# Patient Record
Sex: FEMALE | Race: WHITE | Employment: OTHER | URBAN - METROPOLITAN AREA
[De-identification: names, ages, dates, MRNs, and addresses within clinical notes are randomized per-mention and may not be internally consistent; named-entity substitution may affect disease eponyms.]

---

## 2017-01-13 ENCOUNTER — TELEPHONE (OUTPATIENT)
Dept: INTERNAL MEDICINE CLINIC | Facility: CLINIC | Age: 82
End: 2017-01-13

## 2017-01-13 NOTE — TELEPHONE ENCOUNTER
Cassie Swartz from [facility] health OT calling because due to medicare regulation she is  requesting a verbal order to see the pt next week to complete the evaluation. .. Kip Contreras please advise

## 2017-01-16 ENCOUNTER — TELEPHONE (OUTPATIENT)
Dept: INTERNAL MEDICINE CLINIC | Facility: CLINIC | Age: 82
End: 2017-01-16

## 2017-01-16 NOTE — TELEPHONE ENCOUNTER
Yumiko from [facility] stts she is sending a new order for speech.  Please advise

## 2017-01-18 NOTE — TELEPHONE ENCOUNTER
Left detailed message on confidential voicemail. If Yumiko calls back, ok to trans to [phone] today only or [phone].

## 2017-01-23 ENCOUNTER — TELEPHONE (OUTPATIENT)
Dept: INTERNAL MEDICINE CLINIC | Facility: CLINIC | Age: 82
End: 2017-01-23

## 2017-01-23 ENCOUNTER — PRIOR ORIGINAL RECORDS (OUTPATIENT)
Dept: OTHER | Age: 82
End: 2017-01-23

## 2017-01-23 NOTE — TELEPHONE ENCOUNTER
Monica LUTHER called requesting recent A1C result . Has to be within the last 120 days, if no recent A1C on file a new order will be needed.   Fax # [phone]

## 2017-01-24 NOTE — TELEPHONE ENCOUNTER
Pt has follow up scheduled Friday 01/27 with TIMOTHY.  LOV 10/12 states DM being managed by endocrinologist Dr. Radha Esposito. No A1C on file here. Mason Kapadia notified, she will contact Dr. Kimberley Sharma office. If no recent data can be addressed at office visit. TIMOTHY FYI.

## 2017-01-24 NOTE — TELEPHONE ENCOUNTER
[facility] [address] calling checking status of results of lab work of A1C done in last 3 months. Nurse states she does not have pts complete med list of plan of care for pt since pt is a diabetic.  Nurse states pts family seems to not have all the informa

## 2017-01-27 ENCOUNTER — OFFICE VISIT (OUTPATIENT)
Dept: INTERNAL MEDICINE CLINIC | Facility: CLINIC | Age: 82
End: 2017-01-27

## 2017-01-27 VITALS
TEMPERATURE: 98 F | SYSTOLIC BLOOD PRESSURE: 137 MMHG | HEIGHT: 64 IN | BODY MASS INDEX: 24.59 KG/M2 | WEIGHT: 144 LBS | DIASTOLIC BLOOD PRESSURE: 87 MMHG | HEART RATE: 97 BPM

## 2017-01-27 DIAGNOSIS — I65.29 CAROTID ARTERY PLAQUE, UNSPECIFIED LATERALITY: ICD-10-CM

## 2017-01-27 DIAGNOSIS — R26.89 NEED FOR ASSISTANCE DUE TO UNSTEADY GAIT: ICD-10-CM

## 2017-01-27 DIAGNOSIS — E11.9 TYPE 2 DIABETES MELLITUS WITHOUT COMPLICATION, WITHOUT LONG-TERM CURRENT USE OF INSULIN (HCC): ICD-10-CM

## 2017-01-27 DIAGNOSIS — M15.9 PRIMARY OSTEOARTHRITIS INVOLVING MULTIPLE JOINTS: ICD-10-CM

## 2017-01-27 DIAGNOSIS — Z91.81 AT HIGH RISK FOR FALLS: ICD-10-CM

## 2017-01-27 DIAGNOSIS — H40.009 PREGLAUCOMA, UNSPECIFIED LATERALITY: ICD-10-CM

## 2017-01-27 DIAGNOSIS — N60.19 FIBROCYSTIC BREAST CHANGES, UNSPECIFIED LATERALITY: ICD-10-CM

## 2017-01-27 DIAGNOSIS — Z00.00 ENCOUNTER FOR MEDICARE ANNUAL WELLNESS EXAM: Primary | ICD-10-CM

## 2017-01-27 DIAGNOSIS — I48.0 PAROXYSMAL ATRIAL FIBRILLATION (HCC): ICD-10-CM

## 2017-01-27 PROCEDURE — G0439 PPPS, SUBSEQ VISIT: HCPCS | Performed by: INTERNAL MEDICINE

## 2017-01-27 PROCEDURE — 99213 OFFICE O/P EST LOW 20 MIN: CPT | Performed by: INTERNAL MEDICINE

## 2017-01-27 RX ORDER — CARVEDILOL 6.25 MG/1
3.12 TABLET ORAL DAILY
COMMUNITY
Start: 2017-01-11 | End: 2018-07-18

## 2017-01-27 RX ORDER — CLOTRIMAZOLE 1 %
CREAM (GRAM) TOPICAL AS NEEDED
COMMUNITY
Start: 2017-01-11 | End: 2017-09-07

## 2017-01-27 RX ORDER — FLUOXETINE 10 MG/1
10 CAPSULE ORAL DAILY
COMMUNITY
End: 2017-05-23

## 2017-01-27 NOTE — PROGRESS NOTES
HPI:    Patient ID: Alan Jackson is a 80year old female.     HPI    Patient lazarus in by her daughter and son  She lives alone in a 4 bedrrom home with stairs  She fell and was found on the charity after a few hours   Was hospitalized  And sent home af 152 lb (68.947 kg)  04/29/13 : 153 lb (69.4 kg)  12/14/12 : 153 lb (69.4 kg)  11/30/12 : 155 lb (70.308 kg)    Body mass index is 24.71 kg/(m^2).   /87 mmHg  Pulse 97  Temp(Src) 98.2 °F (36.8 °C) (Oral)  Ht 5' 4\" (1.626 m)  Wt 144 lb (65.318 kg)  BMI of Systems   Constitutional: Positive for fatigue. Negative for fever, chills and activity change. HENT: Negative for ear discharge, nosebleeds, postnasal drip, rhinorrhea, sinus pressure and sore throat.     Eyes: Negative for pain, discharge and redness by mouth daily. Disp: 90 tablet Rfl: 1   oxcarbazepine (TRILEPTAL) 150 MG Oral Tab Take 1 tablet (150 mg total) by mouth daily. Disp: 90 tablet Rfl: 1   RaNITidine HCl 15 MG/ML Oral Syrup Take by mouth 2 (two) times daily.  Disp:  Rfl:    GlipiZIDE ER 2.5 M gallop. No murmur heard. Pulmonary/Chest: Effort normal and breath sounds normal. No respiratory distress. She has no wheezes. She has no rales. She exhibits no tenderness. Abdominal: Soft.  Bowel sounds are normal. She exhibits no distension and no m discussed. Modification of risk for CAD discussed. Patient voiced understanding and agrees with current plan and management. HPI:   Juanito Fraga is a 80year old female who presents for a Medicare Annual Wellness visit.     Patient Active Proble 0-No     Have you had any memory issues?: 0-No    Fall/Risk Scorin    Scoring Interpretation: 4+ At Risk     Depression Screening (PHQ-2/PHQ-9): Over the LAST 2 WEEKS   Little interest or pleasure in doing things (over the last two weeks)?: Not at all mg total) by mouth daily. Disp: 90 tablet Rfl: 1   oxcarbazepine (TRILEPTAL) 150 MG Oral Tab Take 1 tablet (150 mg total) by mouth daily. Disp: 90 tablet Rfl: 1   RaNITidine HCl 15 MG/ML Oral Syrup Take by mouth 2 (two) times daily.  Disp:  Rfl:    Mathew Nicholas are grossly intact    ASSESSMENT AND OTHER RELEVANT CHRONIC CONDITIONS:   Briseida Still is a 80year old female who presents for a Medicare Assessment.      PLAN SUMMARY:   MEDICARE ANNUAL EXAM  SEE ABOVE FOR DETAIL     The patient indicates [place] Immunization Activity if applicable    Pneumococcal   Orders placed or performed in visit on 10/12/16  -PNEUMOCOCCAL IMM (PNEUMOVAX)    Update Immunization Activity if applicable    Hepatitis B No orders found for this or any previous visit.  Update Immuniz reminders to display for this patient. Pap All Patients q2 year if indicated There are no preventive care reminders to display for this patient.    Mammogram Age > 39 q1 year Mammogram,1 Yr due on 11/13/2015   EKG All Patients One at initial exam COMPLED

## 2017-01-30 ENCOUNTER — TELEPHONE (OUTPATIENT)
Dept: INTERNAL MEDICINE CLINIC | Facility: CLINIC | Age: 82
End: 2017-01-30

## 2017-01-30 NOTE — TELEPHONE ENCOUNTER
[facility] Renetta Swift is calling to f/u with Md regarding the pt visit on Friday   HHN need to know exactly what took place at the visit   [facility] also states she has irregular heart beats   Please advise

## 2017-01-30 NOTE — TELEPHONE ENCOUNTER
Spoke to Randee. She has noticed pt has an irregular HR when she sees her, is aware that pt has a history of afib. Wanted to make sure that  in the office on Friday and wondered if there was anything specific that had changed.  Per OV note,  is aware pt

## 2017-01-31 NOTE — TELEPHONE ENCOUNTER
Left a detailed message with orders below to Randee from home health. Instructed to call back if needed.

## 2017-02-01 ENCOUNTER — TELEPHONE (OUTPATIENT)
Dept: INTERNAL MEDICINE CLINIC | Facility: CLINIC | Age: 82
End: 2017-02-01

## 2017-02-01 NOTE — TELEPHONE ENCOUNTER
Per daughter changed pt residential home because her room was too small. Pt is going to [facility]. Daughter is requesting the information needed be faxed to [address] [facility]. Information faxed.   Also daughter is would like to know the st

## 2017-02-01 NOTE — TELEPHONE ENCOUNTER
Daughter would like to speak to to RN or , to discuss physical therapy for the pt and also to get an update on the walker request for the pt.

## 2017-02-02 ENCOUNTER — TELEPHONE (OUTPATIENT)
Dept: INTERNAL MEDICINE CLINIC | Facility: CLINIC | Age: 82
End: 2017-02-02

## 2017-02-02 NOTE — TELEPHONE ENCOUNTER
Sherie Dumont from Occupational Therapy would like to know if she can extend OT for two times per week for two week.

## 2017-02-02 NOTE — TELEPHONE ENCOUNTER
Zachariah ordered  You have to send it to physical therapy taking care of her now  She will need to give me new forms  [facility]

## 2017-02-02 NOTE — TELEPHONE ENCOUNTER
Order faxed to [phone] and daughter notified. Daughter stts to please also send a copy to Jonel . Copy faxed.

## 2017-02-02 NOTE — TELEPHONE ENCOUNTER
Ladarius Almendarez is calling to get Height and weight for pt   Pharm is trying to deliver her walker

## 2017-02-03 NOTE — TELEPHONE ENCOUNTER
LM on verified confidential VM of Jack Vizcaino OT in regards to ok to extend OT two times per week for two weeks. Informed to call back if any further questions or concerns. May transfer to x  until 5 today or i [phone] anytime.

## 2017-02-06 ENCOUNTER — LAB REQUISITION (OUTPATIENT)
Dept: LAB | Facility: HOSPITAL | Age: 82
End: 2017-02-06
Payer: MEDICARE

## 2017-02-06 DIAGNOSIS — E11.9 TYPE 2 DIABETES MELLITUS WITHOUT COMPLICATIONS (HCC): ICD-10-CM

## 2017-02-06 PROCEDURE — 83036 HEMOGLOBIN GLYCOSYLATED A1C: CPT | Performed by: INTERNAL MEDICINE

## 2017-02-07 ENCOUNTER — TELEPHONE (OUTPATIENT)
Dept: FAMILY MEDICINE CLINIC | Facility: CLINIC | Age: 82
End: 2017-02-07

## 2017-02-07 LAB — HBA1C MFR BLD: 6.3 % (ref 4–6)

## 2017-02-07 NOTE — TELEPHONE ENCOUNTER
Randee SALAS states Hemaglobin A1C is 6.3. States when patient was in [place] Mylar was taken off the Glipizide, states family told her she wasn't eating well at that time and blood sugar was controlled.  States tried calling daughter who is in charge of Endocrine

## 2017-02-07 NOTE — TELEPHONE ENCOUNTER
Baltazar Jo is calling from [facility] state that pt had abnormal AC value is 6.3 want to know if any new order that concern   Also state that she will be discharging pt from [facility]  On 2/13 Monday state that pt will still have therapy but there is no Need for Skil

## 2017-02-08 ENCOUNTER — PRIOR ORIGINAL RECORDS (OUTPATIENT)
Dept: OTHER | Age: 82
End: 2017-02-08

## 2017-02-08 NOTE — TELEPHONE ENCOUNTER
S/w @ AM- she states pt did see [facility] over there, will give the msg to [facility]'s nurse to refill this med. Pt not seen in [facility].

## 2017-02-08 NOTE — TELEPHONE ENCOUNTER
LM on verified confidential VM in regards to doctor message below , stated to call back if any further questions or concerns. May transfer to  [phone] if call back.

## 2017-02-09 ENCOUNTER — TELEPHONE (OUTPATIENT)
Dept: FAMILY MEDICINE CLINIC | Facility: CLINIC | Age: 82
End: 2017-02-09

## 2017-02-09 DIAGNOSIS — Z11.1 PPD SCREENING TEST: Primary | ICD-10-CM

## 2017-02-09 NOTE — TELEPHONE ENCOUNTER
Peter Gonsales is calling again. Can we please fax pt's XR chest results from 12/14/2012 to [facility] at fax # [phone]?

## 2017-02-09 NOTE — TELEPHONE ENCOUNTER
Faxed chest x-ray to fax [phone]. Yessenia-daughter advised. Daughter indicated that paperwork was filled out by Dr Davi Dumont for [facility]. [facility]iley Felder will accept that paperwork. Do not see paperwork scanned into chart.  Please advise if paperwork o

## 2017-02-09 NOTE — TELEPHONE ENCOUNTER
Morgan Shultz of [facility] Snider to her New home [facility] is scheduled to Move in there today   FAX # [phone] [facility] [address] Phone # [phone]  Please advise

## 2017-02-09 NOTE — TELEPHONE ENCOUNTER
Pt's daughter Simone Duffy she needs results of chest xray and TB so mother can be admitted to nursing home.  Please advised

## 2017-02-09 NOTE — TELEPHONE ENCOUNTER
[facility] is unable to accept pt. Esau Lares from [facility] is requesting form that was sent to [facility] to be faxed to her.      Fax # [phone] , Latisha Gardner can be reached at [phone]

## 2017-02-09 NOTE — TELEPHONE ENCOUNTER
Daughter needs the original form that was filled out at her first visit. Her Mom will not be able to move in [place]. They will accept the original form if we can find it.   Daughter is very upset and wants a call back ASAP ( advised no one in th

## 2017-02-10 NOTE — TELEPHONE ENCOUNTER
Rafael form filled out  We just got iti today and  The form from another assisted living was not appropriate  The office did not have the correct form before

## 2017-02-10 NOTE — TELEPHONE ENCOUNTER
Advised daughter of Fabiano Reeves note below. Daughter verbalized understanding and appreciated the call.

## 2017-02-10 NOTE — TELEPHONE ENCOUNTER
Per Alexia Lamb of pt, would like to know if form is completed. Pls call back Genna Ramírez at [phone]. Pls advise.

## 2017-02-10 NOTE — TELEPHONE ENCOUNTER
Per Franca Ramos daughter of pt, need a new whole form for the [place] since  pt form for [employer] is missing by the office,  Franca Ramos the daughter of pt is so upset that this needs to be done asap due to office is at fault.   Any question can call Franca Ramos at

## 2017-02-10 NOTE — TELEPHONE ENCOUNTER
FORM COMPLETED AND FAXED TO [facility] [phone], FAXED CONFIRMATION RECEIVED. ORIGINAL FORM PLACED ON DR Ninfa More DESK AT [facility].

## 2017-02-10 NOTE — TELEPHONE ENCOUNTER
Grace/Rafael [place] f/u on the forms. Must have them faxed today before office is closed. Fax:[phone].

## 2017-02-13 ENCOUNTER — TELEPHONE (OUTPATIENT)
Dept: INTERNAL MEDICINE CLINIC | Facility: CLINIC | Age: 82
End: 2017-02-13

## 2017-02-13 NOTE — TELEPHONE ENCOUNTER
Merry from [facility] calling and states patients BP is elevated at 140/92. Pulse is 83. Patient has just taken her BP meds. Franci Lyle states patient is now at [facility] in [address] [place] [address] and they need an order in order to check patients BP for one whole week.

## 2017-02-14 NOTE — TELEPHONE ENCOUNTER
Darlyn Craig RN of Dr. Villareal [place] note. Merry indicated that needed that order faxed to [address] Fax [phone]. Letter generated and faxed.

## 2017-02-15 ENCOUNTER — TELEPHONE (OUTPATIENT)
Dept: INTERNAL MEDICINE CLINIC | Facility: CLINIC | Age: 82
End: 2017-02-15

## 2017-02-15 DIAGNOSIS — R41.3 SHORT-TERM MEMORY LOSS: Primary | ICD-10-CM

## 2017-02-15 NOTE — TELEPHONE ENCOUNTER
Call  DR Fidel oSn or pharmacy  Not in her med list  Refill for a month if you fine exelon in her med list   Makes sure no drug interaction

## 2017-02-15 NOTE — TELEPHONE ENCOUNTER
Per Daly/OT, need to extend Occupational Therapy as pt is living in [place] [address] living for 2 times a wk for 2 wks. Daughter of pt, she would like pt to start a memory patch due to pt is having memory issue and Daly/OT told her to call the office.

## 2017-02-15 NOTE — TELEPHONE ENCOUNTER
Spoke to pt's daughter, Herbert Howard. Informed her of recommendation from Dr. Melody Bates to refer to Neurology. Per daughter, the pt was on the Exelon (Rivastigmine) patch, prescribed by Dr. Latrell Elliott, however the patient was not changing it, so it was discontinued.  Adia Jimenez

## 2017-02-15 NOTE — TELEPHONE ENCOUNTER
Dr. Natasha Hair, please see message from PT at [facility] below and advise.

## 2017-02-15 NOTE — TELEPHONE ENCOUNTER
Estelita, physical therapist with [facility] , called in requesting an extension on pt's PT. Estelita is asking for twice a week for the next three weeks, please.

## 2017-02-15 NOTE — TELEPHONE ENCOUNTER
OK    Will have to eval  For memory issues  Next OV will do MMSE if not done yet to determine need  For  Treatment for Alzheimer  Offer referal to see neurology  meds have side effect   Need to  Give med if there is an indicatiion  referal to neurology pre

## 2017-02-16 NOTE — TELEPHONE ENCOUNTER
Message sent to Dr Devan Cunningham. [facility] pharmacy called as only pharmacy on file for this pt. Pharmacist looked as far back as 2015. No record of ever dispensing this medication for pt.

## 2017-02-16 NOTE — TELEPHONE ENCOUNTER
Dr Melody Bates would like to know if you recall prescribing Exelon ( Rivastigmine) for this pt? No in current EPIC chart past or present meds.   Please advise

## 2017-02-24 ENCOUNTER — HOSPITAL ENCOUNTER (INPATIENT)
Facility: HOSPITAL | Age: 82
LOS: 10 days | Discharge: NURSING, REHAB OR LONG-TERM CARE | DRG: 291 | End: 2017-03-07
Attending: EMERGENCY MEDICINE | Admitting: HOSPITALIST
Payer: MEDICARE

## 2017-02-24 ENCOUNTER — APPOINTMENT (OUTPATIENT)
Dept: GENERAL RADIOLOGY | Facility: HOSPITAL | Age: 82
DRG: 291 | End: 2017-02-24
Attending: EMERGENCY MEDICINE
Payer: MEDICARE

## 2017-02-24 DIAGNOSIS — K29.70 GASTRITIS: ICD-10-CM

## 2017-02-24 DIAGNOSIS — I50.9 HEART FAILURE, UNSPECIFIED HEART FAILURE CHRONICITY, UNSPECIFIED HEART FAILURE TYPE: Primary | ICD-10-CM

## 2017-02-24 DIAGNOSIS — K44.9 HIATAL HERNIA: ICD-10-CM

## 2017-02-24 DIAGNOSIS — K31.7 GASTRIC POLYP: ICD-10-CM

## 2017-02-24 LAB
ALBUMIN SERPL BCP-MCNC: 3.9 G/DL (ref 3.5–4.8)
ALP SERPL-CCNC: 75 U/L (ref 32–100)
ALT SERPL-CCNC: 20 U/L (ref 14–54)
ANION GAP SERPL CALC-SCNC: 11 MMOL/L (ref 0–18)
AST SERPL-CCNC: 22 U/L (ref 15–41)
BASOPHILS # BLD: 0.1 K/UL (ref 0–0.2)
BASOPHILS NFR BLD: 1 %
BILIRUB DIRECT SERPL-MCNC: 0.3 MG/DL (ref 0–0.2)
BILIRUB SERPL-MCNC: 1.1 MG/DL (ref 0.3–1.2)
BNP SERPL-MCNC: 1499 PG/ML (ref 0–100)
BUN SERPL-MCNC: 25 MG/DL (ref 8–20)
BUN/CREAT SERPL: 24 (ref 10–20)
CALCIUM SERPL-MCNC: 9 MG/DL (ref 8.5–10.5)
CHLORIDE SERPL-SCNC: 100 MMOL/L (ref 95–110)
CO2 SERPL-SCNC: 22 MMOL/L (ref 22–32)
CREAT SERPL-MCNC: 1.04 MG/DL (ref 0.5–1.5)
EOSINOPHIL # BLD: 0.1 K/UL (ref 0–0.7)
EOSINOPHIL NFR BLD: 1 %
ERYTHROCYTE [DISTWIDTH] IN BLOOD BY AUTOMATED COUNT: 15.4 % (ref 11–15)
GLUCOSE SERPL-MCNC: 263 MG/DL (ref 70–99)
HCT VFR BLD AUTO: 38.6 % (ref 35–48)
HGB BLD-MCNC: 12.7 G/DL (ref 12–16)
LYMPHOCYTES # BLD: 0.6 K/UL (ref 1–4)
LYMPHOCYTES NFR BLD: 6 %
MCH RBC QN AUTO: 30.8 PG (ref 27–32)
MCHC RBC AUTO-ENTMCNC: 32.9 G/DL (ref 32–37)
MCV RBC AUTO: 93.8 FL (ref 80–100)
MONOCYTES # BLD: 1 K/UL (ref 0–1)
MONOCYTES NFR BLD: 9 %
NEUTROPHILS # BLD AUTO: 9 K/UL (ref 1.8–7.7)
NEUTROPHILS NFR BLD: 84 %
OSMOLALITY UR CALC.SUM OF ELEC: 290 MOSM/KG (ref 275–295)
PLATELET # BLD AUTO: 200 K/UL (ref 140–400)
PMV BLD AUTO: 9.7 FL (ref 7.4–10.3)
POTASSIUM SERPL-SCNC: 4.1 MMOL/L (ref 3.3–5.1)
PROT SERPL-MCNC: 6.2 G/DL (ref 5.9–8.4)
RBC # BLD AUTO: 4.11 M/UL (ref 3.7–5.4)
SODIUM SERPL-SCNC: 133 MMOL/L (ref 136–144)
TROPONIN I SERPL-MCNC: 0.02 NG/ML (ref ?–0.03)
WBC # BLD AUTO: 10.7 K/UL (ref 4–11)

## 2017-02-24 PROCEDURE — 71010 XR CHEST AP PORTABLE  (CPT=71010): CPT

## 2017-02-24 PROCEDURE — 99223 1ST HOSP IP/OBS HIGH 75: CPT | Performed by: HOSPITALIST

## 2017-02-24 RX ORDER — NITROGLYCERIN 20 MG/100ML
50 INJECTION INTRAVENOUS CONTINUOUS
Status: DISCONTINUED | OUTPATIENT
Start: 2017-02-24 | End: 2017-02-24

## 2017-02-24 RX ORDER — FUROSEMIDE 10 MG/ML
40 INJECTION INTRAMUSCULAR; INTRAVENOUS ONCE
Status: COMPLETED | OUTPATIENT
Start: 2017-02-24 | End: 2017-02-24

## 2017-02-24 RX ORDER — FUROSEMIDE 10 MG/ML
20 INJECTION INTRAMUSCULAR; INTRAVENOUS ONCE
Status: DISCONTINUED | OUTPATIENT
Start: 2017-02-24 | End: 2017-02-24

## 2017-02-24 RX ORDER — NITROGLYCERIN 20 MG/100ML
50 INJECTION INTRAVENOUS CONTINUOUS
Status: DISCONTINUED | OUTPATIENT
Start: 2017-02-24 | End: 2017-03-07

## 2017-02-24 RX ORDER — NITROGLYCERIN 20 MG/100ML
INJECTION INTRAVENOUS
Status: COMPLETED
Start: 2017-02-24 | End: 2017-02-24

## 2017-02-25 ENCOUNTER — APPOINTMENT (OUTPATIENT)
Dept: ULTRASOUND IMAGING | Facility: HOSPITAL | Age: 82
DRG: 291 | End: 2017-02-25
Attending: INTERNAL MEDICINE
Payer: MEDICARE

## 2017-02-25 ENCOUNTER — APPOINTMENT (OUTPATIENT)
Dept: CV DIAGNOSTICS | Facility: HOSPITAL | Age: 82
DRG: 291 | End: 2017-02-25
Attending: HOSPITALIST
Payer: MEDICARE

## 2017-02-25 PROBLEM — I50.9 HEART FAILURE, UNSPECIFIED HEART FAILURE CHRONICITY, UNSPECIFIED HEART FAILURE TYPE: Status: ACTIVE | Noted: 2017-02-25

## 2017-02-25 LAB
ANION GAP SERPL CALC-SCNC: 12 MMOL/L (ref 0–18)
BASOPHILS # BLD: 0.1 K/UL (ref 0–0.2)
BASOPHILS NFR BLD: 1 %
BILIRUB UR QL: NEGATIVE
BUN SERPL-MCNC: 20 MG/DL (ref 8–20)
BUN/CREAT SERPL: 18.9 (ref 10–20)
CALCIUM SERPL-MCNC: 9 MG/DL (ref 8.5–10.5)
CHLORIDE SERPL-SCNC: 96 MMOL/L (ref 95–110)
CO2 SERPL-SCNC: 28 MMOL/L (ref 22–32)
COLOR UR: YELLOW
CREAT SERPL-MCNC: 1.06 MG/DL (ref 0.5–1.5)
EOSINOPHIL # BLD: 0.1 K/UL (ref 0–0.7)
EOSINOPHIL NFR BLD: 2 %
ERYTHROCYTE [DISTWIDTH] IN BLOOD BY AUTOMATED COUNT: 14.9 % (ref 11–15)
GLUCOSE BLDC GLUCOMTR-MCNC: 125 MG/DL (ref 70–99)
GLUCOSE BLDC GLUCOMTR-MCNC: 153 MG/DL (ref 70–99)
GLUCOSE BLDC GLUCOMTR-MCNC: 176 MG/DL (ref 70–99)
GLUCOSE BLDC GLUCOMTR-MCNC: 211 MG/DL (ref 70–99)
GLUCOSE SERPL-MCNC: 179 MG/DL (ref 70–99)
GLUCOSE UR-MCNC: NEGATIVE MG/DL
HCT VFR BLD AUTO: 36.8 % (ref 35–48)
HGB BLD-MCNC: 12.5 G/DL (ref 12–16)
KETONES UR-MCNC: NEGATIVE MG/DL
LYMPHOCYTES # BLD: 0.7 K/UL (ref 1–4)
LYMPHOCYTES NFR BLD: 7 %
MAGNESIUM SERPL-MCNC: 1.6 MG/DL (ref 1.8–2.5)
MCH RBC QN AUTO: 31.3 PG (ref 27–32)
MCHC RBC AUTO-ENTMCNC: 33.8 G/DL (ref 32–37)
MCV RBC AUTO: 92.4 FL (ref 80–100)
MONOCYTES # BLD: 1.2 K/UL (ref 0–1)
MONOCYTES NFR BLD: 13 %
MRSA DNA SPEC QL NAA+PROBE: NEGATIVE
NEUTROPHILS # BLD AUTO: 7.3 K/UL (ref 1.8–7.7)
NEUTROPHILS NFR BLD: 77 %
NITRITE UR QL STRIP.AUTO: NEGATIVE
OSMOLALITY UR CALC.SUM OF ELEC: 289 MOSM/KG (ref 275–295)
PH UR: 5 [PH] (ref 5–8)
PLATELET # BLD AUTO: 183 K/UL (ref 140–400)
PMV BLD AUTO: 9.6 FL (ref 7.4–10.3)
POTASSIUM SERPL-SCNC: 3.6 MMOL/L (ref 3.3–5.1)
PROT UR-MCNC: NEGATIVE MG/DL
RBC # BLD AUTO: 3.98 M/UL (ref 3.7–5.4)
RBC #/AREA URNS AUTO: 18 /HPF
SODIUM SERPL-SCNC: 136 MMOL/L (ref 136–144)
SP GR UR STRIP: 1 (ref 1–1.03)
UROBILINOGEN UR STRIP-ACNC: <2
VIT C UR-MCNC: NEGATIVE MG/DL
WBC # BLD AUTO: 9.5 K/UL (ref 4–11)
WBC #/AREA URNS AUTO: 186 /HPF

## 2017-02-25 PROCEDURE — 99223 1ST HOSP IP/OBS HIGH 75: CPT | Performed by: INTERNAL MEDICINE

## 2017-02-25 PROCEDURE — 99233 SBSQ HOSP IP/OBS HIGH 50: CPT | Performed by: HOSPITALIST

## 2017-02-25 PROCEDURE — 76770 US EXAM ABDO BACK WALL COMP: CPT

## 2017-02-25 PROCEDURE — 93306 TTE W/DOPPLER COMPLETE: CPT | Performed by: INTERNAL MEDICINE

## 2017-02-25 PROCEDURE — 93306 TTE W/DOPPLER COMPLETE: CPT

## 2017-02-25 RX ORDER — FLUOXETINE 10 MG/1
10 CAPSULE ORAL DAILY
Status: DISCONTINUED | OUTPATIENT
Start: 2017-02-25 | End: 2017-03-07

## 2017-02-25 RX ORDER — MAGNESIUM SULFATE HEPTAHYDRATE 40 MG/ML
2 INJECTION, SOLUTION INTRAVENOUS ONCE
Status: COMPLETED | OUTPATIENT
Start: 2017-02-25 | End: 2017-02-25

## 2017-02-25 RX ORDER — DEXTROSE MONOHYDRATE 25 G/50ML
50 INJECTION, SOLUTION INTRAVENOUS AS NEEDED
Status: DISCONTINUED | OUTPATIENT
Start: 2017-02-25 | End: 2017-03-07

## 2017-02-25 RX ORDER — 0.9 % SODIUM CHLORIDE 0.9 %
VIAL (ML) INJECTION
Status: DISPENSED
Start: 2017-02-25 | End: 2017-02-25

## 2017-02-25 RX ORDER — FUROSEMIDE 10 MG/ML
20 INJECTION INTRAMUSCULAR; INTRAVENOUS ONCE
Status: DISCONTINUED | OUTPATIENT
Start: 2017-02-25 | End: 2017-02-27

## 2017-02-25 RX ORDER — CARVEDILOL 12.5 MG/1
12.5 TABLET ORAL 2 TIMES DAILY WITH MEALS
Status: DISCONTINUED | OUTPATIENT
Start: 2017-02-25 | End: 2017-03-02

## 2017-02-25 RX ORDER — LOSARTAN POTASSIUM 50 MG/1
50 TABLET ORAL DAILY
Status: DISCONTINUED | OUTPATIENT
Start: 2017-02-25 | End: 2017-03-02

## 2017-02-25 RX ORDER — NITROGLYCERIN 0.4 MG/1
0.4 TABLET SUBLINGUAL EVERY 5 MIN PRN
Status: DISCONTINUED | OUTPATIENT
Start: 2017-02-25 | End: 2017-03-07

## 2017-02-25 RX ORDER — FUROSEMIDE 10 MG/ML
40 INJECTION INTRAMUSCULAR; INTRAVENOUS DAILY
Status: DISCONTINUED | OUTPATIENT
Start: 2017-02-25 | End: 2017-02-25

## 2017-02-25 RX ORDER — FUROSEMIDE 10 MG/ML
40 INJECTION INTRAMUSCULAR; INTRAVENOUS DAILY
Status: DISCONTINUED | OUTPATIENT
Start: 2017-02-26 | End: 2017-02-27

## 2017-02-25 RX ORDER — ASPIRIN 81 MG/1
81 TABLET ORAL DAILY
Status: DISCONTINUED | OUTPATIENT
Start: 2017-02-25 | End: 2017-03-07

## 2017-02-25 RX ORDER — EZETIMIBE AND SIMVASTATIN 10; 10 MG/1; MG/1
1 TABLET ORAL DAILY
Status: DISCONTINUED | OUTPATIENT
Start: 2017-02-25 | End: 2017-03-07

## 2017-02-25 RX ORDER — OXCARBAZEPINE 150 MG/1
150 TABLET, FILM COATED ORAL DAILY
Status: DISCONTINUED | OUTPATIENT
Start: 2017-02-25 | End: 2017-03-07

## 2017-02-25 RX ORDER — POTASSIUM CHLORIDE 20 MEQ/1
40 TABLET, EXTENDED RELEASE ORAL EVERY 4 HOURS
Status: COMPLETED | OUTPATIENT
Start: 2017-02-25 | End: 2017-02-25

## 2017-02-25 RX ORDER — FUROSEMIDE 10 MG/ML
40 INJECTION INTRAMUSCULAR; INTRAVENOUS
Status: DISCONTINUED | OUTPATIENT
Start: 2017-02-25 | End: 2017-02-25

## 2017-02-25 RX ORDER — ACETAMINOPHEN 325 MG/1
650 TABLET ORAL EVERY 6 HOURS PRN
Status: DISCONTINUED | OUTPATIENT
Start: 2017-02-25 | End: 2017-03-07

## 2017-02-25 RX ORDER — GLIPIZIDE 2.5 MG/1
2.5 TABLET, EXTENDED RELEASE ORAL
Status: DISCONTINUED | OUTPATIENT
Start: 2017-02-25 | End: 2017-03-06

## 2017-02-25 RX ORDER — ALPRAZOLAM 0.25 MG/1
0.25 TABLET ORAL EVERY 8 HOURS PRN
Status: DISCONTINUED | OUTPATIENT
Start: 2017-02-25 | End: 2017-03-07

## 2017-02-25 RX ORDER — ONDANSETRON 2 MG/ML
4 INJECTION INTRAMUSCULAR; INTRAVENOUS EVERY 6 HOURS PRN
Status: DISCONTINUED | OUTPATIENT
Start: 2017-02-25 | End: 2017-03-07

## 2017-02-25 RX ORDER — PANTOPRAZOLE SODIUM 40 MG/1
40 TABLET, DELAYED RELEASE ORAL DAILY
Status: DISCONTINUED | OUTPATIENT
Start: 2017-02-25 | End: 2017-03-07

## 2017-02-25 RX ORDER — DOCUSATE SODIUM 100 MG/1
100 CAPSULE, LIQUID FILLED ORAL 2 TIMES DAILY
Status: DISCONTINUED | OUTPATIENT
Start: 2017-02-25 | End: 2017-03-07

## 2017-02-25 NOTE — ED NOTES
Bello catheter continues to drain clear yellow urine via gravity without difficulty. Nitro drip infusing via pump at 100 mcg/min. Patient sleeping on cart with respirations even and unlabored at this time. Monitor continues to display A-fib.   Patient to

## 2017-02-25 NOTE — PROGRESS NOTES
[place] [facility]    Progress Note    Veronica Doherty Patient Status:  Inpatient    1935 MRN [id number]   Location [facility] 2W/SW Attending Amanda Wiggins MD   [address] Day # 1 PCP Violet Montoya MD       SUBJECTIVE:  No CP, reflect congestive changes overlie can't entirely exclude superimposed pneumonia. Findings are new when compared to December 2012 study. 3. Preliminary report was given by I AM AT Radiology.            Meds:     Current Facility-Administered Medications:  on (chronic kidney disease) stage 3, GFR 30-59 ml/min      Plan:     Acute diastolic heart failure  - Started IV lasix bid - change to daily now  - repeat echo pending  - cards consulted  - monitor I/O and daily weights    Uncontrolled diabetes  We will resum

## 2017-02-25 NOTE — PROGRESS NOTES
Diabetes Educator Note:  Met with patient and family to discuss monitoring blood sugars. Pt resides at [facility], receives nursing care where her blood sugars are regularly being checked by staff.   Information provided by her son, who also reports she is n

## 2017-02-25 NOTE — ED NOTES
Ntg paste removed per MD orders and Nitro Drip hung and infusing via pump at 50 mcg/min as per orders. Monitor continues to displays A-fib with rate of 119. Daughter at bedside.

## 2017-02-25 NOTE — PHYSICAL THERAPY NOTE
PHYSICAL THERAPY EVALUATION - INPATIENT     Room Number: 224/224-A  Evaluation Date: 2/25/2017  Type of Evaluation: Initial   Physician Order: PT Eval and Treat    Presenting Problem:  (CHF, BLE edema)  Reason for Therapy: Mobility Dysfunction and Disc consents to sit at EOB, stance in walker, tries to take steps in walker. RN reports pt walked to BR earlier in the day w/ RN assist.     Patient self-stated goal is go back to sleep.      OBJECTIVE  Precautions: None  Fall Risk: High fall risk    WEIGHT KARMA from a bed to a chair (including a wheelchair)?: A Lot   -   Need to walk in hospital room?: A Lot   -   Climbing 3-5 steps with a railing?: A Lot       AM-PAC Score:  Raw Score: 15   PT Approx Degree of Impairment Score: 57.7%   Standardized Score (AM-PAC Goal #3 Patient is able to ambulate 100 feet with assist device: RW at assistance level: CGA   Goal #4    Goal #5    Goal #6    Goal Comments: Goals established on 2/25/2017

## 2017-02-25 NOTE — CONSULTS
[facility]    PATIENT'S NAME: Dianne Holcomb   ATTENDING PHYSICIAN: Isaak Anthony MD   CONSULTING PHYSICIAN: Vic Goldmann, MD   PATIENT ACCOUNT#:   [id number]    LOCATION:  [address] [place] [address] Street #:   [id number]       DATE OF BI losartan 50 mg daily, Trileptal 150 mg daily, Protonix, NovoLog p.r.n., Xanax, Lasix 40 mg IV push b.i.d., Coreg 12.5 mg b.i.d., nitroglycerin p.r.n. SOCIAL HISTORY:  She is a nonsmoker and recently started residing at [place].   The daughter states s a urinalysis along with a baseline renal ultrasound for completion sake. Her current sodium is normal but we will follow. The patient is at risk for cardiorenal syndrome. Discussed the above with the patient, her daughter, and Dr. Marce Dubois.      Dictated B

## 2017-02-25 NOTE — H&P
[facility] Patient Status:  Emergency    1935 MRN [id number]   Location [address] Attending Kym Cantrell MD   [facility] Day # 1 PCP MD ANIKET Edmondson UNITS Oral Cap   Yes No   Sig: Take by mouth. Ezetimibe-Simvastatin (VYTORIN) 10-10 MG Oral Tab   Yes No   Sig: Take 1 tablet by mouth daily. FLUoxetine HCl 10 MG Oral Cap   Yes No   Sig: Take 10 mg by mouth daily.    GlipiZIDE ER 2.5 MG Oral Tablet 24 symmetrical chest wall expansion. Cardiovascular:  Normal rate, regular rhythm, no murmur, no edema. Gastrointestinal:  Soft, non-tender, non-distended, normal bowel sounds, no organomegaly. Lymphatics:  No lymphadenopathy neck, axilla, groin.   Musculos outcome      Farrah Gomez MD  2/25/2017  12:02 AM

## 2017-02-25 NOTE — TRANSITION NOTE
ADMITTED , DROWSY BUT RESPONDS APPROPRIATELY; ORIENTED TO PERSON AND PLACE BUT FORGETS SITUATION EACH TME SHE WAKES U.  RESP EASY AND NON-LABORED WHILE SLEEPING . HR  AFIB. ON AWAKENING, BECOMES ANXIOUS, RESP GRUNTING, HR 'S.    LG OUT FR

## 2017-02-25 NOTE — ED PROVIDER NOTES
Patient Seen in: [facility] AND [facility] Emergency Department    History   Patient presents with:  Swelling Edema (cardiovascular, metabolic)    Stated Complaint: leg swelling, fatigue, sob    HPI     44-year-old female presents for evaluation of bilateral low with breakfast.   Pantoprazole Sodium 40 MG Oral Tab EC,  Take 1 tablet (40 mg total) by mouth daily. oxcarbazepine (TRILEPTAL) 150 MG Oral Tab,  Take 1 tablet (150 mg total) by mouth daily.        Family History   Problem Relation Age of Onset   • Diabet Musculoskeletal: Normal range of motion. Neurological: She is alert and oriented to person, place, and time. No focal deficit   Skin: Skin is warm and dry. No rash noted. Psychiatric: She has a normal mood and affect.    Nursing note and vitals revi peripheral edema    Well-appearing patient, denies chest pain or shortness of breath. Clinically in heart failure. Labs consistent with heart failure as well. Initially discussed with Dr. Tulio Garcia, plan for Lasix 40 mg and telemetry floor admission.   When I

## 2017-02-26 ENCOUNTER — APPOINTMENT (OUTPATIENT)
Dept: GENERAL RADIOLOGY | Facility: HOSPITAL | Age: 82
DRG: 291 | End: 2017-02-26
Attending: INTERNAL MEDICINE
Payer: MEDICARE

## 2017-02-26 LAB
ALBUMIN SERPL BCP-MCNC: 3.4 G/DL (ref 3.5–4.8)
ANION GAP SERPL CALC-SCNC: 7 MMOL/L (ref 0–18)
BASOPHILS # BLD: 0.1 K/UL (ref 0–0.2)
BASOPHILS NFR BLD: 1 %
BUN SERPL-MCNC: 16 MG/DL (ref 8–20)
BUN/CREAT SERPL: 16.8 (ref 10–20)
CALCIUM SERPL-MCNC: 8.8 MG/DL (ref 8.5–10.5)
CHLORIDE SERPL-SCNC: 98 MMOL/L (ref 95–110)
CO2 SERPL-SCNC: 31 MMOL/L (ref 22–32)
CREAT SERPL-MCNC: 0.95 MG/DL (ref 0.5–1.5)
EOSINOPHIL # BLD: 0.2 K/UL (ref 0–0.7)
EOSINOPHIL NFR BLD: 2 %
ERYTHROCYTE [DISTWIDTH] IN BLOOD BY AUTOMATED COUNT: 15.1 % (ref 11–15)
GLUCOSE BLDC GLUCOMTR-MCNC: 142 MG/DL (ref 70–99)
GLUCOSE BLDC GLUCOMTR-MCNC: 151 MG/DL (ref 70–99)
GLUCOSE BLDC GLUCOMTR-MCNC: 166 MG/DL (ref 70–99)
GLUCOSE BLDC GLUCOMTR-MCNC: 251 MG/DL (ref 70–99)
GLUCOSE SERPL-MCNC: 136 MG/DL (ref 70–99)
HCT VFR BLD AUTO: 36.5 % (ref 35–48)
HGB BLD-MCNC: 12.3 G/DL (ref 12–16)
LYMPHOCYTES # BLD: 0.9 K/UL (ref 1–4)
LYMPHOCYTES NFR BLD: 9 %
MAGNESIUM SERPL-MCNC: 2 MG/DL (ref 1.8–2.5)
MCH RBC QN AUTO: 31.2 PG (ref 27–32)
MCHC RBC AUTO-ENTMCNC: 33.6 G/DL (ref 32–37)
MCV RBC AUTO: 92.9 FL (ref 80–100)
MONOCYTES # BLD: 1.2 K/UL (ref 0–1)
MONOCYTES NFR BLD: 13 %
NEUTROPHILS # BLD AUTO: 7.3 K/UL (ref 1.8–7.7)
NEUTROPHILS NFR BLD: 75 %
OSMOLALITY UR CALC.SUM OF ELEC: 285 MOSM/KG (ref 275–295)
PHOSPHATE SERPL-MCNC: 4.3 MG/DL (ref 2.4–4.7)
PLATELET # BLD AUTO: 176 K/UL (ref 140–400)
PMV BLD AUTO: 9.5 FL (ref 7.4–10.3)
POTASSIUM SERPL-SCNC: 4.3 MMOL/L (ref 3.3–5.1)
RBC # BLD AUTO: 3.93 M/UL (ref 3.7–5.4)
SODIUM SERPL-SCNC: 136 MMOL/L (ref 136–144)
WBC # BLD AUTO: 9.7 K/UL (ref 4–11)

## 2017-02-26 PROCEDURE — 99233 SBSQ HOSP IP/OBS HIGH 50: CPT | Performed by: HOSPITALIST

## 2017-02-26 PROCEDURE — 99233 SBSQ HOSP IP/OBS HIGH 50: CPT | Performed by: INTERNAL MEDICINE

## 2017-02-26 PROCEDURE — 71010 XR CHEST AP PORTABLE  (CPT=71010): CPT

## 2017-02-26 RX ORDER — SPIRONOLACTONE 25 MG/1
12.5 TABLET ORAL DAILY
Status: DISCONTINUED | OUTPATIENT
Start: 2017-02-26 | End: 2017-03-02

## 2017-02-26 RX ORDER — 0.9 % SODIUM CHLORIDE 0.9 %
VIAL (ML) INJECTION
Status: DISPENSED
Start: 2017-02-26 | End: 2017-02-26

## 2017-02-26 NOTE — DISCHARGE PLANNING
2/26-MD orders received in regards to discharge planning. The Patient was seen at bedside. The Patient resides alone in [place] in a single family home.   Prior to hospitalization, the Patient was using a walker and/or cane PRN, wasn't driving, but doing

## 2017-02-26 NOTE — PROGRESS NOTES
[place] [facility] - [facility]    Progress Note    Judith Lou Patient Status:  Inpatient    1935 MRN [id number]   Location [facility] 2W/SW Attending Kaley Torrez MD   [facility] Day # 2 PCP George Agee MD       SUBJECTIVE:    No ac and/or pulmonary edema. 2. Left lower lobe/retrocardiac consolidation and/or atelectasis may reflect congestive changes overlie can't entirely exclude superimposed pneumonia. Findings are new when compared to December 2012 study.  3. Preliminary report was ml/min      Plan:     Acute diastolic heart failure  - Started IV lasix bid - changedto daily now  - repeat echo showed decreased EF  - cards consulted  - monitor I/O and daily weights  - will need further work up with new echo findings - cath vs stress te

## 2017-02-26 NOTE — PROGRESS NOTES
Patient seen and examined. Chart reviewed in EPIC. Discussed with night RN.     Impression/Recommendations:  Acute on chronic heart failure with new, significant decline in EF on echo   - LVEF 30% with severe hypokinesis of inferoseptal myocardium (nml EF mg Oral Daily with breakfast   • Losartan Potassium  50 mg Oral Daily   • oxcarbazepine  150 mg Oral Daily   • Pantoprazole Sodium  40 mg Oral Daily   • insulin aspart  1-7 Units Subcutaneous TID CC   • furosemide  20 mg Intravenous Once   • carvedilol  12

## 2017-02-26 NOTE — PROGRESS NOTES
[facility]  Nephrology Daily Progress Note    Briseida Still  [id number]  80year old      HPI:   Briseida Still is a 80year old female. Overall feels better. SOB improved.     ROS:     Constitutional:  Negative for decreased act 02/26/2017   HGB 12.3 02/26/2017   HCT 36.5 02/26/2017    02/26/2017   CREATSERUM 0.95 02/26/2017   BUN 16 02/26/2017    02/26/2017   K 4.3 02/26/2017   CL 98 02/26/2017   CO2 31 02/26/2017    02/26/2017   CA 8.8 02/26/2017   ALB 3.4 02 sodium (COLACE) cap 100 mg, 100 mg, Oral, BID  •  Ezetimibe-Simvastatin (VYTORIN) 10-10 MG tablet 1 tablet, 1 tablet, Oral, Daily  •  FLUoxetine HCl (PROZAC) cap 10 mg, 10 mg, Oral, Daily  •  GlipiZIDE ER (GLUCOTROL) 24 hr tab 2.5 mg, 2.5 mg, Oral, Daily w

## 2017-02-27 ENCOUNTER — APPOINTMENT (OUTPATIENT)
Dept: CV DIAGNOSTICS | Facility: HOSPITAL | Age: 82
DRG: 291 | End: 2017-02-27
Attending: NURSE PRACTITIONER
Payer: MEDICARE

## 2017-02-27 ENCOUNTER — APPOINTMENT (OUTPATIENT)
Dept: NUCLEAR MEDICINE | Facility: HOSPITAL | Age: 82
DRG: 291 | End: 2017-02-27
Attending: NURSE PRACTITIONER
Payer: MEDICARE

## 2017-02-27 LAB
ALBUMIN SERPL BCP-MCNC: 3.4 G/DL (ref 3.5–4.8)
ANION GAP SERPL CALC-SCNC: 11 MMOL/L (ref 0–18)
BASOPHILS # BLD: 0.1 K/UL (ref 0–0.2)
BASOPHILS NFR BLD: 1 %
BUN SERPL-MCNC: 18 MG/DL (ref 8–20)
BUN/CREAT SERPL: 20.7 (ref 10–20)
CALCIUM SERPL-MCNC: 8.9 MG/DL (ref 8.5–10.5)
CHLORIDE SERPL-SCNC: 95 MMOL/L (ref 95–110)
CO2 SERPL-SCNC: 26 MMOL/L (ref 22–32)
CREAT SERPL-MCNC: 0.87 MG/DL (ref 0.5–1.5)
EOSINOPHIL # BLD: 0.2 K/UL (ref 0–0.7)
EOSINOPHIL NFR BLD: 2 %
ERYTHROCYTE [DISTWIDTH] IN BLOOD BY AUTOMATED COUNT: 14.7 % (ref 11–15)
GLUCOSE BLDC GLUCOMTR-MCNC: 145 MG/DL (ref 70–99)
GLUCOSE BLDC GLUCOMTR-MCNC: 156 MG/DL (ref 70–99)
GLUCOSE BLDC GLUCOMTR-MCNC: 196 MG/DL (ref 70–99)
GLUCOSE BLDC GLUCOMTR-MCNC: 260 MG/DL (ref 70–99)
GLUCOSE SERPL-MCNC: 165 MG/DL (ref 70–99)
HBA1C MFR BLD: 6.6 % (ref 4–6)
HCT VFR BLD AUTO: 38.3 % (ref 35–48)
HGB BLD-MCNC: 12.8 G/DL (ref 12–16)
LYMPHOCYTES # BLD: 0.7 K/UL (ref 1–4)
LYMPHOCYTES NFR BLD: 6 %
MAGNESIUM SERPL-MCNC: 1.7 MG/DL (ref 1.8–2.5)
MCH RBC QN AUTO: 31.2 PG (ref 27–32)
MCHC RBC AUTO-ENTMCNC: 33.4 G/DL (ref 32–37)
MCV RBC AUTO: 93.6 FL (ref 80–100)
MONOCYTES # BLD: 1.2 K/UL (ref 0–1)
MONOCYTES NFR BLD: 10 %
NEUTROPHILS # BLD AUTO: 9.6 K/UL (ref 1.8–7.7)
NEUTROPHILS NFR BLD: 82 %
OSMOLALITY UR CALC.SUM OF ELEC: 280 MOSM/KG (ref 275–295)
PHOSPHATE SERPL-MCNC: 4.3 MG/DL (ref 2.4–4.7)
PLATELET # BLD AUTO: 171 K/UL (ref 140–400)
PMV BLD AUTO: 9.4 FL (ref 7.4–10.3)
POTASSIUM SERPL-SCNC: 4 MMOL/L (ref 3.3–5.1)
RBC # BLD AUTO: 4.1 M/UL (ref 3.7–5.4)
SODIUM SERPL-SCNC: 132 MMOL/L (ref 136–144)
WBC # BLD AUTO: 11.8 K/UL (ref 4–11)

## 2017-02-27 PROCEDURE — 93018 CV STRESS TEST I&R ONLY: CPT | Performed by: NUCLEAR MEDICINE

## 2017-02-27 PROCEDURE — 93016 CV STRESS TEST SUPVJ ONLY: CPT | Performed by: INTERNAL MEDICINE

## 2017-02-27 PROCEDURE — 99232 SBSQ HOSP IP/OBS MODERATE 35: CPT | Performed by: INTERNAL MEDICINE

## 2017-02-27 PROCEDURE — 99233 SBSQ HOSP IP/OBS HIGH 50: CPT | Performed by: HOSPITALIST

## 2017-02-27 PROCEDURE — 93018 CV STRESS TEST I&R ONLY: CPT | Performed by: INTERNAL MEDICINE

## 2017-02-27 RX ORDER — FUROSEMIDE 20 MG/1
20 TABLET ORAL DAILY
Status: DISCONTINUED | OUTPATIENT
Start: 2017-02-27 | End: 2017-03-01

## 2017-02-27 RX ORDER — MAGNESIUM SULFATE HEPTAHYDRATE 40 MG/ML
2 INJECTION, SOLUTION INTRAVENOUS ONCE
Status: COMPLETED | OUTPATIENT
Start: 2017-02-27 | End: 2017-02-27

## 2017-02-27 RX ORDER — 0.9 % SODIUM CHLORIDE 0.9 %
VIAL (ML) INJECTION
Status: COMPLETED
Start: 2017-02-27 | End: 2017-02-27

## 2017-02-27 NOTE — PROGRESS NOTES
[place] [facility]    Progress Note    Blanca Lobo Patient Status:  Inpatient    1935 MRN [id number]   Location [facility] 2W/SW Attending Joshua Stacy MD   [facility] Day # 3 PCP Abdon Bailey MD         Assessment and Plan: breakfast   • Losartan Potassium  50 mg Oral Daily   • oxcarbazepine  150 mg Oral Daily   • Pantoprazole Sodium  40 mg Oral Daily   • insulin aspart  1-7 Units Subcutaneous TID CC   • carvedilol  12.5 mg Oral BID with meals   • furosemide  40 mg Intravenou

## 2017-02-27 NOTE — PHYSICAL THERAPY NOTE
Attempted to see pt this am. Pt is multiple test. Will follow up later as schedule allows otherwise will follow up tomorrow.

## 2017-02-27 NOTE — PROGRESS NOTES
[facility]    Progress Note    Isaac Rasmussen Patient Status:  Inpatient    1935 MRN [id number]   Location [facility] 2W/SW Attending Kye Sarkar MD   [facility] Day # 3 PCP Malaika Sidhu MD       SUBJECTIVE:    No ac consistent with congestive failure and/or pulmonary edema. 2. Left lower lobe/retrocardiac consolidation and/or atelectasis may reflect congestive changes overlie can't entirely exclude superimposed pneumonia.  Findings are new when compared to December 201 ml/min      Plan:     Acute diastolic heart failure  - Started IV lasix bid - changed to daily IV and now changed to oral lasix  - repeat echo showed decreased EF  - cards consulted  - monitor I/O and daily weights  - will need further work up with new ech

## 2017-02-27 NOTE — PROGRESS NOTES
[facility]  Nephrology Daily Progress Note    Mimi Juan  [id number]  80year old      HPI:   Mimi Juan is a 80year old female. SOB better.  No CP.      ROS:     Constitutional:  Negative for decreased activity, fever, i Results  Component Value Date   WBC 11.8 02/27/2017   HGB 12.8 02/27/2017   HCT 38.3 02/27/2017    02/27/2017   CREATSERUM 0.87 02/27/2017   BUN 18 02/27/2017    02/27/2017   K 4.0 02/27/2017   CL 95 02/27/2017   CO2 26 02/27/2017    02 (ALDACTONE) tab 12.5 mg, 12.5 mg, Oral, Daily  •  insulin detemir (LEVEMIR) 100 UNIT/ML flextouch 5 Units, 5 Units, Subcutaneous, Nightly  •  ondansetron HCl (ZOFRAN) injection 4 mg, 4 mg, Intravenous, Q6H PRN  •  acetaminophen (TYLENOL) tab 650 mg, 650 mg today. Results pending. Discussed with family.              2/27/2017  Angel Edwards MD

## 2017-02-27 NOTE — PLAN OF CARE
CARDIOVASCULAR - ADULT    • Absence of cardiac arrhythmias or at baseline Not Progressing        Integumentary status not within defined limits    • Pt's integumentary status will be adequate for discharge Not Progressing        Patient/Family Goals    • P

## 2017-02-28 LAB
ALBUMIN SERPL BCP-MCNC: 3.3 G/DL (ref 3.5–4.8)
ANION GAP SERPL CALC-SCNC: 8 MMOL/L (ref 0–18)
BASOPHILS # BLD: 0 K/UL (ref 0–0.2)
BASOPHILS NFR BLD: 0 %
BUN SERPL-MCNC: 23 MG/DL (ref 8–20)
BUN/CREAT SERPL: 29.9 (ref 10–20)
CALCIUM SERPL-MCNC: 8.7 MG/DL (ref 8.5–10.5)
CHLORIDE SERPL-SCNC: 95 MMOL/L (ref 95–110)
CO2 SERPL-SCNC: 30 MMOL/L (ref 22–32)
CREAT SERPL-MCNC: 0.77 MG/DL (ref 0.5–1.5)
EOSINOPHIL # BLD: 0.2 K/UL (ref 0–0.7)
EOSINOPHIL NFR BLD: 2 %
ERYTHROCYTE [DISTWIDTH] IN BLOOD BY AUTOMATED COUNT: 14.6 % (ref 11–15)
GLUCOSE BLDC GLUCOMTR-MCNC: 123 MG/DL (ref 70–99)
GLUCOSE BLDC GLUCOMTR-MCNC: 151 MG/DL (ref 70–99)
GLUCOSE BLDC GLUCOMTR-MCNC: 164 MG/DL (ref 70–99)
GLUCOSE BLDC GLUCOMTR-MCNC: 169 MG/DL (ref 70–99)
GLUCOSE SERPL-MCNC: 146 MG/DL (ref 70–99)
HCT VFR BLD AUTO: 38.7 % (ref 35–48)
HGB BLD-MCNC: 13.2 G/DL (ref 12–16)
LYMPHOCYTES # BLD: 0.8 K/UL (ref 1–4)
LYMPHOCYTES NFR BLD: 8 %
MAGNESIUM SERPL-MCNC: 2.1 MG/DL (ref 1.8–2.5)
MCH RBC QN AUTO: 31.7 PG (ref 27–32)
MCHC RBC AUTO-ENTMCNC: 34 G/DL (ref 32–37)
MCV RBC AUTO: 93.3 FL (ref 80–100)
MONOCYTES # BLD: 1.3 K/UL (ref 0–1)
MONOCYTES NFR BLD: 13 %
NEUTROPHILS # BLD AUTO: 7.8 K/UL (ref 1.8–7.7)
NEUTROPHILS NFR BLD: 77 %
OSMOLALITY UR CALC.SUM OF ELEC: 282 MOSM/KG (ref 275–295)
PHOSPHATE SERPL-MCNC: 4.5 MG/DL (ref 2.4–4.7)
PLATELET # BLD AUTO: 173 K/UL (ref 140–400)
PMV BLD AUTO: 9.7 FL (ref 7.4–10.3)
POTASSIUM SERPL-SCNC: 4 MMOL/L (ref 3.3–5.1)
RBC # BLD AUTO: 4.15 M/UL (ref 3.7–5.4)
SODIUM SERPL-SCNC: 133 MMOL/L (ref 136–144)
WBC # BLD AUTO: 10.1 K/UL (ref 4–11)

## 2017-02-28 PROCEDURE — 99233 SBSQ HOSP IP/OBS HIGH 50: CPT | Performed by: HOSPITALIST

## 2017-02-28 PROCEDURE — 99232 SBSQ HOSP IP/OBS MODERATE 35: CPT | Performed by: INTERNAL MEDICINE

## 2017-02-28 NOTE — PLAN OF CARE
CARDIOVASCULAR - ADULT    • Maintains optimal cardiac output and hemodynamic stability Progressing    • Absence of cardiac arrhythmias or at baseline Progressing    A-fib on tele. VSS.      Diabetes/Glucose Control    • Glucose maintained within prescribed

## 2017-02-28 NOTE — PROGRESS NOTES
[facility]  Nephrology Daily Progress Note    Michelle Job  [id number]  80year old      HPI:   Michelle Job is a 80year old female. SOB better.  No CP.      ROS:     Constitutional:  Negative for decreased activity, fever, i Results  Component Value Date   WBC 10.1 02/28/2017   HGB 13.2 02/28/2017   HCT 38.7 02/28/2017    02/28/2017   CREATSERUM 0.77 02/28/2017   BUN 23 02/28/2017    02/28/2017   K 4.0 02/28/2017   CL 95 02/28/2017   CO2 30 02/28/2017    02 Daily  •  dextrose injection 50 mL, 50 mL, Intravenous, PRN  •  insulin aspart (NOVOLOG) 100 UNIT/ML flexpen 1-7 Units, 1-7 Units, Subcutaneous, TID CC  •  alprazolam (XANAX) tab 0.25 mg, 0.25 mg, Oral, Q8H PRN  •  carvedilol (COREG) tab 12.5 mg, 12.5 mg,

## 2017-02-28 NOTE — PROGRESS NOTES
[facility]    Progress Note    Chemo Rodriguez Patient Status:  Inpatient    1935 MRN [id number]   Location [facility] 3W/SW Attending Jerica Wright MD   [facility] Day # 4 PCP Martha Hale MD         Assessment and Plan: • Pantoprazole Sodium  40 mg Oral Daily   • insulin aspart  1-7 Units Subcutaneous TID CC   • carvedilol  12.5 mg Oral BID with meals             Results:     Lab Results  Component Value Date   WBC 10.1 02/28/2017   HGB 13.2 02/28/2017   HCT 38.7 02/28/

## 2017-02-28 NOTE — PHYSICAL THERAPY NOTE
PHYSICAL THERAPY TREATMENT NOTE - INPATIENT    Room Number: 122/937-T       Presenting Problem:  (CHF, BLE edema)    Problem List  Principal Problem:    Heart failure, unspecified heart failure chronicity, unspecified heart failure type (HCC)  Active Prob Static Sitting: Fair +  Dynamic Sitting: Fair           Static Standing: Fair -  Dynamic Standing: Fair -    ACTIVITY TOLERANCE  Room air O2 sats remained above 90% with activity. RN (Rico) aware.   SpO2 following gait, bathro

## 2017-02-28 NOTE — DIETARY NOTE
ADULT NUTRITION INITIAL ASSESSMENT    Pt is at moderate nutrition risk. Pt does not meet malnutrition criteria.       RECOMMENDATIONS TO MD:  Recommendations to MD: none at this time     NUTRITION DIAGNOSIS/PROBLEM:  Inadequate oral intake related to lack 22.1 kg/(m^2). BMI CLASSIFICATION: 19-24.9 kg/m2 - WNL  IBW: 120 lbs        107% IBW  Usual Body Wt: unsure.  Due to new CHF  WEIGHT HISTORY:  Patient Weight(s) for the past 336 hrs:   Weight   02/28/17 0558 58.423 kg (128 lb 12.8 oz)   02/27/17 0600 63.09

## 2017-02-28 NOTE — PROGRESS NOTES
[facility]    Progress Note    Voncile Slider Patient Status:  Inpatient    1935 MRN [id number]   Location [facility] 2W/SW Attending Dajuan Hyde MD   [facility] Day # 4 PCP Joshua Williamson MD       SUBJECTIVE:    No ac detemir (LEVEMIR) 100 UNIT/ML flextouch 5 Units 5 Units Subcutaneous Nightly   ondansetron HCl (ZOFRAN) injection 4 mg 4 mg Intravenous Q6H PRN   acetaminophen (TYLENOL) tab 650 mg 650 mg Oral Q6H PRN   aspirin EC tab 81 mg 81 mg Oral Daily   docusate sodi secondary to fluid overload, continue Lasix for now despite history of hyponatremia with diuresis.   - monitor bmp    Hyperlipidemia  Continue statin    Dementia without signs of agitation  Continue supportive care will use Haldol as needed for agitation if

## 2017-03-01 LAB
ANION GAP SERPL CALC-SCNC: 9 MMOL/L (ref 0–18)
BASOPHILS # BLD: 0.1 K/UL (ref 0–0.2)
BASOPHILS NFR BLD: 1 %
BUN SERPL-MCNC: 17 MG/DL (ref 8–20)
BUN/CREAT SERPL: 25 (ref 10–20)
CALCIUM SERPL-MCNC: 8.9 MG/DL (ref 8.5–10.5)
CHLORIDE SERPL-SCNC: 94 MMOL/L (ref 95–110)
CO2 SERPL-SCNC: 27 MMOL/L (ref 22–32)
CREAT SERPL-MCNC: 0.68 MG/DL (ref 0.5–1.5)
EOSINOPHIL # BLD: 0.3 K/UL (ref 0–0.7)
EOSINOPHIL NFR BLD: 3 %
ERYTHROCYTE [DISTWIDTH] IN BLOOD BY AUTOMATED COUNT: 15 % (ref 11–15)
GLUCOSE BLDC GLUCOMTR-MCNC: 118 MG/DL (ref 70–99)
GLUCOSE BLDC GLUCOMTR-MCNC: 130 MG/DL (ref 70–99)
GLUCOSE BLDC GLUCOMTR-MCNC: 157 MG/DL (ref 70–99)
GLUCOSE BLDC GLUCOMTR-MCNC: 206 MG/DL (ref 70–99)
GLUCOSE SERPL-MCNC: 129 MG/DL (ref 70–99)
HCT VFR BLD AUTO: 39.2 % (ref 35–48)
HGB BLD-MCNC: 13 G/DL (ref 12–16)
LYMPHOCYTES # BLD: 0.9 K/UL (ref 1–4)
LYMPHOCYTES NFR BLD: 10 %
MCH RBC QN AUTO: 31.1 PG (ref 27–32)
MCHC RBC AUTO-ENTMCNC: 33.3 G/DL (ref 32–37)
MCV RBC AUTO: 93.3 FL (ref 80–100)
MONOCYTES # BLD: 1.3 K/UL (ref 0–1)
MONOCYTES NFR BLD: 14 %
NEUTROPHILS # BLD AUTO: 6.4 K/UL (ref 1.8–7.7)
NEUTROPHILS NFR BLD: 72 %
OSMOLALITY UR CALC.SUM OF ELEC: 273 MOSM/KG (ref 275–295)
PLATELET # BLD AUTO: 178 K/UL (ref 140–400)
PMV BLD AUTO: 9.4 FL (ref 7.4–10.3)
POTASSIUM SERPL-SCNC: 4 MMOL/L (ref 3.3–5.1)
RBC # BLD AUTO: 4.19 M/UL (ref 3.7–5.4)
SODIUM SERPL-SCNC: 130 MMOL/L (ref 136–144)
WBC # BLD AUTO: 8.9 K/UL (ref 4–11)

## 2017-03-01 PROCEDURE — 99232 SBSQ HOSP IP/OBS MODERATE 35: CPT | Performed by: HOSPITALIST

## 2017-03-01 RX ORDER — CALCIUM CARBONATE 200(500)MG
1000 TABLET,CHEWABLE ORAL 3 TIMES DAILY PRN
Status: DISCONTINUED | OUTPATIENT
Start: 2017-03-01 | End: 2017-03-07

## 2017-03-01 RX ORDER — FUROSEMIDE 20 MG/1
20 TABLET ORAL
Status: DISCONTINUED | OUTPATIENT
Start: 2017-03-03 | End: 2017-03-07

## 2017-03-01 NOTE — CONSULTS
PHYSICAL MEDICINE AND REHABILITATION CONSULTATION     CC: Impaired mobility and ADL dysfunction secondary to acute diastolic heart failure      HPI: This is a 80year old woman admitted to [facility] 2/24 for bilateral lower extremity swelling.  Apparently had sto Intravenous Q6H PRN   acetaminophen (TYLENOL) tab 650 mg 650 mg Oral Q6H PRN   [DISCONTINUED] furosemide (LASIX) injection 40 mg 40 mg Intravenous Daily   aspirin EC tab 81 mg 81 mg Oral Daily   [DISCONTINUED] carvedilol (COREG) tab 9.375 mg 9.375 mg Oral [COMPLETED] furosemide (LASIX) injection 40 mg 40 mg Intravenous Once   [COMPLETED] nitroGLYCERIN (NITRO-BID 2 % TD OINT) 2 % ointment 1 inch 1 inch Topical Once   [COMPLETED] Nitroglycerin in D5W 200-5 MCG/ML-% infusion      nitroGLYCERIN infusion 50mg well nourished, NAD  Eyes: conjunctiva/lids without erythema/icterus, pupils are equal and round  E/N/T: No scars noted on bilateral ears, Lips normal, hard of hearing  Neck: supple, symmetrical, no thyromegaly appreciated  Lymph: no cervical and no axilla interdisciplinary team approach, with 24H medical oversight and access to subspeciality management. Pt has long term discharge plan which is home to her [place] apartment.    PT to work on transfers, balance, mobility  OT to work on improving independence with s

## 2017-03-01 NOTE — PLAN OF CARE
CARDIOVASCULAR - ADULT    • Maintains optimal cardiac output and hemodynamic stability Progressing    • Absence of cardiac arrhythmias or at baseline Progressing    1500 mls fluid restriction.      Diabetes/Glucose Control    • Glucose maintained within pre

## 2017-03-01 NOTE — PROGRESS NOTES
[place] [facility]    Progress Note    Cheryle Bryce Patient Status:  Inpatient    1935 MRN [id number]   Location [facility] 3W/SW Attending Yohannes Zimmerman MD   Hosp Day # 5 PCP Chela James MD         Assessment and Mani • Pantoprazole Sodium  40 mg Oral Daily   • insulin aspart  1-7 Units Subcutaneous TID CC   • carvedilol  12.5 mg Oral BID with meals             Results:     Lab Results  Component Value Date   WBC 8.9 03/01/2017   HGB 13.0 03/01/2017   HCT 39.2 03/01/2

## 2017-03-01 NOTE — PROGRESS NOTES
[facility]    Progress Note    Warren Palm Patient Status:  Inpatient    1935 MRN [id number]   Location [facility] 2W/SW Attending Oleg Vargas MD   [facility] Day # 5 PCP Eddie Ellis MD       SUBJECTIVE:    No ac [facility]) injection 4 mg 4 mg Intravenous Q6H PRN   acetaminophen (TYLENOL) tab 650 mg 650 mg Oral Q6H PRN   aspirin EC tab 81 mg 81 mg Oral Daily   docusate sodium (COLACE) cap 100 mg 100 mg Oral BID   Ezetimibe-Simvastatin (VYTORIN) 10-10 MG tablet 1 tabl diuresis.   - monitor bmp    Hyperlipidemia  Continue statin    Dementia without signs of agitation  Continue supportive care will use Haldol as needed for agitation if occurs    CKD stage 3- possible cardiorenal syndrome  - nephro consulted  - checked mike

## 2017-03-01 NOTE — DISCHARGE PLANNING
3/1/17 CM Discharge planning   Pt resides at [place] [id number]. Family requesting rehab at [facility], requested PMR fro rehab recommendation. CM will continue to follow.   Tammy Medina X [phone]

## 2017-03-02 ENCOUNTER — APPOINTMENT (OUTPATIENT)
Dept: GENERAL RADIOLOGY | Facility: HOSPITAL | Age: 82
DRG: 291 | End: 2017-03-02
Attending: HOSPITALIST
Payer: MEDICARE

## 2017-03-02 ENCOUNTER — APPOINTMENT (OUTPATIENT)
Dept: ULTRASOUND IMAGING | Facility: HOSPITAL | Age: 82
DRG: 291 | End: 2017-03-02
Attending: HOSPITALIST
Payer: MEDICARE

## 2017-03-02 LAB
ANION GAP SERPL CALC-SCNC: 10 MMOL/L (ref 0–18)
BASOPHILS # BLD: 0.1 K/UL (ref 0–0.2)
BASOPHILS NFR BLD: 1 %
BILIRUB UR QL: NEGATIVE
BUN SERPL-MCNC: 24 MG/DL (ref 8–20)
BUN/CREAT SERPL: 32 (ref 10–20)
CALCIUM SERPL-MCNC: 9 MG/DL (ref 8.5–10.5)
CHLORIDE SERPL-SCNC: 98 MMOL/L (ref 95–110)
CO2 SERPL-SCNC: 26 MMOL/L (ref 22–32)
COLOR UR: YELLOW
CREAT SERPL-MCNC: 0.75 MG/DL (ref 0.5–1.5)
EOSINOPHIL # BLD: 0.2 K/UL (ref 0–0.7)
EOSINOPHIL NFR BLD: 2 %
ERYTHROCYTE [DISTWIDTH] IN BLOOD BY AUTOMATED COUNT: 14.2 % (ref 11–15)
GLUCOSE BLDC GLUCOMTR-MCNC: 110 MG/DL (ref 70–99)
GLUCOSE BLDC GLUCOMTR-MCNC: 142 MG/DL (ref 70–99)
GLUCOSE BLDC GLUCOMTR-MCNC: 167 MG/DL (ref 70–99)
GLUCOSE BLDC GLUCOMTR-MCNC: 181 MG/DL (ref 70–99)
GLUCOSE SERPL-MCNC: 140 MG/DL (ref 70–99)
GLUCOSE UR-MCNC: NEGATIVE MG/DL
HCT VFR BLD AUTO: 39.6 % (ref 35–48)
HGB BLD-MCNC: 13.3 G/DL (ref 12–16)
KETONES UR-MCNC: NEGATIVE MG/DL
LACTATE SERPL-SCNC: 1.6 MMOL/L (ref 0.5–2.2)
LYMPHOCYTES # BLD: 0.9 K/UL (ref 1–4)
LYMPHOCYTES NFR BLD: 10 %
MCH RBC QN AUTO: 31.1 PG (ref 27–32)
MCHC RBC AUTO-ENTMCNC: 33.7 G/DL (ref 32–37)
MCV RBC AUTO: 92.3 FL (ref 80–100)
MONOCYTES # BLD: 1.1 K/UL (ref 0–1)
MONOCYTES NFR BLD: 13 %
NEUTROPHILS # BLD AUTO: 6.8 K/UL (ref 1.8–7.7)
NEUTROPHILS NFR BLD: 75 %
NITRITE UR QL STRIP.AUTO: NEGATIVE
OSMOLALITY UR CALC.SUM OF ELEC: 284 MOSM/KG (ref 275–295)
PH UR: 5 [PH] (ref 5–8)
PLATELET # BLD AUTO: 187 K/UL (ref 140–400)
PMV BLD AUTO: 9.6 FL (ref 7.4–10.3)
POTASSIUM SERPL-SCNC: 4.1 MMOL/L (ref 3.3–5.1)
PROCALCITONIN SERPL-MCNC: <0.05 NG/ML (ref ?–0.11)
PROT UR-MCNC: NEGATIVE MG/DL
RBC # BLD AUTO: 4.28 M/UL (ref 3.7–5.4)
RBC #/AREA URNS AUTO: 2 /HPF
SODIUM SERPL-SCNC: 134 MMOL/L (ref 136–144)
SP GR UR STRIP: 1.02 (ref 1–1.03)
TROPONIN I SERPL-MCNC: 0.01 NG/ML (ref ?–0.03)
TROPONIN I SERPL-MCNC: 0.03 NG/ML (ref ?–0.03)
UROBILINOGEN UR STRIP-ACNC: <2
VIT C UR-MCNC: NEGATIVE MG/DL
WBC # BLD AUTO: 9.1 K/UL (ref 4–11)
WBC #/AREA URNS AUTO: 124 /HPF

## 2017-03-02 PROCEDURE — 71010 XR CHEST AP PORTABLE  (CPT=71010): CPT

## 2017-03-02 PROCEDURE — 99291 CRITICAL CARE FIRST HOUR: CPT | Performed by: HOSPITALIST

## 2017-03-02 PROCEDURE — 76705 ECHO EXAM OF ABDOMEN: CPT

## 2017-03-02 RX ORDER — LOSARTAN POTASSIUM 25 MG/1
25 TABLET ORAL DAILY
Status: DISCONTINUED | OUTPATIENT
Start: 2017-03-03 | End: 2017-03-07

## 2017-03-02 RX ORDER — SODIUM CHLORIDE 9 MG/ML
INJECTION, SOLUTION INTRAVENOUS
Status: COMPLETED
Start: 2017-03-02 | End: 2017-03-02

## 2017-03-02 RX ORDER — 0.9 % SODIUM CHLORIDE 0.9 %
VIAL (ML) INJECTION
Status: COMPLETED
Start: 2017-03-02 | End: 2017-03-02

## 2017-03-02 RX ORDER — CARVEDILOL 6.25 MG/1
6.25 TABLET ORAL 2 TIMES DAILY WITH MEALS
Status: DISCONTINUED | OUTPATIENT
Start: 2017-03-02 | End: 2017-03-07

## 2017-03-02 NOTE — PHYSICAL THERAPY NOTE
Attempted to work with pt this AM, however rapid response team in room. Per MD (Dr. Randall Cha) at nurses station pt is hypotensive. Spoke with RN Kacey Katz) who stated that we should resume with PT tomorrow but to check with nursing prior to session.

## 2017-03-02 NOTE — SPIRITUAL CARE NOTE
Chp followed up after RRT an hour earlier. Visited with pt's son as pt was asleep. Family appreciates periodic pastoral visits.

## 2017-03-02 NOTE — PROGRESS NOTES
[facility]   Progress Note    Jersey Turner Patient Status:  Inpatient    1935 MRN [id number]   Location [facility] 3W/SW Attending Low Jeff MD   Hosp Day # 6 PCP Irish Maradiaga MD (chronic kidney disease) stage 3, GFR 30-59 ml/min  -BUN slightly up but stable    3) Chronic atrial fibrillation  -no anticoagulation due to fall risk, on baby aspirin  -on Coreg 12.5mg B ID for rate control  -Afib on telemetry with acceptable rates    4)

## 2017-03-02 NOTE — PROGRESS NOTES
[place] [facility] - [facility]    Progress Note    Homa Daily Patient Status:  Inpatient    1935 MRN [id number]   Location [facility] 2W/SW Attending Hardeep Pérez MD   [address] Day # 6 PCP Paul Darden MD       SUBJECTIVE:    No ac Meds:     Current Facility-Administered Medications:  CefTRIAXone Sodium (ROCEPHIN) 1 g in sodium chloride 0.9 % 100 mL IVPB-minibag 1 g Intravenous Q24H   [START ON 3/3/2017] furosemide (LASIX) tab 20 mg 20 mg Oral Once per day on Mon Wed Fri   Calciu sensitive to rocephin  -will give bolus NS 500cc x 1  -monitor BP closely on tele  -check lactic acid  -check PCT  -check repeat u/a, uc  -check CXR  -check EKG  -hold BP meds for SBP <90  -check blood cultures x 2  -check trop 0 and 2 hour  -start on Roce

## 2017-03-02 NOTE — DISCHARGE PLANNING
3/2/17 CM Discharge planning   Per PMR consult recommending acute rehab. Met with dtr at bedside, pt and family in agreement with rehab at [facility]. Referral and medical records faxed to [facility]. Awaiting further orders to arrange rehab transfer.   KR

## 2017-03-03 LAB
ANION GAP SERPL CALC-SCNC: 9 MMOL/L (ref 0–18)
BASOPHILS # BLD: 0.1 K/UL (ref 0–0.2)
BASOPHILS NFR BLD: 1 %
BUN SERPL-MCNC: 21 MG/DL (ref 8–20)
BUN/CREAT SERPL: 28.8 (ref 10–20)
CALCIUM SERPL-MCNC: 9.1 MG/DL (ref 8.5–10.5)
CHLORIDE SERPL-SCNC: 99 MMOL/L (ref 95–110)
CO2 SERPL-SCNC: 26 MMOL/L (ref 22–32)
CREAT SERPL-MCNC: 0.73 MG/DL (ref 0.5–1.5)
EOSINOPHIL # BLD: 0.2 K/UL (ref 0–0.7)
EOSINOPHIL NFR BLD: 2 %
ERYTHROCYTE [DISTWIDTH] IN BLOOD BY AUTOMATED COUNT: 14.2 % (ref 11–15)
GLUCOSE BLDC GLUCOMTR-MCNC: 112 MG/DL (ref 70–99)
GLUCOSE BLDC GLUCOMTR-MCNC: 113 MG/DL (ref 70–99)
GLUCOSE BLDC GLUCOMTR-MCNC: 122 MG/DL (ref 70–99)
GLUCOSE BLDC GLUCOMTR-MCNC: 167 MG/DL (ref 70–99)
GLUCOSE SERPL-MCNC: 120 MG/DL (ref 70–99)
HCT VFR BLD AUTO: 37.2 % (ref 35–48)
HGB BLD-MCNC: 12.5 G/DL (ref 12–16)
LYMPHOCYTES # BLD: 0.9 K/UL (ref 1–4)
LYMPHOCYTES NFR BLD: 11 %
MCH RBC QN AUTO: 31.2 PG (ref 27–32)
MCHC RBC AUTO-ENTMCNC: 33.7 G/DL (ref 32–37)
MCV RBC AUTO: 92.7 FL (ref 80–100)
MONOCYTES # BLD: 0.9 K/UL (ref 0–1)
MONOCYTES NFR BLD: 11 %
NEUTROPHILS # BLD AUTO: 6.1 K/UL (ref 1.8–7.7)
NEUTROPHILS NFR BLD: 75 %
OSMOLALITY UR CALC.SUM OF ELEC: 282 MOSM/KG (ref 275–295)
PLATELET # BLD AUTO: 192 K/UL (ref 140–400)
PMV BLD AUTO: 9.2 FL (ref 7.4–10.3)
POTASSIUM SERPL-SCNC: 4.4 MMOL/L (ref 3.3–5.1)
RBC # BLD AUTO: 4.01 M/UL (ref 3.7–5.4)
SODIUM SERPL-SCNC: 134 MMOL/L (ref 136–144)
VIT B12 SERPL-MCNC: 493 PG/ML (ref 181–914)
WBC # BLD AUTO: 8.1 K/UL (ref 4–11)

## 2017-03-03 PROCEDURE — 99233 SBSQ HOSP IP/OBS HIGH 50: CPT | Performed by: HOSPITALIST

## 2017-03-03 RX ORDER — SODIUM CHLORIDE 9 MG/ML
INJECTION, SOLUTION INTRAVENOUS
Status: COMPLETED
Start: 2017-03-03 | End: 2017-03-03

## 2017-03-03 RX ORDER — 0.9 % SODIUM CHLORIDE 0.9 %
VIAL (ML) INJECTION
Status: COMPLETED
Start: 2017-03-03 | End: 2017-03-03

## 2017-03-03 NOTE — DISCHARGE PLANNING
3/3/17 CM Discharge planning   Updated medical records faxed to [facility]. Spoke with Fidelina Lennon at [facility], requested bed for this weekend, pending medical course. CM will continue to follow.   Elvia Stroud  X [phone]

## 2017-03-03 NOTE — PROGRESS NOTES
[facility]    Progress Note    Waylon Sunshine Patient Status:  Inpatient    1935 MRN [id number]   Location [facility] 2W/SW Attending Nieves Gomez MD   [facility] Day # 7 PCP Dane Hollis MD       SUBJECTIVE:    No ac mL IVPB-minibag 1 g Intravenous Q24H   carvedilol (COREG) tab 6.25 mg 6.25 mg Oral BID with meals   Losartan Potassium (COZAAR) tab 25 mg 25 mg Oral Daily   furosemide (LASIX) tab 20 mg 20 mg Oral Once per day on Mon Wed Fri   Calcium Carbonate Antacid (TU <90  -blood cultures x 2 pending  -checked trop 0 and 2 hour  -started on Rocephin     Nausea, intractable  -u/s GB shows sludge  -check HIDA  -GI asked to see    Acute systolic heart failure EF 30%  - Started on IV lasix bid - changed to daily IV and now

## 2017-03-03 NOTE — PROGRESS NOTES
[facility]    Progress Note    Cheryle Bryce Patient Status:  Inpatient    1935 MRN [id number]   Location [facility] 3W/SW Attending Yohannes Zimmerman MD   Hosp Day # 7 PCP Chela James MD         Assessment and Mani sodium  100 mg Oral BID   • Ezetimibe-Simvastatin  1 tablet Oral Daily   • FLUoxetine HCl  10 mg Oral Daily   • GlipiZIDE ER  2.5 mg Oral Daily with breakfast   • oxcarbazepine  150 mg Oral Daily   • Pantoprazole Sodium  40 mg Oral Daily   • insulin aspart

## 2017-03-03 NOTE — PHYSICAL THERAPY NOTE
PHYSICAL THERAPY TREATMENT NOTE - INPATIENT    Room Number: 808/616-D       Presenting Problem:  (CHF, BLE edema)    Problem List  Principal Problem:    Heart failure, unspecified heart failure chronicity, unspecified heart failure type (HCC)  Active Prob a chair (including a wheelchair)?: A Little   -   Need to walk in hospital room?: A Little   -   Climbing 3-5 steps with a railing?: A Lot    AM-PAC Score:  Raw Score: 17   PT Approx Degree of Impairment Score: 50.57%   Standardized Score (AM-PAC Scale): 4

## 2017-03-03 NOTE — PLAN OF CARE
RRT    *See RRT Documentation Record*    Reason the RRT was called: Hypotension  Assessment of patient leading up to RRT: Pt C/O nausea. Zofran given without relief.   BP 66/52  Interventions/Testing: Bolus 500 cc x1, STAT BC x2, straight cath for UA & CX,

## 2017-03-04 ENCOUNTER — APPOINTMENT (OUTPATIENT)
Dept: NUCLEAR MEDICINE | Facility: HOSPITAL | Age: 82
End: 2017-03-04
Attending: HOSPITALIST
Payer: MEDICARE

## 2017-03-04 LAB
ANION GAP SERPL CALC-SCNC: 7 MMOL/L (ref 0–18)
BASOPHILS # BLD: 0.1 K/UL (ref 0–0.2)
BASOPHILS NFR BLD: 1 %
BUN SERPL-MCNC: 15 MG/DL (ref 8–20)
BUN/CREAT SERPL: 19.5 (ref 10–20)
CALCIUM SERPL-MCNC: 8.9 MG/DL (ref 8.5–10.5)
CHLORIDE SERPL-SCNC: 99 MMOL/L (ref 95–110)
CO2 SERPL-SCNC: 27 MMOL/L (ref 22–32)
CREAT SERPL-MCNC: 0.77 MG/DL (ref 0.5–1.5)
EOSINOPHIL # BLD: 0.2 K/UL (ref 0–0.7)
EOSINOPHIL NFR BLD: 2 %
ERYTHROCYTE [DISTWIDTH] IN BLOOD BY AUTOMATED COUNT: 14.3 % (ref 11–15)
GLUCOSE BLDC GLUCOMTR-MCNC: 124 MG/DL (ref 70–99)
GLUCOSE BLDC GLUCOMTR-MCNC: 126 MG/DL (ref 70–99)
GLUCOSE BLDC GLUCOMTR-MCNC: 132 MG/DL (ref 70–99)
GLUCOSE SERPL-MCNC: 101 MG/DL (ref 70–99)
HCT VFR BLD AUTO: 38.8 % (ref 35–48)
HGB BLD-MCNC: 12.8 G/DL (ref 12–16)
LYMPHOCYTES # BLD: 0.9 K/UL (ref 1–4)
LYMPHOCYTES NFR BLD: 9 %
MCH RBC QN AUTO: 30.8 PG (ref 27–32)
MCHC RBC AUTO-ENTMCNC: 33 G/DL (ref 32–37)
MCV RBC AUTO: 93.4 FL (ref 80–100)
MONOCYTES # BLD: 1 K/UL (ref 0–1)
MONOCYTES NFR BLD: 11 %
NEUTROPHILS # BLD AUTO: 7.3 K/UL (ref 1.8–7.7)
NEUTROPHILS NFR BLD: 77 %
OSMOLALITY UR CALC.SUM OF ELEC: 277 MOSM/KG (ref 275–295)
PLATELET # BLD AUTO: 179 K/UL (ref 140–400)
PMV BLD AUTO: 9.5 FL (ref 7.4–10.3)
POTASSIUM SERPL-SCNC: 4.1 MMOL/L (ref 3.3–5.1)
RBC # BLD AUTO: 4.16 M/UL (ref 3.7–5.4)
SODIUM SERPL-SCNC: 133 MMOL/L (ref 136–144)
WBC # BLD AUTO: 9.4 K/UL (ref 4–11)

## 2017-03-04 PROCEDURE — 99233 SBSQ HOSP IP/OBS HIGH 50: CPT | Performed by: HOSPITALIST

## 2017-03-04 PROCEDURE — 78227 HEPATOBIL SYST IMAGE W/DRUG: CPT

## 2017-03-04 RX ORDER — 0.9 % SODIUM CHLORIDE 0.9 %
VIAL (ML) INJECTION
Status: COMPLETED
Start: 2017-03-04 | End: 2017-03-04

## 2017-03-04 NOTE — PLAN OF CARE
CARDIOVASCULAR - ADULT    • Maintains optimal cardiac output and hemodynamic stability Not Progressing          RESPIRATORY - ADULT    • Achieves optimal ventilation and oxygenation Progressing        SAFETY ADULT - FALL    • Free from fall injury Progress

## 2017-03-04 NOTE — PROGRESS NOTES
[facility]    Progress Note    Judy Garcia Patient Status:  Inpatient    1935 MRN [id number]   Location [facility] 2W/SW Attending Jessica Carter MD   [facility] Day # 8 PCP Fallon Blakely MD       SUBJECTIVE:    Jasbir Miller IVPB-minibag 1 g Intravenous Q24H   carvedilol (COREG) tab 6.25 mg 6.25 mg Oral BID with meals   Losartan Potassium (COZAAR) tab 25 mg 25 mg Oral Daily   furosemide (LASIX) tab 20 mg 20 mg Oral Once per day on Mon Wed Fri   Calcium Carbonate Antacid (TUMS) EKG  -hold BP meds for SBP <90  -blood cultures x 2 negative.   -checked trop 0 and 2 hour  - On rocephin     Nausea, intractable  -u/s GB shows sludge  -check HIDA pending.   - Dr. Prasanth Castillo to see today     Acute systolic heart failure EF 30%  - Started on IV

## 2017-03-04 NOTE — PROGRESS NOTES
[facility]    Progress Note    Briseida Still Patient Status:  Inpatient    1935 MRN [id number]   Location [facility] 3W/SW Attending Tia Wharton MD   Hosp Day # 8 PCP Leigh Ann Chaudhari MD         Assessment and Mani Potassium  25 mg Oral Daily   • furosemide  20 mg Oral Once per day on Mon Wed Fri   • insulin detemir  5 Units Subcutaneous Nightly   • aspirin  81 mg Oral Daily   • docusate sodium  100 mg Oral BID   • Ezetimibe-Simvastatin  1 tablet Oral Daily   • FLUox

## 2017-03-04 NOTE — CONSULTS
REFERRING PHYSICIAN: Dr. Lozada ref. provider found    HPI:         Thank you very much for requesting me to see the patient. Hx per chart and son, Ricki Brown, at bedside.      As you know, Paul Sinclair is a 80year old female who presents today --hx per son -- Alcohol Use: Yes           1.2 oz/week       2 Glasses of wine per week          Current Facility-Administered Medications:  CefTRIAXone Sodium (ROCEPHIN) 1 g in sodium chloride 0.9 % 100 mL IVPB-minibag 1 g Intravenous Q24H   carvedilol (COREG) tab 6.25 no rashes, no suspicious lesions  HEENT: anicteric; no JVD. NECK: supple, no adenopathy, no bruits  LUNGS: clear to auscultation  CARDIO: RRR, nl s1 and s2. No murmers appreciated. GI: Soft, NT, ND, normal BS. NO HSM, masses, hernias or bruits.   EXTREMIT

## 2017-03-04 NOTE — DISCHARGE PLANNING
IDALMIS following up on d/c planning for the patient. The plan is for transfer to [facility] when stable. No d/c orders at this time.      Edwige Parada [facility]  [phone]

## 2017-03-04 NOTE — PLAN OF CARE
Integumentary status not within defined limits    • Pt's integumentary status will be adequate for discharge Adequate for Discharge        Patient Centered Care    • Patient preferences are identified and integrated in the patient's plan of care Adequate f

## 2017-03-05 ENCOUNTER — APPOINTMENT (OUTPATIENT)
Dept: CT IMAGING | Facility: HOSPITAL | Age: 82
DRG: 291 | End: 2017-03-05
Attending: INTERNAL MEDICINE
Payer: MEDICARE

## 2017-03-05 LAB
ALBUMIN SERPL BCP-MCNC: 3.3 G/DL (ref 3.5–4.8)
ALBUMIN/GLOB SERPL: 1.3 {RATIO} (ref 1–2)
ALP SERPL-CCNC: 63 U/L (ref 32–100)
ALT SERPL-CCNC: 23 U/L (ref 14–54)
ANION GAP SERPL CALC-SCNC: 9 MMOL/L (ref 0–18)
AST SERPL-CCNC: 24 U/L (ref 15–41)
BILIRUB SERPL-MCNC: 0.9 MG/DL (ref 0.3–1.2)
BUN SERPL-MCNC: 14 MG/DL (ref 8–20)
BUN/CREAT SERPL: 19.7 (ref 10–20)
CALCIUM SERPL-MCNC: 9 MG/DL (ref 8.5–10.5)
CHLORIDE SERPL-SCNC: 99 MMOL/L (ref 95–110)
CO2 SERPL-SCNC: 26 MMOL/L (ref 22–32)
CREAT SERPL-MCNC: 0.71 MG/DL (ref 0.5–1.5)
GLOBULIN PLAS-MCNC: 2.5 G/DL (ref 2.5–3.7)
GLUCOSE BLDC GLUCOMTR-MCNC: 103 MG/DL (ref 70–99)
GLUCOSE BLDC GLUCOMTR-MCNC: 105 MG/DL (ref 70–99)
GLUCOSE BLDC GLUCOMTR-MCNC: 113 MG/DL (ref 70–99)
GLUCOSE BLDC GLUCOMTR-MCNC: 116 MG/DL (ref 70–99)
GLUCOSE SERPL-MCNC: 111 MG/DL (ref 70–99)
OSMOLALITY UR CALC.SUM OF ELEC: 279 MOSM/KG (ref 275–295)
POTASSIUM SERPL-SCNC: 4 MMOL/L (ref 3.3–5.1)
PROT SERPL-MCNC: 5.8 G/DL (ref 5.9–8.4)
SODIUM SERPL-SCNC: 134 MMOL/L (ref 136–144)

## 2017-03-05 PROCEDURE — 99233 SBSQ HOSP IP/OBS HIGH 50: CPT | Performed by: HOSPITALIST

## 2017-03-05 PROCEDURE — 74177 CT ABD & PELVIS W/CONTRAST: CPT

## 2017-03-05 RX ORDER — 0.9 % SODIUM CHLORIDE 0.9 %
VIAL (ML) INJECTION
Status: COMPLETED
Start: 2017-03-05 | End: 2017-03-05

## 2017-03-05 NOTE — PROGRESS NOTES
[place] [facility] - [facility]    Progress Note    Cheryle Bryce Patient Status:  Inpatient    1935 MRN [id number]   Location [facility] 2W/SW Attending Chris Bobo MD   [facility] Day # 9 PCP Chela James MD       SUBJECTIVE:    Mayco Henson Facility-Administered Medications:  CefTRIAXone Sodium (ROCEPHIN) 1 g in sodium chloride 0.9 % 100 mL IVPB-minibag 1 g Intravenous Q24H   carvedilol (COREG) tab 6.25 mg 6.25 mg Oral BID with meals   Losartan Potassium (COZAAR) tab 25 mg 25 mg Oral Daily - ok  -checked PCT - neg  -check repeat u/a, uc Citrobacter farmeri sens to Rocephin.   -checked EKG  -hold BP meds for SBP <90  -blood cultures x 2 negative.   -checked trop 0 and 2 hour  - On rocephin     Nausea, intractable  -u/s GB shows sludge  -check

## 2017-03-05 NOTE — PLAN OF CARE
Diabetes/Glucose Control    • Glucose maintained within prescribed range Adequate for Discharge        RESPIRATORY - ADULT    • Achieves optimal ventilation and oxygenation Adequate for Discharge          Integumentary status not within defined limits    •

## 2017-03-05 NOTE — PROGRESS NOTES
GI  PROGRESS NOTE    SUBJECTIVE: resting.  No new complaints;       OBJECTIVE:  Temp:  [98.1 °F (36.7 °C)-98.3 °F (36.8 °C)] 98.1 °F (36.7 °C)  Pulse:  [78-83] 83  Resp:  [14-17] 14  BP: (102-114)/(58-97) 112/97 mmHg  Exam  Gen: No acute distress,   Pulm: Gastroenterology.   ___________________________________________________________

## 2017-03-05 NOTE — DISCHARGE PLANNING
SW informed that the patient is not ready for d/c this weekend.      Debora Castillo [facility]  [id number]

## 2017-03-06 ENCOUNTER — SURGERY (OUTPATIENT)
Age: 82
End: 2017-03-06

## 2017-03-06 ENCOUNTER — ANESTHESIA (OUTPATIENT)
Dept: ENDOSCOPY | Facility: HOSPITAL | Age: 82
DRG: 291 | End: 2017-03-06
Payer: MEDICARE

## 2017-03-06 ENCOUNTER — PRIOR ORIGINAL RECORDS (OUTPATIENT)
Dept: OTHER | Age: 82
End: 2017-03-06

## 2017-03-06 ENCOUNTER — ANESTHESIA EVENT (OUTPATIENT)
Dept: ENDOSCOPY | Facility: HOSPITAL | Age: 82
DRG: 291 | End: 2017-03-06
Payer: MEDICARE

## 2017-03-06 ENCOUNTER — APPOINTMENT (OUTPATIENT)
Dept: GENERAL RADIOLOGY | Facility: HOSPITAL | Age: 82
DRG: 291 | End: 2017-03-06
Attending: INTERNAL MEDICINE
Payer: MEDICARE

## 2017-03-06 LAB
ALBUMIN SERPL BCP-MCNC: 3.2 G/DL (ref 3.5–4.8)
ALBUMIN/GLOB SERPL: 1.3 {RATIO} (ref 1–2)
ALP SERPL-CCNC: 61 U/L (ref 32–100)
ALT SERPL-CCNC: 20 U/L (ref 14–54)
ANION GAP SERPL CALC-SCNC: 8 MMOL/L (ref 0–18)
AST SERPL-CCNC: 21 U/L (ref 15–41)
BASOPHILS # BLD: 0.1 K/UL (ref 0–0.2)
BASOPHILS NFR BLD: 1 %
BILIRUB SERPL-MCNC: 0.7 MG/DL (ref 0.3–1.2)
BUN SERPL-MCNC: 11 MG/DL (ref 8–20)
BUN/CREAT SERPL: 17.7 (ref 10–20)
CALCIUM SERPL-MCNC: 8.9 MG/DL (ref 8.5–10.5)
CHLORIDE SERPL-SCNC: 98 MMOL/L (ref 95–110)
CO2 SERPL-SCNC: 26 MMOL/L (ref 22–32)
CREAT SERPL-MCNC: 0.62 MG/DL (ref 0.5–1.5)
EOSINOPHIL # BLD: 0.2 K/UL (ref 0–0.7)
EOSINOPHIL NFR BLD: 2 %
ERYTHROCYTE [DISTWIDTH] IN BLOOD BY AUTOMATED COUNT: 13.7 % (ref 11–15)
GLOBULIN PLAS-MCNC: 2.4 G/DL (ref 2.5–3.7)
GLUCOSE BLDC GLUCOMTR-MCNC: 107 MG/DL (ref 70–99)
GLUCOSE BLDC GLUCOMTR-MCNC: 136 MG/DL (ref 70–99)
GLUCOSE BLDC GLUCOMTR-MCNC: 169 MG/DL (ref 70–99)
GLUCOSE BLDC GLUCOMTR-MCNC: 95 MG/DL (ref 70–99)
GLUCOSE SERPL-MCNC: 106 MG/DL (ref 70–99)
HCT VFR BLD AUTO: 37.8 % (ref 35–48)
HGB BLD-MCNC: 12.8 G/DL (ref 12–16)
LYMPHOCYTES # BLD: 0.8 K/UL (ref 1–4)
LYMPHOCYTES NFR BLD: 10 %
MAGNESIUM SERPL-MCNC: 1.7 MG/DL (ref 1.8–2.5)
MCH RBC QN AUTO: 30.9 PG (ref 27–32)
MCHC RBC AUTO-ENTMCNC: 33.7 G/DL (ref 32–37)
MCV RBC AUTO: 91.7 FL (ref 80–100)
MONOCYTES # BLD: 0.9 K/UL (ref 0–1)
MONOCYTES NFR BLD: 12 %
NEUTROPHILS # BLD AUTO: 5.9 K/UL (ref 1.8–7.7)
NEUTROPHILS NFR BLD: 76 %
OSMOLALITY UR CALC.SUM OF ELEC: 274 MOSM/KG (ref 275–295)
PLATELET # BLD AUTO: 189 K/UL (ref 140–400)
PMV BLD AUTO: 9.5 FL (ref 7.4–10.3)
POTASSIUM SERPL-SCNC: 4 MMOL/L (ref 3.3–5.1)
PROT SERPL-MCNC: 5.6 G/DL (ref 5.9–8.4)
RBC # BLD AUTO: 4.13 M/UL (ref 3.7–5.4)
SODIUM SERPL-SCNC: 132 MMOL/L (ref 136–144)
WBC # BLD AUTO: 7.8 K/UL (ref 4–11)

## 2017-03-06 PROCEDURE — 0DB68ZX EXCISION OF STOMACH, VIA NATURAL OR ARTIFICIAL OPENING ENDOSCOPIC, DIAGNOSTIC: ICD-10-PCS | Performed by: INTERNAL MEDICINE

## 2017-03-06 PROCEDURE — 99232 SBSQ HOSP IP/OBS MODERATE 35: CPT | Performed by: HOSPITALIST

## 2017-03-06 PROCEDURE — 74230 X-RAY XM SWLNG FUNCJ C+: CPT

## 2017-03-06 RX ORDER — LIDOCAINE HYDROCHLORIDE 10 MG/ML
INJECTION, SOLUTION EPIDURAL; INFILTRATION; INTRACAUDAL; PERINEURAL AS NEEDED
Status: DISCONTINUED | OUTPATIENT
Start: 2017-03-06 | End: 2017-03-06 | Stop reason: SURG

## 2017-03-06 RX ORDER — FUROSEMIDE 20 MG/1
10 TABLET ORAL DAILY
Refills: 0 | Status: SHIPPED | COMMUNITY
Start: 2017-03-06 | End: 2017-06-06

## 2017-03-06 RX ORDER — ACETAMINOPHEN 325 MG/1
650 TABLET ORAL EVERY 6 HOURS PRN
Refills: 0 | Status: SHIPPED | COMMUNITY
Start: 2017-03-06 | End: 2019-03-19

## 2017-03-06 RX ORDER — DEXTROSE MONOHYDRATE 25 G/50ML
50 INJECTION, SOLUTION INTRAVENOUS AS NEEDED
Status: DISCONTINUED | OUTPATIENT
Start: 2017-03-06 | End: 2017-03-06

## 2017-03-06 RX ORDER — NALOXONE HYDROCHLORIDE 0.4 MG/ML
80 INJECTION, SOLUTION INTRAMUSCULAR; INTRAVENOUS; SUBCUTANEOUS AS NEEDED
Status: DISCONTINUED | OUTPATIENT
Start: 2017-03-06 | End: 2017-03-06 | Stop reason: HOSPADM

## 2017-03-06 RX ORDER — SODIUM CHLORIDE 9 MG/ML
INJECTION, SOLUTION INTRAVENOUS
Status: COMPLETED
Start: 2017-03-06 | End: 2017-03-07

## 2017-03-06 RX ORDER — CEPHALEXIN 500 MG/1
500 CAPSULE ORAL 2 TIMES DAILY
Qty: 8 CAPSULE | Refills: 0 | Status: SHIPPED | COMMUNITY
Start: 2017-03-06 | End: 2017-06-11 | Stop reason: ALTCHOICE

## 2017-03-06 RX ORDER — ALPRAZOLAM 0.25 MG/1
0.25 TABLET ORAL EVERY 8 HOURS PRN
Qty: 30 TABLET | Refills: 0 | Status: SHIPPED | OUTPATIENT
Start: 2017-03-06 | End: 2017-06-22 | Stop reason: ALTCHOICE

## 2017-03-06 RX ORDER — SODIUM CHLORIDE, SODIUM LACTATE, POTASSIUM CHLORIDE, CALCIUM CHLORIDE 600; 310; 30; 20 MG/100ML; MG/100ML; MG/100ML; MG/100ML
INJECTION, SOLUTION INTRAVENOUS CONTINUOUS PRN
Status: DISCONTINUED | OUTPATIENT
Start: 2017-03-06 | End: 2017-03-06 | Stop reason: SURG

## 2017-03-06 RX ORDER — MAGNESIUM OXIDE 400 MG (241.3 MG MAGNESIUM) TABLET
400 TABLET ONCE
Status: COMPLETED | OUTPATIENT
Start: 2017-03-06 | End: 2017-03-06

## 2017-03-06 RX ORDER — LOSARTAN POTASSIUM 25 MG/1
25 TABLET ORAL DAILY
Refills: 0 | Status: SHIPPED | COMMUNITY
Start: 2017-03-06 | End: 2017-08-04

## 2017-03-06 RX ADMIN — LIDOCAINE HYDROCHLORIDE 25 MG: 10 INJECTION, SOLUTION EPIDURAL; INFILTRATION; INTRACAUDAL; PERINEURAL at 11:06:00

## 2017-03-06 RX ADMIN — SODIUM CHLORIDE, SODIUM LACTATE, POTASSIUM CHLORIDE, CALCIUM CHLORIDE: 600; 310; 30; 20 INJECTION, SOLUTION INTRAVENOUS at 11:14:00

## 2017-03-06 RX ADMIN — SODIUM CHLORIDE, SODIUM LACTATE, POTASSIUM CHLORIDE, CALCIUM CHLORIDE: 600; 310; 30; 20 INJECTION, SOLUTION INTRAVENOUS at 11:03:00

## 2017-03-06 NOTE — SLP NOTE
ADULT VIDEOFLUOROSCOPIC SWALLOWING STUDY    Admission Date: 2/24/2017  Evaluation Date: 03/06/2017  Radiologist: Surya Higgins    PLAN   Diet Recommendations - Solids: Regular  Diet Recommendations - Liquid:  Thin    Further Follow-up:  Follow Up Needed: No functional and timely across all puree, nectar, and thin liquid consistencies. Pt with increased mastication time on hard solid with reduced mastication.  However, pt independently clearing mild lingual residue after the swallow with multiple bolus formatio

## 2017-03-06 NOTE — PROGRESS NOTES
[place] [facility] - [facility]    Progress Note    Virgil Fair Patient Status:  Inpatient    1935 MRN [id number]   Location [facility] 2W/SW Attending Arzella Saint, MD   [address] Day # 10 PCP Roger Kitchen MD       SUBJECTIVE:    Feel Uncomplicated diverticulosis of the sigmoid colon. Distended gallbladder. Mild fatty infiltration of the liver. No obstructive uropathy or kidney stone. Air in the urinary bladder more likely iatrogenic.          Nm Gb Hepatobiliary Scan With Pharmacy  (cpt 0.4 mg Sublingual Q5 Min PRN   nitroGLYCERIN infusion 50mg in D5W 250ml 50 mcg/min Intravenous Continuous         Assessment  Patient Active Problem List:     Atrial fibrillation (HCC)     Preglaucoma     [facility] II diabetes mellitus ([facility]     Heart failure ( checked renal us - image reviewed  - monitor I/O    A-fib  Chronic  -no AC due to falls  -rate controlled    Prophylaxis  Subcutaneous heparin    CODE STATUS  Full    Primary care physician  Malaika Sidhu MD    To rehab tomorrow      >30 min spent with p

## 2017-03-06 NOTE — OPERATIVE REPORT
[facility] HOSP - [facility]    Esophagogastroduodenoscopy Report    Juanito Fraga Patient Status:  Inpatient    1935 MRN [id number]   Location [facility] ENDOSCOPY LAB SUITES Attending Jaci Angeles MD      DATE OF OPERATION:  that patient frequently chokes and coughs when swallowing.     Miriam Callahan MD  3/6/2017  11:15 AM

## 2017-03-06 NOTE — PROGRESS NOTES
[facility]    Progress Note    Joshua Peng Patient Status:  Inpatient    1935 MRN [id number]   Location [facility] 3W/SW Attending Carolann Adams MD   Hosp Day # 10 PCP Ivana Diop MD         Assessment and Pl 150 mg Oral Daily   • Pantoprazole Sodium  40 mg Oral Daily             Results:     Lab Results  Component Value Date   WBC 7.8 03/06/2017   HGB 12.8 03/06/2017   HCT 37.8 03/06/2017    03/06/2017   CREATSERUM 0.62 03/06/2017   BUN 11 03/06/2017

## 2017-03-06 NOTE — ANESTHESIA POSTPROCEDURE EVALUATION
Patient: Warren Palm    Procedure Summary     Date Anesthesia Start Anesthesia Stop Room / Location    03/06/17 1103 1121 [facility] ENDOSCOPY 01 / [facility] ENDOSCOPY       Procedure Diagnosis Surgeon Responsible Provider    ESOPHAGOGASTRODUODENOSCOPY (EGD) (N

## 2017-03-06 NOTE — DISCHARGE PLANNING
3/6/17 CM Discharge planning   Updated medical records faxed to [facility] pt [facility] Awaiting final orders to arrange rehab transfer.   Carlene Cervantes X [phone]

## 2017-03-06 NOTE — H&P
[address]  Patient Status:  Inpatient    1935 MRN [id number]   Location [facility] ENDOSCOPY LAB SUITES Attending Marissa Ryder MD   Hosp Day # 10 PCP MD ANIKET Whaley EC Take 1 tablet (40 mg total) by mouth daily. oxcarbazepine (TRILEPTAL) 150 MG Oral Tab Take 1 tablet (150 mg total) by mouth daily. carvedilol 6.25 MG Oral Tab 6.25 mg 2 (two) times daily. clotrimazole 1 % External Cream as needed.      docusate s

## 2017-03-06 NOTE — ANESTHESIA PREPROCEDURE EVALUATION
Anesthesia PreOp Note    HPI:     Sofía Weinberg is a 80year old female who presents for preoperative consultation requested by: Wali Gonzalez MD    Date of Surgery: 2/24/2017 - 3/6/2017    Procedure(s):  ESOPHAGOGASTRODUODENOSCOPY (EGD)  Indicatio Ezetimibe-Simvastatin (VYTORIN) 10-10 MG Oral Tab Take 1 tablet by mouth daily. Disp:  Rfl:  2/24/2017 at Unknown time   Losartan Potassium 50 MG Oral Tab Take 50 mg by mouth daily.    Disp:  Rfl: 3 2/24/2017 at Unknown time   GlipiZIDE ER 2.5 MG Oral Table furosemide (LASIX) tab 20 mg 20 mg Oral Once per day on Mon Wed Fri Lela Leyva MD 20 mg at 03/03/17 1018    Calcium Carbonate Antacid (TUMS) chewable tab 1,000 mg 1,000 mg Oral TID PRN Choco Peña MD     insulin detemir (LEVEMIR) 100 UNIT/ML • Macular degeneration Sister    • Diabetes Sister    • Glaucoma Neg        Social History   Marital Status:    Spouse Name: N/A    Years of Education: N/A  Number of Children: N/A     Occupational History  None on file     Social History Main Topics (+) neuromuscular disease, TIA,     GI/Hepatic/Renal - negative ROS     Endo/Other    (+) diabetes mellitus type 2,   Abdominal              Anesthesia Plan:   ASA:  3  Plan:   MAC  Informed Consent Plan and Risks Discussed With:  Patient  Discussed plan w

## 2017-03-07 VITALS
HEIGHT: 64 IN | RESPIRATION RATE: 18 BRPM | HEART RATE: 70 BPM | BODY MASS INDEX: 23.1 KG/M2 | OXYGEN SATURATION: 94 % | SYSTOLIC BLOOD PRESSURE: 118 MMHG | DIASTOLIC BLOOD PRESSURE: 71 MMHG | TEMPERATURE: 98 F | WEIGHT: 135.31 LBS

## 2017-03-07 LAB
CREAT SERPL-MCNC: 0.62 MG/DL (ref 0.5–1.5)
GLUCOSE BLDC GLUCOMTR-MCNC: 105 MG/DL (ref 70–99)
GLUCOSE BLDC GLUCOMTR-MCNC: 127 MG/DL (ref 70–99)
MAGNESIUM SERPL-MCNC: 1.6 MG/DL (ref 1.8–2.5)
POTASSIUM SERPL-SCNC: 3.8 MMOL/L (ref 3.3–5.1)

## 2017-03-07 PROCEDURE — 99239 HOSP IP/OBS DSCHRG MGMT >30: CPT | Performed by: HOSPITALIST

## 2017-03-07 RX ORDER — POTASSIUM CHLORIDE 20 MEQ/1
40 TABLET, EXTENDED RELEASE ORAL ONCE
Status: COMPLETED | OUTPATIENT
Start: 2017-03-07 | End: 2017-03-07

## 2017-03-07 RX ORDER — RIVASTIGMINE 4.6 MG/24H
1 PATCH, EXTENDED RELEASE TRANSDERMAL DAILY
Refills: 0 | Status: SHIPPED | COMMUNITY
Start: 2017-03-07 | End: 2017-09-07

## 2017-03-07 RX ORDER — MAGNESIUM OXIDE 400 MG (241.3 MG MAGNESIUM) TABLET
400 TABLET ONCE
Status: COMPLETED | OUTPATIENT
Start: 2017-03-07 | End: 2017-03-07

## 2017-03-07 NOTE — DISCHARGE SUMMARY
[facility]    Discharge Summary    Waylon Sunshine Patient Status:  Inpatient    1935 MRN [id number]   Location [facility] 3W/SW Attending Wilfrido Storey MD   Hosp Day # 6 PCP Dane Hollis MD     Date of Admissi negative.    -checked trop 0 and 2 hour  - On rocephin - change to keflex    Nausea, intractable  Abnormal HIDA scan  -u/s GB shows sludge  -check HIDA with 0% EF  - Dr. Be Rodriguez rec CT and Surgical eval -  Patient does not want surgery - d/w her son Sunday Arabian who Refills:  0         CHANGE how you take these medications       Instructions Prescription details    carvedilol 6.25 MG Tabs   Last time this was given:  6.25 mg on 3/7/2017  8:51 AM   Commonly known as:  COREG   What changed:  Another medication with t by mouth daily. Quantity:  90 tablet   Refills:  1       Pantoprazole Sodium 40 MG Tbec   Last time this was given:  40 mg on 3/7/2017  8:51 AM   Commonly known as:  PROTONIX        Take 1 tablet (40 mg total) by mouth daily.     Quantity:  90 tablet   R

## 2017-03-07 NOTE — DISCHARGE PLANNING
3/7/17 CM Discharge planning /MDO transfer to rehab   Spoke with Dariusz Mayorga at [facility], bed available today at 1:30 pm, room 1111B RN report [phone], transport arranged via [facility].   Met with pt and son at bedside, both aware and in agreement wit

## 2017-03-07 NOTE — PROGRESS NOTES
Report called to [facility] for transfer. Family aware of discharge.  Plan for discharge at 1330 via [address] [facility]

## 2017-03-07 NOTE — PROGRESS NOTES
[place] [facility] - [facility]    Progress Note    Cheryle Bryce Patient Status:  Inpatient    1935 MRN [id number]   Location [facility] 3W/SW Attending Yohannes Zimmerman MD   Hosp Day # 11 PCP Chela James MD         Assessment and Pl FLUoxetine HCl  10 mg Oral Daily   • oxcarbazepine  150 mg Oral Daily   • Pantoprazole Sodium  40 mg Oral Daily             Results:     Lab Results  Component Value Date   WBC 7.8 03/06/2017   HGB 12.8 03/06/2017   HCT 37.8 03/06/2017    03/06/2017

## 2017-04-03 ENCOUNTER — PRIOR ORIGINAL RECORDS (OUTPATIENT)
Dept: OTHER | Age: 82
End: 2017-04-03

## 2017-04-03 ENCOUNTER — TELEPHONE (OUTPATIENT)
Dept: INTERNAL MEDICINE CLINIC | Facility: CLINIC | Age: 82
End: 2017-04-03

## 2017-04-03 ENCOUNTER — LAB ENCOUNTER (OUTPATIENT)
Dept: LAB | Facility: HOSPITAL | Age: 82
End: 2017-04-03
Attending: INTERNAL MEDICINE
Payer: MEDICARE

## 2017-04-03 DIAGNOSIS — I50.9 HF (HEART FAILURE) (HCC): Primary | ICD-10-CM

## 2017-04-03 DIAGNOSIS — R60.9 EDEMA: ICD-10-CM

## 2017-04-03 PROCEDURE — 36415 COLL VENOUS BLD VENIPUNCTURE: CPT

## 2017-04-03 PROCEDURE — 80048 BASIC METABOLIC PNL TOTAL CA: CPT

## 2017-04-04 ENCOUNTER — PRIOR ORIGINAL RECORDS (OUTPATIENT)
Dept: OTHER | Age: 82
End: 2017-04-04

## 2017-04-04 LAB
BUN: 12 MG/DL
CALCIUM: 8.8 MG/DL
CHLORIDE: 90 MEQ/L
CREATININE, SERUM: 0.77 MG/DL
GLUCOSE: 95 MG/DL
POTASSIUM, SERUM: 3.9 MEQ/L
SODIUM: 126 MEQ/L

## 2017-04-05 ENCOUNTER — TELEPHONE (OUTPATIENT)
Dept: INTERNAL MEDICINE CLINIC | Facility: CLINIC | Age: 82
End: 2017-04-05

## 2017-04-05 ENCOUNTER — PRIOR ORIGINAL RECORDS (OUTPATIENT)
Dept: OTHER | Age: 82
End: 2017-04-05

## 2017-04-05 DIAGNOSIS — I50.9 CONGESTIVE HEART FAILURE, UNSPECIFIED CONGESTIVE HEART FAILURE CHRONICITY, UNSPECIFIED CONGESTIVE HEART FAILURE TYPE: Primary | ICD-10-CM

## 2017-04-05 NOTE — TELEPHONE ENCOUNTER
Pt started [facility] yesterday for skilled RN, PT and OT. Pt needs a Tele health machine which include weighing scale, BP and pulse auxiliary due to CHF. Need to know also when was pt A1C with the result?

## 2017-04-06 ENCOUNTER — TELEPHONE (OUTPATIENT)
Dept: FAMILY MEDICINE CLINIC | Facility: CLINIC | Age: 82
End: 2017-04-06

## 2017-04-06 NOTE — TELEPHONE ENCOUNTER
Derrek Skill is calling from [facility] state that pt is developing some upper respiratory infection running productive cough and congestion  Otankita Skill want to know if Dr AGUIRRE can prescribe a antibiotic

## 2017-04-06 NOTE — TELEPHONE ENCOUNTER
Actions Requested: [facility] RN requesting ABX  Situation/Background   Problem:  URI    Onset: 2-3 days   Associated Symptoms: *mod with to yellow sputum    History of Same:  NA   Precipitated By:  Admitted to [facility] 4/3/17     Aggravating/Alleviating Factors:  na

## 2017-04-07 ENCOUNTER — OFFICE VISIT (OUTPATIENT)
Dept: INTERNAL MEDICINE CLINIC | Facility: CLINIC | Age: 82
End: 2017-04-07

## 2017-04-07 ENCOUNTER — HOSPITAL ENCOUNTER (OUTPATIENT)
Dept: GENERAL RADIOLOGY | Age: 82
Discharge: HOME | End: 2017-04-07
Attending: INTERNAL MEDICINE | Admitting: INTERNAL MEDICINE
Payer: MEDICARE

## 2017-04-07 VITALS
OXYGEN SATURATION: 94 % | RESPIRATION RATE: 123 BRPM | BODY MASS INDEX: 23.9 KG/M2 | DIASTOLIC BLOOD PRESSURE: 74 MMHG | HEIGHT: 64 IN | HEART RATE: 89 BPM | WEIGHT: 140 LBS | SYSTOLIC BLOOD PRESSURE: 107 MMHG | TEMPERATURE: 98 F

## 2017-04-07 DIAGNOSIS — R05.9 COUGH: ICD-10-CM

## 2017-04-07 DIAGNOSIS — R05.9 COUGH: Primary | ICD-10-CM

## 2017-04-07 PROCEDURE — 71020 XR CHEST PA + LAT CHEST (CPT=71020): CPT

## 2017-04-07 PROCEDURE — G0463 HOSPITAL OUTPT CLINIC VISIT: HCPCS | Performed by: INTERNAL MEDICINE

## 2017-04-07 PROCEDURE — 99214 OFFICE O/P EST MOD 30 MIN: CPT | Performed by: INTERNAL MEDICINE

## 2017-04-07 RX ORDER — BETHANECHOL CHLORIDE 10 MG/1
1 TABLET ORAL 3 TIMES DAILY
Refills: 0 | COMMUNITY
Start: 2017-03-31 | End: 2017-05-23

## 2017-04-07 RX ORDER — LORATADINE 10 MG/1
1 TABLET ORAL DAILY
Refills: 0 | COMMUNITY
Start: 2017-03-31 | End: 2017-05-02

## 2017-04-07 RX ORDER — DOXYCYCLINE 100 MG/1
100 TABLET ORAL 2 TIMES DAILY
Qty: 20 TABLET | Refills: 0 | Status: SHIPPED | OUTPATIENT
Start: 2017-04-07 | End: 2017-06-11 | Stop reason: ALTCHOICE

## 2017-04-07 RX ORDER — INHALER, ASSIST DEVICES
SPACER (EA) MISCELLANEOUS
Qty: 1 DEVICE | Refills: 0 | Status: SHIPPED | OUTPATIENT
Start: 2017-04-07

## 2017-04-07 RX ORDER — ALBUTEROL SULFATE 90 UG/1
2 AEROSOL, METERED RESPIRATORY (INHALATION) EVERY 6 HOURS PRN
Qty: 1 INHALER | Refills: 0 | Status: SHIPPED | OUTPATIENT
Start: 2017-04-07 | End: 2017-06-22 | Stop reason: ALTCHOICE

## 2017-04-07 NOTE — TELEPHONE ENCOUNTER
Component      Latest Ref Rng 2/27/2017   HEMOGLOBIN A1c      4.0-6.0 % 6.6 (H)     OK to order  requests

## 2017-04-07 NOTE — PROGRESS NOTES
HPI:    Patient ID: Lelo Lares is a 80year old female. Cough  This is a new problem. The current episode started in the past 7 days (3 dyas). The problem has been unchanged. The problem occurs constantly. The cough is productive of sputum.  Per Take 2.5 mg by mouth daily with breakfast. Disp:  Rfl:    oxcarbazepine (TRILEPTAL) 150 MG Oral Tab Take 1 tablet (150 mg total) by mouth daily. Disp: 90 tablet Rfl: 1   Bethanechol Chloride 10 MG Oral Tab Take 1 tablet by mouth 3 (three) times daily.  Disp She is alert. Skin: She is not diaphoretic.               ASSESSMENT/PLAN:   (R05) Cough  (primary encounter diagnosis)  Plan: XR CHEST PA + LAT CHEST (CPT=71020)        Pt has coarse breath sounds and coarse rales both lung fields on exam. She has no sig

## 2017-04-07 NOTE — TELEPHONE ENCOUNTER
Elier Vargas from [facility] notified of Dr. Irma Viera recommendation. He stated he will call the family and inform them.

## 2017-04-10 ENCOUNTER — TELEPHONE (OUTPATIENT)
Dept: INTERNAL MEDICINE CLINIC | Facility: CLINIC | Age: 82
End: 2017-04-10

## 2017-04-10 NOTE — TELEPHONE ENCOUNTER
Per Aleks/RN, would like an RN to contact [facility] Tel# [phone]. to give Order for blood sugars.

## 2017-04-11 NOTE — TELEPHONE ENCOUNTER
Dr. Lina Snyder, please see [facility] message below and advise regarding orders for BS at [facility].

## 2017-04-11 NOTE — TELEPHONE ENCOUNTER
Yifan Coppola from [place] [address] stated the patient is living at [facility] in [place] and they need an order to either check her blood sugar or not check her blood sugar. The Patient is on Glipizide 2.5 mg daily.      We need to call the nursing station

## 2017-04-15 NOTE — TELEPHONE ENCOUNTER
glucose were within normal in the hospital  Check glucose premeal once a week  Call MD ir glucose is 180 or more

## 2017-04-19 ENCOUNTER — TELEPHONE (OUTPATIENT)
Dept: INTERNAL MEDICINE CLINIC | Facility: CLINIC | Age: 82
End: 2017-04-19

## 2017-04-19 NOTE — TELEPHONE ENCOUNTER
Faina Kasper from [facility], called in requesting a verbal order to extend pt's OT services with home health for once a week, for the next three weeks, going into effect starting next week. Please advise.

## 2017-04-20 NOTE — TELEPHONE ENCOUNTER
Noted, [facility] called LM with verbal order on confidential VM and asked to call back if additional info needed.

## 2017-04-25 ENCOUNTER — TELEPHONE (OUTPATIENT)
Dept: INTERNAL MEDICINE CLINIC | Facility: CLINIC | Age: 82
End: 2017-04-25

## 2017-04-25 NOTE — TELEPHONE ENCOUNTER
Per Sarah/RN need the written Order that to monitor the sugar level of pt wkly, pls fax it to [phone]. Sarah/RN can not get the device until they can receive the order for pt.

## 2017-04-25 NOTE — TELEPHONE ENCOUNTER
Roseanna Whalen RN from [facility] will be faxing you a form to fill out to [phone] . See her request below. She stated she can not accept a verbal order. Pls fax back to them . Thanks

## 2017-04-27 ENCOUNTER — TELEPHONE (OUTPATIENT)
Dept: INTERNAL MEDICINE CLINIC | Facility: CLINIC | Age: 82
End: 2017-04-27

## 2017-05-02 LAB
BUN: 11 MG/DL
CREATININE, SERUM: 0.62 MG/DL
GLUCOSE: 106 MG/DL
POTASSIUM, SERUM: 4 MEQ/L
SGOT (AST): 21 IU/L
SGPT (ALT): 20 IU/L
SODIUM: 132 MEQ/L

## 2017-05-03 RX ORDER — LORATADINE 10 MG/1
TABLET ORAL
Qty: 30 TABLET | Refills: 5 | Status: SHIPPED | OUTPATIENT
Start: 2017-05-03 | End: 2017-10-24

## 2017-05-03 RX ORDER — MELATONIN
Qty: 30 TABLET | Refills: 5 | Status: SHIPPED | OUTPATIENT
Start: 2017-05-03 | End: 2017-10-24

## 2017-05-03 RX ORDER — PANTOPRAZOLE SODIUM 40 MG/1
TABLET, DELAYED RELEASE ORAL
Qty: 30 TABLET | Refills: 5 | Status: SHIPPED | OUTPATIENT
Start: 2017-05-03 | End: 2017-10-03

## 2017-05-03 RX ORDER — GLIPIZIDE 2.5 MG/1
TABLET, EXTENDED RELEASE ORAL
Qty: 30 TABLET | Refills: 5 | Status: SHIPPED | OUTPATIENT
Start: 2017-05-03 | End: 2017-09-07

## 2017-05-04 RX ORDER — GLIPIZIDE 2.5 MG/1
2.5 TABLET, EXTENDED RELEASE ORAL
Qty: 60 TABLET | Refills: 0 | OUTPATIENT
Start: 2017-05-04

## 2017-05-04 RX ORDER — LORATADINE 10 MG/1
10 TABLET ORAL DAILY
Qty: 30 TABLET | Refills: 0 | OUTPATIENT
Start: 2017-05-04

## 2017-05-04 RX ORDER — PANTOPRAZOLE SODIUM 40 MG/1
40 TABLET, DELAYED RELEASE ORAL DAILY
Qty: 90 TABLET | Refills: 1 | OUTPATIENT
Start: 2017-05-04

## 2017-05-04 NOTE — TELEPHONE ENCOUNTER
Pending Prescriptions Disp Refills    GlipiZIDE ER 2.5 MG Oral Tablet 24 Hr 60 tablet 0     Sig: Take 1 tablet (2.5 mg total) by mouth daily with breakfast.      loratadine 10 MG Oral Tab 30 tablet 0     Sig: Take 1 tablet (10 mg total) by mouth daily.

## 2017-05-12 ENCOUNTER — TELEPHONE (OUTPATIENT)
Dept: INTERNAL MEDICINE CLINIC | Facility: CLINIC | Age: 82
End: 2017-05-12

## 2017-05-13 NOTE — TELEPHONE ENCOUNTER
Please note. Thank you. [facility] nurse (Estelita) returned call and MD message for approval to discharge from services was relayed to her.     Estelita verbalized understanding but wanted to inform the doctor that she missed the visit with the pt yesterday and will

## 2017-05-24 RX ORDER — OXCARBAZEPINE 150 MG/1
TABLET, FILM COATED ORAL
Qty: 90 TABLET | Refills: 0 | Status: SHIPPED | OUTPATIENT
Start: 2017-05-24 | End: 2017-08-08

## 2017-05-24 RX ORDER — FLUOXETINE 10 MG/1
CAPSULE ORAL
Qty: 90 CAPSULE | Refills: 0 | Status: SHIPPED | OUTPATIENT
Start: 2017-05-24 | End: 2017-08-08

## 2017-05-24 RX ORDER — BETHANECHOL CHLORIDE 10 MG/1
TABLET ORAL
Qty: 90 TABLET | Refills: 1 | Status: SHIPPED | OUTPATIENT
Start: 2017-05-24 | End: 2017-06-27

## 2017-06-01 ENCOUNTER — PRIOR ORIGINAL RECORDS (OUTPATIENT)
Dept: OTHER | Age: 82
End: 2017-06-01

## 2017-06-06 NOTE — TELEPHONE ENCOUNTER
Hypertensive Medications:  .Refill protocol failed because the patient did not meet the protocol criteria.  Please advise in regards to refill request - not mentioned in LOV with MMP      Protocol Criteria:  · Appointment scheduled in the past 6 months or i

## 2017-06-08 RX ORDER — FUROSEMIDE 20 MG/1
TABLET ORAL
Qty: 15 TABLET | Refills: 5 | Status: SHIPPED | OUTPATIENT
Start: 2017-06-08 | End: 2017-10-31

## 2017-06-11 ENCOUNTER — HOSPITAL ENCOUNTER (OUTPATIENT)
Age: 82
Discharge: HOME | End: 2017-06-11
Attending: EMERGENCY MEDICINE
Payer: MEDICARE

## 2017-06-11 VITALS
TEMPERATURE: 97 F | SYSTOLIC BLOOD PRESSURE: 143 MMHG | WEIGHT: 143 LBS | DIASTOLIC BLOOD PRESSURE: 82 MMHG | BODY MASS INDEX: 25 KG/M2 | HEART RATE: 84 BPM | RESPIRATION RATE: 12 BRPM | OXYGEN SATURATION: 96 %

## 2017-06-11 DIAGNOSIS — B02.8 HERPES ZOSTER WITH COMPLICATION: Primary | ICD-10-CM

## 2017-06-11 PROCEDURE — 99204 OFFICE O/P NEW MOD 45 MIN: CPT

## 2017-06-11 PROCEDURE — 99213 OFFICE O/P EST LOW 20 MIN: CPT

## 2017-06-11 RX ORDER — SENNOSIDES 8.6 MG
17.2 TABLET ORAL DAILY PRN
COMMUNITY

## 2017-06-11 RX ORDER — PREDNISONE 50 MG/1
50 TABLET ORAL DAILY
Qty: 5 TABLET | Refills: 0 | Status: SHIPPED | OUTPATIENT
Start: 2017-06-11 | End: 2017-06-16

## 2017-06-11 RX ORDER — LIDOCAINE 50 MG/G
PATCH TOPICAL
Qty: 30 PATCH | Refills: 0 | Status: SHIPPED | OUTPATIENT
Start: 2017-06-11 | End: 2018-07-18 | Stop reason: ALTCHOICE

## 2017-06-11 RX ORDER — VALACYCLOVIR HYDROCHLORIDE 1 G/1
1 TABLET, FILM COATED ORAL 3 TIMES DAILY
Qty: 21 TABLET | Refills: 0 | Status: SHIPPED | OUTPATIENT
Start: 2017-06-11 | End: 2017-06-18

## 2017-06-11 NOTE — ED PROVIDER NOTES
Patient Seen in: 5 [facility]    History   Patient presents with:  Rash Skin Problem (integumentary)    Stated Complaint: Poss Shingles    HPI    80year old female complains of painful red rash to left flank starting today b Albuterol Sulfate HFA (PROAIR HFA) 108 (90 Base) MCG/ACT Inhalation Aero Soln,  Inhale 2 puffs into the lungs every 6 (six) hours as needed for Wheezing or Shortness of Breath.    Spacer/Aero-Holding Chambers (AEROCHAMBER MINI CHAMBER) Does not apply Devic O2 Device 06/11/17 1126 None (Room air)       Current:/82 mmHg  Pulse 84  Temp(Src) 97 °F (36.1 °C) (Oral)  Resp 12  Wt 64.864 kg  SpO2 96%        Physical Exam   Constitutional: She is oriented to person, place, and time. She is cooperative.   Non-to Impression:  After history, physical and any above immediate care workup, the patient was found to have Herpes zoster with complication  (primary encounter diagnosis)    Plan: lidoderm patch for pain, prednisone and valacyclovir. F.u pmd Supportive care.  A

## 2017-06-11 NOTE — ED INITIAL ASSESSMENT (HPI)
PATIENT ARRIVED AMBULATORY WITH WALKER TO ROOM WITH SON. SON STATES \"SHE'S AT [facility] AND THEY SAID I SHOULD BRING HER IN FOR POSSIBLE SHINGLES\" +RASH TO THE LEFT FLANK. +PAIN WITH RASH. NO FEVERS. PT ALERT AND ORIENTED X3. HARD OF HEARING.

## 2017-06-14 ENCOUNTER — TELEPHONE (OUTPATIENT)
Dept: INTERNAL MEDICINE CLINIC | Facility: CLINIC | Age: 82
End: 2017-06-14

## 2017-06-14 NOTE — TELEPHONE ENCOUNTER
Please advise. Thank you. Pt's daughter Bina Kramer) was contacted and informed of the medication being sent to the pharmacy on file. Cady Talamantes asked if they should continue with the Lidocaine patches and should they be giving her Tylenol for the pain?  Arik Fylnn

## 2017-06-14 NOTE — TELEPHONE ENCOUNTER
i sent neurontin to pharmacy  ERX sent  100 mg po tid  Neuralgia  Regular pain meds usually do not work for nerve pain

## 2017-06-14 NOTE — TELEPHONE ENCOUNTER
Changed from orders call to acute. Pt's daughter [facility] is calling stating that the pt has shingles. They gave her tylenol for the pain. Was this ok to do?

## 2017-06-14 NOTE — TELEPHONE ENCOUNTER
Willian from [employer] and also needs an order for a pain medication, because the pt has shingles.

## 2017-06-18 ENCOUNTER — HOSPITAL ENCOUNTER (EMERGENCY)
Facility: HOSPITAL | Age: 82
Discharge: HOME | End: 2017-06-19
Attending: EMERGENCY MEDICINE
Payer: MEDICARE

## 2017-06-18 ENCOUNTER — APPOINTMENT (OUTPATIENT)
Dept: CT IMAGING | Facility: HOSPITAL | Age: 82
End: 2017-06-18
Attending: EMERGENCY MEDICINE
Payer: MEDICARE

## 2017-06-18 DIAGNOSIS — S01.01XA SCALP LACERATION, INITIAL ENCOUNTER: ICD-10-CM

## 2017-06-18 DIAGNOSIS — N30.00 ACUTE CYSTITIS WITHOUT HEMATURIA: ICD-10-CM

## 2017-06-18 DIAGNOSIS — S09.90XA HEAD INJURY, INITIAL ENCOUNTER: Primary | ICD-10-CM

## 2017-06-18 PROCEDURE — 72125 CT NECK SPINE W/O DYE: CPT | Performed by: EMERGENCY MEDICINE

## 2017-06-18 PROCEDURE — 99284 EMERGENCY DEPT VISIT MOD MDM: CPT

## 2017-06-18 PROCEDURE — 70450 CT HEAD/BRAIN W/O DYE: CPT | Performed by: EMERGENCY MEDICINE

## 2017-06-18 PROCEDURE — 12001 RPR S/N/AX/GEN/TRNK 2.5CM/<: CPT

## 2017-06-18 PROCEDURE — 87086 URINE CULTURE/COLONY COUNT: CPT | Performed by: EMERGENCY MEDICINE

## 2017-06-18 PROCEDURE — 87186 SC STD MICRODIL/AGAR DIL: CPT | Performed by: EMERGENCY MEDICINE

## 2017-06-18 PROCEDURE — 81001 URINALYSIS AUTO W/SCOPE: CPT | Performed by: EMERGENCY MEDICINE

## 2017-06-18 RX ORDER — CEPHALEXIN 500 MG/1
500 CAPSULE ORAL 4 TIMES DAILY
Qty: 28 CAPSULE | Refills: 0 | Status: SHIPPED | OUTPATIENT
Start: 2017-06-18 | End: 2017-06-25

## 2017-06-18 RX ORDER — LIDOCAINE HYDROCHLORIDE AND EPINEPHRINE 20; 5 MG/ML; UG/ML
INJECTION, SOLUTION EPIDURAL; INFILTRATION; INTRACAUDAL; PERINEURAL
Status: COMPLETED
Start: 2017-06-18 | End: 2017-06-19

## 2017-06-19 VITALS
TEMPERATURE: 98 F | SYSTOLIC BLOOD PRESSURE: 127 MMHG | RESPIRATION RATE: 16 BRPM | OXYGEN SATURATION: 94 % | HEIGHT: 64 IN | WEIGHT: 143 LBS | BODY MASS INDEX: 24.41 KG/M2 | HEART RATE: 87 BPM | DIASTOLIC BLOOD PRESSURE: 76 MMHG

## 2017-06-19 RX ORDER — CEPHALEXIN 500 MG/1
500 CAPSULE ORAL ONCE
Status: COMPLETED | OUTPATIENT
Start: 2017-06-19 | End: 2017-06-19

## 2017-06-19 NOTE — ED PROVIDER NOTES
Patient Seen in: [facility] AND [facility] Emergency Department    History   Patient presents with:  Fall (musculoskeletal, neurologic)    Stated Complaint: Fall    HPI    42-year-old female on aspirin for history of atrial fibrillation who lives alone at [facility] [place] DAILY   PANTOPRAZOLE SODIUM 40 MG Oral Tab EC,  TAKE 1 TABLET BY MOUTH DAILY   MAGNESIUM OXIDE 400 (240 Mg) MG Oral Tab,  TAKE 1 TABLET BY MOUTH DAILY   GLIPIZIDE ER 2.5 MG Oral Tablet 24 Hr,  TAKE 1 TABLET BY MOUTH DAILY   LORATADINE 10 MG Oral Tab,  TAKE agreed except as otherwise stated in HPI.     Physical Exam       ED Triage Vitals   BP 06/18/17 2220 132/65 mmHg   Pulse 06/18/17 2220 88   Resp 06/18/17 2220 16   Temp 06/18/17 2220 98.1 °F (36.7 °C)   Temp src 06/18/17 2220 Oral   SpO2 06/18/17 2220 95 % calvarial fracture  Moderate cervical atrophy, minimal periventricular regions of white matter ischemic low density  Right posterior scalp contusion/laceration  No abnormal fluid in the visualized portions paranasal sinuses and mastoid air cells    Cervica Refills: 0

## 2017-06-19 NOTE — ED INITIAL ASSESSMENT (HPI)
Pt to ED via EMS after she went to pick something up and lost her balance. Patient hit her head on the table- small superficial LAC to side of head; no active bleeding. No LOC. No dizziness, vision changes, n/v or weakness.

## 2017-06-22 ENCOUNTER — OFFICE VISIT (OUTPATIENT)
Dept: INTERNAL MEDICINE CLINIC | Facility: CLINIC | Age: 82
End: 2017-06-22

## 2017-06-22 VITALS — TEMPERATURE: 99 F | BODY MASS INDEX: 24.24 KG/M2 | HEIGHT: 64 IN | WEIGHT: 142 LBS

## 2017-06-22 DIAGNOSIS — B02.9 HERPES ZOSTER WITHOUT COMPLICATION: ICD-10-CM

## 2017-06-22 DIAGNOSIS — R53.81 PHYSICAL DECONDITIONING: ICD-10-CM

## 2017-06-22 DIAGNOSIS — S01.01XA SCALP LACERATION, INITIAL ENCOUNTER: Primary | ICD-10-CM

## 2017-06-22 PROCEDURE — 99214 OFFICE O/P EST MOD 30 MIN: CPT | Performed by: INTERNAL MEDICINE

## 2017-06-22 PROCEDURE — G0463 HOSPITAL OUTPT CLINIC VISIT: HCPCS | Performed by: INTERNAL MEDICINE

## 2017-06-22 NOTE — PROGRESS NOTES
HPI:    Patient ID: Briseida Still is a 80year old female. Shingles  This is a new (was diagnosed with shingles in ER 2 weeks ago) problem. The current episode started 1 to 4 weeks ago. The problem has been gradually improving since onset.  The aff 0   BETHANECHOL CHLORIDE 10 MG Oral Tab TAKE 1 TABLET BY MOUTH THREE TIMES DAILY Disp: 90 tablet Rfl: 1   FLUOXETINE HCL 10 MG Oral Cap TAKE 1 CAPSULE BY MOUTH DAILY Disp: 90 capsule Rfl: 0   PANTOPRAZOLE SODIUM 40 MG Oral Tab EC TAKE 1 TABLET BY MOUTH HAMIDA Left eye exhibits no discharge. No scleral icterus. Neck: Normal range of motion. Neck supple. No JVD present. No thyromegaly present. Cardiovascular: Normal rate and normal heart sounds. No murmur heard.   Pulmonary/Chest: Effort normal and breath s

## 2017-06-26 ENCOUNTER — OFFICE VISIT (OUTPATIENT)
Dept: INTERNAL MEDICINE CLINIC | Facility: CLINIC | Age: 82
End: 2017-06-26

## 2017-06-26 ENCOUNTER — TELEPHONE (OUTPATIENT)
Dept: INTERNAL MEDICINE CLINIC | Facility: CLINIC | Age: 82
End: 2017-06-26

## 2017-06-26 VITALS
HEART RATE: 93 BPM | SYSTOLIC BLOOD PRESSURE: 95 MMHG | WEIGHT: 145 LBS | HEIGHT: 64 IN | TEMPERATURE: 98 F | DIASTOLIC BLOOD PRESSURE: 60 MMHG | RESPIRATION RATE: 12 BRPM | BODY MASS INDEX: 24.75 KG/M2

## 2017-06-26 DIAGNOSIS — Z48.02 ENCOUNTER FOR STAPLE REMOVAL: Primary | ICD-10-CM

## 2017-06-26 DIAGNOSIS — B02.9 HERPES ZOSTER WITHOUT COMPLICATION: ICD-10-CM

## 2017-06-26 PROCEDURE — 99213 OFFICE O/P EST LOW 20 MIN: CPT | Performed by: INTERNAL MEDICINE

## 2017-06-26 PROCEDURE — G0463 HOSPITAL OUTPT CLINIC VISIT: HCPCS | Performed by: INTERNAL MEDICINE

## 2017-06-26 NOTE — TELEPHONE ENCOUNTER
[facility] physical therapist calling states pt had evaluation on 06/24, he is recommending twice a week for 4 weeks, and suggesting OT and speech therapy as well. Please call with verbal orders.

## 2017-06-26 NOTE — PROGRESS NOTES
HPI:    Patient ID: Michelle Lipscomb is a 80year old female. Pt here for removal of 3 staples for scalp laceration she sustained from a fall in [facility] about a week ago.  Daughter also mentioned about pt complaining of some possible pain or weak Tablet 24 Hr TAKE 1 TABLET BY MOUTH DAILY Disp: 30 tablet Rfl: 5   LORATADINE 10 MG Oral Tab TAKE 1 TABLET BY MOUTH DAILY Disp: 30 tablet Rfl: 5   Spacer/Aero-Holding Chambers (AEROCHAMBER MINI CHAMBER) Does not apply Device Use with inhaler Disp: 1 Device torso. Continue  With gabapentin as prescribd before. No focal weakness noted on upper and lower extremities so I think her some irritation on raising her left upper ext is due to her shingles instead.          No orders of the defined types were placed in

## 2017-06-27 NOTE — TELEPHONE ENCOUNTER
Left voice mail for [facility] and gave verbal order approving PT twice a week for 4 weeks as well as OT and speech therapy

## 2017-06-28 RX ORDER — BETHANECHOL CHLORIDE 10 MG/1
TABLET ORAL
Qty: 90 TABLET | Refills: 0 | Status: SHIPPED | OUTPATIENT
Start: 2017-06-28 | End: 2017-08-08

## 2017-07-05 RX ORDER — EZETIMIBE AND SIMVASTATIN 10; 10 MG/1; MG/1
1 TABLET ORAL DAILY
Qty: 30 TABLET | Refills: 11 | Status: SHIPPED | OUTPATIENT
Start: 2017-07-05 | End: 2018-06-12

## 2017-07-10 ENCOUNTER — TELEPHONE (OUTPATIENT)
Dept: INTERNAL MEDICINE CLINIC | Facility: CLINIC | Age: 82
End: 2017-07-10

## 2017-07-10 NOTE — TELEPHONE ENCOUNTER
Home Health Order # [id number] faxed and confirmed sent. Original placed for scanning to chart.  Order was signed by Tim Salazar.

## 2017-07-11 ENCOUNTER — TELEPHONE (OUTPATIENT)
Dept: INTERNAL MEDICINE CLINIC | Facility: CLINIC | Age: 82
End: 2017-07-11

## 2017-07-11 NOTE — TELEPHONE ENCOUNTER
Replaced by [facility] order # [id number] faxed, confirmed sent and original placed for scanning to chart.

## 2017-07-14 ENCOUNTER — TELEPHONE (OUTPATIENT)
Dept: INTERNAL MEDICINE CLINIC | Facility: CLINIC | Age: 82
End: 2017-07-14

## 2017-07-14 NOTE — TELEPHONE ENCOUNTER
Left detailed message on [facility] (OT with [facility]) confidential VM informing EL approves extension of OT as requested below.

## 2017-07-14 NOTE — TELEPHONE ENCOUNTER
Tanvir Arenas, occupational therapist from [facility] is calling to request orders to extend occupational therapy two times a week for three weeks. Please advise.

## 2017-07-18 ENCOUNTER — TELEPHONE (OUTPATIENT)
Dept: INTERNAL MEDICINE CLINIC | Facility: CLINIC | Age: 82
End: 2017-07-18

## 2017-07-18 NOTE — TELEPHONE ENCOUNTER
[facility] certification and plan of care # [id number] received. Placed in Dr Lillie Dawson for signature.

## 2017-07-20 RX ORDER — GABAPENTIN 100 MG/1
CAPSULE ORAL
Qty: 90 CAPSULE | Refills: 5 | Status: SHIPPED | OUTPATIENT
Start: 2017-07-20 | End: 2017-09-07

## 2017-07-25 ENCOUNTER — TELEPHONE (OUTPATIENT)
Dept: INTERNAL MEDICINE CLINIC | Facility: CLINIC | Age: 82
End: 2017-07-25

## 2017-07-26 ENCOUNTER — TELEPHONE (OUTPATIENT)
Dept: INTERNAL MEDICINE CLINIC | Facility: CLINIC | Age: 82
End: 2017-07-26

## 2017-08-01 ENCOUNTER — TELEPHONE (OUTPATIENT)
Dept: INTERNAL MEDICINE CLINIC | Facility: CLINIC | Age: 82
End: 2017-08-01

## 2017-08-01 NOTE — TELEPHONE ENCOUNTER
ANN Kingston pls see [facility] OT message below. lmtcb ext [phone] I tried calling Alicia to get more information. Is pt in pain?did pt hit her head?

## 2017-08-01 NOTE — TELEPHONE ENCOUNTER
FYI - Pt. had an unwitnessed fall last night on 7/31. Per RN at [facility], pt. Has no bruising.

## 2017-08-01 NOTE — TELEPHONE ENCOUNTER
[facility] Occupational Therapy calling to see if Dr. Clarisa Sharma can extend Occupational Therapy for Pt after recent fall. Thomas Swann states would like 1x this week and 2 more times for next 2weeks on Thursday if possible.      Thomas Swann would like a ve

## 2017-08-03 ENCOUNTER — TELEPHONE (OUTPATIENT)
Dept: INTERNAL MEDICINE CLINIC | Facility: CLINIC | Age: 82
End: 2017-08-03

## 2017-08-03 NOTE — TELEPHONE ENCOUNTER
Order # [id number] received from [facility] for signature. Placed in Dr Nikki Luna.

## 2017-08-04 ENCOUNTER — TELEPHONE (OUTPATIENT)
Dept: INTERNAL MEDICINE CLINIC | Facility: CLINIC | Age: 82
End: 2017-08-04

## 2017-08-04 RX ORDER — LOSARTAN POTASSIUM 25 MG/1
25 TABLET ORAL DAILY
Qty: 30 TABLET | Refills: 0 | Status: SHIPPED | OUTPATIENT
Start: 2017-08-04 | End: 2017-08-08

## 2017-08-04 NOTE — TELEPHONE ENCOUNTER
Hypertensive Medications: 30 day supply issued per protocol.  Due for office visit    Protocol Criteria:  · Appointment scheduled in the past 6 months or in the next 3 months  · BMP or CMP in the past 12 months  · Creatinine result < 2  Recent Outpatient Vi

## 2017-08-04 NOTE — TELEPHONE ENCOUNTER
Physician verbal order # [id number] & cert plan of care # [id number] received from [facility]. Placed in Dr Aminta Favre for signatures.

## 2017-08-08 NOTE — TELEPHONE ENCOUNTER
Please send to the     [facility], [address], [address] [phone], [phone]

## 2017-08-09 RX ORDER — LOSARTAN POTASSIUM 25 MG/1
TABLET ORAL
Qty: 30 TABLET | Refills: 22 | Status: SHIPPED | OUTPATIENT
Start: 2017-08-09 | End: 2018-09-04

## 2017-08-09 RX ORDER — FLUOXETINE 10 MG/1
CAPSULE ORAL
Qty: 90 CAPSULE | Refills: 0 | Status: SHIPPED | OUTPATIENT
Start: 2017-08-09 | End: 2017-11-14

## 2017-08-09 RX ORDER — OXCARBAZEPINE 150 MG/1
TABLET, FILM COATED ORAL
Qty: 90 TABLET | Refills: 0 | Status: SHIPPED | OUTPATIENT
Start: 2017-08-09 | End: 2017-11-14

## 2017-08-09 RX ORDER — BETHANECHOL CHLORIDE 10 MG/1
TABLET ORAL
Qty: 90 TABLET | Refills: 0 | Status: SHIPPED | OUTPATIENT
Start: 2017-08-09 | End: 2017-09-12

## 2017-08-14 ENCOUNTER — OFFICE VISIT (OUTPATIENT)
Dept: INTERNAL MEDICINE CLINIC | Facility: CLINIC | Age: 82
End: 2017-08-14

## 2017-08-14 ENCOUNTER — LAB ENCOUNTER (OUTPATIENT)
Dept: LAB | Age: 82
End: 2017-08-14
Attending: INTERNAL MEDICINE
Payer: MEDICARE

## 2017-08-14 VITALS
SYSTOLIC BLOOD PRESSURE: 106 MMHG | HEIGHT: 64 IN | WEIGHT: 143 LBS | TEMPERATURE: 98 F | DIASTOLIC BLOOD PRESSURE: 62 MMHG | BODY MASS INDEX: 24.41 KG/M2 | HEART RATE: 101 BPM

## 2017-08-14 DIAGNOSIS — E11.9 CONTROLLED TYPE 2 DIABETES MELLITUS WITHOUT COMPLICATION, WITHOUT LONG-TERM CURRENT USE OF INSULIN (HCC): ICD-10-CM

## 2017-08-14 DIAGNOSIS — R26.81 UNSTEADY GAIT: Primary | ICD-10-CM

## 2017-08-14 DIAGNOSIS — R53.1 GENERALIZED WEAKNESS: ICD-10-CM

## 2017-08-14 LAB
ALBUMIN SERPL BCP-MCNC: 4 G/DL (ref 3.5–4.8)
ALBUMIN/GLOB SERPL: 1.4 {RATIO} (ref 1–2)
ALP SERPL-CCNC: 71 U/L (ref 32–100)
ALT SERPL-CCNC: 12 U/L (ref 14–54)
ANION GAP SERPL CALC-SCNC: 9 MMOL/L (ref 0–18)
AST SERPL-CCNC: 16 U/L (ref 15–41)
BASOPHILS # BLD: 0.1 K/UL (ref 0–0.2)
BASOPHILS NFR BLD: 1 %
BILIRUB SERPL-MCNC: 0.7 MG/DL (ref 0.3–1.2)
BILIRUB UR QL: NEGATIVE
BUN SERPL-MCNC: 13 MG/DL (ref 8–20)
BUN/CREAT SERPL: 15.5 (ref 10–20)
CALCIUM SERPL-MCNC: 9.2 MG/DL (ref 8.5–10.5)
CHLORIDE SERPL-SCNC: 95 MMOL/L (ref 95–110)
CO2 SERPL-SCNC: 26 MMOL/L (ref 22–32)
COLOR UR: YELLOW
CREAT SERPL-MCNC: 0.84 MG/DL (ref 0.5–1.5)
EOSINOPHIL # BLD: 0.2 K/UL (ref 0–0.7)
EOSINOPHIL NFR BLD: 2 %
ERYTHROCYTE [DISTWIDTH] IN BLOOD BY AUTOMATED COUNT: 13.4 % (ref 11–15)
GLOBULIN PLAS-MCNC: 2.9 G/DL (ref 2.5–3.7)
GLUCOSE SERPL-MCNC: 105 MG/DL (ref 70–99)
GLUCOSE UR-MCNC: NEGATIVE MG/DL
HCT VFR BLD AUTO: 41.2 % (ref 35–48)
HGB BLD-MCNC: 13.9 G/DL (ref 12–16)
HGB UR QL STRIP.AUTO: NEGATIVE
KETONES UR-MCNC: NEGATIVE MG/DL
LYMPHOCYTES # BLD: 0.9 K/UL (ref 1–4)
LYMPHOCYTES NFR BLD: 9 %
MAGNESIUM SERPL-MCNC: 1.8 MG/DL (ref 1.8–2.5)
MCH RBC QN AUTO: 31.3 PG (ref 27–32)
MCHC RBC AUTO-ENTMCNC: 33.6 G/DL (ref 32–37)
MCV RBC AUTO: 93 FL (ref 80–100)
MONOCYTES # BLD: 1.3 K/UL (ref 0–1)
MONOCYTES NFR BLD: 14 %
NEUTROPHILS # BLD AUTO: 6.9 K/UL (ref 1.8–7.7)
NEUTROPHILS NFR BLD: 74 %
NITRITE UR QL STRIP.AUTO: NEGATIVE
OSMOLALITY UR CALC.SUM OF ELEC: 270 MOSM/KG (ref 275–295)
PH UR: 5 [PH] (ref 5–8)
PLATELET # BLD AUTO: 216 K/UL (ref 140–400)
PMV BLD AUTO: 9.7 FL (ref 7.4–10.3)
POTASSIUM SERPL-SCNC: 4 MMOL/L (ref 3.3–5.1)
PROT SERPL-MCNC: 6.9 G/DL (ref 5.9–8.4)
PROT UR-MCNC: NEGATIVE MG/DL
RBC # BLD AUTO: 4.43 M/UL (ref 3.7–5.4)
RBC #/AREA URNS AUTO: 2 /HPF
SODIUM SERPL-SCNC: 130 MMOL/L (ref 136–144)
SP GR UR STRIP: 1.01 (ref 1–1.03)
TSH SERPL-ACNC: 1.45 UIU/ML (ref 0.45–5.33)
UROBILINOGEN UR STRIP-ACNC: <2
VIT C UR-MCNC: NEGATIVE MG/DL
WBC # BLD AUTO: 9.4 K/UL (ref 4–11)
WBC #/AREA URNS AUTO: 27 /HPF

## 2017-08-14 PROCEDURE — 83735 ASSAY OF MAGNESIUM: CPT

## 2017-08-14 PROCEDURE — 83036 HEMOGLOBIN GLYCOSYLATED A1C: CPT

## 2017-08-14 PROCEDURE — 36415 COLL VENOUS BLD VENIPUNCTURE: CPT

## 2017-08-14 PROCEDURE — 80053 COMPREHEN METABOLIC PANEL: CPT

## 2017-08-14 PROCEDURE — 87086 URINE CULTURE/COLONY COUNT: CPT

## 2017-08-14 PROCEDURE — 81001 URINALYSIS AUTO W/SCOPE: CPT

## 2017-08-14 PROCEDURE — G0463 HOSPITAL OUTPT CLINIC VISIT: HCPCS | Performed by: INTERNAL MEDICINE

## 2017-08-14 PROCEDURE — 84443 ASSAY THYROID STIM HORMONE: CPT

## 2017-08-14 PROCEDURE — 99214 OFFICE O/P EST MOD 30 MIN: CPT | Performed by: INTERNAL MEDICINE

## 2017-08-14 PROCEDURE — 85025 COMPLETE CBC W/AUTO DIFF WBC: CPT

## 2017-08-14 RX ORDER — BLOOD-GLUCOSE METER
KIT MISCELLANEOUS
Refills: 99 | COMMUNITY
Start: 2017-06-23

## 2017-08-14 NOTE — PROGRESS NOTES
HPI:    Patient ID: Sofía Weinberg is a 80year old female. Patient presents today with her daughter, complaining of marked fatigue/tiredness as well as getting weaker.   Pt lives ins [facility] living place and had been getting physical therapy nina TABLET BY MOUTH DAILY Disp: 30 tablet Rfl: 22   EZETIMIBE-SIMVASTATIN 10-10 MG Oral Tab TAKE 1 TABLET BY MOUTH DAILY Disp: 30 tablet Rfl: 11   Senna 8.6 MG Oral Tab Take 8.6 mg by mouth 2 (two) times daily.  Disp:  Rfl:    FUROSEMIDE 20 MG Oral Tab TAKE 1/2 Comment:tendonitis heel   PHYSICAL EXAM:   Physical Exam   Constitutional: She appears well-developed and well-nourished. She is cooperative. Non-toxic appearance. She does not have a sickly appearance. She does not appear ill. No distress.    HENT:   [place] neurologist Dr Jerica Russell.      (R53.1) Generalized weakness  Plan: CBC WITH DIFFERENTIAL WITH PLATELET, COMP         METABOLIC PANEL (14), URINALYSIS WITH CULTURE         REFLEX, MAGNESIUM, ASSAY, THYROID STIM HORMONE,        NEURO - INTERNAL, NEURO - INTERNA

## 2017-08-15 ENCOUNTER — TELEPHONE (OUTPATIENT)
Dept: INTERNAL MEDICINE CLINIC | Facility: CLINIC | Age: 82
End: 2017-08-15

## 2017-08-15 LAB — HBA1C MFR BLD: 6.1 % (ref 4–6)

## 2017-08-15 NOTE — TELEPHONE ENCOUNTER
[facility] Physical Therapy calling to report pt fell in her room at [facility] living [facility] this morning  Would like callback

## 2017-08-16 ENCOUNTER — TELEPHONE (OUTPATIENT)
Dept: OTHER | Age: 82
End: 2017-08-16

## 2017-08-16 ENCOUNTER — TELEPHONE (OUTPATIENT)
Dept: INTERNAL MEDICINE CLINIC | Facility: CLINIC | Age: 82
End: 2017-08-16

## 2017-08-16 NOTE — TELEPHONE ENCOUNTER
Daughter would like a call back with pts blood and urine test results. Per daughter they were done on Monday 8-14-17.

## 2017-08-16 NOTE — TELEPHONE ENCOUNTER
We can hold her glipizide since she is taking very small dose anyway but would continue at least checking glucose once a week as they are doing now.

## 2017-08-16 NOTE — TELEPHONE ENCOUNTER
Daughter advised of recommendations/results below and verbalized understanding. No new or changed sxs since lov with Dr. Breann Danielle.  Daughter advised to call with new or changed/worsening sxs. Daughter agrees with the plan and verbalized understanding.

## 2017-08-16 NOTE — TELEPHONE ENCOUNTER
Her labs shows her cbc, thyroid test, Magnesium level were all good. Her chemistries were all good also except slightly sodium level though this is getting better already compared to previous blood test back in April.    Urine culture showed only contamina

## 2017-08-16 NOTE — TELEPHONE ENCOUNTER
Dr. Ally Wynn, please see message below. Pts daughter Ameya Gillespie [employer] [facility]), is requesting lab results done on 8/14/17 also requesting to speak directly to Dr. Ally Wynn regarding labs, good call back number is [phone].  [facility] [employer] is inquiring

## 2017-08-17 NOTE — TELEPHONE ENCOUNTER
Dr. Leo Easley (I know you are aware of patient's multiple falls)    Spoke with Ciarra Kim [facility] physical therapist re: patient fall at [facility] 8/16/17. Ciarra Kim states there was no injury to patient and family is aware of fall.  She is concerne

## 2017-08-17 NOTE — TELEPHONE ENCOUNTER
Spoke with Karlene Piedra at [facility] and relayed Dr. Payton Domínguez message below. She will fax order to Dr. Dee Dee Mclean RE: d/c Glipizide and continue weekly glucose checks as they are doing now for him to sign.  Also spoke with daughter Mark Mcgarry [employer] [facility]) and relayed bruce

## 2017-08-21 ENCOUNTER — TELEPHONE (OUTPATIENT)
Dept: INTERNAL MEDICINE CLINIC | Facility: CLINIC | Age: 82
End: 2017-08-21

## 2017-08-22 ENCOUNTER — TELEPHONE (OUTPATIENT)
Dept: INTERNAL MEDICINE CLINIC | Facility: CLINIC | Age: 82
End: 2017-08-22

## 2017-08-22 NOTE — TELEPHONE ENCOUNTER
Current Outpatient Prescriptions:  BETHANECHOL CHLORIDE 10 MG Oral Tab TAKE 1 TABLET BY MOUTH THREE TIMES DAILY Disp: 90 tablet Rfl: 0

## 2017-08-23 ENCOUNTER — TELEPHONE (OUTPATIENT)
Dept: INTERNAL MEDICINE CLINIC | Facility: CLINIC | Age: 82
End: 2017-08-23

## 2017-08-28 NOTE — TELEPHONE ENCOUNTER
Dr. Alejandro Hernandez, do you have any H. H orders for this patient? Last I have are from July and sent. Please advise.

## 2017-08-29 NOTE — TELEPHONE ENCOUNTER
Script from 8/9 was printed instead of electronically sent. Phoned in prescription to [facility].

## 2017-09-01 ENCOUNTER — TELEPHONE (OUTPATIENT)
Dept: INTERNAL MEDICINE CLINIC | Facility: CLINIC | Age: 82
End: 2017-09-01

## 2017-09-01 DIAGNOSIS — R53.1 GENERALIZED WEAKNESS: Primary | ICD-10-CM

## 2017-09-06 NOTE — TELEPHONE ENCOUNTER
LMTCB. Need to tell patient order for wheel chair is ready to p/u at reception area B.  Order placed in file szymanski at reception B.

## 2017-09-07 ENCOUNTER — TELEPHONE (OUTPATIENT)
Dept: NEUROLOGY | Facility: CLINIC | Age: 82
End: 2017-09-07

## 2017-09-07 ENCOUNTER — OFFICE VISIT (OUTPATIENT)
Dept: NEUROLOGY | Facility: CLINIC | Age: 82
End: 2017-09-07

## 2017-09-07 VITALS
HEIGHT: 64 IN | RESPIRATION RATE: 16 BRPM | DIASTOLIC BLOOD PRESSURE: 64 MMHG | HEART RATE: 56 BPM | SYSTOLIC BLOOD PRESSURE: 110 MMHG | WEIGHT: 143 LBS | BODY MASS INDEX: 24.41 KG/M2

## 2017-09-07 DIAGNOSIS — R48.2 APRAXIA: Primary | ICD-10-CM

## 2017-09-07 DIAGNOSIS — G12.29 PSEUDOBULBAR PALSY (HCC): ICD-10-CM

## 2017-09-07 PROCEDURE — 99215 OFFICE O/P EST HI 40 MIN: CPT | Performed by: OTHER

## 2017-09-07 NOTE — PROGRESS NOTES
Juanito Fraga : 1935     HPI:   Patient presents with:  Gait: [facility] daughter.  Patient's daughter states that patient was doing okay but in the past 2 months she has been having a hard time wal Rfl: 0   LOSARTAN POTASSIUM 25 MG Oral Tab TAKE 1 TABLET BY MOUTH DAILY Disp: 30 tablet Rfl: 22   EZETIMIBE-SIMVASTATIN 10-10 MG Oral Tab TAKE 1 TABLET BY MOUTH DAILY Disp: 30 tablet Rfl: 11   Senna 8.6 MG Oral Tab Take 8.6 mg by mouth 2 (two) times daily. reviewed. No pertinent surgical history. Family History   Problem Relation Age of Onset   • Diabetes Mother    • Macular degeneration Sister    • Diabetes Sister    • Glaucoma Neg       Social History:  Social History    Marital status:   the date. She was able to recall 2 of 3 objects after a short time frame. Cranial Nerves: II-Visual acuity grossly normal, with full visual fields. Pupil react to light. Fundoscopic exam normal.  III,IV,VI- EOM full, with normal pursuit.  V-Facial sens In the meantime, she is continuing physical and speech therapy at [facility], which hopefully will help her walking. I discussed the differential diagnosis with the patient and her daughter. Thank you very much.     Return in about 3 months (around 12/7/20

## 2017-09-08 ENCOUNTER — TELEPHONE (OUTPATIENT)
Dept: INTERNAL MEDICINE CLINIC | Facility: CLINIC | Age: 82
End: 2017-09-08

## 2017-09-08 NOTE — TELEPHONE ENCOUNTER
Called [facility] [phone] for authorization of approval of MRI brain wo. L/m requesting authorization for MRI. Pt. Is scheduled for MRI on 09/11/17. Await return call.

## 2017-09-08 NOTE — TELEPHONE ENCOUNTER
Order # [id number] received from [facility] for 's signature. Placed in Dr Lillie Dawson.

## 2017-09-08 NOTE — TELEPHONE ENCOUNTER
De Campbell @ [facility] [phone] returned call. States this insurance is secondary and Medicare is primary. MRI does not require authorization.

## 2017-09-11 ENCOUNTER — TELEPHONE (OUTPATIENT)
Dept: INTERNAL MEDICINE CLINIC | Facility: CLINIC | Age: 82
End: 2017-09-11

## 2017-09-11 ENCOUNTER — HOSPITAL ENCOUNTER (OUTPATIENT)
Dept: MRI IMAGING | Age: 82
Discharge: HOME | End: 2017-09-11
Attending: Other
Payer: MEDICARE

## 2017-09-11 DIAGNOSIS — R48.2 APRAXIA: ICD-10-CM

## 2017-09-11 PROCEDURE — 70551 MRI BRAIN STEM W/O DYE: CPT | Performed by: OTHER

## 2017-09-11 NOTE — TELEPHONE ENCOUNTER
Order # [id number] faxed to [facility] Confirmed sent and placed for scanning to [facility].

## 2017-09-13 RX ORDER — BETHANECHOL CHLORIDE 10 MG/1
TABLET ORAL
Qty: 90 TABLET | Refills: 1 | Status: SHIPPED | OUTPATIENT
Start: 2017-09-13 | End: 2017-10-31

## 2017-09-14 ENCOUNTER — TELEPHONE (OUTPATIENT)
Dept: INTERNAL MEDICINE CLINIC | Facility: CLINIC | Age: 82
End: 2017-09-14

## 2017-09-14 NOTE — TELEPHONE ENCOUNTER
[facility] certification and plan of care order # [id number] received. Placed in Dr Nikki Luna for signature.

## 2017-09-15 NOTE — TELEPHONE ENCOUNTER
Forms signed by MD and faxed back to home health certification and plan of care. Forms placed to be scanned into epic. Confirmation received.

## 2017-09-19 ENCOUNTER — TELEPHONE (OUTPATIENT)
Dept: NEUROLOGY | Facility: CLINIC | Age: 82
End: 2017-09-19

## 2017-09-21 ENCOUNTER — TELEPHONE (OUTPATIENT)
Dept: OTHER | Age: 82
End: 2017-09-21

## 2017-09-21 RX ORDER — CLOPIDOGREL BISULFATE 75 MG/1
75 TABLET ORAL DAILY
Qty: 30 TABLET | Refills: 3 | Status: SHIPPED | OUTPATIENT
Start: 2017-09-21 | End: 2017-12-12

## 2017-09-21 NOTE — TELEPHONE ENCOUNTER
Dr. Vinicio Alvarenga, [facility], Physical Therapy is requesting an extension of physical therapy services for 1x a week for 4 weeks. Please advise.

## 2017-09-22 NOTE — TELEPHONE ENCOUNTER
Estelita [facility]) notified that it is ok to extend the physical therapy for 4 weeks. She indicated verbal order was fine and she would fax over documentation. Closing encounter.

## 2017-09-29 RX ORDER — CARVEDILOL 3.12 MG/1
TABLET ORAL
Qty: 30 TABLET | Refills: 0 | Status: SHIPPED | OUTPATIENT
Start: 2017-09-29 | End: 2017-12-05

## 2017-10-02 ENCOUNTER — PRIOR ORIGINAL RECORDS (OUTPATIENT)
Dept: OTHER | Age: 82
End: 2017-10-02

## 2017-10-02 ENCOUNTER — LAB ENCOUNTER (OUTPATIENT)
Dept: LAB | Facility: HOSPITAL | Age: 82
End: 2017-10-02
Attending: INTERNAL MEDICINE
Payer: MEDICARE

## 2017-10-02 DIAGNOSIS — I10 HTN (HYPERTENSION): Primary | ICD-10-CM

## 2017-10-02 PROCEDURE — 80061 LIPID PANEL: CPT

## 2017-10-02 PROCEDURE — 84460 ALANINE AMINO (ALT) (SGPT): CPT

## 2017-10-02 PROCEDURE — 80048 BASIC METABOLIC PNL TOTAL CA: CPT

## 2017-10-02 PROCEDURE — 36415 COLL VENOUS BLD VENIPUNCTURE: CPT

## 2017-10-02 PROCEDURE — 84450 TRANSFERASE (AST) (SGOT): CPT

## 2017-10-04 RX ORDER — PANTOPRAZOLE SODIUM 40 MG/1
TABLET, DELAYED RELEASE ORAL
Qty: 30 TABLET | Refills: 2 | Status: SHIPPED | OUTPATIENT
Start: 2017-10-04 | End: 2018-01-02

## 2017-10-11 ENCOUNTER — TELEPHONE (OUTPATIENT)
Dept: FAMILY MEDICINE CLINIC | Facility: CLINIC | Age: 82
End: 2017-10-11

## 2017-10-11 NOTE — TELEPHONE ENCOUNTER
Daughter notified of last flu vaccine date. Pt will receive flu shot at [facility] and will have them fax info regarding vaccine to [facility] office.

## 2017-10-16 ENCOUNTER — TELEPHONE (OUTPATIENT)
Dept: INTERNAL MEDICINE CLINIC | Facility: CLINIC | Age: 82
End: 2017-10-16

## 2017-10-16 NOTE — TELEPHONE ENCOUNTER
[facility] residential [facility] asking for discharge from PT for home care has met maximum rehab potential

## 2017-10-18 ENCOUNTER — TELEPHONE (OUTPATIENT)
Dept: INTERNAL MEDICINE CLINIC | Facility: CLINIC | Age: 82
End: 2017-10-18

## 2017-10-22 ENCOUNTER — PRIOR ORIGINAL RECORDS (OUTPATIENT)
Dept: OTHER | Age: 82
End: 2017-10-22

## 2017-10-24 ENCOUNTER — TELEPHONE (OUTPATIENT)
Dept: INTERNAL MEDICINE CLINIC | Facility: CLINIC | Age: 82
End: 2017-10-24

## 2017-10-24 RX ORDER — LORATADINE 10 MG/1
TABLET ORAL
Qty: 30 TABLET | Refills: 3 | Status: SHIPPED | OUTPATIENT
Start: 2017-10-24 | End: 2018-02-27

## 2017-10-24 RX ORDER — MELATONIN
Qty: 30 TABLET | Refills: 1 | Status: SHIPPED | OUTPATIENT
Start: 2017-10-24 | End: 2017-12-26

## 2017-10-31 RX ORDER — BETHANECHOL CHLORIDE 10 MG/1
TABLET ORAL
Qty: 90 TABLET | Refills: 0 | Status: SHIPPED | OUTPATIENT
Start: 2017-10-31 | End: 2017-12-26

## 2017-10-31 RX ORDER — FUROSEMIDE 20 MG/1
TABLET ORAL
Qty: 15 TABLET | Refills: 1 | Status: SHIPPED | OUTPATIENT
Start: 2017-10-31 | End: 2017-12-26

## 2017-11-14 NOTE — TELEPHONE ENCOUNTER
Please advise on refill request.      Recent Outpatient Visits            2 months ago Darryl Farmer MD    Office Visit    3 months ago Unsteady gait    150 Kim Perry MD    Office Visit    4 months ago Encounter for staple removal    [address] Kim Nguyen MD    Office Visit    4 months ago Scalp laceration, initial encounter    150 Kim Perry MD    Office Visit    7 months ago Evelyn Quintana MD    Office Visit

## 2017-11-15 RX ORDER — FLUOXETINE 10 MG/1
CAPSULE ORAL
Qty: 90 CAPSULE | Refills: 0 | Status: SHIPPED | OUTPATIENT
Start: 2017-11-15 | End: 2017-12-26

## 2017-11-15 RX ORDER — OXCARBAZEPINE 150 MG/1
TABLET, FILM COATED ORAL
Qty: 90 TABLET | Refills: 1 | Status: SHIPPED | OUTPATIENT
Start: 2017-11-15 | End: 2018-05-08

## 2017-12-06 RX ORDER — CARVEDILOL 3.12 MG/1
TABLET ORAL
Qty: 90 TABLET | Refills: 0 | Status: SHIPPED | OUTPATIENT
Start: 2017-12-06 | End: 2018-05-08

## 2017-12-13 RX ORDER — CLOPIDOGREL BISULFATE 75 MG/1
75 TABLET ORAL DAILY
Qty: 30 TABLET | Refills: 5 | Status: SHIPPED | OUTPATIENT
Start: 2017-12-13 | End: 2018-06-19

## 2017-12-26 RX ORDER — BETHANECHOL CHLORIDE 10 MG/1
TABLET ORAL
Qty: 90 TABLET | Refills: 0 | Status: SHIPPED | OUTPATIENT
Start: 2017-12-26 | End: 2018-02-05

## 2017-12-26 RX ORDER — FUROSEMIDE 20 MG/1
TABLET ORAL
Qty: 15 TABLET | Refills: 0 | Status: SHIPPED | OUTPATIENT
Start: 2017-12-26 | End: 2018-03-26

## 2017-12-26 RX ORDER — FLUOXETINE 10 MG/1
CAPSULE ORAL
Qty: 90 CAPSULE | Refills: 0 | Status: SHIPPED | OUTPATIENT
Start: 2017-12-26 | End: 2018-04-17

## 2017-12-26 RX ORDER — MELATONIN
Qty: 30 TABLET | Refills: 0 | Status: SHIPPED | OUTPATIENT
Start: 2017-12-26 | End: 2018-02-15

## 2018-01-03 RX ORDER — ASPIRIN 81 MG
TABLET, DELAYED RELEASE (ENTERIC COATED) ORAL
Qty: 30 TABLET | Refills: 11 | Status: SHIPPED | OUTPATIENT
Start: 2018-01-03 | End: 2018-02-05

## 2018-01-03 RX ORDER — PANTOPRAZOLE SODIUM 40 MG/1
TABLET, DELAYED RELEASE ORAL
Qty: 30 TABLET | Refills: 11 | Status: SHIPPED | OUTPATIENT
Start: 2018-01-03 | End: 2018-12-24

## 2018-01-20 ENCOUNTER — APPOINTMENT (OUTPATIENT)
Dept: GENERAL RADIOLOGY | Age: 83
End: 2018-01-20
Attending: EMERGENCY MEDICINE
Payer: MEDICARE

## 2018-01-20 ENCOUNTER — HOSPITAL ENCOUNTER (OUTPATIENT)
Age: 83
Discharge: HOME | End: 2018-01-20
Attending: EMERGENCY MEDICINE
Payer: MEDICARE

## 2018-01-20 VITALS
RESPIRATION RATE: 18 BRPM | WEIGHT: 138 LBS | OXYGEN SATURATION: 95 % | HEIGHT: 64 IN | TEMPERATURE: 98 F | BODY MASS INDEX: 23.56 KG/M2 | SYSTOLIC BLOOD PRESSURE: 117 MMHG | HEART RATE: 73 BPM | DIASTOLIC BLOOD PRESSURE: 66 MMHG

## 2018-01-20 DIAGNOSIS — J40 BRONCHITIS: Primary | ICD-10-CM

## 2018-01-20 PROCEDURE — 99213 OFFICE O/P EST LOW 20 MIN: CPT

## 2018-01-20 PROCEDURE — 99214 OFFICE O/P EST MOD 30 MIN: CPT

## 2018-01-20 PROCEDURE — 71046 X-RAY EXAM CHEST 2 VIEWS: CPT | Performed by: EMERGENCY MEDICINE

## 2018-01-20 RX ORDER — CEFDINIR 300 MG/1
300 CAPSULE ORAL 2 TIMES DAILY
Qty: 20 CAPSULE | Refills: 0 | Status: SHIPPED | OUTPATIENT
Start: 2018-01-20 | End: 2018-01-20

## 2018-01-20 RX ORDER — CEFDINIR 300 MG/1
300 CAPSULE ORAL 2 TIMES DAILY
Qty: 20 CAPSULE | Refills: 0 | Status: SHIPPED | OUTPATIENT
Start: 2018-01-20 | End: 2018-01-30

## 2018-01-20 NOTE — ED PROVIDER NOTES
Patient Seen in: [facility]    History   Patient presents with:  Cough/URI    Stated Complaint: Cough    HPI    Patient complains of a cough that she has had for the past week.   Patient's history was obtained from patient 73  Resp: 18  Temp: 98.2 °F (36.8 °C)  Temp src: n/a  SpO2: 95 %  O2 Device: None (Room air)    Current:/66   Pulse 73   Temp 98.2 °F (36.8 °C)   Resp 18   Ht 162.6 cm (5' 4\")   Wt 62.6 kg   SpO2 95%   BMI 23.69 kg/m²         Physical Exam    Patien by (CST): Grayson Harp MD on 1/20/2018 at 14:30          CONCLUSION:  Stable chest with reidentification of a minimal midthoracic compression fracture deformity, and without radiographically evident acute intrathoracic process.            MDM     In disc

## 2018-01-20 NOTE — ED INITIAL ASSESSMENT (HPI)
PATIENT ARRIVED PER WHEELCHAIR TO ROOM WITH DAUGHTER C/O A COUGH STARTED 1 WEEK AGO. NO FEVERS. NO N/V/D. NO NASAL CONGESTION. EASY NON LABORED RESPIRATIONS. NO CHEST PAIN.

## 2018-01-24 ENCOUNTER — PRIOR ORIGINAL RECORDS (OUTPATIENT)
Dept: OTHER | Age: 83
End: 2018-01-24

## 2018-01-25 ENCOUNTER — PRIOR ORIGINAL RECORDS (OUTPATIENT)
Dept: OTHER | Age: 83
End: 2018-01-25

## 2018-02-13 ENCOUNTER — TELEPHONE (OUTPATIENT)
Dept: INTERNAL MEDICINE CLINIC | Facility: CLINIC | Age: 83
End: 2018-02-13

## 2018-02-13 NOTE — TELEPHONE ENCOUNTER
Current Outpatient Prescriptions:   •  MAGNESIUM OXIDE 400 (240 Mg) MG Oral Tab, TAKE 1 TABLET BY MOUTH EVERY DAY, Disp: 30 tablet, Rfl: 0  •  BETHANECHOL CHLORIDE 10 MG Oral Tab, TAKE 1 TABLET BY MOUTH THREE TIMES DAILY, Disp: 90 tablet, Rfl: 0

## 2018-02-16 RX ORDER — MELATONIN
1
Qty: 90 TABLET | Refills: 0 | Status: SHIPPED | OUTPATIENT
Start: 2018-02-16 | End: 2018-05-08

## 2018-02-16 RX ORDER — BETHANECHOL CHLORIDE 10 MG/1
TABLET ORAL
Qty: 90 TABLET | Refills: 0 | Status: SHIPPED | OUTPATIENT
Start: 2018-02-16 | End: 2018-03-26

## 2018-02-16 RX ORDER — ASPIRIN 81 MG
TABLET, DELAYED RELEASE (ENTERIC COATED) ORAL
Qty: 90 TABLET | Refills: 0 | Status: SHIPPED | OUTPATIENT
Start: 2018-02-16 | End: 2018-06-12

## 2018-02-28 ENCOUNTER — TELEPHONE (OUTPATIENT)
Dept: INTERNAL MEDICINE CLINIC | Facility: CLINIC | Age: 83
End: 2018-02-28

## 2018-02-28 RX ORDER — LORATADINE 10 MG/1
TABLET ORAL
Qty: 30 TABLET | Refills: 11 | Status: SHIPPED | OUTPATIENT
Start: 2018-02-28 | End: 2018-07-18 | Stop reason: ALTCHOICE

## 2018-02-28 NOTE — TELEPHONE ENCOUNTER
Received a medical survey/assesment from [facility] assisted living that needs to be completed prior to admission. Placed in Dr. David Jacinto to review. Dr REYNALDO flores has an appt on 03/06/18 for a check up, if you need to see her we can change the time.

## 2018-02-28 NOTE — TELEPHONE ENCOUNTER
We had not seen pt for almost 7 mos so will request pt to come in for visit so we can fill out these forms

## 2018-03-01 ENCOUNTER — TELEPHONE (OUTPATIENT)
Dept: INTERNAL MEDICINE CLINIC | Facility: CLINIC | Age: 83
End: 2018-03-01

## 2018-03-01 RX ORDER — MAG HYDROX/ALUMINUM HYD/SIMETH 400-400-40
1 SUSPENSION, ORAL (FINAL DOSE FORM) ORAL DAILY
Qty: 90 CAPSULE | Refills: 1 | Status: CANCELLED | OUTPATIENT
Start: 2018-03-01 | End: 2018-03-31

## 2018-03-01 NOTE — TELEPHONE ENCOUNTER
LMTCB transfer to triage--message to daughter Scottie Navas to ask about medication, frequency of dose.

## 2018-03-01 NOTE — TELEPHONE ENCOUNTER
Pharmacy requesting new rx for vitamin D3 5000 U per pharmacy fax it was originally prescribed by dr Javan Gomez. Arnol Rodriguez

## 2018-03-02 NOTE — TELEPHONE ENCOUNTER
Called patient's daughter  - verified patient's name and  - daughter states pt was prescribed vitamin D by older Leobardo Terry. provider. Pt has no vitamin D level on chart. Pt has appt with EL on Tuesday, daughter states she will mention to doctor then.

## 2018-03-13 NOTE — TELEPHONE ENCOUNTER
Pt failed protocol, pended and sent to provider per protocol, thank you. Sent to Dr. Freida Luong as he appears to be pcp, thank you.

## 2018-03-21 ENCOUNTER — APPOINTMENT (OUTPATIENT)
Dept: CT IMAGING | Facility: HOSPITAL | Age: 83
End: 2018-03-21
Attending: EMERGENCY MEDICINE
Payer: MEDICARE

## 2018-03-21 ENCOUNTER — TELEPHONE (OUTPATIENT)
Dept: INTERNAL MEDICINE CLINIC | Facility: CLINIC | Age: 83
End: 2018-03-21

## 2018-03-21 ENCOUNTER — HOSPITAL ENCOUNTER (EMERGENCY)
Facility: HOSPITAL | Age: 83
Discharge: HOME | End: 2018-03-21
Attending: EMERGENCY MEDICINE
Payer: MEDICARE

## 2018-03-21 ENCOUNTER — APPOINTMENT (OUTPATIENT)
Dept: GENERAL RADIOLOGY | Facility: HOSPITAL | Age: 83
End: 2018-03-21
Attending: EMERGENCY MEDICINE
Payer: MEDICARE

## 2018-03-21 ENCOUNTER — PRIOR ORIGINAL RECORDS (OUTPATIENT)
Dept: OTHER | Age: 83
End: 2018-03-21

## 2018-03-21 VITALS
OXYGEN SATURATION: 95 % | SYSTOLIC BLOOD PRESSURE: 140 MMHG | HEART RATE: 83 BPM | DIASTOLIC BLOOD PRESSURE: 96 MMHG | RESPIRATION RATE: 18 BRPM | TEMPERATURE: 99 F

## 2018-03-21 DIAGNOSIS — R11.2 NAUSEA AND VOMITING, INTRACTABILITY OF VOMITING NOT SPECIFIED, UNSPECIFIED VOMITING TYPE: Primary | ICD-10-CM

## 2018-03-21 LAB
ALBUMIN SERPL BCP-MCNC: 3.8 G/DL (ref 3.5–4.8)
ALP SERPL-CCNC: 61 U/L (ref 32–100)
ALT SERPL-CCNC: 11 U/L (ref 14–54)
ANION GAP SERPL CALC-SCNC: 11 MMOL/L (ref 0–18)
AST SERPL-CCNC: 19 U/L (ref 15–41)
BACTERIA UR QL AUTO: NEGATIVE /HPF
BASOPHILS # BLD: 0.1 K/UL (ref 0–0.2)
BASOPHILS NFR BLD: 1 %
BILIRUB DIRECT SERPL-MCNC: 0.2 MG/DL (ref 0–0.2)
BILIRUB SERPL-MCNC: 1.1 MG/DL (ref 0.3–1.2)
BILIRUB UR QL: NEGATIVE
BUN SERPL-MCNC: 18 MG/DL (ref 8–20)
BUN/CREAT SERPL: 20.5 (ref 10–20)
CALCIUM SERPL-MCNC: 8.9 MG/DL (ref 8.5–10.5)
CHLORIDE SERPL-SCNC: 96 MMOL/L (ref 95–110)
CLARITY UR: CLEAR
CO2 SERPL-SCNC: 23 MMOL/L (ref 22–32)
COLOR UR: YELLOW
CREAT SERPL-MCNC: 0.88 MG/DL (ref 0.5–1.5)
EOSINOPHIL # BLD: 0 K/UL (ref 0–0.7)
EOSINOPHIL NFR BLD: 0 %
ERYTHROCYTE [DISTWIDTH] IN BLOOD BY AUTOMATED COUNT: 12.8 % (ref 11–15)
GLUCOSE SERPL-MCNC: 176 MG/DL (ref 70–99)
GLUCOSE UR-MCNC: NEGATIVE MG/DL
HCT VFR BLD AUTO: 41.5 % (ref 35–48)
HGB BLD-MCNC: 14 G/DL (ref 12–16)
HGB UR QL STRIP.AUTO: NEGATIVE
KETONES UR-MCNC: NEGATIVE MG/DL
LIPASE SERPL-CCNC: <10 U/L (ref 22–51)
LYMPHOCYTES # BLD: 0.8 K/UL (ref 1–4)
LYMPHOCYTES NFR BLD: 8 %
MCH RBC QN AUTO: 31.1 PG (ref 27–32)
MCHC RBC AUTO-ENTMCNC: 33.8 G/DL (ref 32–37)
MCV RBC AUTO: 92 FL (ref 80–100)
MONOCYTES # BLD: 1.1 K/UL (ref 0–1)
MONOCYTES NFR BLD: 11 %
NEUTROPHILS # BLD AUTO: 8.2 K/UL (ref 1.8–7.7)
NEUTROPHILS NFR BLD: 80 %
NITRITE UR QL STRIP.AUTO: NEGATIVE
OSMOLALITY UR CALC.SUM OF ELEC: 276 MOSM/KG (ref 275–295)
PH UR: 6 [PH] (ref 5–8)
PLATELET # BLD AUTO: 203 K/UL (ref 140–400)
PMV BLD AUTO: 8.7 FL (ref 7.4–10.3)
POTASSIUM SERPL-SCNC: 4.5 MMOL/L (ref 3.3–5.1)
PROT SERPL-MCNC: 6.4 G/DL (ref 5.9–8.4)
PROT UR-MCNC: NEGATIVE MG/DL
RBC # BLD AUTO: 4.51 M/UL (ref 3.7–5.4)
RBC #/AREA URNS AUTO: 3 /HPF
SODIUM SERPL-SCNC: 130 MMOL/L (ref 136–144)
SP GR UR STRIP: 1.02 (ref 1–1.03)
UROBILINOGEN UR STRIP-ACNC: <2
VIT C UR-MCNC: NEGATIVE MG/DL
WBC # BLD AUTO: 10.2 K/UL (ref 4–11)
WBC #/AREA URNS AUTO: 12 /HPF

## 2018-03-21 PROCEDURE — 80076 HEPATIC FUNCTION PANEL: CPT | Performed by: EMERGENCY MEDICINE

## 2018-03-21 PROCEDURE — 83690 ASSAY OF LIPASE: CPT | Performed by: EMERGENCY MEDICINE

## 2018-03-21 PROCEDURE — 74177 CT ABD & PELVIS W/CONTRAST: CPT | Performed by: EMERGENCY MEDICINE

## 2018-03-21 PROCEDURE — 80048 BASIC METABOLIC PNL TOTAL CA: CPT

## 2018-03-21 PROCEDURE — 96361 HYDRATE IV INFUSION ADD-ON: CPT

## 2018-03-21 PROCEDURE — 85025 COMPLETE CBC W/AUTO DIFF WBC: CPT | Performed by: EMERGENCY MEDICINE

## 2018-03-21 PROCEDURE — 71101 X-RAY EXAM UNILAT RIBS/CHEST: CPT | Performed by: EMERGENCY MEDICINE

## 2018-03-21 PROCEDURE — 99285 EMERGENCY DEPT VISIT HI MDM: CPT

## 2018-03-21 PROCEDURE — 80048 BASIC METABOLIC PNL TOTAL CA: CPT | Performed by: EMERGENCY MEDICINE

## 2018-03-21 PROCEDURE — 87086 URINE CULTURE/COLONY COUNT: CPT | Performed by: EMERGENCY MEDICINE

## 2018-03-21 PROCEDURE — 85025 COMPLETE CBC W/AUTO DIFF WBC: CPT

## 2018-03-21 PROCEDURE — 96360 HYDRATION IV INFUSION INIT: CPT

## 2018-03-21 PROCEDURE — 81001 URINALYSIS AUTO W/SCOPE: CPT | Performed by: EMERGENCY MEDICINE

## 2018-03-21 RX ORDER — SODIUM CHLORIDE 9 MG/ML
INJECTION, SOLUTION INTRAVENOUS ONCE
Status: COMPLETED | OUTPATIENT
Start: 2018-03-21 | End: 2018-03-21

## 2018-03-21 NOTE — TELEPHONE ENCOUNTER
Pt's daughter called in stating that pt is experiencing the following symptoms:  Pt's daughter is taking pt to the [address] [place] [address] ER today.      Low fever of 100.5  Vomiting  Pain in arm and rib cage    Pt had bronchitis 8 weeks ago

## 2018-03-21 NOTE — ED INITIAL ASSESSMENT (HPI)
Pt from [facility]. Per daughter [facility] told her she was having a low grade fever and vomiting. Not sure if she aspirated. Pt denies pain at present.  Has been having pain to left abd area for a week

## 2018-03-21 NOTE — ED PROVIDER NOTES
Patient Seen in: [facility] AND [facility] Emergency Department    History   Patient presents with:  Nausea/Vomiting/Diarrhea (gastrointestinal)    Stated Complaint: vomiting and low grade fever    HPI    Patient presents emergency department with a low-grade fe 135/77  Pulse: 65  Resp: 20  Temp: 97.8 °F (36.6 °C)  Temp src: Temporal  SpO2: 96 %  O2 Device: None (Room air)    Current:/96   Pulse 83   Temp 98.5 °F (36.9 °C) (Oral)   Resp 18   SpO2 95%         Physical Exam   Constitutional: She is oriented to limits   REDRAW BMP - Normal   CBC WITH DIFFERENTIAL WITH PLATELET    Narrative: The following orders were created for panel order CBC WITH DIFFERENTIAL WITH PLATELET.   Procedure                               Abnormality         Status for follow up      Case was discussed with Dr. Pawan Aviles. Will discharge back to [facility].       Disposition and Plan     Clinical Impression:  Nausea and vomiting, intractability of vomiting not specified, unspecified vomiting type  (primary encounter diagnos

## 2018-03-29 RX ORDER — BETHANECHOL CHLORIDE 10 MG/1
10 TABLET ORAL 3 TIMES DAILY
Qty: 30 TABLET | Refills: 0 | Status: SHIPPED | OUTPATIENT
Start: 2018-03-29 | End: 2018-04-10

## 2018-03-29 RX ORDER — FUROSEMIDE 20 MG/1
TABLET ORAL
Qty: 15 TABLET | Refills: 0 | Status: SHIPPED | OUTPATIENT
Start: 2018-03-29 | End: 2018-04-17

## 2018-03-31 ENCOUNTER — TELEPHONE (OUTPATIENT)
Dept: INTERNAL MEDICINE CLINIC | Facility: CLINIC | Age: 83
End: 2018-03-31

## 2018-04-13 RX ORDER — BETHANECHOL CHLORIDE 10 MG/1
TABLET ORAL
Qty: 30 TABLET | Refills: 2 | Status: SHIPPED | OUTPATIENT
Start: 2018-04-13 | End: 2018-04-17

## 2018-04-16 LAB
ALT (SGPT): 11 U/L
AST (SGOT): 20 U/L
BUN: 18 MG/DL
CALCIUM: 8.9 MG/DL
CHLORIDE: 96 MEQ/L
CHOLESTEROL, TOTAL: 153 MG/DL
CREATININE, SERUM: 0.88 MG/DL
GLUCOSE: 176 MG/DL
HDL CHOLESTEROL: 65 MG/DL
HEMATOCRIT: 41.5 %
HEMOGLOBIN: 14 G/DL
LDL CHOLESTEROL: 37 MG/DL
NON-HDL CHOLESTEROL: 88 MG/DL
PLATELETS: 203 K/UL
RED BLOOD COUNT: 4.51 X 10-6/U
SODIUM: 130 MEQ/L
TRIGLYCERIDES: 255 MG/DL
WHITE BLOOD COUNT: 10.2 X 10-3/U

## 2018-04-17 RX ORDER — BETHANECHOL CHLORIDE 10 MG/1
10 TABLET ORAL 3 TIMES DAILY
Qty: 90 TABLET | Refills: 2 | Status: SHIPPED | OUTPATIENT
Start: 2018-04-17 | End: 2018-06-12

## 2018-04-17 NOTE — TELEPHONE ENCOUNTER
To DR SMITH=medication pended for approval , needs 90 tabs/month , was previously ordered for 30 tabs only, patient is taking it 3x/day., pharmacy did not release the 4/13/18 script yet, waiting for the 90 tabs script.       Darius Fraire calling and reques

## 2018-04-18 RX ORDER — FUROSEMIDE 20 MG/1
TABLET ORAL
Qty: 15 TABLET | Refills: 2 | Status: SHIPPED | OUTPATIENT
Start: 2018-04-18 | End: 2019-08-13

## 2018-04-18 RX ORDER — FLUOXETINE 10 MG/1
CAPSULE ORAL
Qty: 90 CAPSULE | Refills: 0 | Status: SHIPPED | OUTPATIENT
Start: 2018-04-18 | End: 2018-07-31

## 2018-04-19 ENCOUNTER — PRIOR ORIGINAL RECORDS (OUTPATIENT)
Dept: OTHER | Age: 83
End: 2018-04-19

## 2018-05-02 NOTE — TELEPHONE ENCOUNTER
Failed per nursing protocol - please advise on refill request   Refill Protocol Appointment Criteria  · Appointment scheduled in the past 6 months or in the next 3 months  Recent Outpatient Visits            7 months ago Deirdre Pillai

## 2018-05-04 RX ORDER — OXCARBAZEPINE 150 MG/1
TABLET, FILM COATED ORAL
Qty: 90 TABLET | OUTPATIENT
Start: 2018-05-04

## 2018-05-04 NOTE — TELEPHONE ENCOUNTER
This med is not in the pt's med list and I had not prescribe this med before.  If given by neurologist, needs to be refilled by neurologist.

## 2018-05-11 RX ORDER — OXCARBAZEPINE 150 MG/1
TABLET, FILM COATED ORAL
Qty: 90 TABLET | Refills: 0 | Status: SHIPPED | OUTPATIENT
Start: 2018-05-11 | End: 2018-08-07

## 2018-05-11 RX ORDER — MELATONIN
Qty: 90 TABLET | Refills: 0 | Status: SHIPPED | OUTPATIENT
Start: 2018-05-11 | End: 2018-08-07

## 2018-05-11 RX ORDER — CARVEDILOL 3.12 MG/1
TABLET ORAL
Qty: 90 TABLET | Refills: 0 | Status: SHIPPED | OUTPATIENT
Start: 2018-05-11 | End: 2018-09-04

## 2018-05-30 RX ORDER — EZETIMIBE AND SIMVASTATIN 10; 10 MG/1; MG/1
TABLET ORAL
Qty: 30 TABLET | OUTPATIENT
Start: 2018-05-30

## 2018-05-31 NOTE — TELEPHONE ENCOUNTER
Pt's daughter called and wanted the name of the script that was requested by the pharmacy.   Informed her it was ezetimibe-simvastatin

## 2018-06-05 ENCOUNTER — TELEPHONE (OUTPATIENT)
Dept: NEUROLOGY | Facility: CLINIC | Age: 83
End: 2018-06-05

## 2018-06-15 RX ORDER — ASPIRIN 81 MG/1
TABLET ORAL
Qty: 90 TABLET | Refills: 0 | Status: SHIPPED | OUTPATIENT
Start: 2018-06-15 | End: 2018-07-02

## 2018-06-15 RX ORDER — BETHANECHOL CHLORIDE 10 MG/1
TABLET ORAL
Qty: 90 TABLET | Refills: 0 | Status: SHIPPED | OUTPATIENT
Start: 2018-06-15 | End: 2018-07-18 | Stop reason: ALTCHOICE

## 2018-06-15 RX ORDER — EZETIMIBE AND SIMVASTATIN 10; 10 MG/1; MG/1
TABLET ORAL
Qty: 30 TABLET | Refills: 0 | Status: SHIPPED | OUTPATIENT
Start: 2018-06-15 | End: 2018-07-02

## 2018-06-21 RX ORDER — CLOPIDOGREL BISULFATE 75 MG/1
75 TABLET ORAL DAILY
Qty: 30 TABLET | Refills: 0 | Status: SHIPPED | OUTPATIENT
Start: 2018-06-21 | End: 2018-07-24

## 2018-07-02 RX ORDER — EZETIMIBE AND SIMVASTATIN 10; 10 MG/1; MG/1
TABLET ORAL
Qty: 90 TABLET | Refills: 1 | Status: SHIPPED | OUTPATIENT
Start: 2018-07-02 | End: 2019-03-14

## 2018-07-02 RX ORDER — ASPIRIN 81 MG/1
TABLET ORAL
Qty: 90 TABLET | Refills: 1 | Status: SHIPPED | OUTPATIENT
Start: 2018-07-02 | End: 2019-01-03

## 2018-07-18 ENCOUNTER — OFFICE VISIT (OUTPATIENT)
Dept: INTERNAL MEDICINE CLINIC | Facility: CLINIC | Age: 83
End: 2018-07-18
Payer: MEDICARE

## 2018-07-18 VITALS
HEART RATE: 66 BPM | DIASTOLIC BLOOD PRESSURE: 72 MMHG | WEIGHT: 148 LBS | SYSTOLIC BLOOD PRESSURE: 113 MMHG | HEIGHT: 64 IN | BODY MASS INDEX: 25.27 KG/M2 | TEMPERATURE: 98 F

## 2018-07-18 DIAGNOSIS — I48.20 CHRONIC A-FIB (HCC): ICD-10-CM

## 2018-07-18 DIAGNOSIS — E11.9 CONTROLLED TYPE 2 DIABETES MELLITUS WITHOUT COMPLICATION, WITHOUT LONG-TERM CURRENT USE OF INSULIN (HCC): Primary | ICD-10-CM

## 2018-07-18 DIAGNOSIS — E78.00 HYPERCHOLESTEREMIA: ICD-10-CM

## 2018-07-18 DIAGNOSIS — E55.9 VITAMIN D DEFICIENCY: ICD-10-CM

## 2018-07-18 DIAGNOSIS — Z23 NEED FOR VACCINATION: ICD-10-CM

## 2018-07-18 DIAGNOSIS — R48.2 GAIT APRAXIA OF ELDERLY: ICD-10-CM

## 2018-07-18 DIAGNOSIS — R39.15 URINARY URGENCY: ICD-10-CM

## 2018-07-18 DIAGNOSIS — I10 ESSENTIAL HYPERTENSION: ICD-10-CM

## 2018-07-18 PROCEDURE — 99214 OFFICE O/P EST MOD 30 MIN: CPT | Performed by: INTERNAL MEDICINE

## 2018-07-18 PROCEDURE — 90670 PCV13 VACCINE IM: CPT | Performed by: INTERNAL MEDICINE

## 2018-07-18 PROCEDURE — G0009 ADMIN PNEUMOCOCCAL VACCINE: HCPCS | Performed by: INTERNAL MEDICINE

## 2018-07-18 PROCEDURE — G0463 HOSPITAL OUTPT CLINIC VISIT: HCPCS | Performed by: INTERNAL MEDICINE

## 2018-07-18 NOTE — PROGRESS NOTES
HPI:    Patient ID: Lex Ojeda is a 80year old female. Diabetes   She presents for her follow-up diabetic visit. She has type 2 diabetes mellitus. Her disease course has been stable. There are no hypoglycemic associated symptoms.  There are no improvement. Hypertensive end-organ damage includes CVA. There is no history of chronic renal disease or a hypertension causing med. Hyperlipidemia   This is a chronic problem. The current episode started more than 1 year ago. The problem is controlled. 20 MG Oral Tab TAKE 1/2 TABLET BY MOUTH DAILY Disp: 15 tablet Rfl: 2   FLUOXETINE HCL 10 MG Oral Cap TAKE 1 CAPSULE BY MOUTH DAILY Disp: 90 capsule Rfl: 0   PANTOPRAZOLE SODIUM 40 MG Oral Tab EC TAKE 1 TABLET BY MOUTH DAILY Disp: 30 tablet Rfl: Keira Cutler diaphoretic.               ASSESSMENT/PLAN:   (E11.9) Controlled type 2 diabetes mellitus without complication, without long-term current use of insulin (HCC)  (primary encounter diagnosis)  Plan: HEMOGLOBIN A1C        Just being treated with diet alone and

## 2018-07-24 ENCOUNTER — OFFICE VISIT (OUTPATIENT)
Dept: NEUROLOGY | Facility: CLINIC | Age: 83
End: 2018-07-24
Payer: MEDICARE

## 2018-07-24 VITALS
HEIGHT: 64 IN | BODY MASS INDEX: 25.1 KG/M2 | WEIGHT: 147 LBS | SYSTOLIC BLOOD PRESSURE: 100 MMHG | DIASTOLIC BLOOD PRESSURE: 58 MMHG | HEART RATE: 67 BPM

## 2018-07-24 DIAGNOSIS — G12.29 PSEUDOBULBAR PALSY (HCC): ICD-10-CM

## 2018-07-24 DIAGNOSIS — R48.2 APRAXIA: Primary | ICD-10-CM

## 2018-07-24 PROCEDURE — 99214 OFFICE O/P EST MOD 30 MIN: CPT | Performed by: OTHER

## 2018-07-24 RX ORDER — FUROSEMIDE 20 MG/1
TABLET ORAL
Qty: 15 TABLET | Refills: 0 | Status: SHIPPED | OUTPATIENT
Start: 2018-07-24 | End: 2018-09-04

## 2018-07-24 RX ORDER — BETHANECHOL CHLORIDE 10 MG/1
TABLET ORAL
Qty: 90 TABLET | Refills: 0 | Status: SHIPPED | OUTPATIENT
Start: 2018-07-24 | End: 2018-09-04

## 2018-07-24 NOTE — PATIENT INSTRUCTIONS
As of October 6th 2014, the Drug Enforcement Agency [facility]) is reclassifying all hydrocodone combination medications from Schedule III to Schedule II. This includes medications such as Norco, Vicodin, Lortab, Zohydro, and Vicoprofen.     What this means for y

## 2018-07-24 NOTE — PROGRESS NOTES
Neurology Follow up Visit     Referred By: Dr. Lozada ref. provider found    Chief Complaint: Patient presents with:  Neurologic Problem: LOV: 09/07/17. Patient presents for a f/u a renewal on her medications and a c/o cough and vomiting , and sneezing.  The was appearing slower.      Past Medical History:   Diagnosis Date   • Acute, but ill-defined, cerebrovascular disease    • Anxiety state    • Atrial fibrillation [facility])    • Cataract, bilateral 2005   • Chronic a-fib ([facility]    • Congestive heart disease ([facility] GLUCOCARD VITAL TEST In Vitro Strip, Use as directed, Disp: , Rfl: 99  •  LOSARTAN POTASSIUM 25 MG Oral Tab, TAKE 1 TABLET BY MOUTH DAILY, Disp: 30 tablet, Rfl: 22  •  Senna 8.6 MG Oral Tab, Take 8.6 mg by mouth 2 (two) times daily. , Disp: , Rfl:   •  acet bilaterally  No atrophy or fasciculations  Strength- upper extremities 5/5 proximally and distally                  - lower  extremities 4/5 proximally and distally    Sensory Exam:  Light touch sensation- intact in all 4 extremities    Deep Tendon Reflexe Instructed patient to call my office or seek medical attention immediately if symptoms worsen. Patient verbalized understanding of information given. All questions were answered. All side effects of drugs were discussed.      Time spent for examination, co

## 2018-07-25 ENCOUNTER — TELEPHONE (OUTPATIENT)
Dept: INTERNAL MEDICINE CLINIC | Facility: CLINIC | Age: 83
End: 2018-07-25

## 2018-07-25 RX ORDER — CLOPIDOGREL BISULFATE 75 MG/1
TABLET ORAL
Qty: 90 TABLET | Refills: 1 | Status: SHIPPED | OUTPATIENT
Start: 2018-07-25 | End: 2019-01-03

## 2018-07-25 RX ORDER — AMPICILLIN 500 MG/1
500 CAPSULE ORAL 4 TIMES DAILY
Qty: 28 CAPSULE | Refills: 0 | Status: SHIPPED | OUTPATIENT
Start: 2018-07-25 | End: 2019-03-19 | Stop reason: ALTCHOICE

## 2018-07-25 NOTE — TELEPHONE ENCOUNTER
Called into the patient's daughter who stated the order has to be given to [address] [place] [address] so they can give her the medication. We need to call Rafael in [place] at  [phone]. I called Rafael and order given.   We need to sent the me

## 2018-07-25 NOTE — TELEPHONE ENCOUNTER
Pls call daughter; urine test did show pt has uti. Start pt on ampicillin 500mg po QID for one week.

## 2018-07-31 RX ORDER — FLUOXETINE 10 MG/1
CAPSULE ORAL
Qty: 90 CAPSULE | Refills: 0 | Status: SHIPPED | OUTPATIENT
Start: 2018-07-31 | End: 2018-09-04

## 2018-07-31 NOTE — TELEPHONE ENCOUNTER
Psychiatric Non-Scheduled (Anti-Anxiety) Passed7/31 12:53 PM   Appointment in last 6 or next 3 months     Medication refilled for 90 days per protocol.

## 2018-08-07 RX ORDER — OXCARBAZEPINE 150 MG/1
TABLET, FILM COATED ORAL
Qty: 90 TABLET | Refills: 1 | Status: SHIPPED | OUTPATIENT
Start: 2018-08-07 | End: 2019-03-14

## 2018-08-07 RX ORDER — MELATONIN
Qty: 90 TABLET | Refills: 1 | Status: SHIPPED | OUTPATIENT
Start: 2018-08-07 | End: 2019-03-14

## 2018-08-21 ENCOUNTER — TELEPHONE (OUTPATIENT)
Dept: OTHER | Age: 83
End: 2018-08-21

## 2018-08-21 DIAGNOSIS — R48.2 APRAXIA: ICD-10-CM

## 2018-08-21 DIAGNOSIS — G12.29 PSEUDOBULBAR PALSY (HCC): Primary | ICD-10-CM

## 2018-08-21 RX ORDER — MAG HYDROX/ALUMINUM HYD/SIMETH 400-400-40
SUSPENSION, ORAL (FINAL DOSE FORM) ORAL
Qty: 90 CAPSULE | Refills: 0 | Status: CANCELLED | OUTPATIENT
Start: 2018-08-21

## 2018-08-21 NOTE — TELEPHONE ENCOUNTER
Darius Fraire requesting Vitamin D3 5000 units once capsule daily    08/21/2018: completely out of medication, need refill in order to fill pt's bubble pack    Please advise. No Vitamin D lab noted. Patient has not done labs ordered 7/18/18;   I call

## 2018-08-21 NOTE — TELEPHONE ENCOUNTER
Armando Living the daughter who is on HIPPA is asking for the patient to receive Home Health for her Physical therapy and Speech therapy. Please advise.

## 2018-08-22 NOTE — TELEPHONE ENCOUNTER
Letha Alvarez informed with understanding who is the patient's daughter. [facility] pharmacy was also called and informed.

## 2018-08-29 ENCOUNTER — TELEPHONE (OUTPATIENT)
Dept: INTERNAL MEDICINE CLINIC | Facility: CLINIC | Age: 83
End: 2018-08-29

## 2018-08-29 NOTE — TELEPHONE ENCOUNTER
Palomo Calderon from [facility] health stts pt refused speech eval today.

## 2018-09-04 RX ORDER — LOSARTAN POTASSIUM 25 MG/1
TABLET ORAL
Qty: 90 TABLET | Refills: 0 | Status: SHIPPED | OUTPATIENT
Start: 2018-09-04 | End: 2019-01-03

## 2018-09-04 RX ORDER — CARVEDILOL 3.12 MG/1
TABLET ORAL
Qty: 90 TABLET | Refills: 0 | Status: SHIPPED | OUTPATIENT
Start: 2018-09-04 | End: 2019-01-03

## 2018-09-04 RX ORDER — FUROSEMIDE 20 MG/1
TABLET ORAL
Qty: 15 TABLET | Refills: 1 | Status: SHIPPED | OUTPATIENT
Start: 2018-09-04 | End: 2018-11-06

## 2018-09-04 RX ORDER — BETHANECHOL CHLORIDE 10 MG/1
TABLET ORAL
Qty: 90 TABLET | Refills: 1 | Status: SHIPPED | OUTPATIENT
Start: 2018-09-04 | End: 2018-10-09

## 2018-09-04 RX ORDER — FLUOXETINE 10 MG/1
CAPSULE ORAL
Qty: 90 CAPSULE | Refills: 0 | Status: SHIPPED | OUTPATIENT
Start: 2018-09-04 | End: 2019-01-03

## 2018-09-05 ENCOUNTER — TELEPHONE (OUTPATIENT)
Dept: OTHER | Age: 83
End: 2018-09-05

## 2018-09-05 DIAGNOSIS — N39.0 URINARY TRACT INFECTION WITHOUT HEMATURIA, SITE UNSPECIFIED: Primary | ICD-10-CM

## 2018-09-05 RX ORDER — SULFAMETHOXAZOLE AND TRIMETHOPRIM 800; 160 MG/1; MG/1
1 TABLET ORAL 2 TIMES DAILY
Qty: 14 TABLET | Refills: 0 | Status: SHIPPED | OUTPATIENT
Start: 2018-09-05 | End: 2018-09-12

## 2018-09-05 NOTE — TELEPHONE ENCOUNTER
Hypertensive Medications  Protocol Criteria:  · Appointment scheduled in the past 6 months or in the next 3 months  · BMP or CMP in the past 12 months  · Creatinine result < 2  Recent Outpatient Visits            1 month ago [facility] Dora Smith MD    Office Visit    1 year ago Unsteady gait    [address] MD    Office Visit    1 year ago Encounter for staple removal    [address] Diaz, Jaylen Villalta

## 2018-09-05 NOTE — TELEPHONE ENCOUNTER
pt daughter [facility] is calling today requesting a second round of abx  for pt UTI. She stated that you treated her on 7/25/2018 with Ampicillin 500 mg but she feels pt still has a UTI.  She stated that last night pt had a personality change and s

## 2018-09-05 NOTE — TELEPHONE ENCOUNTER
Will send the message to on call Dr Pawan Aviles. Dr Ulloa=(on behalf of Dr Fred Jefferson) ,please see message below and advise.

## 2018-09-06 NOTE — TELEPHONE ENCOUNTER
I advised daughter of orders and recommendations below. She is concerned with rx for sulfa as she (daughter) experienced tongue swelling with sulfa.   Also, she will not be able to get the medication until tomorrow, pt lives in assisted living at [facility]

## 2018-09-06 NOTE — TELEPHONE ENCOUNTER
Late entry, I called Dr. Courtney Rasmussen shortly after 8 PM and he stated he will review. No response. Dr. Tamra Peterson will address. Sent to Dr. Tamra Peterson. Also daughter called for update on orders, see other encounter from today.

## 2018-09-06 NOTE — TELEPHONE ENCOUNTER
Talked to [employer] and message given. U/A and C&S ordered and faxed to [phone] per request.   Sofía So from [facility] will call the [address] and obtain the medication order.     Called the Daughter, Elsy Eason, and infor

## 2018-09-06 NOTE — TELEPHONE ENCOUNTER
Will rx with Bactrim DS. This is a different antibioitic, but very good for UTI's in older people. I have pended the order. If not resolving quickly, then get UA with culture. Prior UA reviewed.

## 2018-09-10 ENCOUNTER — TELEPHONE (OUTPATIENT)
Dept: INTERNAL MEDICINE CLINIC | Facility: CLINIC | Age: 83
End: 2018-09-10

## 2018-09-10 ENCOUNTER — TELEPHONE (OUTPATIENT)
Dept: OTHER | Age: 83
End: 2018-09-10

## 2018-09-10 DIAGNOSIS — N39.0 URINARY TRACT INFECTION WITHOUT HEMATURIA, SITE UNSPECIFIED: Primary | ICD-10-CM

## 2018-09-10 NOTE — TELEPHONE ENCOUNTER
Spoke with [facility] and relayed EL message below--she verbalizes understanding and agreement and will send out phlebotomist to draw fasting labs, as she does not usually see patient until 10 a.m.

## 2018-09-10 NOTE — TELEPHONE ENCOUNTER
Ned Yanes called to report that there is an interaction between the Bactrim DS and Losartan, severity 2 but didn't know specific interaction. Pt started taking medication yesterday. Pt is at [facility] facility in [place].

## 2018-09-10 NOTE — TELEPHONE ENCOUNTER
Nimesh Betancur, calling to confirm labs that have been ordered by Dr Millie High. [employer] was contacted by patient's daughter to notify of lipid panel and CBC that have been ordered.  Reviewed with [facility] all the current lab orders that need to be done, there is no

## 2018-09-10 NOTE — TELEPHONE ENCOUNTER
Spoke with Live Wu and relayed EL message below--she verbalizes understanding and agreement. Live Wu states UA and C & S was completed on Friday and resulted yesterday.  She states she saw the results yesterday--\"that's why the patient was started on Bact

## 2018-09-12 ENCOUNTER — TELEPHONE (OUTPATIENT)
Dept: OTHER | Age: 83
End: 2018-09-12

## 2018-09-12 NOTE — TELEPHONE ENCOUNTER
Stop the bactrim and observe pt, if continues to vomit, send her to ER  Would also recommend doing ua and urine culture.  I reviewed chart, she was treated with ampicillin 2 weeks ago for uti but apparently didn't clear up symptoms completely so was then or

## 2018-09-12 NOTE — TELEPHONE ENCOUNTER
Spoke with Sarah NEFF and relayed EL message below--she verbalizes understanding and agreement. Faxed EL message to her per her request at [phone].      She states she will fax order form to EL at [facility] office for UA and Urine C & S order--states sh

## 2018-09-12 NOTE — TELEPHONE ENCOUNTER
Dr. David Estrella I received a call from [facility] from [facility] in [place]. She stated that pt was started on Bactrim DS and she has been complaining of vomiting and nausea since yesterday.  She had one episode yesterday of vomiting and another one

## 2018-09-13 ENCOUNTER — TELEPHONE (OUTPATIENT)
Dept: OTHER | Age: 83
End: 2018-09-13

## 2018-09-13 NOTE — TELEPHONE ENCOUNTER
[facility] [address] calling to inform Dr Destiny Hebert she was unable to do blood draw today as by the time she got to [place] the pt already ate.  RN states she will go back next week to draw labs.     Per [facility] RN ok to leave voice mail

## 2018-09-17 ENCOUNTER — LAB REQUISITION (OUTPATIENT)
Dept: LAB | Facility: HOSPITAL | Age: 83
End: 2018-09-17
Payer: MEDICARE

## 2018-09-17 ENCOUNTER — PRIOR ORIGINAL RECORDS (OUTPATIENT)
Dept: OTHER | Age: 83
End: 2018-09-17

## 2018-09-17 DIAGNOSIS — E11.22 TYPE 2 DIABETES MELLITUS WITH DIABETIC CHRONIC KIDNEY DISEASE (HCC): ICD-10-CM

## 2018-09-17 DIAGNOSIS — E55.9 VITAMIN D DEFICIENCY: ICD-10-CM

## 2018-09-17 DIAGNOSIS — N18.30 CHRONIC KIDNEY DISEASE, STAGE III (MODERATE) (HCC): ICD-10-CM

## 2018-09-17 LAB
25(OH)D3 SERPL-MCNC: 56.4 NG/ML
ALBUMIN SERPL BCP-MCNC: 3.7 G/DL (ref 3.5–4.8)
ALBUMIN/GLOB SERPL: 1.5 {RATIO} (ref 1–2)
ALP SERPL-CCNC: 61 U/L (ref 32–100)
ALT SERPL-CCNC: 13 U/L (ref 14–54)
ANION GAP SERPL CALC-SCNC: 10 MMOL/L (ref 0–18)
AST SERPL-CCNC: 19 U/L (ref 15–41)
BASOPHILS # BLD: 0.1 K/UL (ref 0–0.2)
BASOPHILS NFR BLD: 1 %
BILIRUB SERPL-MCNC: 0.6 MG/DL (ref 0.3–1.2)
BUN SERPL-MCNC: 14 MG/DL (ref 8–20)
BUN/CREAT SERPL: 18.2 (ref 10–20)
CALCIUM SERPL-MCNC: 8.8 MG/DL (ref 8.5–10.5)
CHLORIDE SERPL-SCNC: 98 MMOL/L (ref 95–110)
CHOLEST SERPL-MCNC: 139 MG/DL (ref 110–200)
CO2 SERPL-SCNC: 24 MMOL/L (ref 22–32)
CREAT SERPL-MCNC: 0.77 MG/DL (ref 0.5–1.5)
EOSINOPHIL # BLD: 0.2 K/UL (ref 0–0.7)
EOSINOPHIL NFR BLD: 2 %
ERYTHROCYTE [DISTWIDTH] IN BLOOD BY AUTOMATED COUNT: 12.9 % (ref 11–15)
GLOBULIN PLAS-MCNC: 2.4 G/DL (ref 2.5–3.7)
GLUCOSE SERPL-MCNC: 119 MG/DL (ref 70–99)
HBA1C MFR BLD: 7.3 % (ref 4–6)
HCT VFR BLD AUTO: 40.6 % (ref 35–48)
HDLC SERPL-MCNC: 59 MG/DL
HGB BLD-MCNC: 13.5 G/DL (ref 12–16)
LDLC SERPL CALC-MCNC: 59 MG/DL (ref 0–99)
LYMPHOCYTES # BLD: 0.8 K/UL (ref 1–4)
LYMPHOCYTES NFR BLD: 10 %
MCH RBC QN AUTO: 30.7 PG (ref 27–32)
MCHC RBC AUTO-ENTMCNC: 33.2 G/DL (ref 32–37)
MCV RBC AUTO: 92.3 FL (ref 80–100)
MONOCYTES # BLD: 0.9 K/UL (ref 0–1)
MONOCYTES NFR BLD: 11 %
NEUTROPHILS # BLD AUTO: 6.3 K/UL (ref 1.8–7.7)
NEUTROPHILS NFR BLD: 76 %
NONHDLC SERPL-MCNC: 80 MG/DL
OSMOLALITY UR CALC.SUM OF ELEC: 276 MOSM/KG (ref 275–295)
PLATELET # BLD AUTO: 214 K/UL (ref 140–400)
PMV BLD AUTO: 8.7 FL (ref 7.4–10.3)
POTASSIUM SERPL-SCNC: 4.3 MMOL/L (ref 3.3–5.1)
PROT SERPL-MCNC: 6.1 G/DL (ref 5.9–8.4)
RBC # BLD AUTO: 4.4 M/UL (ref 3.7–5.4)
SODIUM SERPL-SCNC: 132 MMOL/L (ref 136–144)
TRIGL SERPL-MCNC: 105 MG/DL (ref 1–149)
WBC # BLD AUTO: 8.2 K/UL (ref 4–11)

## 2018-09-17 PROCEDURE — 80061 LIPID PANEL: CPT | Performed by: INTERNAL MEDICINE

## 2018-09-17 PROCEDURE — 82306 VITAMIN D 25 HYDROXY: CPT | Performed by: INTERNAL MEDICINE

## 2018-09-17 PROCEDURE — 85025 COMPLETE CBC W/AUTO DIFF WBC: CPT | Performed by: INTERNAL MEDICINE

## 2018-09-17 PROCEDURE — 80053 COMPREHEN METABOLIC PANEL: CPT | Performed by: INTERNAL MEDICINE

## 2018-09-17 PROCEDURE — 83036 HEMOGLOBIN GLYCOSYLATED A1C: CPT | Performed by: INTERNAL MEDICINE

## 2018-09-20 ENCOUNTER — TELEPHONE (OUTPATIENT)
Dept: INTERNAL MEDICINE CLINIC | Facility: CLINIC | Age: 83
End: 2018-09-20

## 2018-09-25 ENCOUNTER — TELEPHONE (OUTPATIENT)
Dept: INTERNAL MEDICINE CLINIC | Facility: CLINIC | Age: 83
End: 2018-09-25

## 2018-09-25 NOTE — TELEPHONE ENCOUNTER
You Delgado from [facility] requesting verbal order to continue PT at home for 3 weeks

## 2018-10-02 ENCOUNTER — TELEPHONE (OUTPATIENT)
Dept: INTERNAL MEDICINE CLINIC | Facility: CLINIC | Age: 83
End: 2018-10-02

## 2018-10-09 RX ORDER — BETHANECHOL CHLORIDE 10 MG/1
TABLET ORAL
Qty: 90 TABLET | Refills: 1 | Status: SHIPPED | OUTPATIENT
Start: 2018-10-09 | End: 2018-12-11

## 2018-10-10 ENCOUNTER — TELEPHONE (OUTPATIENT)
Dept: OTHER | Age: 83
End: 2018-10-10

## 2018-10-10 NOTE — TELEPHONE ENCOUNTER
Spoke with [address] Robison RN--requesting order to discharge patient from home health next week.     Please respond to pool: LANDY MCDONALDO CANDICEN/FRANK

## 2018-10-11 ENCOUNTER — TELEPHONE (OUTPATIENT)
Dept: OTHER | Age: 83
End: 2018-10-11

## 2018-10-11 NOTE — TELEPHONE ENCOUNTER
Informed daughter test results. Verbalized understanding. Informed pt results. Notes recorded by Marisela Paige MD on 9/18/2018 at 5:02 PM CDT  Notify daughter  Pt's labs show a1c at 7.3 so dm fairly controlled.   Her cholesterol levels are in

## 2018-10-16 ENCOUNTER — TELEPHONE (OUTPATIENT)
Dept: INTERNAL MEDICINE CLINIC | Facility: CLINIC | Age: 83
End: 2018-10-16

## 2018-10-16 NOTE — TELEPHONE ENCOUNTER
Order # [id number] received from [facility] for 's signature.

## 2018-11-06 RX ORDER — FUROSEMIDE 20 MG/1
TABLET ORAL
Qty: 15 TABLET | Refills: 1 | Status: SHIPPED | OUTPATIENT
Start: 2018-11-06 | End: 2019-01-08

## 2018-11-14 LAB
ALBUMIN: 3.7 G/DL
ALKALINE PHOSPHATATE(ALK PHOS): 61 IU/L
BILIRUBIN TOTAL: 0.6 MG/DL
BUN: 14 MG/DL
CALCIUM: 8.8 MG/DL
CHLORIDE: 98 MEQ/L
CREATININE, SERUM: 0.77 MG/DL
GLOBULIN: 2.4 G/DL
GLUCOSE: 119 MG/DL
HEMATOCRIT: 40.6 %
HEMOGLOBIN: 13.5 G/DL
PLATELETS: 214 K/UL
POTASSIUM, SERUM: 4.3 MEQ/L
PROTEIN, TOTAL: 6.1 G/DL
RED BLOOD COUNT: 4.4 X 10-6/U
SGOT (AST): 19 IU/L
SGPT (ALT): 13 IU/L
SODIUM: 132 MEQ/L
WHITE BLOOD COUNT: 8.2 X 10-3/U

## 2018-11-15 ENCOUNTER — PRIOR ORIGINAL RECORDS (OUTPATIENT)
Dept: OTHER | Age: 83
End: 2018-11-15

## 2018-11-15 ENCOUNTER — MYAURORA ACCOUNT LINK (OUTPATIENT)
Dept: OTHER | Age: 83
End: 2018-11-15

## 2018-11-28 RX ORDER — LOSARTAN POTASSIUM 25 MG/1
TABLET ORAL
Qty: 90 TABLET | Refills: 0 | OUTPATIENT
Start: 2018-11-28

## 2018-11-28 RX ORDER — CARVEDILOL 3.12 MG/1
TABLET ORAL
Qty: 90 TABLET | Refills: 0 | OUTPATIENT
Start: 2018-11-28

## 2018-11-28 NOTE — TELEPHONE ENCOUNTER
Sara Fortune from [facility] called in about the refilled that was received for the Pt.   She stated the Px was called in this morning and she need to verify the parameters of the  Medication Carvedilol 3.125    Please advised    [phone] Pharmacy #

## 2018-12-10 ENCOUNTER — TELEPHONE (OUTPATIENT)
Dept: INTERNAL MEDICINE CLINIC | Facility: CLINIC | Age: 83
End: 2018-12-10

## 2018-12-10 NOTE — TELEPHONE ENCOUNTER
Received a fax from [facility]; ua and culture done  Due to urinary frequency; culture showed E coli  50K to 100K with sensitivities. Start cefuroxime 250mg po bid for one week.

## 2018-12-11 ENCOUNTER — TELEPHONE (OUTPATIENT)
Dept: INTERNAL MEDICINE CLINIC | Facility: CLINIC | Age: 83
End: 2018-12-11

## 2018-12-11 RX ORDER — BETHANECHOL CHLORIDE 10 MG/1
TABLET ORAL
Qty: 90 TABLET | Refills: 0 | Status: SHIPPED | OUTPATIENT
Start: 2018-12-11 | End: 2019-02-12

## 2018-12-11 NOTE — TELEPHONE ENCOUNTER
Order for Cefueroxime signed by Dr. Piter Diaz faxed back, confirmed sent. Original placed for scanning to joshua.

## 2018-12-18 RX ORDER — EZETIMIBE AND SIMVASTATIN 10; 10 MG/1; MG/1
TABLET ORAL
Qty: 90 TABLET | Refills: 0 | Status: CANCELLED | OUTPATIENT
Start: 2018-12-18

## 2018-12-24 RX ORDER — PANTOPRAZOLE SODIUM 40 MG/1
TABLET, DELAYED RELEASE ORAL
Qty: 30 TABLET | Refills: 11 | Status: SHIPPED | OUTPATIENT
Start: 2018-12-24 | End: 2020-01-08

## 2018-12-24 NOTE — TELEPHONE ENCOUNTER
Refill Protocol Appointment Criteria  · Appointment scheduled in the past 12 months or in the next 3 months  Recent Outpatient Visits            5 months ago [place], [address], [address], [place]

## 2018-12-27 ENCOUNTER — APPOINTMENT (OUTPATIENT)
Dept: CT IMAGING | Age: 83
End: 2018-12-27
Attending: EMERGENCY MEDICINE
Payer: MEDICARE

## 2018-12-27 ENCOUNTER — APPOINTMENT (OUTPATIENT)
Dept: GENERAL RADIOLOGY | Age: 83
End: 2018-12-27
Attending: EMERGENCY MEDICINE
Payer: MEDICARE

## 2018-12-27 ENCOUNTER — NURSE TRIAGE (OUTPATIENT)
Dept: INTERNAL MEDICINE CLINIC | Facility: CLINIC | Age: 83
End: 2018-12-27

## 2018-12-27 ENCOUNTER — HOSPITAL ENCOUNTER (OUTPATIENT)
Age: 83
Discharge: HOME | End: 2018-12-27
Attending: EMERGENCY MEDICINE
Payer: MEDICARE

## 2018-12-27 VITALS
HEART RATE: 101 BPM | OXYGEN SATURATION: 94 % | DIASTOLIC BLOOD PRESSURE: 94 MMHG | SYSTOLIC BLOOD PRESSURE: 145 MMHG | RESPIRATION RATE: 18 BRPM | TEMPERATURE: 98 F

## 2018-12-27 DIAGNOSIS — R52 PAIN: ICD-10-CM

## 2018-12-27 DIAGNOSIS — S20.229A CONTUSION OF BACK, UNSPECIFIED LATERALITY, INITIAL ENCOUNTER: Primary | ICD-10-CM

## 2018-12-27 PROCEDURE — 70450 CT HEAD/BRAIN W/O DYE: CPT | Performed by: EMERGENCY MEDICINE

## 2018-12-27 PROCEDURE — 99214 OFFICE O/P EST MOD 30 MIN: CPT

## 2018-12-27 PROCEDURE — 72110 X-RAY EXAM L-2 SPINE 4/>VWS: CPT | Performed by: EMERGENCY MEDICINE

## 2018-12-27 PROCEDURE — 72100 X-RAY EXAM L-S SPINE 2/3 VWS: CPT | Performed by: EMERGENCY MEDICINE

## 2018-12-27 PROCEDURE — 72125 CT NECK SPINE W/O DYE: CPT | Performed by: EMERGENCY MEDICINE

## 2018-12-27 PROCEDURE — 72072 X-RAY EXAM THORAC SPINE 3VWS: CPT | Performed by: EMERGENCY MEDICINE

## 2018-12-27 NOTE — ED PROVIDER NOTES
Patient Seen in: [facility]    History   Patient presents with:  Fall (musculoskeletal, neurologic)    Stated Complaint: Fall/Back Pain    HPI    This patient's history was obtained from patient and family member.   Adriel systems reviewed and negative except as noted above.     Physical Exam     ED Triage Vitals [12/27/18 1505]   BP (!) 145/94   Pulse 101   Resp 18   Temp 98.3 °F (36.8 °C)   Temp src Oral   SpO2 94 %   O2 Device None (Room air)       Current:BP (!) 145/94 DISC SPACES: Normal.  No significant disc space narrowing. OTHER: Negative. CONCLUSION:  1. No well-defined acute appearing compression deformity is noted. Moderate compression deformity of T9 unchanged from previous chest x-ray of 1/20/18.  Wil Turner is commensurate atrophy. CALVARIUM: There is no apparent depressed fracture, mass, or other significant visible lesion. SINUSES: Limited views demonstrate no significant mucosal thickening or fluid. ORBITS: Limited views are grossly unremarkable.   OTHER subluxation of the cervical spine. 2. Multilevel degenerative changes of the cervical spine, most pronounced at C4-C5 and C5-C6 with moderate to severe left neural foraminal stenosis at both levels. 3. Mild cervical levoscoliosis.  4. Mild bilateral carotid symptomatic treatment for contusion.   Did not think laboratory testing was indicated            Disposition and Plan     Clinical Impression:  Contusion of back, unspecified laterality, initial encounter  (primary encounter diagnosis)    Disposition:  Disc

## 2018-12-27 NOTE — TELEPHONE ENCOUNTER
Action Requested: Summary for Provider     []  Critical Lab, Recommendations Needed  [] Need Additional Advice  [x]   FYI    []   Need Orders  [] Need Medications Sent to Pharmacy  []  Other     SUMMARY: Patient fell on barry lucho at her assisted living

## 2018-12-27 NOTE — ED INITIAL ASSESSMENT (HPI)
Pt fell on 12/25/18 while using the walker  Pt hit head on door jam while she fell and injured low back  Pt using Tylenol for pain   +bruising to back   Unable to lay flat today

## 2019-01-01 ENCOUNTER — EXTERNAL RECORD (OUTPATIENT)
Dept: HEALTH INFORMATION MANAGEMENT | Facility: OTHER | Age: 84
End: 2019-01-01

## 2019-01-03 RX ORDER — CLOPIDOGREL BISULFATE 75 MG/1
TABLET ORAL
Qty: 90 TABLET | Refills: 1 | Status: SHIPPED | OUTPATIENT
Start: 2019-01-03 | End: 2019-07-16

## 2019-01-04 RX ORDER — FLUOXETINE 10 MG/1
CAPSULE ORAL
Qty: 90 CAPSULE | Refills: 0 | Status: SHIPPED | OUTPATIENT
Start: 2019-01-04 | End: 2019-03-14

## 2019-01-04 RX ORDER — LOSARTAN POTASSIUM 25 MG/1
TABLET ORAL
Qty: 30 TABLET | Refills: 0 | Status: SHIPPED | OUTPATIENT
Start: 2019-01-04 | End: 2019-02-07

## 2019-01-04 RX ORDER — CARVEDILOL 3.12 MG/1
TABLET ORAL
Qty: 30 TABLET | Refills: 0 | Status: SHIPPED | OUTPATIENT
Start: 2019-01-04 | End: 2019-02-07

## 2019-01-04 RX ORDER — ASPIRIN 81 MG/1
TABLET ORAL
Qty: 90 TABLET | Refills: 1 | Status: ON HOLD | OUTPATIENT
Start: 2019-01-04 | End: 2019-04-02

## 2019-01-08 RX ORDER — FUROSEMIDE 20 MG/1
TABLET ORAL
Qty: 45 TABLET | Refills: 0 | Status: SHIPPED | OUTPATIENT
Start: 2019-01-08 | End: 2019-03-19

## 2019-01-09 NOTE — TELEPHONE ENCOUNTER
Refill passed per [facility] protocol.   Hypertensive Medications  Protocol Criteria:  · Appointment scheduled in the past 6 months or in the next 3 months  · BMP or CMP in the past 12 months  · Creatinine result < 2  Recent Outpatient Visits

## 2019-01-29 ENCOUNTER — TELEPHONE (OUTPATIENT)
Dept: INTERNAL MEDICINE CLINIC | Facility: CLINIC | Age: 84
End: 2019-01-29

## 2019-01-29 RX ORDER — MELATONIN
Qty: 90 TABLET | Refills: 1 | Status: CANCELLED | OUTPATIENT
Start: 2019-01-29

## 2019-01-29 RX ORDER — OXCARBAZEPINE 150 MG/1
TABLET, FILM COATED ORAL
Qty: 90 TABLET | Refills: 1 | Status: CANCELLED | OUTPATIENT
Start: 2019-01-29

## 2019-01-29 NOTE — TELEPHONE ENCOUNTER
[de-identified] Telephone Orders for UA with reflex culture signed by Dr. Aditi Vazquez, faxed back and confirmed sent. Original placed for scanning to chart.

## 2019-01-30 RX ORDER — EZETIMIBE AND SIMVASTATIN 10; 10 MG/1; MG/1
TABLET ORAL
Qty: 90 TABLET | Refills: 1 | OUTPATIENT
Start: 2019-01-30

## 2019-01-30 RX ORDER — LOSARTAN POTASSIUM 25 MG/1
25 TABLET ORAL
Qty: 30 TABLET | Refills: 0 | OUTPATIENT
Start: 2019-01-30

## 2019-01-30 RX ORDER — CARVEDILOL 3.12 MG/1
TABLET ORAL
Qty: 30 TABLET | Refills: 0 | OUTPATIENT
Start: 2019-01-30

## 2019-01-30 NOTE — TELEPHONE ENCOUNTER
Current Outpatient Medications:   •  CARVEDILOL 3.125 MG Oral Tab, TAKE 1 TABLET BY MOUTH daily, hold FOR sbp less THAN 100/60, Disp: 30 tablet, Rfl: 0  •  LOSARTAN POTASSIUM 25 MG Oral Tab, TAKE 1 TABLET BY MOUTH DAILY, Disp: 30 tablet, Rfl: 0  •  EZETI

## 2019-01-30 NOTE — TELEPHONE ENCOUNTER
Failed protocol.      Hypertensive Medications  Protocol Criteria:  · Appointment scheduled in the past 6 months or in the next 3 months  · BMP or CMP in the past 12 months  · Creatinine result < 2  Recent Outpatient Visits            6 months ago [facility]

## 2019-02-07 RX ORDER — CARVEDILOL 3.12 MG/1
TABLET ORAL
Qty: 30 TABLET | Refills: 0 | Status: SHIPPED | OUTPATIENT
Start: 2019-02-07 | End: 2019-03-26

## 2019-02-07 RX ORDER — LOSARTAN POTASSIUM 25 MG/1
25 TABLET ORAL
Qty: 30 TABLET | Refills: 0 | Status: SHIPPED | OUTPATIENT
Start: 2019-02-07 | End: 2019-02-19

## 2019-02-12 ENCOUNTER — TELEPHONE (OUTPATIENT)
Dept: INTERNAL MEDICINE CLINIC | Facility: CLINIC | Age: 84
End: 2019-02-12

## 2019-02-12 RX ORDER — BETHANECHOL CHLORIDE 10 MG/1
TABLET ORAL
Qty: 90 TABLET | Refills: 0 | Status: SHIPPED | OUTPATIENT
Start: 2019-02-12 | End: 2019-02-26

## 2019-02-12 NOTE — TELEPHONE ENCOUNTER
Orders for Cipro signed by Dr. Han Cano, faxed back and confirmed sent. Original placed for scanning.

## 2019-02-19 RX ORDER — LOSARTAN POTASSIUM 25 MG/1
TABLET ORAL
Qty: 30 TABLET | Refills: 0 | Status: SHIPPED | OUTPATIENT
Start: 2019-02-19 | End: 2019-03-26

## 2019-02-26 RX ORDER — BETHANECHOL CHLORIDE 10 MG/1
TABLET ORAL
Qty: 90 TABLET | Refills: 0 | Status: SHIPPED | OUTPATIENT
Start: 2019-02-26 | End: 2019-03-26

## 2019-02-28 VITALS
WEIGHT: 135 LBS | SYSTOLIC BLOOD PRESSURE: 124 MMHG | DIASTOLIC BLOOD PRESSURE: 70 MMHG | HEIGHT: 64 IN | HEART RATE: 64 BPM | BODY MASS INDEX: 23.05 KG/M2

## 2019-02-28 VITALS
SYSTOLIC BLOOD PRESSURE: 132 MMHG | WEIGHT: 144 LBS | BODY MASS INDEX: 24.59 KG/M2 | DIASTOLIC BLOOD PRESSURE: 66 MMHG | HEART RATE: 58 BPM | HEIGHT: 64 IN

## 2019-02-28 VITALS
SYSTOLIC BLOOD PRESSURE: 120 MMHG | DIASTOLIC BLOOD PRESSURE: 60 MMHG | WEIGHT: 144 LBS | HEIGHT: 64 IN | OXYGEN SATURATION: 94 % | BODY MASS INDEX: 24.59 KG/M2 | HEART RATE: 55 BPM

## 2019-03-01 VITALS
OXYGEN SATURATION: 94 % | HEIGHT: 64 IN | DIASTOLIC BLOOD PRESSURE: 60 MMHG | HEART RATE: 95 BPM | SYSTOLIC BLOOD PRESSURE: 98 MMHG | WEIGHT: 141 LBS | BODY MASS INDEX: 24.07 KG/M2

## 2019-03-01 VITALS
BODY MASS INDEX: 23.9 KG/M2 | HEART RATE: 81 BPM | OXYGEN SATURATION: 94 % | WEIGHT: 140 LBS | HEIGHT: 64 IN | DIASTOLIC BLOOD PRESSURE: 66 MMHG | SYSTOLIC BLOOD PRESSURE: 108 MMHG

## 2019-03-05 RX ORDER — LORATADINE 10 MG/1
TABLET ORAL
Qty: 30 TABLET | Refills: 11 | Status: SHIPPED | OUTPATIENT
Start: 2019-03-05 | End: 2020-03-31

## 2019-03-14 RX ORDER — FLUOXETINE 10 MG/1
CAPSULE ORAL
Qty: 30 CAPSULE | Refills: 0 | Status: SHIPPED | OUTPATIENT
Start: 2019-03-14 | End: 2019-06-11

## 2019-03-14 RX ORDER — OXCARBAZEPINE 150 MG/1
150 TABLET, FILM COATED ORAL
Qty: 90 TABLET | Refills: 1 | Status: SHIPPED | OUTPATIENT
Start: 2019-03-14 | End: 2019-09-04

## 2019-03-14 RX ORDER — MELATONIN
1
Qty: 90 TABLET | Refills: 1 | Status: SHIPPED | OUTPATIENT
Start: 2019-03-14 | End: 2020-01-07

## 2019-03-14 RX ORDER — EZETIMIBE AND SIMVASTATIN 10; 10 MG/1; MG/1
TABLET ORAL
Qty: 30 TABLET | Refills: 0 | Status: SHIPPED | OUTPATIENT
Start: 2019-03-14 | End: 2019-03-26

## 2019-03-14 NOTE — TELEPHONE ENCOUNTER
Fluoxetine hcl 10mg capsule qty90  Take one capsule by mouth everyda    •  OXCARBAZEPINE 150 MG Oral Tab, TAKE 1 TABLET BY MOUTH DAILY, Disp: 90 tablet, Rfl: 1  •  MAGNESIUM OXIDE 400 (240 Mg) MG Oral Tab, TAKE 1 TABLET BY MOUTH DAILY, Disp: 90 tablet, Rfl

## 2019-03-14 NOTE — TELEPHONE ENCOUNTER
Please call patient and schedule OV. Thanks.     Yesenia Macias MD   Em Im Shwetha Lpn/Cma 4 minutes ago (5:09 PM)      PLEASE INFORM PATIENT, OFFICE VISIT REQUIRED PRIOR TO ANY ADDITIONAL REFILLS   Last  OV more than a year ago

## 2019-03-15 RX ORDER — OXCARBAZEPINE 150 MG/1
TABLET, FILM COATED ORAL
Qty: 90 TABLET | Refills: 1 | OUTPATIENT
Start: 2019-03-15

## 2019-03-15 RX ORDER — EZETIMIBE AND SIMVASTATIN 10; 10 MG/1; MG/1
TABLET ORAL
Qty: 30 TABLET | Refills: 0 | OUTPATIENT
Start: 2019-03-15

## 2019-03-15 NOTE — TELEPHONE ENCOUNTER
Spoke with Pt authorized Demar Merlos daughter and she made appt for 3/19    Daughter also said thank you to PCP

## 2019-03-19 ENCOUNTER — OFFICE VISIT (OUTPATIENT)
Dept: INTERNAL MEDICINE CLINIC | Facility: CLINIC | Age: 84
End: 2019-03-19
Payer: MEDICARE

## 2019-03-19 VITALS
SYSTOLIC BLOOD PRESSURE: 124 MMHG | RESPIRATION RATE: 16 BRPM | DIASTOLIC BLOOD PRESSURE: 76 MMHG | TEMPERATURE: 98 F | HEART RATE: 78 BPM | WEIGHT: 146 LBS | HEIGHT: 64 IN | BODY MASS INDEX: 24.92 KG/M2

## 2019-03-19 DIAGNOSIS — I48.20 CHRONIC A-FIB (HCC): ICD-10-CM

## 2019-03-19 DIAGNOSIS — I50.22 CHRONIC SYSTOLIC HEART FAILURE (HCC): ICD-10-CM

## 2019-03-19 DIAGNOSIS — I10 ESSENTIAL HYPERTENSION: ICD-10-CM

## 2019-03-19 DIAGNOSIS — E78.00 HYPERCHOLESTEREMIA: ICD-10-CM

## 2019-03-19 DIAGNOSIS — E83.42 HYPOMAGNESEMIA: ICD-10-CM

## 2019-03-19 DIAGNOSIS — R39.15 URINARY URGENCY: ICD-10-CM

## 2019-03-19 DIAGNOSIS — E11.9 CONTROLLED TYPE 2 DIABETES MELLITUS WITHOUT COMPLICATION, WITHOUT LONG-TERM CURRENT USE OF INSULIN (HCC): Primary | ICD-10-CM

## 2019-03-19 DIAGNOSIS — R48.2 GAIT APRAXIA OF ELDERLY: ICD-10-CM

## 2019-03-19 DIAGNOSIS — R53.81 PHYSICAL DECONDITIONING: ICD-10-CM

## 2019-03-19 DIAGNOSIS — N18.30 CKD (CHRONIC KIDNEY DISEASE) STAGE 3, GFR 30-59 ML/MIN (HCC): ICD-10-CM

## 2019-03-19 DIAGNOSIS — E55.9 VITAMIN D DEFICIENCY: ICD-10-CM

## 2019-03-19 PROCEDURE — 99214 OFFICE O/P EST MOD 30 MIN: CPT | Performed by: INTERNAL MEDICINE

## 2019-03-19 PROCEDURE — G0463 HOSPITAL OUTPT CLINIC VISIT: HCPCS | Performed by: INTERNAL MEDICINE

## 2019-03-19 RX ORDER — MECLIZINE HYDROCHLORIDE 25 MG/1
12.5 TABLET ORAL DAILY PRN
COMMUNITY

## 2019-03-19 RX ORDER — ALBUTEROL SULFATE 90 UG/1
2 AEROSOL, METERED RESPIRATORY (INHALATION) EVERY 6 HOURS PRN
COMMUNITY

## 2019-03-19 RX ORDER — ACETAMINOPHEN 500 MG
500 TABLET ORAL EVERY 6 HOURS PRN
COMMUNITY

## 2019-03-19 NOTE — PROGRESS NOTES
HPI:    Patient ID: Jersey Turner is a 80year old female. Diabetes   She presents for her follow-up diabetic visit. She has type 2 diabetes mellitus. Her disease course has been stable. There are no hypoglycemic associated symptoms.  There are no disease. Identifiable causes of hypertension include chronic renal disease. There is no history of a hypertension causing med. Hyperlipidemia   This is a chronic problem. The current episode started more than 1 year ago. The problem is controlled.  Recent once daily. Disp: 90 tablet Rfl: 1   Magnesium Oxide 400 (240 Mg) MG Oral Tab Take 1 tablet (400 mg total) by mouth once daily.  Disp: 90 tablet Rfl: 1   LORATADINE 10 MG Oral Tab TAKE 1 TABLET BY MOUTH EVERY DAY Disp: 30 tablet Rfl: 11   BETHANECHOL CHLORI Abdominal: Soft. Bowel sounds are normal. She exhibits no distension and no mass. There is no tenderness. There is no rebound and no guarding. Musculoskeletal: She exhibits no edema.    Bilateral barefoot skin diabetic exam is normal, visualized feet an benefit from getting PT again. Order given.     (R53.81) Physical deconditioning  Plan: PHYSICAL THERAPY - INTERNAL        See above         Orders Placed This Encounter      Comp Metabolic Panel (14) [E]      Hemoglobin A1C [E]      Lipid Panel [E]      Vi

## 2019-03-20 ENCOUNTER — TELEPHONE (OUTPATIENT)
Dept: INTERNAL MEDICINE CLINIC | Facility: CLINIC | Age: 84
End: 2019-03-20

## 2019-03-20 NOTE — TELEPHONE ENCOUNTER
Per Cherise Willis, needs to fax the blood work including UA Order and Physical Therapy Order fax to [address] @ [phone] Attn: Jayna Miller!!

## 2019-03-20 NOTE — TELEPHONE ENCOUNTER
Pt daughter would like labs and urine order to be faxed to number listed below. Dr. Gurmeet [place] daughter stts there was suppose to be a PT. Please advise last PT order was 7/2018.

## 2019-03-26 RX ORDER — LOSARTAN POTASSIUM 25 MG/1
TABLET ORAL
Qty: 30 TABLET | Refills: 0 | Status: SHIPPED | OUTPATIENT
Start: 2019-03-26 | End: 2019-05-28

## 2019-03-26 RX ORDER — BETHANECHOL CHLORIDE 10 MG/1
TABLET ORAL
Qty: 90 TABLET | Refills: 0 | Status: SHIPPED | OUTPATIENT
Start: 2019-03-26 | End: 2019-06-11

## 2019-03-26 RX ORDER — CARVEDILOL 3.12 MG/1
TABLET ORAL
Qty: 30 TABLET | Refills: 0 | Status: SHIPPED | OUTPATIENT
Start: 2019-03-26 | End: 2019-06-11

## 2019-03-26 RX ORDER — EZETIMIBE AND SIMVASTATIN 10; 10 MG/1; MG/1
TABLET ORAL
Qty: 30 TABLET | Refills: 0 | Status: SHIPPED | OUTPATIENT
Start: 2019-03-26 | End: 2019-05-07

## 2019-03-27 ENCOUNTER — TELEPHONE (OUTPATIENT)
Dept: OTHER | Age: 84
End: 2019-03-27

## 2019-03-27 NOTE — TELEPHONE ENCOUNTER
Urine culture result reviewed and positive for E coli >100K colonies  ESBL positive. pls call [place] and start pt on Bactrim DS one tab po bid for one week. Her other blood tests shows her cholesterol levels are in good range.   a1c was 7.3 so diab

## 2019-03-27 NOTE — TELEPHONE ENCOUNTER
Nurse Sarah- called from [facility] - stated Pt's UA/Cul- has Ecoli/ESBL- advised to fax report

## 2019-03-28 ENCOUNTER — APPOINTMENT (OUTPATIENT)
Dept: CT IMAGING | Facility: HOSPITAL | Age: 84
DRG: 884 | End: 2019-03-28
Attending: EMERGENCY MEDICINE
Payer: MEDICARE

## 2019-03-28 ENCOUNTER — TELEPHONE (OUTPATIENT)
Dept: INTERNAL MEDICINE CLINIC | Facility: CLINIC | Age: 84
End: 2019-03-28

## 2019-03-28 ENCOUNTER — HOSPITAL ENCOUNTER (INPATIENT)
Facility: HOSPITAL | Age: 84
LOS: 5 days | Discharge: NURSING, REHAB OR LONG-TERM CARE | DRG: 884 | End: 2019-04-02
Attending: EMERGENCY MEDICINE | Admitting: HOSPITALIST
Payer: MEDICARE

## 2019-03-28 ENCOUNTER — APPOINTMENT (OUTPATIENT)
Dept: GENERAL RADIOLOGY | Facility: HOSPITAL | Age: 84
DRG: 884 | End: 2019-03-28
Attending: HOSPITALIST
Payer: MEDICARE

## 2019-03-28 ENCOUNTER — APPOINTMENT (OUTPATIENT)
Dept: GENERAL RADIOLOGY | Facility: HOSPITAL | Age: 84
DRG: 884 | End: 2019-03-28
Attending: EMERGENCY MEDICINE
Payer: MEDICARE

## 2019-03-28 DIAGNOSIS — S22.42XD CLOSED FRACTURE OF MULTIPLE RIBS OF LEFT SIDE WITH ROUTINE HEALING, SUBSEQUENT ENCOUNTER: ICD-10-CM

## 2019-03-28 DIAGNOSIS — N30.01 ACUTE CYSTITIS WITH HEMATURIA: Primary | ICD-10-CM

## 2019-03-28 PROCEDURE — 71045 X-RAY EXAM CHEST 1 VIEW: CPT | Performed by: HOSPITALIST

## 2019-03-28 PROCEDURE — 73700 CT LOWER EXTREMITY W/O DYE: CPT | Performed by: EMERGENCY MEDICINE

## 2019-03-28 RX ORDER — SODIUM CHLORIDE 0.9 % (FLUSH) 0.9 %
3 SYRINGE (ML) INJECTION AS NEEDED
Status: DISCONTINUED | OUTPATIENT
Start: 2019-03-28 | End: 2019-04-02

## 2019-03-28 RX ORDER — HEPARIN SODIUM 5000 [USP'U]/ML
5000 INJECTION, SOLUTION INTRAVENOUS; SUBCUTANEOUS EVERY 12 HOURS SCHEDULED
Status: DISCONTINUED | OUTPATIENT
Start: 2019-03-29 | End: 2019-04-02

## 2019-03-28 RX ORDER — HYDROMORPHONE HYDROCHLORIDE 1 MG/ML
0.2 INJECTION, SOLUTION INTRAMUSCULAR; INTRAVENOUS; SUBCUTANEOUS EVERY 4 HOURS PRN
Status: DISCONTINUED | OUTPATIENT
Start: 2019-03-28 | End: 2019-04-02

## 2019-03-28 RX ORDER — SULFAMETHOXAZOLE AND TRIMETHOPRIM 800; 160 MG/1; MG/1
1 TABLET ORAL 2 TIMES DAILY
Qty: 14 TABLET | Refills: 0 | Status: ON HOLD | OUTPATIENT
Start: 2019-03-28 | End: 2019-04-02

## 2019-03-28 RX ORDER — HYDROMORPHONE HYDROCHLORIDE 1 MG/ML
0.4 INJECTION, SOLUTION INTRAMUSCULAR; INTRAVENOUS; SUBCUTANEOUS EVERY 4 HOURS PRN
Status: DISCONTINUED | OUTPATIENT
Start: 2019-03-28 | End: 2019-04-02

## 2019-03-28 RX ORDER — ONDANSETRON 2 MG/ML
4 INJECTION INTRAMUSCULAR; INTRAVENOUS EVERY 4 HOURS PRN
Status: DISCONTINUED | OUTPATIENT
Start: 2019-03-28 | End: 2019-03-28

## 2019-03-28 RX ORDER — ONDANSETRON 2 MG/ML
4 INJECTION INTRAMUSCULAR; INTRAVENOUS EVERY 6 HOURS PRN
Status: DISCONTINUED | OUTPATIENT
Start: 2019-03-28 | End: 2019-04-02

## 2019-03-28 RX ORDER — SODIUM CHLORIDE 9 MG/ML
INJECTION, SOLUTION INTRAVENOUS CONTINUOUS
Status: DISCONTINUED | OUTPATIENT
Start: 2019-03-28 | End: 2019-04-02

## 2019-03-28 RX ORDER — DEXTROSE MONOHYDRATE 25 G/50ML
50 INJECTION, SOLUTION INTRAVENOUS AS NEEDED
Status: DISCONTINUED | OUTPATIENT
Start: 2019-03-28 | End: 2019-04-02

## 2019-03-28 NOTE — TELEPHONE ENCOUNTER
Received call from Letha Alvarez, reports patient is currently having an X-Ray done at [facility], due to a left side fall she sustained yesterday, x-ray being done by home health now, requesting if left side hip can also be added due to symptom note

## 2019-03-28 NOTE — TELEPHONE ENCOUNTER
Selena spoke with Nurse AMANDA and informed of Dr. Nick Taylor note below. Amanda would like script sent to [facility], completed per Dr. Nick Taylor order. Amanda voiced understanding.

## 2019-03-29 ENCOUNTER — TELEPHONE (OUTPATIENT)
Dept: INTERNAL MEDICINE CLINIC | Facility: CLINIC | Age: 84
End: 2019-03-29

## 2019-03-29 ENCOUNTER — APPOINTMENT (OUTPATIENT)
Dept: CT IMAGING | Facility: HOSPITAL | Age: 84
DRG: 884 | End: 2019-03-29
Attending: HOSPITALIST
Payer: MEDICARE

## 2019-03-29 PROCEDURE — 70450 CT HEAD/BRAIN W/O DYE: CPT | Performed by: HOSPITALIST

## 2019-03-29 PROCEDURE — 99223 1ST HOSP IP/OBS HIGH 75: CPT | Performed by: HOSPITALIST

## 2019-03-29 RX ORDER — TRAMADOL HYDROCHLORIDE 50 MG/1
50 TABLET ORAL EVERY 6 HOURS PRN
Status: DISCONTINUED | OUTPATIENT
Start: 2019-03-29 | End: 2019-04-02

## 2019-03-29 RX ORDER — LIDOCAINE 50 MG/G
1 PATCH TOPICAL DAILY PRN
Status: DISCONTINUED | OUTPATIENT
Start: 2019-03-29 | End: 2019-04-02

## 2019-03-29 RX ORDER — ACETAMINOPHEN 325 MG/1
650 TABLET ORAL EVERY 6 HOURS PRN
Status: DISCONTINUED | OUTPATIENT
Start: 2019-03-29 | End: 2019-04-02

## 2019-03-29 RX ORDER — ACETAMINOPHEN 325 MG/1
650 TABLET ORAL EVERY 6 HOURS PRN
Status: DISCONTINUED | OUTPATIENT
Start: 2019-03-29 | End: 2019-03-29

## 2019-03-29 RX ORDER — CARVEDILOL 3.12 MG/1
3.12 TABLET ORAL DAILY
Status: DISCONTINUED | OUTPATIENT
Start: 2019-03-29 | End: 2019-04-02

## 2019-03-29 RX ORDER — LOSARTAN POTASSIUM 25 MG/1
25 TABLET ORAL
Status: DISCONTINUED | OUTPATIENT
Start: 2019-03-29 | End: 2019-04-02

## 2019-03-29 RX ORDER — ALBUTEROL SULFATE 90 UG/1
2 AEROSOL, METERED RESPIRATORY (INHALATION) EVERY 6 HOURS PRN
Status: DISCONTINUED | OUTPATIENT
Start: 2019-03-29 | End: 2019-04-02

## 2019-03-29 RX ORDER — MECLIZINE HYDROCHLORIDE 25 MG/1
12.5 TABLET ORAL 3 TIMES DAILY PRN
Status: DISCONTINUED | OUTPATIENT
Start: 2019-03-29 | End: 2019-04-02

## 2019-03-29 RX ORDER — FLUOXETINE 10 MG/1
10 CAPSULE ORAL
Status: DISCONTINUED | OUTPATIENT
Start: 2019-03-29 | End: 2019-04-02

## 2019-03-29 RX ORDER — CLOPIDOGREL BISULFATE 75 MG/1
75 TABLET ORAL DAILY
Status: DISCONTINUED | OUTPATIENT
Start: 2019-03-29 | End: 2019-04-02

## 2019-03-29 RX ORDER — EZETIMIBE AND SIMVASTATIN 10; 10 MG/1; MG/1
1 TABLET ORAL NIGHTLY
Status: DISCONTINUED | OUTPATIENT
Start: 2019-03-29 | End: 2019-04-02

## 2019-03-29 RX ORDER — OXCARBAZEPINE 300 MG/1
150 TABLET, FILM COATED ORAL
Status: DISCONTINUED | OUTPATIENT
Start: 2019-03-29 | End: 2019-04-02

## 2019-03-29 RX ORDER — CETIRIZINE HYDROCHLORIDE 5 MG/1
5 TABLET ORAL DAILY
Status: DISCONTINUED | OUTPATIENT
Start: 2019-03-29 | End: 2019-04-02

## 2019-03-29 RX ORDER — PANTOPRAZOLE SODIUM 40 MG/1
40 TABLET, DELAYED RELEASE ORAL
Status: DISCONTINUED | OUTPATIENT
Start: 2019-03-29 | End: 2019-04-02

## 2019-03-29 RX ORDER — BETHANECHOL CHLORIDE 5 MG
10 TABLET ORAL 3 TIMES DAILY
Status: DISCONTINUED | OUTPATIENT
Start: 2019-03-29 | End: 2019-04-02

## 2019-03-29 RX ORDER — MAGNESIUM OXIDE 400 MG (241.3 MG MAGNESIUM) TABLET
400 TABLET
Status: DISCONTINUED | OUTPATIENT
Start: 2019-03-29 | End: 2019-04-02

## 2019-03-29 RX ORDER — SENNOSIDES 8.6 MG
17.2 TABLET ORAL DAILY PRN
Status: DISCONTINUED | OUTPATIENT
Start: 2019-03-29 | End: 2019-04-02

## 2019-03-29 RX ORDER — CLOPIDOGREL BISULFATE 75 MG/1
75 TABLET ORAL
Status: DISCONTINUED | OUTPATIENT
Start: 2019-03-29 | End: 2019-03-29

## 2019-03-29 RX ORDER — FUROSEMIDE 20 MG/1
10 TABLET ORAL DAILY
Status: DISCONTINUED | OUTPATIENT
Start: 2019-03-29 | End: 2019-04-02

## 2019-03-29 RX ORDER — ASPIRIN 81 MG/1
81 TABLET ORAL
Status: DISCONTINUED | OUTPATIENT
Start: 2019-03-29 | End: 2019-03-29

## 2019-03-29 NOTE — PROGRESS NOTES
[facility] Pharmacy Note:  Renal Adjustment for meropenem [facility])    Radha Faye is a 80year old female who has been prescribed meropenem (MERREM) 500 mg every 8 hrs. CrCl is estimated creatinine clearance is 42 mL/min (based on SCr of 0.86 mg/dL).  so

## 2019-03-29 NOTE — TELEPHONE ENCOUNTER
Paging    Message # [phone]         2019 07:46p   [[facility]]  To:  From:  VIOLETA Arias MD:  Phone#:  ----------------------------------------------------------------------  [place] [facility] [place]/ MARELY Perez, :   19

## 2019-03-29 NOTE — PLAN OF CARE
Problem: Patient Centered Care  Goal: Patient preferences are identified and integrated in the patient's plan of care  Interventions:  - What would you like us to know as we care for you?  \"can not hear without hearing aids\"  - Provide timely, complete, a vasoactive medications to optimize hemodynamic stability  - Monitor arterial and/or venous puncture sites for bleeding and/or hematoma  - Assess quality of pulses, skin color and temperature  - Assess for signs of decreased coronary artery perfusion - ex. protection for wounds/incisions during mobilization  - Obtain PT/OT consults as needed  - Advance activity as appropriate  - Communicate ordered activity level and limitations with patient/family  Outcome: Progressing      Problem: PAIN - ADULT  Goal: Verb barriers to discharge w/pt and caregiver  - Include patient/family/discharge partner in discharge planning  - Arrange for needed discharge resources and transportation as appropriate  - Identify discharge learning needs (meds, wound care, etc)  - Arrange f

## 2019-03-29 NOTE — OCCUPATIONAL THERAPY NOTE
OCCUPATIONAL THERAPY EVALUATION - INPATIENT     Room Number: 555/555-A  Evaluation Date: 3/29/2019  Type of Evaluation: Initial  Presenting Problem: fall    Physician Order: IP Consult to Occupational Therapy  Reason for Therapy: ADL/IADL Dysfunction and D TBD    PLAN  OT Treatment Plan: Balance activities; ADL training;Functional transfer training; Compensatory technique education;Patient/Family education       OCCUPATIONAL THERAPY MEDICAL/SOCIAL HISTORY     Problem List  Principal Problem:    Acute cystitis TOLERANCE                         O2 SATURATIONS                COGNITION  Following Commands:  follows one step commands without difficulty      SENSATION  inact    Communication: wfl    Behavioral/Emotional/Social: wfl    RANGE OF MOTION   Upper extremit questions and concerns addressed; Alarm set; Family present    OT Goals  Patients self stated goal is: to go home     Patient will complete functional transfer with CGA  Comment:     Patient will complete toileting with CGA  Comment:     Patient will tolerat

## 2019-03-29 NOTE — ED PROVIDER NOTES
Patient Seen in: [facility] AND [facility] Emergency Department    History   Patient presents with:  Fall (musculoskeletal, neurologic)      HPI    Patient presents to the ED from her care facility after falling yesterday.   Patient fell getting out of the shower MOUTH DAILY AT BEDTIME Disp: 30 tablet Rfl: 0    LOSARTAN POTASSIUM 25 MG Oral Tab TAKE 1 TABLET BY MOUTH DAILY Disp: 30 tablet Rfl: 0    CARVEDILOL 3.125 MG Oral Tab TAKE 1 TABLET BY MOUTH DAILY hold FOR sbp less THAN 100/60 Disp: 30 tablet Rfl: 0    Mecl degeneration Sister    • Diabetes Sister    • Glaucoma Neg        Smoking Status: Social History    Socioeconomic History      Marital status:        Spouse name: Not on file      Number of children: Not on file      Years of education: Not on file There is no tenderness. Musculoskeletal: She exhibits tenderness. She exhibits no edema or deformity. Tenderness to the left lateral hip and ecchymosis and hematoma present. Neurological: She is alert and oriented to person, place, and time.    Skin: in ED:     ED Medications Administered:   Medications   Normal Saline Flush 0.9 % injection 3 mL (not administered)   dextrose 50 % injection 50 mL (not administered)   Glucose-Vitamin C (DEX-4) 4-6 GM-MG chewable tab 4 tablet (not administered)   glucose subsequent encounter on their problem list. to contribute to the complexity of this ED evaluation. ED Course: Presents to the ED after falling yesterday. Left rib fractures 4 through 6 on x-rays at her nursing care facility.   X-ray of the left hip show

## 2019-03-29 NOTE — PLAN OF CARE
Problem: Patient Centered Care  Goal: Patient preferences are identified and integrated in the patient's plan of care  Interventions:  - What would you like us to know as we care for you?  \"can not hear without hearing aids\"  - Provide timely, complete, a and temperature  - Assess for signs of decreased coronary artery perfusion - ex.  Angina  - Evaluate fluid balance, assess for edema, trend weights  Outcome: Progressing      Problem: METABOLIC/FLUID AND ELECTROLYTES - ADULT  Goal: Glucose maintained within patient/family  Outcome: Progressing      Problem: PAIN - ADULT  Goal: Verbalizes/displays adequate comfort level or patient's stated pain goal  INTERVENTIONS:  - Encourage pt to monitor pain and request assistance  - Assess pain using appropriate pain sca planning  - Arrange for needed discharge resources and transportation as appropriate  - Identify discharge learning needs (meds, wound care, etc)  - Arrange for interpreters to assist at discharge as needed  - Consider post-discharge preferences of patient

## 2019-03-29 NOTE — CM/SW NOTE
SW met w/ pt and pt's dtr to discuss d/c planning. Pt lives at [facility]. Pt reported using a walker. Dtr stated that she assist w/ transportation & errands.  Dtr stated that MD just sent referral to [facility], but then pt fell & was admitted into the hos

## 2019-03-29 NOTE — RESPIRATORY THERAPY NOTE
HIMANSHU ASSESSMENT:    Pt does not have a previous diagnosis of HIMANSHU. Pt does not routinely use a CPAP device at home. This pt is not suspected to be at high risk for HIMANSHU and sleep lab packet was not provided to patient for outpatient follow-up.     CPAP INITIAT

## 2019-03-29 NOTE — ED NOTES
Orders for admission, patient is aware of plan and ready to go upstairs.  Any questions, please call ED RN Eddie Harman  at extension [phone]

## 2019-03-29 NOTE — ED INITIAL ASSESSMENT (HPI)
Patient arrive from EMS with c/o of left rib/ left hip pain from fall yesterday. Pt received xray and showed fractures- here for evaluation.

## 2019-03-29 NOTE — PROGRESS NOTES
Lido-Capsaicin-Men-Methyl Sal 0.5-0.035-5-20 % PTCH is Non-Formulary Medication &  Auto-Substituted to Lidocaine 5% Patch apply for 12hr then remove for 12hrs daily prn Per P&T PROTOCOL

## 2019-03-29 NOTE — PHYSICAL THERAPY NOTE
PHYSICAL THERAPY EVALUATION - INPATIENT     Room Number: 555/555-A  Evaluation Date: 3/29/2019  Type of Evaluation: Initial   Physician Order: PT Eval and Treat    Presenting Problem: Acute cystitis wtih heamturia, fall at home  Reason for Therapy: [facility] use of RW bed>chair approx 5 feet. Shuffled gait with narrow base of support. Antalgic pattern, cues for management of RW with turns. Patient assisted to chair at end of session with needs in reach.      The patient's Approx Degree of Impairment: 68.66% ha HOME SITUATION  Type of Home: Assisted living facility([facility])   Home Layout: One level                Lives With: Staff 24 hours  Drives: No  Patient Owned Equipment: Rolling walker([employer] LLE- also has orthotics for shoes)  Patient Regularly Uses: bed to a chair (including a wheelchair)?: A Lot   -   Need to walk in hospital room?: A Lot   -   Climbing 3-5 steps with a railing?: A Lot     AM-PAC Score:  Raw Score: 12   Approx Degree of Impairment: 68.66%   Standardized Score (AM-PAC Scale): 35.33

## 2019-03-29 NOTE — H&P
[facility] ER    PATIENT'S NAME: Ramona Lilly   ATTENDING PHYSICIAN: Gato Cervantes MD   PATIENT ACCOUNT#:   [de-identified]    LOCATION:  [address] [place] [address] RECORD #:   [id number]       YOB: 1935  ADMISSION DATE: sensitivities that were reported to me from the nursing home indicated an organism which was resistant to ceftriaxone, possible ESBL, sensitive to meropenem. Her glucose was 217. Sodium was 131.   CBC was essentially normal with a hemoglobin of 12.9, plat years ago. Drinks a small amount of alcohol per week. No use of drugs. Currently residing in a skilled nursing facility. REVIEW OF SYSTEMS:  Unobtainable from the patient, who is very hard of hearing.       PHYSICAL EXAMINATION:    VITAL SIGNS:  [employer] mentioned. Chest x-ray was previously mentioned. EKG is pending. ASSESSMENT AND PLAN:    1. Fall in the shower. According to the emergency room physician's record, it appears to have been a mechanical fall.   It is unclear as to whether or not precautions. 12.   The patient's current clinical status and proposed treatment plan could not be discussed with her, and her daughter had left for the night. Await results of further testing.   Discussion can be had with the patient's daughter and daytim

## 2019-03-29 NOTE — PROGRESS NOTES
[facility] Pharmacy Note:  Renal Dose Adjustment for Cetirizine (ZYRTEC)    Rayshawn Sadler has been prescribed Cetirizine (Zyrtec) 10 mg orally daily. Estimated Creatinine Clearance: 42 mL/min (based on SCr of 0.86 mg/dL).     Her calculated creatinine jen

## 2019-03-29 NOTE — PROGRESS NOTES
See H and P from Dr Alvaro Estrada. Discussed with daughter CT findings. Discussed with Dr Dina Hays. Will change baby asa to 325 given possible subacute infarct. Daughter states pt has no h/o seizure; is on trileptal for peripheral neuropathy.    Cont to mo

## 2019-03-30 PROCEDURE — 99233 SBSQ HOSP IP/OBS HIGH 50: CPT | Performed by: HOSPITALIST

## 2019-03-30 NOTE — PLAN OF CARE
Problem: Patient Centered Care  Goal: Patient preferences are identified and integrated in the patient's plan of care  Interventions:  - What would you like us to know as we care for you?  \"can not hear without hearing aids\"  - Provide timely, complete, a medications to optimize hemodynamic stability  - Monitor arterial and/or venous puncture sites for bleeding and/or hematoma  - Assess quality of pulses, skin color and temperature  - Assess for signs of decreased coronary artery perfusion - ex.  Angina  - E wounds/incisions during mobilization  - Obtain PT/OT consults as needed  - Advance activity as appropriate  - Communicate ordered activity level and limitations with patient/family  Outcome: Progressing  Pt ambulates with 2 assist and walker - tolerated we resources  INTERVENTIONS:  - Identify barriers to discharge w/pt and caregiver  - Include patient/family/discharge partner in discharge planning  - Arrange for needed discharge resources and transportation as appropriate  - Identify discharge learning need

## 2019-03-30 NOTE — PROGRESS NOTES
[facility]    Progress Note    Alan Jackson Patient Status:  Inpatient    1935 MRN [id number]   Location [facility] 5SW/SE Attending Cristela Larios, [address] [facility] [place] Day # 2 PCP Claudene Newborn, MD       Subjective:   Gonzales Carranza Daily   acetaminophen (TYLENOL) tab 650 mg 650 mg Oral Q6H PRN   traMADol HCl (ULTRAM) tab 50 mg 50 mg Oral Q6H PRN   Clopidogrel Bisulfate (PLAVIX) tab 75 mg 75 mg Oral Daily   Buffered Aspirin (BUFFERIN) 325 MG tab 325 mg 325 mg Oral Daily   Vancomycin H subacute/recent infarct. This finding is new since 12/27/18 CT exam. 2.  There are moderate microvascular white matter ischemic changes, likely related to long-standing hypertension and/or diabetes. 3.  Atherosclerosis in the anterior circulation.   Dictat 189.0 03/29/2019    .0 03/28/2019       Recent Labs   Lab  03/28/19   2221  03/29/19   0920  03/30/19   0629   GLU  217*  139*  132*   BUN  15  11  13   CREATSERUM  0.86  0.77  0.77   GFRAA  72  82  82   GFRNAA  62  71  71   CA  8.4*  8.7  8.2*   NA plavix and asa     6. Several rib fractures on the left. Continue Lidoderm patch. If pain persists significantly, consider nerve block. - added tramadol with improvement     8. Hyperlipidemia. Continue Vytorin.     9.       Chronic atrial fi

## 2019-03-30 NOTE — PLAN OF CARE
Problem: Patient Centered Care  Goal: Patient preferences are identified and integrated in the patient's plan of care  Interventions:  - What would you like us to know as we care for you?  \"can not hear without hearing aids\"  - Provide timely, complete, a stability  - Monitor arterial and/or venous puncture sites for bleeding and/or hematoma  - Assess quality of pulses, skin color and temperature  - Assess for signs of decreased coronary artery perfusion - ex.  Angina  - Evaluate fluid balance, assess for ed needed  - Ensure adequate protection for wounds/incisions during mobilization  - Obtain PT/OT consults as needed  - Advance activity as appropriate  - Communicate ordered activity level and limitations with patient/family  Outcome: Progressing  Up with 1-2 assist with strengthening/mobility  - Encourage toileting schedule  Outcome: Progressing  Fall precautions in place. Chair alarm/bed alarm on. Bed in low and locked position. Non-skid socks applied.      Problem: DISCHARGE PLANNING  Goal: Discharge to home

## 2019-03-31 PROCEDURE — 99233 SBSQ HOSP IP/OBS HIGH 50: CPT | Performed by: HOSPITALIST

## 2019-03-31 NOTE — PHYSICAL THERAPY NOTE
PHYSICAL THERAPY TREATMENT NOTE - INPATIENT     Room Number: 555/555-A       Presenting Problem: Acute cystitis wtih heamturia, fall at home    Problem List  Principal Problem:    Acute cystitis with hematuria  Active Problems:    Closed fracture of multip Restriction: None                PAIN ASSESSMENT   Rating: (did not rate)  Location: L flank  Management Techniques: Activity promotion; Body mechanics; Relaxation;Repositioning    BALANCE #2  Current Status Min A with the RW   Goal #3 Patient is able to ambulate 100 feet with assist device: walker - rolling at assistance level:CG   Goal #3   Current Status 3' with the RW min A   Goal #4 Patient to demonstrate independence with home activity

## 2019-03-31 NOTE — PROGRESS NOTES
[facility]  Hospitalist Progress Note     Warren Palm Patient Status:  Inpatient    1935  80year old CSN [id number]   Location 555/555-A Attending Prosper Delgado MD   Hosp Day # 3 PCP Julito Barba MD     ASSESSMENT/P [facility]    Labs:  Recent Labs   Lab  03/29/19   0920  03/30/19   0629  03/31/19   0613   RBC  3.93  4.07  3.87   HGB  12.3  12.6  11.9*   HCT  36.7  38.9  36.9   MCV  93.4  95.6  95.3   MCH  31.3  31.0  30.7   MCHC  33.5  32.4  32.2   RDW  12.0

## 2019-03-31 NOTE — CM/SW NOTE
Received MDO for d/c planning- dtr romie Parish. PT recommending subacute rehab. Patient has been to [facility] in the past. L/m for [place] liaestrada, SAMMI/Yessenia sent referral via [facility]. L/m for daughter Armando Living regarding Plan B option.      Please see Ab

## 2019-03-31 NOTE — PLAN OF CARE
Problem: Patient Centered Care  Goal: Patient preferences are identified and integrated in the patient's plan of care  Interventions:  - What would you like us to know as we care for you?  \"can not hear without hearing aids\"  - Provide timely, complete, a bleeding, hypotension and signs of decreased cardiac output  - Evaluate effectiveness of vasoactive medications to optimize hemodynamic stability  - Monitor arterial and/or venous puncture sites for bleeding and/or hematoma  - Assess quality of pulses, ski transferring and ambulating w/ or w/o assistive devices  - Assist with transfers and ambulation using safe patient handling equipment as needed  - Ensure adequate protection for wounds/incisions during mobilization  - Obtain PT/OT consults as needed  - Adv physical limitations  - Instruct pt to call for assistance with activity based on assessment  - Modify environment to reduce risk of injury  - Provide assistive devices as appropriate  - Consider OT/PT consult to assist with strengthening/mobility  - Encou

## 2019-03-31 NOTE — PLAN OF CARE
Problem: Patient Centered Care  Goal: Patient preferences are identified and integrated in the patient's plan of care  Interventions:  - What would you like us to know as we care for you?  \"can not hear without hearing aids\"  - Provide timely, complete, a pulses, skin color and temperature  - Assess for signs of decreased coronary artery perfusion - ex.  Angina  - Evaluate fluid balance, assess for edema, trend weights  Outcome: Progressing      Problem: METABOLIC/FLUID AND ELECTROLYTES - ADULT  Goal: Glucos level and limitations with patient/family  Outcome: Progressing  Patient using walker and rolling chair for bathroom and transfers    Problem: PAIN - ADULT  Goal: Verbalizes/displays adequate comfort level or patient's stated pain goal  INTERVENTIONS:  - E PLANNING  Goal: Discharge to home or other facility with appropriate resources  INTERVENTIONS:  - Identify barriers to discharge w/pt and caregiver  - Include patient/family/discharge partner in discharge planning  - Arrange for needed discharge resources

## 2019-04-01 PROCEDURE — 99233 SBSQ HOSP IP/OBS HIGH 50: CPT | Performed by: HOSPITALIST

## 2019-04-01 NOTE — HOME CARE LIAISON
Met with patient and daughter Truong Frederick at the bedside, 3/30/19. Patient is agreeable to [facility], pending orders. Brochure and liaison's card provided with contact information. All questions addressed and answered.  Aware she may be goi

## 2019-04-01 NOTE — CM/SW NOTE
Met with patient and daughter at bedside to explain the BPCI/Medicare program. Patient and daughter agreed with phone follow up for 3 months from [address] [place] [address] after discharge from [address] [place] [address]. Patient was enrolled under .   BPCI/Medicare letter and brochu

## 2019-04-01 NOTE — PLAN OF CARE
Problem: Diabetes/Glucose Control  Goal: Glucose maintained within prescribed range  INTERVENTIONS:  - Monitor Blood Glucose as ordered  - Assess for signs and symptoms of hyperglycemia and hypoglycemia  - Administer ordered medications to maintain glucose intake and initiate nutrition consult as needed  - Instruct patient on self management of diabetes  Outcome: Progressing    Goal: Electrolytes maintained within normal limits  INTERVENTIONS:  - Monitor labs and rhythm and assess patient for signs and sympt response  - Implement non-pharmacological measures as appropriate and evaluate response  - Consider cultural and social influences on pain and pain management  - Manage/alleviate anxiety  - Utilize distraction and/or relaxation techniques  - Monitor for op

## 2019-04-01 NOTE — PLAN OF CARE
Problem: Patient Centered Care  Goal: Patient preferences are identified and integrated in the patient's plan of care  Interventions:  - What would you like us to know as we care for you?  \"can not hear without hearing aids\"  - Provide timely, complete, a puncture sites for bleeding and/or hematoma  - Assess quality of pulses, skin color and temperature  - Assess for signs of decreased coronary artery perfusion - ex.  Angina  - Evaluate fluid balance, assess for edema, trend weights  Outcome: Progressing Advance activity as appropriate  - Communicate ordered activity level and limitations with patient/family  Outcome: Progressing  Patient using rolling chair to go to bathroom with assistance.      Problem: PAIN - ADULT  Goal: Verbalizes/displays adequate co patient reach.      Problem: DISCHARGE PLANNING  Goal: Discharge to home or other facility with appropriate resources  INTERVENTIONS:  - Identify barriers to discharge w/pt and caregiver  - Include patient/family/discharge partner in discharge planning  - A

## 2019-04-01 NOTE — PROGRESS NOTES
[facility]  Hospitalist Progress Note     Homa Daily Patient Status:  Inpatient    1935  80year old CSN [id number]   Location 555/555-A Attending Damien Jefferson, [address] [facility] Day # 4 PCP Valentina Khalil MD     ASSESSMENT/P RBC  3.93  4.07  3.87   HGB  12.3  12.6  11.9*   HCT  36.7  38.9  36.9   MCV  93.4  95.6  95.3   MCH  31.3  31.0  30.7   MCHC  33.5  32.4  32.2   RDW  12.0  12.1  12.1   NEPRELIM  9.88*  6.51  6.97   WBC  11.8*  8.8  9.2   PLT  189.0  194.0  207.0     Re

## 2019-04-01 NOTE — CM/SW NOTE
SW met w/ pt and pt's dtr to follow up on recommendations of SNF. Dtr requesting MJ at this time and does not want to discuss any other options. Dtr states that pt is able to follow commands, pt just chooses to not do certain things.  Dtr stated she feels t

## 2019-04-01 NOTE — OCCUPATIONAL THERAPY NOTE
OCCUPATIONAL THERAPY TREATMENT NOTE - INPATIENT        Room Number: 555/555-A           Presenting Problem: fall    Problem List  Principal Problem:    Acute cystitis with hematuria  Active Problems:    Closed fracture of multiple ribs of left side with ro None                PAIN ASSESSMENT  Rating: Unable to rate  Location: (L flank pain/fx ribs)  Management Techniques: Relaxation;Repositioning     ACTIVITY TOLERANCE    O2 SATURATIONS    ACTIVITIES OF DAILY LIVING ASSESSMENT  AM-PAC ‘6-Clicks’ Inpatient Da OTR/L  [facility] AND [facility]

## 2019-04-01 NOTE — PLAN OF CARE
Patient with history of ESBL in February 2019. Urine culture this admission (3/28/19) isolated E. Coli, not multi-drug resistant. Contact Isolation discontinued at this time. Will monitor closely.

## 2019-04-01 NOTE — CONSULTS
Consult requested by Dr. Regina Bowles in this 80year old woman with acute cystitis with hematuria. She is max assit for bed mobilty, min assist for sit to stand transfers. Able to take a few steps min assist level.  Prior level of function was  Mod I for ambulatio

## 2019-04-02 VITALS
HEART RATE: 93 BPM | WEIGHT: 144.31 LBS | HEIGHT: 64 IN | SYSTOLIC BLOOD PRESSURE: 149 MMHG | TEMPERATURE: 97 F | RESPIRATION RATE: 18 BRPM | BODY MASS INDEX: 24.64 KG/M2 | OXYGEN SATURATION: 93 % | DIASTOLIC BLOOD PRESSURE: 64 MMHG

## 2019-04-02 PROCEDURE — 99239 HOSP IP/OBS DSCHRG MGMT >30: CPT | Performed by: HOSPITALIST

## 2019-04-02 RX ORDER — CEPHALEXIN 500 MG/1
500 CAPSULE ORAL 3 TIMES DAILY
Qty: 9 CAPSULE | Refills: 0 | Status: SHIPPED | OUTPATIENT
Start: 2019-04-02 | End: 2019-04-05

## 2019-04-02 NOTE — PLAN OF CARE
Problem: Patient Centered Care  Goal: Patient preferences are identified and integrated in the patient's plan of care  Interventions:  - What would you like us to know as we care for you?  \"can not hear without hearing aids\"  - Provide timely, complete, a hematoma  - Assess quality of pulses, skin color and temperature  - Assess for signs of decreased coronary artery perfusion - ex.  Angina  - Evaluate fluid balance, assess for edema, trend weights  Outcome: Adequate for Discharge      Problem: METABOLIC/FLU activity as appropriate  - Communicate ordered activity level and limitations with patient/family  Outcome: Adequate for Discharge      Problem: PAIN - ADULT  Goal: Verbalizes/displays adequate comfort level or patient's stated pain goal  INTERVENTIONS:  - patient/family/discharge partner in discharge planning  - Arrange for needed discharge resources and transportation as appropriate  - Identify discharge learning needs (meds, wound care, etc)  - Arrange for interpreters to assist at discharge as needed  -

## 2019-04-02 NOTE — DISCHARGE SUMMARY
[facility] HOSPITALIST  DISCHARGE SUMMARY     William Gomez Patient Status:  Inpatient    1935 MRN [id number]   Location [facility] 5SW/SE Attending Erika Ferraro MD   Hosp Day # 5 PCP Emilio Jensen MD     DATE OF ADMISSION: 3/28 left hip were noted per [facility]. The patient denied any shortness of breath or chest discomfort. She was admitted for further management of her pain and continued treatment of her UTI.   The sensitivities that were reported to me from the nursing symptoms, other concerning symptoms. PHYSICAL EXAM:  Temp:  [96.6 °F (35.9 °C)-98.4 °F (36.9 °C)] 96.6 °F (35.9 °C)  Pulse:  [] 93  Resp:  [18] 18  BP: (132-152)/(64-81) 149/64  Gen: A+Ox2. No distress.    HEENT: NCAT, neck supple, no carotid brui less THAN 100/60   Quantity:  30 tablet  Refills:  0     Clopidogrel Bisulfate 75 MG Tabs  Commonly known as:  PLAVIX      TAKE 1 TABLET BY MOUTH DAILY   Quantity:  90 tablet  Refills:  1     Ezetimibe-Simvastatin 10-10 MG Tabs  Commonly known as:  VYTORIN Get Your Medications      Please  your prescriptions at the location directed by your doctor or nurse    Bring a paper prescription for each of these medications  · cephALEXin 500 MG Caps         CONSULTANTS  Consultants     Provider Role Specialty

## 2019-04-02 NOTE — PLAN OF CARE
Problem: Patient Centered Care  Goal: Patient preferences are identified and integrated in the patient's plan of care  Interventions:  - What would you like us to know as we care for you?  \"can not hear without hearing aids\"  - Provide timely, complete, a and temperature  - Assess for signs of decreased coronary artery perfusion - ex.  Angina  - Evaluate fluid balance, assess for edema, trend weights  Outcome: Progressing      Problem: METABOLIC/FLUID AND ELECTROLYTES - ADULT  Goal: Glucose maintained within patient/family  Outcome: Progressing      Problem: PAIN - ADULT  Goal: Verbalizes/displays adequate comfort level or patient's stated pain goal  INTERVENTIONS:  - Encourage pt to monitor pain and request assistance  - Assess pain using appropriate pain sca appropriate  - Identify discharge learning needs (meds, wound care, etc)  - Arrange for interpreters to assist at discharge as needed  - Consider post-discharge preferences of patient/family/discharge partner  - Complete POLST form as appropriate  - Assess

## 2019-04-02 NOTE — PHYSICAL THERAPY NOTE
PHYSICAL THERAPY TREATMENT NOTE - INPATIENT     Room Number: 555/555-A       Presenting Problem: Acute cystitis wtih heamturia, fall at home    Problem List  Principal Problem:    Acute cystitis with hematuria  Active Problems:    Closed fracture of multip training;Strengthening    SUBJECTIVE  cooperative    OBJECTIVE  Precautions: Bed/chair alarm    WEIGHT BEARING RESTRICTION  Weight Bearing Restriction: None                PAIN ASSESSMENT   Rating: Unable to rate  Location: lt side ribs  [employer] 4/19/19  Patient Goal Patient's self-stated goal is: Return to PLOF   Goal #1 Patient is able to demonstrate supine - sit EOB @ level: supervision     Goal #1   Current Status Mod A    Goal #2 Patient is able to demonstrate transfers EOB to/from Chair/Whee

## 2019-04-18 RX ORDER — FLUOXETINE 10 MG/1
TABLET, FILM COATED ORAL
COMMUNITY

## 2019-04-18 RX ORDER — EZETIMIBE AND SIMVASTATIN 10; 10 MG/1; MG/1
1 TABLET ORAL DAILY
COMMUNITY
Start: 2016-07-11

## 2019-04-18 RX ORDER — GLIPIZIDE 2.5 MG/1
TABLET, EXTENDED RELEASE ORAL DAILY
COMMUNITY
End: 2019-11-25

## 2019-04-18 RX ORDER — CARVEDILOL 3.12 MG/1
TABLET ORAL
COMMUNITY
Start: 2017-04-04

## 2019-04-18 RX ORDER — FUROSEMIDE 20 MG/1
TABLET ORAL
COMMUNITY
Start: 2017-04-04

## 2019-04-18 RX ORDER — CLOPIDOGREL BISULFATE 75 MG/1
TABLET ORAL DAILY
COMMUNITY
Start: 2017-10-02

## 2019-04-18 RX ORDER — MECLIZINE HYDROCHLORIDE 25 MG/1
TABLET ORAL
COMMUNITY

## 2019-04-18 RX ORDER — OXCARBAZEPINE 150 MG/1
TABLET, FILM COATED ORAL DAILY
COMMUNITY
Start: 2016-10-17

## 2019-04-18 RX ORDER — LORATADINE 10 MG/1
TABLET ORAL
COMMUNITY
Start: 2017-04-04

## 2019-04-18 RX ORDER — BETHANECHOL CHLORIDE 10 MG/1
10 TABLET ORAL 2 TIMES DAILY
COMMUNITY
Start: 2017-04-04

## 2019-04-18 RX ORDER — AMOXICILLIN 250 MG
CAPSULE ORAL
COMMUNITY
End: 2019-09-06 | Stop reason: ALTCHOICE

## 2019-04-18 RX ORDER — PANTOPRAZOLE SODIUM 40 MG/1
TABLET, DELAYED RELEASE ORAL
COMMUNITY
Start: 2012-09-11 | End: 2019-04-18 | Stop reason: CLARIF

## 2019-04-18 RX ORDER — MAGNESIUM OXIDE 400 MG/1
TABLET ORAL
COMMUNITY

## 2019-04-22 ENCOUNTER — OFFICE VISIT (OUTPATIENT)
Dept: INTERNAL MEDICINE CLINIC | Facility: CLINIC | Age: 84
End: 2019-04-22
Payer: MEDICARE

## 2019-04-22 ENCOUNTER — TELEPHONE (OUTPATIENT)
Dept: INTERNAL MEDICINE CLINIC | Facility: CLINIC | Age: 84
End: 2019-04-22

## 2019-04-22 VITALS
RESPIRATION RATE: 20 BRPM | DIASTOLIC BLOOD PRESSURE: 72 MMHG | HEIGHT: 64 IN | TEMPERATURE: 99 F | BODY MASS INDEX: 23.39 KG/M2 | HEART RATE: 78 BPM | WEIGHT: 137 LBS | SYSTOLIC BLOOD PRESSURE: 105 MMHG | OXYGEN SATURATION: 94 %

## 2019-04-22 DIAGNOSIS — I50.22 CHRONIC SYSTOLIC HEART FAILURE (HCC): ICD-10-CM

## 2019-04-22 DIAGNOSIS — I48.20 CHRONIC A-FIB (HCC): ICD-10-CM

## 2019-04-22 DIAGNOSIS — Z86.73 HISTORY OF RECENT STROKE: ICD-10-CM

## 2019-04-22 DIAGNOSIS — E11.9 CONTROLLED TYPE 2 DIABETES MELLITUS WITHOUT COMPLICATION, WITHOUT LONG-TERM CURRENT USE OF INSULIN (HCC): ICD-10-CM

## 2019-04-22 DIAGNOSIS — E78.00 HYPERCHOLESTEREMIA: ICD-10-CM

## 2019-04-22 DIAGNOSIS — I10 ESSENTIAL HYPERTENSION: ICD-10-CM

## 2019-04-22 DIAGNOSIS — S22.42XD CLOSED FRACTURE OF MULTIPLE RIBS OF LEFT SIDE WITH ROUTINE HEALING, SUBSEQUENT ENCOUNTER: Primary | ICD-10-CM

## 2019-04-22 PROCEDURE — G0463 HOSPITAL OUTPT CLINIC VISIT: HCPCS | Performed by: INTERNAL MEDICINE

## 2019-04-22 PROCEDURE — 99214 OFFICE O/P EST MOD 30 MIN: CPT | Performed by: INTERNAL MEDICINE

## 2019-04-22 NOTE — TELEPHONE ENCOUNTER
Luz Mccarty from [facility] home health [facility] pt is on Plavix and Celebrex which shows possible interaction. Also, asking if you will sign home care orders?

## 2019-04-22 NOTE — TELEPHONE ENCOUNTER
[facility] cele[facility] already been stopped since last week.

## 2019-04-24 ENCOUNTER — TELEPHONE (OUTPATIENT)
Dept: INTERNAL MEDICINE CLINIC | Facility: CLINIC | Age: 84
End: 2019-04-24

## 2019-04-24 DIAGNOSIS — Z87.440 HISTORY OF UTI: Primary | ICD-10-CM

## 2019-04-24 NOTE — TELEPHONE ENCOUNTER
I talked to [facility] and informed of order. They need the order faxed to [phone]. I pended the order but a diagnostic code is needed. Thank You.

## 2019-04-24 NOTE — TELEPHONE ENCOUNTER
[facility] req a repeat urinalysis to be performed by [address] [facility] and \[facility] [place] [facility]\"" can call the order in at [phone].

## 2019-04-26 ENCOUNTER — HOSPITAL ENCOUNTER (EMERGENCY)
Facility: HOSPITAL | Age: 84
Discharge: HOME | End: 2019-04-26
Attending: EMERGENCY MEDICINE
Payer: MEDICARE

## 2019-04-26 ENCOUNTER — APPOINTMENT (OUTPATIENT)
Dept: CT IMAGING | Facility: HOSPITAL | Age: 84
End: 2019-04-26
Attending: EMERGENCY MEDICINE
Payer: MEDICARE

## 2019-04-26 VITALS
RESPIRATION RATE: 23 BRPM | DIASTOLIC BLOOD PRESSURE: 81 MMHG | TEMPERATURE: 98 F | HEART RATE: 52 BPM | SYSTOLIC BLOOD PRESSURE: 142 MMHG | OXYGEN SATURATION: 94 %

## 2019-04-26 DIAGNOSIS — S09.90XA INJURY OF HEAD, INITIAL ENCOUNTER: ICD-10-CM
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
## 2019-04-26 PROCEDURE — 99284 EMERGENCY DEPT VISIT MOD MDM: CPT

## 2019-04-26 PROCEDURE — 70450 CT HEAD/BRAIN W/O DYE: CPT | Performed by: EMERGENCY MEDICINE

## 2019-04-26 PROCEDURE — 85025 COMPLETE CBC W/AUTO DIFF WBC: CPT | Performed by: EMERGENCY MEDICINE

## 2019-04-26 PROCEDURE — 80048 BASIC METABOLIC PNL TOTAL CA: CPT | Performed by: EMERGENCY MEDICINE

## 2019-04-26 PROCEDURE — 36415 COLL VENOUS BLD VENIPUNCTURE: CPT

## 2019-04-26 NOTE — ED PROVIDER NOTES
Patient Seen in: [facility] Emergency Department    History   Patient presents with:  Fall (musculoskeletal, neurologic)    Stated Complaint:     HPI    40-year-old female presents for complaint of a fall.   Patient states that she went to sit down Genitourinary: Negative. Musculoskeletal: Negative. Skin: Negative. Neurological: Negative. All other systems reviewed and are negative. Positive for stated complaint:   Other systems are as noted in HPI.   Constitutional and vital signs Cevallos's sign.    Musculoskeletal:        Right shoulder: Normal.        Left shoulder: Normal.        Right elbow: Normal.       Left elbow: Normal.        Right wrist: Normal.        Left wrist: Normal.        Right hip: Normal.        Left hip: Normal. MDM    Fall was mechanical. Patient's head CT is negative. Cervical spine cleared using Nexus criteria. Remainder of exam is unremarkable. Patient is able to ambulate using a walker. This is her baseline. She is discharged home.   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 Excluding this region, the gray-white matter junction is preserved and bilaterally symmetric in appearance.   Scattered periventricular and subcortical and radiology findings were discussed with the patient. Patient is advised to follow up with PCP for reevaluation. Return precautions were given. Patient voices understanding and agreement with the treatment plan. All questions were addressed and answered.

## 2019-04-26 NOTE — ED INITIAL ASSESSMENT (HPI)
C/o mechanical fall PTA. Pt denies hitting head, denies LOC,  en route. Pt recalls \"missing the railing\" when attempting to hold herself up. Denies pain.  Denies dizziness

## 2019-04-27 PROBLEM — R39.15 URINARY URGENCY: Status: RESOLVED | Noted: 2018-07-18 | Resolved: 2019-04-27

## 2019-04-27 PROBLEM — Z86.73 HISTORY OF RECENT STROKE: Status: ACTIVE | Noted: 2019-04-27

## 2019-04-27 PROBLEM — Z23 NEED FOR VACCINATION: Status: RESOLVED | Noted: 2018-07-18 | Resolved: 2019-04-27

## 2019-04-27 NOTE — PROGRESS NOTES
HPI:    Patient ID: Gina Mauricio is a 80year old female. Patient presents today for posthospital and rehab ffup. She was admitted to [facility] from her [place] assisted living after she had fallen. She sustained multiple rib fractures.  She was also Medications:  Buffered Aspirin 325 MG Oral Tab Take 1 tablet (325 mg total) by mouth daily.  Disp:  Rfl: 0   BETHANECHOL CHLORIDE 10 MG Oral Tab TAKE 1 TABLET BY MOUTH THREE TIMES DAILY Disp: 90 tablet Rfl: 0   EZETIMIBE-SIMVASTATIN 10-10 MG Oral Tab TAKE 1 Allergies:  Levaquin [Levofloxa*    OTHER (SEE COMMENTS)    Comment:tendonitis heel   PHYSICAL EXAM:   Physical Exam   Constitutional: She appears well-developed and well-nourished. No distress.    Pt in wheelchair   HENT:   Right Ear: External ear norm cpm.    (E78.00) Hypercholesteremia  Plan: continue with low chol diet and chol med.     (H85.35) Chronic systolic heart failure ([facility] [address]  Plan: pt appears to be compensated currently; she is being ff by cardilogist.        No orders of the defined types were

## 2019-04-30 ENCOUNTER — TELEPHONE (OUTPATIENT)
Dept: INTERNAL MEDICINE CLINIC | Facility: CLINIC | Age: 84
End: 2019-04-30

## 2019-04-30 RX ORDER — NITROFURANTOIN 25; 75 MG/1; MG/1
100 CAPSULE ORAL 2 TIMES DAILY
Qty: 14 CAPSULE | Refills: 0 | Status: SHIPPED | OUTPATIENT
Start: 2019-04-30 | End: 2019-06-11

## 2019-04-30 NOTE — TELEPHONE ENCOUNTER
Pls call [facility] where pt resides [phone]; I had received urinalysis and urine culture which showed pt still has uti.   Start pt on macrobid 100mg po bid for one week

## 2019-04-30 NOTE — TELEPHONE ENCOUNTER
Spoke with Richard Napier at [facility] and relayed EL message below--she verbalizes understanding and agreement--requests Macrobid to be sent to [facility] and they will deliver it to patient.     Spoke with Phani Beach at [facility] and relayed [employer]

## 2019-05-06 ENCOUNTER — TELEPHONE (OUTPATIENT)
Dept: INTERNAL MEDICINE CLINIC | Facility: CLINIC | Age: 84
End: 2019-05-06

## 2019-05-07 RX ORDER — EZETIMIBE AND SIMVASTATIN 10; 10 MG/1; MG/1
TABLET ORAL
Qty: 90 TABLET | Refills: 1 | Status: SHIPPED | OUTPATIENT
Start: 2019-05-07 | End: 2019-12-10

## 2019-05-07 NOTE — TELEPHONE ENCOUNTER
[facility]. Order # [id number] and order # [id number] signed by Dr. Fred Jefferson, faxed and confirmed sent. Original placed for scanning to chart.

## 2019-05-10 ENCOUNTER — TELEPHONE (OUTPATIENT)
Dept: INTERNAL MEDICINE CLINIC | Facility: CLINIC | Age: 84
End: 2019-05-10

## 2019-05-10 NOTE — TELEPHONE ENCOUNTER
Per Riddhi/RN, Need verbal order for blood sugar to be taken weekly at the [facility] facility.

## 2019-05-13 ENCOUNTER — TELEPHONE (OUTPATIENT)
Dept: INTERNAL MEDICINE CLINIC | Facility: CLINIC | Age: 84
End: 2019-05-13

## 2019-05-13 NOTE — TELEPHONE ENCOUNTER
[facility] Rosaline Trimble. Order # [id number] and [id number] placed on Dr. Maribeth Perez desk to complete.

## 2019-05-14 NOTE — TELEPHONE ENCOUNTER
Patient orders completed by Dr. Linda Moreno placed for mailing to [facility] in pre-addressed and stamped envelope

## 2019-05-15 RX ORDER — MAG HYDROX/ALUMINUM HYD/SIMETH 400-400-40
SUSPENSION, ORAL (FINAL DOSE FORM) ORAL
Qty: 90 CAPSULE | Refills: 1 | Status: SHIPPED | OUTPATIENT
Start: 2019-05-15 | End: 2019-11-12

## 2019-05-15 RX ORDER — ASPIRIN 325 MG
TABLET, DELAYED RELEASE (ENTERIC COATED) ORAL
Qty: 90 TABLET | Refills: 1 | Status: ON HOLD | OUTPATIENT
Start: 2019-05-15 | End: 2019-08-29

## 2019-05-15 NOTE — TELEPHONE ENCOUNTER
Refill passed per [facility] Aakash protocol.   Refill Protocol Appointment Criteria  · Appointment scheduled in the past 6 months or in the next 3 months  Recent Outpatient Visits            3 weeks ago Closed fracture of multiple ribs of left side with rout

## 2019-05-15 NOTE — TELEPHONE ENCOUNTER
Review pended refill request as it does not fall under a protocol.   Requested Prescriptions     Pending Prescriptions Disp Refills   • VITAMIN D3 5000 units Oral Cap [Pharmacy Med Name: VITAMIN D3 5000 UNIT CAPSULE] 30 capsule      Sig: TAKE 1 CAPSULE BY M

## 2019-05-22 RX ORDER — BETHANECHOL CHLORIDE 10 MG/1
TABLET ORAL
Qty: 90 TABLET | Refills: 0 | OUTPATIENT
Start: 2019-05-22

## 2019-05-22 RX ORDER — CARVEDILOL 6.25 MG/1
3.12 TABLET ORAL DAILY
Qty: 90 TABLET | Refills: 0 | OUTPATIENT
Start: 2019-05-22

## 2019-05-22 RX ORDER — FUROSEMIDE 20 MG/1
TABLET ORAL
Qty: 45 TABLET | Refills: 0 | OUTPATIENT
Start: 2019-05-22

## 2019-05-22 RX ORDER — FUROSEMIDE 20 MG/1
TABLET ORAL
Qty: 15 TABLET | Refills: 0 | OUTPATIENT
Start: 2019-05-22

## 2019-05-28 ENCOUNTER — TELEPHONE (OUTPATIENT)
Dept: INTERNAL MEDICINE CLINIC | Facility: CLINIC | Age: 84
End: 2019-05-28

## 2019-05-28 NOTE — TELEPHONE ENCOUNTER
Order signed by Dr. Faisal Cantrell to monitor patients blood pressure weekly faxed and confirmed sent. Original placed for scanning to chart.

## 2019-05-29 ENCOUNTER — TELEPHONE (OUTPATIENT)
Dept: INTERNAL MEDICINE CLINIC | Facility: CLINIC | Age: 84
End: 2019-05-29

## 2019-05-29 DIAGNOSIS — N39.0 RECURRENT UTI: Primary | ICD-10-CM

## 2019-05-29 RX ORDER — AMPICILLIN 500 MG/1
500 CAPSULE ORAL 4 TIMES DAILY
Qty: 28 CAPSULE | Refills: 0 | Status: SHIPPED | OUTPATIENT
Start: 2019-05-29 | End: 2019-06-11

## 2019-05-29 RX ORDER — LOSARTAN POTASSIUM 25 MG/1
TABLET ORAL
Qty: 90 TABLET | Refills: 1 | Status: SHIPPED | OUTPATIENT
Start: 2019-05-29 | End: 2019-11-07

## 2019-05-29 NOTE — TELEPHONE ENCOUNTER
Received ua and urine culture from [facility]; the test was done as requested by family last week when patient was getting confused and unstable gait which she gets when she had uti before.    Urine culture positive for E coli and ua was also

## 2019-06-11 ENCOUNTER — OFFICE VISIT (OUTPATIENT)
Dept: SURGERY | Facility: CLINIC | Age: 84
End: 2019-06-11
Payer: MEDICARE

## 2019-06-11 VITALS
DIASTOLIC BLOOD PRESSURE: 73 MMHG | HEART RATE: 58 BPM | SYSTOLIC BLOOD PRESSURE: 117 MMHG | WEIGHT: 137 LBS | BODY MASS INDEX: 23.39 KG/M2 | HEIGHT: 64 IN

## 2019-06-11 DIAGNOSIS — N39.0 RECURRENT UTI: Primary | ICD-10-CM

## 2019-06-11 PROCEDURE — 99204 OFFICE O/P NEW MOD 45 MIN: CPT | Performed by: NURSE PRACTITIONER

## 2019-06-11 PROCEDURE — G0463 HOSPITAL OUTPT CLINIC VISIT: HCPCS | Performed by: NURSE PRACTITIONER

## 2019-06-11 RX ORDER — FLUOXETINE 10 MG/1
CAPSULE ORAL
Qty: 90 CAPSULE | Refills: 1 | Status: SHIPPED | OUTPATIENT
Start: 2019-06-11 | End: 2019-12-31

## 2019-06-11 RX ORDER — CEPHALEXIN 500 MG/1
500 CAPSULE ORAL 2 TIMES DAILY
Qty: 14 CAPSULE | Refills: 0 | Status: SHIPPED | OUTPATIENT
Start: 2019-06-11 | End: 2019-06-18

## 2019-06-11 RX ORDER — CEPHALEXIN 250 MG/1
250 CAPSULE ORAL NIGHTLY
Qty: 90 CAPSULE | Refills: 1 | Status: ON HOLD | OUTPATIENT
Start: 2019-06-11 | End: 2019-08-30

## 2019-06-12 ENCOUNTER — TELEPHONE (OUTPATIENT)
Dept: INTERNAL MEDICINE CLINIC | Facility: CLINIC | Age: 84
End: 2019-06-12

## 2019-06-12 RX ORDER — BETHANECHOL CHLORIDE 10 MG/1
TABLET ORAL
Qty: 90 TABLET | Refills: 0 | Status: SHIPPED | OUTPATIENT
Start: 2019-06-12 | End: 2019-06-25

## 2019-06-12 RX ORDER — CARVEDILOL 3.12 MG/1
TABLET ORAL
Qty: 30 TABLET | Refills: 0 | Status: SHIPPED | OUTPATIENT
Start: 2019-06-12 | End: 2019-07-30

## 2019-06-12 NOTE — IP AVS SNAPSHOT
Patient Demographics     Address  [address] [facility] [address]  [place] [address] Phone  [phone] (Home)  [phone] (Mobile) *Preferred*      Emergency Contact(s)     Name Relation Home Work [place] Daughter [phone] When you should NOTIFY YOUR PHYSICIAN:   · If you have increasing discomfort at the procedure site.    ·  If you experience signs of a infection including a  temperature >101,  chills, redness and tenderness at the site, or any greenish/yellow drainage from Inhale 2 puffs into the lungs every 6 (six) hours as needed for Wheezing.           Bethanechol Chloride 10 MG Tabs  Commonly known as:  URECHOLINE  Next dose due:  TONIGHT      TAKE 1 TABLET BY MOUTH THREE TIMES DAILY   Malorie Lemus MD         [facility] Commonly known as:  COZAAR  Next dose due: Tomorrow morning      TAKE 1 TABLET BY MOUTH DAILY   Irish Maradiaga MD         Magnesium Oxide 400 (240 Mg) MG Tabs  Next dose due: Tomorrow morning      Take 1 tablet (400 mg total) by mouth once daily.    Saida [id number] acetaminophen (TYLENOL EXTRA STRENGTH) tab 500 mg 09/01/19 0911 Given      [id number] acetaminophen (TYLENOL) tab 650 mg 09/01/19 0434 Given      [id number] atorvastatin (LIPITOR) tab 5 mg (And Linked Group #1) 08/31/19 2017 Given      [id number] c PT 13.9 11.8 - 14.5 seconds — [facility]   INR 1.09 0.90 - 1.20 — [facility]   Comment:           Only the INR (not the Protime value) should be utilized for   the monitoring of oral anticoagulant therapy.      Recommended therapeutic ranges for anticoa MRSA Screen by PCR Once [[id number]] Collected:  08/29/19 1339    Order Status:  Sent Lab Status:  No result     Specimen:  Other from [facility]          H&P - H&P Note      H&P signed by Abelino Collins MD at 8/29/2019  5:57 PM   Version 1 of 1    Author:  KATHLEEN small dose of carvedilol 3.125 mg b.i.d. ALLERGIES:  Levaquin. FAMILY HISTORY:  Positive for diabetes. SOCIAL HISTORY:  No tobacco, alcohol, or drug use. Lives in a nursing facility.   Requires some assistance in her basic activities of daily taran Dictated By Susana Calderon MD  d: 08/29/2019 13:32:00  t: 08/29/2019 14:11:28  Job [id number]  FB/  [FB.1]  Electronically signed by Luzmaria Griffith MD on 8/29/2019  5:57 PM   Attribution Key    FB. 1 - Luzmaria Griffith MD on 8/29/2019  2:31 PM SOCIAL HISTORY:  She is , has 6 children. Daughter and son-in-law are here with her in the ER. Does not smoke. No alcohol. Mild caffeine. FAMILY HISTORY:  Noncontributory.     REVIEW OF SYSTEMS:  Review of systems cannot be obtained due to he Chest X-Ray Results (HF patients only)    Xr Chest Pa + Lat Chest (cpt=71046)    Result Date: 8/30/2019  PROCEDURE: XR CHEST PA + LAT CHEST (CPT=71020)  COMPARISON: [facility], XR CHEST AP PORTABLE (CPT=71045), 8/29/2019, 16:35.   INDICATION sling during Pt session. Pt with s/p pacemaker placement, HR varied during activity from 55-94bpm, RN is aware, oxy sat is [facility].  Pt is very [place] and needed lots of encouragement to participate and to do more, pt's son in the room during partial treatment and Precautions: Limb alert - left(Simultaneous filing.  User may not have seen previous data.)    WEIGHT BEARING RESTRICTION  Weight Bearing Restriction: L upper extremity     L Upper Extremity: Non-Weight Bearing          PAIN ASSESSMENT   Rating: (Did not ra Patient Goal Patient's self-stated goal is: to go home   Goal #1 Patient is able to demonstrate supine - sit EOB @ level: supervision      Goal #1   Current Status  NT, up in the chair   Goal #2 Patient is able to demonstrate transfers Sit to/from Stand at [address] and was ambulatory with use of a RW-had staff help with ADL's and bed mobility but able to walk to the bathroom and down to the dining oviedo independently with her RW.   Patient will benefit from continued IP PT services to address PT Discharge Recommendations: Sub-acute rehabilitation[MD.2]    PLAN[MD.1]  PT Treatment Plan: Bed mobility; Patient education; Energy conservation; Family education;Gait training;Strengthening;Transfer training;Balance training  Rehab Potential : Rondall Gearing Hipolito Najjar Precautions: Limb alert - left(Simultaneous filing.  User may not have seen previous data.)  Fall Risk: High fall risk[MD.2]    WEIGHT BEARING RESTRICTION[MD.1]  Weight Bearing Restriction: L upper extremity     L Upper Extremity: Non-Weight Bearing[MD.2] Exercise/Education Provided:  Functional activity tolerated  Gait training  Transfer training[MD.1]    Patient End of Session: Up in chair;Needs met;Call light within reach;RN aware of session/findings; All patient questions and concerns addressed; Family pr Pt was seen for OT treatment, performed in coordination w/ PT, nurse Oneil Welch authorized Pt participation, Pt was up in chair and asleep. Pt was drowsy, she is [place] and had difficulty conversing and following directions. BP was 123/73, 92-96% on room air. Pt w -   Putting on and taking off regular lower body clothing?: A Lot  -   Bathing (including washing, rinsing, drying)?: A Lot  -   Toileting, which includes using toilet, bedpan or urinal? : A Lot  -   Putting on and taking off regular upper body clothing?: Occupational Therapy Note signed by Elana Howell OT at 8/30/2019  4:32 PM  Version 1 of 1    Author:  Elana Howell OT Service:  — Author Type:  Occupational Therapist    Filed:  8/30/2019  4:32 PM Date of Service:  8/30/2019  4:10 PM Status:  Paolo Casarez The patient's Approx Degree of Impairment: 59.67% has been calculated based on documentation in the [facility] '6 clicks' Inpatient Daily Activity Short Form. Research supports that patients with this level of impairment may benefit from BRICE.      DISCHARGE REC bathing dressing. Staff would assist pt to bed a night. Pt ambulated w/ rw to dining room for all meals unassisted. Pt mod I for tolieting and clothing management    SUBJECTIVE  \"Do I have to?  I just want to sleep\"    OCCUPATIONAL THERAPY EXAMINATION Patient End of Session: Up in chair;Needs met;Call light within reach; All patient questions and concerns addressed; Family present;RN aware of session/findings    OT Goals  Patients self stated goal is: daughter's goal is for pt to go to rehab     Patient w 28

## 2019-06-12 NOTE — TELEPHONE ENCOUNTER
Please advise on rx. Passed protocol, but need last PAP date and PAP result and there aren't any current PAP results. LOV 04/22/19. Last RF 03/26/19.

## 2019-06-12 NOTE — TELEPHONE ENCOUNTER
[facility] req an order to discharge Pt from [address] [place] [address] due to no more skilled needs and states planning to discharge Pt on Thursday, 6/13.

## 2019-06-12 NOTE — TELEPHONE ENCOUNTER
Refill passed per [facility] protocol.   Refill Protocol Appointment Criteria  · Appointment scheduled in the past 6 months or in the next 3 months  Recent Outpatient Visits            Today Recurrent UTI    [facility], [address] [place] [address]

## 2019-06-21 ENCOUNTER — TELEPHONE (OUTPATIENT)
Dept: INTERNAL MEDICINE CLINIC | Facility: CLINIC | Age: 84
End: 2019-06-21

## 2019-06-26 RX ORDER — BETHANECHOL CHLORIDE 10 MG/1
TABLET ORAL
Qty: 270 TABLET | Refills: 1 | Status: SHIPPED | OUTPATIENT
Start: 2019-06-26 | End: 2020-01-08

## 2019-06-26 NOTE — TELEPHONE ENCOUNTER
Review pended refill request as it does not fall under a protocol.     Last Rx: 6/12/19  LOV: 2 weeks ago

## 2019-06-28 NOTE — TELEPHONE ENCOUNTER
[facility] order # [id number] signed by Dr. Karmen Maddox, faxed back, confirmed sent. Original placed for scanning to [facility].

## 2019-07-09 ENCOUNTER — TELEPHONE (OUTPATIENT)
Dept: SURGERY | Facility: CLINIC | Age: 84
End: 2019-07-09

## 2019-07-16 NOTE — TELEPHONE ENCOUNTER
Review pended refill request as it does not fall under a protocol.     Last Rx: 01/03/19  LOV: 04/22/19

## 2019-07-17 RX ORDER — CLOPIDOGREL BISULFATE 75 MG/1
TABLET ORAL
Qty: 90 TABLET | Refills: 1 | Status: SHIPPED | OUTPATIENT
Start: 2019-07-17 | End: 2020-02-11

## 2019-07-30 RX ORDER — CARVEDILOL 3.12 MG/1
TABLET ORAL
Qty: 90 TABLET | Refills: 0 | Status: SHIPPED | OUTPATIENT
Start: 2019-07-30 | End: 2019-11-19

## 2019-07-30 NOTE — TELEPHONE ENCOUNTER
Hypertensive Medications  Refill passed per [facility] protocol.     Protocol Criteria:  · Appointment scheduled in the past 6 months or in the next 3 months  · BMP or CMP in the past 12 months  · Creatinine result < 2  Recent Outpatient Visits

## 2019-07-31 RX ORDER — FUROSEMIDE 20 MG/1
TABLET ORAL
Qty: 30 TABLET | OUTPATIENT
Start: 2019-07-31

## 2019-08-01 RX ORDER — CEPHALEXIN 500 MG/1
CAPSULE ORAL
Qty: 14 CAPSULE | Refills: 0 | OUTPATIENT
Start: 2019-08-01

## 2019-08-01 NOTE — TELEPHONE ENCOUNTER
Pt LOV with Roberto TEIXEIRA 6/11/19 pt pharmacy requesting refill on cephalexin, refill denies. Pt needs to call our office if she is having any complaints.

## 2019-08-08 NOTE — TELEPHONE ENCOUNTER
Dr Moises Henry, please advise on 3/21/18 labs from ER visit, including UA/culture. Also advise on physical therapy order (new referral to [facility]). Called daughter Mark Mcgarry (on HIPAA) about follow-up with the PCP.  Patient lost 3 teeth and is getting d
LMTCB transfer to triage, to Peter Gonsales, daughter.
Pt's daughter is calling b/c her mother was in the ER on 3/21 and got some labs done. She was advised by ER DR to await Dr. Mariano Brought call for results. pls advise.    She has not heard anything from the hospital.
Urine cultur negative so no uti  Ok for phYsical therapy with me
\[facility] [place] [facility]\"" ( pt's dght)  was informed of MD recommendation per hipaa, she stated understanding.
no

## 2019-08-14 RX ORDER — FUROSEMIDE 20 MG/1
TABLET ORAL
Qty: 45 TABLET | Refills: 1 | Status: SHIPPED | OUTPATIENT
Start: 2019-08-14 | End: 2019-12-10

## 2019-08-23 ENCOUNTER — APPOINTMENT (OUTPATIENT)
Dept: CT IMAGING | Facility: HOSPITAL | Age: 84
End: 2019-08-23
Attending: EMERGENCY MEDICINE
Payer: MEDICARE

## 2019-08-23 ENCOUNTER — HOSPITAL ENCOUNTER (EMERGENCY)
Facility: HOSPITAL | Age: 84
Discharge: HOME | End: 2019-08-23
Attending: EMERGENCY MEDICINE
Payer: MEDICARE

## 2019-08-23 ENCOUNTER — APPOINTMENT (OUTPATIENT)
Dept: GENERAL RADIOLOGY | Facility: HOSPITAL | Age: 84
End: 2019-08-23
Attending: EMERGENCY MEDICINE
Payer: MEDICARE

## 2019-08-23 VITALS
HEART RATE: 87 BPM | HEIGHT: 64 IN | SYSTOLIC BLOOD PRESSURE: 149 MMHG | BODY MASS INDEX: 24.24 KG/M2 | DIASTOLIC BLOOD PRESSURE: 89 MMHG | TEMPERATURE: 98 F | OXYGEN SATURATION: 99 % | RESPIRATION RATE: 16 BRPM | WEIGHT: 142 LBS

## 2019-08-23 DIAGNOSIS — S00.03XA CONTUSION OF SCALP, INITIAL ENCOUNTER: Primary | ICD-10-CM

## 2019-08-23 DIAGNOSIS — S20.212A CONTUSION OF RIB ON LEFT SIDE, INITIAL ENCOUNTER: ICD-10-CM

## 2019-08-23 PROCEDURE — 99284 EMERGENCY DEPT VISIT MOD MDM: CPT

## 2019-08-23 PROCEDURE — 70450 CT HEAD/BRAIN W/O DYE: CPT | Performed by: EMERGENCY MEDICINE

## 2019-08-23 PROCEDURE — 71111 X-RAY EXAM RIBS/CHEST4/> VWS: CPT | Performed by: EMERGENCY MEDICINE

## 2019-08-23 NOTE — ED INITIAL ASSESSMENT (HPI)
Care assumed from EMS. Pts daughter states that the pt lost her balance & fell last night around 2200 and now c/o bl rib pain. No deformity, swelling, or bruising noted.  Pt denies hitting her head, LOC. + blood thinners

## 2019-08-23 NOTE — ED PROVIDER NOTES
Patient Seen in: [facility] AND [facility] Emergency Department    History   Patient presents with:  Fall (musculoskeletal, neurologic)    Stated Complaint:     HPI    Patient complains of mechanical fall that occurred  Last night when her walker got caught in s TAKE 1 CAPSULE BY MOUTH DAILY   Meclizine HCl 25 MG Oral Tab,  Take 12.5 mg by mouth daily as needed. Albuterol Sulfate  (90 Base) MCG/ACT Inhalation Aero Soln,  Inhale 2 puffs into the lungs every 6 (six) hours as needed for Wheezing.    acetamino reviewed and negative except as noted above. PSFH elements reviewed from today and agreed except as otherwise stated in HPI.     Physical Exam     ED Triage Vitals [08/23/19 0432]   BP (!) 170/117   Pulse 101   Resp 18   Temp 97.8 °F (36.6 °C)   Temp src circulations. 5. Lesser incidental findings as above.       Dictated by (CST): Jaki Hernandes MD on 8/23/2019 at 6:09     Approved by (CST): Jaki Hernandes MD on 8/23/2019 at 6:12          Xr Ribs With Chest, Bilateral   (xup=47252)    Result Date: 8/23/ Critical Care:      MDM                 Disposition and Plan     Clinical Impression:  Contusion of scalp, initial encounter  (primary encounter diagnosis)  Contusion of rib on left side, initial encounter    Disposition:  Discharge  8/23/2019

## 2019-08-23 NOTE — ED NOTES
Pt and pts daughter provided with discharge instructions. Verbalized understanding for plan of care at home and follow up. All questions/concerns addressed prior to discharge. Pt wheeled out of ER with pts daughter.

## 2019-08-28 ENCOUNTER — NURSE TRIAGE (OUTPATIENT)
Dept: INTERNAL MEDICINE CLINIC | Facility: CLINIC | Age: 84
End: 2019-08-28

## 2019-08-28 NOTE — TELEPHONE ENCOUNTER
Patient was seen on 8/23/19 for a fall;  Daughter suspected this may have been due to a UTI but she was not tested in ER . She was tested at [facility] and results came back positive for UTI. Patient is currently on Keflex 250 mg daily to prevent UTI.

## 2019-08-29 ENCOUNTER — APPOINTMENT (OUTPATIENT)
Dept: GENERAL RADIOLOGY | Facility: HOSPITAL | Age: 84
DRG: 244 | End: 2019-08-29
Attending: INTERNAL MEDICINE
Payer: MEDICARE

## 2019-08-29 ENCOUNTER — APPOINTMENT (OUTPATIENT)
Dept: GENERAL RADIOLOGY | Facility: HOSPITAL | Age: 84
DRG: 244 | End: 2019-08-29
Attending: EMERGENCY MEDICINE
Payer: MEDICARE

## 2019-08-29 ENCOUNTER — HOSPITAL ENCOUNTER (INPATIENT)
Facility: HOSPITAL | Age: 84
LOS: 3 days | Discharge: NURSING, REHAB OR LONG-TERM CARE | DRG: 244 | End: 2019-09-01
Attending: EMERGENCY MEDICINE | Admitting: HOSPITALIST
Payer: MEDICARE

## 2019-08-29 ENCOUNTER — APPOINTMENT (OUTPATIENT)
Dept: INTERVENTIONAL RADIOLOGY/VASCULAR | Facility: HOSPITAL | Age: 84
DRG: 244 | End: 2019-08-29
Attending: INTERNAL MEDICINE
Payer: MEDICARE

## 2019-08-29 DIAGNOSIS — I48.91 ATRIAL FIBRILLATION WITH SLOW VENTRICULAR RESPONSE (HCC): Primary | ICD-10-CM

## 2019-08-29 LAB
ANION GAP SERPL CALC-SCNC: 10 MMOL/L (ref 0–18)
BACTERIA UR QL AUTO: NEGATIVE /HPF
BASOPHILS # BLD AUTO: 0.07 X10(3) UL (ref 0–0.2)
BASOPHILS NFR BLD AUTO: 0.6 %
BILIRUB UR QL: NEGATIVE
BUN BLD-MCNC: 14 MG/DL (ref 7–18)
BUN/CREAT SERPL: 13.5 (ref 10–20)
CALCIUM BLD-MCNC: 9.3 MG/DL (ref 8.5–10.1)
CHLORIDE SERPL-SCNC: 95 MMOL/L (ref 98–112)
CLARITY UR: CLEAR
CO2 SERPL-SCNC: 24 MMOL/L (ref 21–32)
COLOR UR: YELLOW
CREAT BLD-MCNC: 1.04 MG/DL (ref 0.55–1.02)
DEPRECATED RDW RBC AUTO: 42.1 FL (ref 35.1–46.3)
EOSINOPHIL # BLD AUTO: 0.27 X10(3) UL (ref 0–0.7)
EOSINOPHIL NFR BLD AUTO: 2.2 %
ERYTHROCYTE [DISTWIDTH] IN BLOOD BY AUTOMATED COUNT: 12.7 % (ref 11–15)
EST. AVERAGE GLUCOSE BLD GHB EST-MCNC: 160 MG/DL (ref 68–126)
GLUCOSE BLD-MCNC: 199 MG/DL (ref 70–99)
GLUCOSE BLDC GLUCOMTR-MCNC: 140 MG/DL (ref 70–99)
GLUCOSE BLDC GLUCOMTR-MCNC: 170 MG/DL (ref 70–99)
GLUCOSE UR-MCNC: NEGATIVE MG/DL
HBA1C MFR BLD HPLC: 7.2 % (ref ?–5.7)
HCT VFR BLD AUTO: 37 % (ref 35–48)
HGB BLD-MCNC: 12.6 G/DL (ref 12–16)
IMM GRANULOCYTES # BLD AUTO: 0.07 X10(3) UL (ref 0–1)
IMM GRANULOCYTES NFR BLD: 0.6 %
KETONES UR-MCNC: NEGATIVE MG/DL
LYMPHOCYTES # BLD AUTO: 0.77 X10(3) UL (ref 1–4)
LYMPHOCYTES NFR BLD AUTO: 6.4 %
MCH RBC QN AUTO: 31.3 PG (ref 26–34)
MCHC RBC AUTO-ENTMCNC: 34.1 G/DL (ref 31–37)
MCV RBC AUTO: 91.8 FL (ref 80–100)
MONOCYTES # BLD AUTO: 1.06 X10(3) UL (ref 0.1–1)
MONOCYTES NFR BLD AUTO: 8.8 %
MRSA DNA SPEC QL NAA+PROBE: NEGATIVE
NEUTROPHILS # BLD AUTO: 9.87 X10 (3) UL (ref 1.5–7.7)
NEUTROPHILS # BLD AUTO: 9.87 X10(3) UL (ref 1.5–7.7)
NEUTROPHILS NFR BLD AUTO: 81.4 %
NITRITE UR QL STRIP.AUTO: NEGATIVE
OSMOLALITY SERPL CALC.SUM OF ELEC: 274 MOSM/KG (ref 275–295)
PH UR: 6 [PH] (ref 5–8)
PLATELET # BLD AUTO: 282 10(3)UL (ref 150–450)
POTASSIUM SERPL-SCNC: 4.9 MMOL/L (ref 3.5–5.1)
PROT UR-MCNC: NEGATIVE MG/DL
RBC # BLD AUTO: 4.03 X10(6)UL (ref 3.8–5.3)
RBC #/AREA URNS AUTO: 4 /HPF
SODIUM SERPL-SCNC: 129 MMOL/L (ref 136–145)
SP GR UR STRIP: 1.01 (ref 1–1.03)
TROPONIN I SERPL-MCNC: <0.045 NG/ML (ref ?–0.04)
UROBILINOGEN UR STRIP-ACNC: <2
VIT C UR-MCNC: NEGATIVE MG/DL
WBC # BLD AUTO: 12.1 X10(3) UL (ref 4–11)
WBC #/AREA URNS AUTO: 21 /HPF

## 2019-08-29 PROCEDURE — 02HK3JZ INSERTION OF PACEMAKER LEAD INTO RIGHT VENTRICLE, PERCUTANEOUS APPROACH: ICD-10-PCS | Performed by: INTERNAL MEDICINE

## 2019-08-29 PROCEDURE — 71045 X-RAY EXAM CHEST 1 VIEW: CPT | Performed by: EMERGENCY MEDICINE

## 2019-08-29 PROCEDURE — 99152 MOD SED SAME PHYS/QHP 5/>YRS: CPT | Performed by: INTERNAL MEDICINE

## 2019-08-29 PROCEDURE — 71045 X-RAY EXAM CHEST 1 VIEW: CPT | Performed by: INTERNAL MEDICINE

## 2019-08-29 PROCEDURE — 99223 1ST HOSP IP/OBS HIGH 75: CPT | Performed by: HOSPITALIST

## 2019-08-29 PROCEDURE — 0JH604Z INSERTION OF PACEMAKER, SINGLE CHAMBER INTO CHEST SUBCUTANEOUS TISSUE AND FASCIA, OPEN APPROACH: ICD-10-PCS | Performed by: INTERNAL MEDICINE

## 2019-08-29 PROCEDURE — 33207 INSERT HEART PM VENTRICULAR: CPT | Performed by: INTERNAL MEDICINE

## 2019-08-29 PROCEDURE — 99223 1ST HOSP IP/OBS HIGH 75: CPT | Performed by: INTERNAL MEDICINE

## 2019-08-29 RX ORDER — PANTOPRAZOLE SODIUM 40 MG/1
40 TABLET, DELAYED RELEASE ORAL
Status: DISCONTINUED | OUTPATIENT
Start: 2019-08-30 | End: 2019-09-01

## 2019-08-29 RX ORDER — ACETAMINOPHEN 325 MG/1
650 TABLET ORAL EVERY 6 HOURS PRN
Status: DISCONTINUED | OUTPATIENT
Start: 2019-08-29 | End: 2019-09-01

## 2019-08-29 RX ORDER — LOSARTAN POTASSIUM 25 MG/1
25 TABLET ORAL
Status: DISCONTINUED | OUTPATIENT
Start: 2019-08-30 | End: 2019-09-01

## 2019-08-29 RX ORDER — CEFAZOLIN SODIUM/WATER 2 G/20 ML
SYRINGE (ML) INTRAVENOUS
Status: COMPLETED
Start: 2019-08-29 | End: 2019-08-29

## 2019-08-29 RX ORDER — SODIUM CHLORIDE 0.9 % (FLUSH) 0.9 %
3 SYRINGE (ML) INJECTION AS NEEDED
Status: DISCONTINUED | OUTPATIENT
Start: 2019-08-29 | End: 2019-09-01

## 2019-08-29 RX ORDER — CEFAZOLIN SODIUM/WATER 2 G/20 ML
2 SYRINGE (ML) INTRAVENOUS EVERY 8 HOURS
Status: COMPLETED | OUTPATIENT
Start: 2019-08-29 | End: 2019-08-30

## 2019-08-29 RX ORDER — LIDOCAINE HYDROCHLORIDE AND EPINEPHRINE 10; 10 MG/ML; UG/ML
INJECTION, SOLUTION INFILTRATION; PERINEURAL
Status: COMPLETED
Start: 2019-08-29 | End: 2019-08-29

## 2019-08-29 RX ORDER — ONDANSETRON 2 MG/ML
4 INJECTION INTRAMUSCULAR; INTRAVENOUS EVERY 6 HOURS PRN
Status: DISCONTINUED | OUTPATIENT
Start: 2019-08-29 | End: 2019-09-01

## 2019-08-29 RX ORDER — DEXTROSE MONOHYDRATE 25 G/50ML
50 INJECTION, SOLUTION INTRAVENOUS AS NEEDED
Status: DISCONTINUED | OUTPATIENT
Start: 2019-08-29 | End: 2019-09-01

## 2019-08-29 RX ORDER — SODIUM CHLORIDE 9 MG/ML
INJECTION, SOLUTION INTRAVENOUS
Status: COMPLETED
Start: 2019-08-29 | End: 2019-08-29

## 2019-08-29 RX ORDER — DIPHENHYDRAMINE HYDROCHLORIDE 50 MG/ML
INJECTION INTRAMUSCULAR; INTRAVENOUS
Status: COMPLETED
Start: 2019-08-29 | End: 2019-08-29

## 2019-08-29 RX ORDER — LIDOCAINE HYDROCHLORIDE 20 MG/ML
INJECTION, SOLUTION EPIDURAL; INFILTRATION; INTRACAUDAL; PERINEURAL
Status: COMPLETED
Start: 2019-08-29 | End: 2019-08-29

## 2019-08-29 RX ORDER — CHLORHEXIDINE GLUCONATE 4 G/100ML
30 SOLUTION TOPICAL
Status: ACTIVE | OUTPATIENT
Start: 2019-08-30 | End: 2019-08-30

## 2019-08-29 RX ORDER — BACITRACIN 50000 [USP'U]/1
INJECTION, POWDER, LYOPHILIZED, FOR SOLUTION INTRAMUSCULAR
Status: COMPLETED
Start: 2019-08-29 | End: 2019-08-29

## 2019-08-29 RX ORDER — OXCARBAZEPINE 150 MG/1
150 TABLET, FILM COATED ORAL
Status: DISCONTINUED | OUTPATIENT
Start: 2019-08-30 | End: 2019-09-01

## 2019-08-29 RX ORDER — MAGNESIUM OXIDE 400 MG (241.3 MG MAGNESIUM) TABLET
400 TABLET
Status: DISCONTINUED | OUTPATIENT
Start: 2019-08-29 | End: 2019-09-01

## 2019-08-29 RX ORDER — ACETAMINOPHEN 500 MG
500 TABLET ORAL EVERY 6 HOURS PRN
Status: DISCONTINUED | OUTPATIENT
Start: 2019-08-29 | End: 2019-09-01

## 2019-08-29 RX ORDER — MIDAZOLAM HYDROCHLORIDE 1 MG/ML
INJECTION INTRAMUSCULAR; INTRAVENOUS
Status: DISCONTINUED
Start: 2019-08-29 | End: 2019-08-29 | Stop reason: WASHOUT

## 2019-08-29 RX ORDER — SODIUM CHLORIDE 9 MG/ML
INJECTION, SOLUTION INTRAVENOUS
Status: ACTIVE | OUTPATIENT
Start: 2019-08-30 | End: 2019-08-30

## 2019-08-29 RX ORDER — FUROSEMIDE 20 MG/1
20 TABLET ORAL DAILY
Status: DISCONTINUED | OUTPATIENT
Start: 2019-08-30 | End: 2019-09-01

## 2019-08-29 RX ORDER — CEFAZOLIN SODIUM/WATER 2 G/20 ML
2 SYRINGE (ML) INTRAVENOUS
Status: DISPENSED | OUTPATIENT
Start: 2019-08-29 | End: 2019-08-30

## 2019-08-29 RX ORDER — EZETIMIBE AND SIMVASTATIN 10; 10 MG/1; MG/1
1 TABLET ORAL NIGHTLY
Status: DISCONTINUED | OUTPATIENT
Start: 2019-08-29 | End: 2019-08-29 | Stop reason: RX

## 2019-08-29 RX ORDER — ATORVASTATIN CALCIUM 10 MG/1
5 TABLET, FILM COATED ORAL NIGHTLY
Status: DISCONTINUED | OUTPATIENT
Start: 2019-08-29 | End: 2019-09-01

## 2019-08-29 RX ORDER — FLUOXETINE 10 MG/1
10 CAPSULE ORAL
Status: DISCONTINUED | OUTPATIENT
Start: 2019-08-29 | End: 2019-09-01

## 2019-08-29 RX ORDER — CARVEDILOL 3.12 MG/1
3.12 TABLET ORAL 2 TIMES DAILY WITH MEALS
Status: DISCONTINUED | OUTPATIENT
Start: 2019-08-29 | End: 2019-09-01

## 2019-08-29 RX ORDER — EZETIMIBE 10 MG/1
10 TABLET ORAL NIGHTLY
Status: DISCONTINUED | OUTPATIENT
Start: 2019-08-29 | End: 2019-09-01

## 2019-08-29 RX ORDER — CLOPIDOGREL BISULFATE 75 MG/1
75 TABLET ORAL
Status: DISCONTINUED | OUTPATIENT
Start: 2019-08-29 | End: 2019-09-01

## 2019-08-29 RX ORDER — ONDANSETRON 2 MG/ML
4 INJECTION INTRAMUSCULAR; INTRAVENOUS EVERY 6 HOURS PRN
Status: DISCONTINUED | OUTPATIENT
Start: 2019-08-29 | End: 2019-08-29

## 2019-08-29 RX ORDER — BETHANECHOL CHLORIDE 5 MG
10 TABLET ORAL 3 TIMES DAILY
Status: DISCONTINUED | OUTPATIENT
Start: 2019-08-29 | End: 2019-09-01

## 2019-08-29 NOTE — TELEPHONE ENCOUNTER
Called daughter Girish to get phone # to [facility] as not listed by RN who documented calling yesterday. [facility] [phone] and was informed by Paul Mcintyre that the culture preliminary was faxed to  yesterday; verified fax #[phone].  Paul Mcintyre will see

## 2019-08-29 NOTE — H&P
[facility]    PATIENT'S NAME: Mercy Carter   ATTENDING PHYSICIAN: Lea Wesley MD   PATIENT ACCOUNT#:   [id number]    LOCATION:  [place] [address]  MEDICAL RECORD #:   [id number]       YOB: 1935  ADMISSION DATE:       08/ systems negative. PHYSICAL EXAMINATION:    GENERAL:  The patient is alert, oriented. She feels a little bit better now but she is very hard of hearing, slightly confused.   VITAL SIGNS:  Temperature 98.2, pulse 33, respiratory rate 20, blood pressure

## 2019-08-29 NOTE — ED PROVIDER NOTES
Patient Seen in: [facility] AND [facility] Emergency Department    History   Patient presents with:  Bradycardia  Hypotension (cardiovascular)    Stated Complaint:     HPI    80-year-old female with history of atrial fibrillation, hypertension, diabetes, hyperli full range of motion  Psychiatric: patient is oriented to person and place, there is no agitation    ED Course     Labs Reviewed   URINALYSIS WITH CULTURE REFLEX - Abnormal; Notable for the following components:       Result Value    Blood Urine Small (*) [place] hospitalist.    I spent a total of 30 minutes of critical care time in obtaining history, performing a physical exam, bedside monitoring of interventions, collecting and interpreting tests and discussion with consultants but not including time sp

## 2019-08-29 NOTE — TELEPHONE ENCOUNTER
Dr Bernie Cohen, results or Urinalysis were faxed to you at [phone], culture was negative, patient was sent by ambulance to [address] [place] [address] ER this afternoon due to change ins tatus, lower BP o2sat falling to 87, c/o fatigue, pallor, stomach pain

## 2019-08-29 NOTE — TELEPHONE ENCOUNTER
Her urine culture was negative so no infection based on this culture. I just got this fax from [facility] just now. The patient is on suppressive antibiotic therapy cephalexin 250mg po every night given by urology since June.

## 2019-08-29 NOTE — CONSULTS
[facility]    PATIENT'S NAME: Adenikeanastasia Elias   ATTENDING PHYSICIAN: Lisa Chan MD   CONSULTING PHYSICIAN: Lavern Desouza.  Zina Keller MD   PATIENT ACCOUNT#:   [id number]    LOCATION:  [facility] [address] [place] [address]  MEDICAL RECORD #:   [id number]       DATE OF BI anterolaterally. HEART:  S1, S2 normal.  No pathologic murmur. No S3.  ABDOMEN:  Soft and nontender. EXTREMITIES:  Warm and dry. No edema. Pulses are palpable. LABORATORY DATA:  White count 12.1, hemoglobin 12.6, platelets 259.   Chemistries pendin

## 2019-08-29 NOTE — CONSULTS
Cardiology (consult dictated)    Assessment:  1. Symptomatic marked bradycardia. Doubt due entirely to low doses of carvedilol. 2.  Chronic atrial fibrillation    3. History of moderate LV dysfunction. Would benefit staying on carvedilol.     Recomme Yes

## 2019-08-29 NOTE — PROCEDURES
OPERATION(S) PERFORMED:   1. Single-chamber pacemaker implant   2. Chest fluoroscopy.      : Jd Gonzalez MD    INDICATION: Atrial fibrillation, SSS, symptomatic bradycardia/SVR, no reversible cause    COMPLICATIONS: None   Moderate conscious sedatio successful implant of a single-chamber pacemaker with a active fixation right ventricular (RV) lead. SJM   2. Adequate RV pacing, impedence and sensing thresholds.      Estella Stein MD  [facility]  Cardiac Electrophysiolgy  8/29/2019

## 2019-08-30 ENCOUNTER — APPOINTMENT (OUTPATIENT)
Dept: GENERAL RADIOLOGY | Facility: HOSPITAL | Age: 84
DRG: 244 | End: 2019-08-30
Attending: INTERNAL MEDICINE
Payer: MEDICARE

## 2019-08-30 LAB
ANION GAP SERPL CALC-SCNC: 8 MMOL/L (ref 0–18)
BUN BLD-MCNC: 16 MG/DL (ref 7–18)
BUN/CREAT SERPL: 16.7 (ref 10–20)
CALCIUM BLD-MCNC: 8.9 MG/DL (ref 8.5–10.1)
CHLORIDE SERPL-SCNC: 96 MMOL/L (ref 98–112)
CO2 SERPL-SCNC: 27 MMOL/L (ref 21–32)
CREAT BLD-MCNC: 0.96 MG/DL (ref 0.55–1.02)
GLUCOSE BLD-MCNC: 138 MG/DL (ref 70–99)
GLUCOSE BLDC GLUCOMTR-MCNC: 146 MG/DL (ref 70–99)
GLUCOSE BLDC GLUCOMTR-MCNC: 149 MG/DL (ref 70–99)
GLUCOSE BLDC GLUCOMTR-MCNC: 172 MG/DL (ref 70–99)
GLUCOSE BLDC GLUCOMTR-MCNC: 214 MG/DL (ref 70–99)
INR BLD: 1.07 (ref 0.9–1.2)
OSMOLALITY SERPL CALC.SUM OF ELEC: 275 MOSM/KG (ref 275–295)
POTASSIUM SERPL-SCNC: 4.4 MMOL/L (ref 3.5–5.1)
PROTHROMBIN TIME: 13.7 SECONDS (ref 11.8–14.5)
SODIUM SERPL-SCNC: 131 MMOL/L (ref 136–145)

## 2019-08-30 PROCEDURE — 99024 POSTOP FOLLOW-UP VISIT: CPT | Performed by: NURSE PRACTITIONER

## 2019-08-30 PROCEDURE — 71046 X-RAY EXAM CHEST 2 VIEWS: CPT | Performed by: INTERNAL MEDICINE

## 2019-08-30 PROCEDURE — 99232 SBSQ HOSP IP/OBS MODERATE 35: CPT | Performed by: HOSPITALIST

## 2019-08-30 RX ORDER — CEPHALEXIN 250 MG/1
250 CAPSULE ORAL NIGHTLY
Status: DISCONTINUED | OUTPATIENT
Start: 2019-08-30 | End: 2019-09-01

## 2019-08-30 NOTE — CM/SW NOTE
MDO DC planning met with dtr and patient at bedside. Patient resides at [facility]. Per dtr for dc planning if BRICE needed, her choice is [facility] in [place]. Dgt wouldn't provide a back up plan of choice at this time. DON scr requested.

## 2019-08-30 NOTE — PROGRESS NOTES
Ezetimibe-Simvastatin (VYTORIN) 10-10 MG tablet 1 tablet  is Non-Formulary Medication &  Auto-Substituted to Atorvastatin 5 mg + Ezetimibe 10mg  Per P&T PROTOCOL

## 2019-08-30 NOTE — PLAN OF CARE
Patient having trouble urinating this am; currently sitting on toilet attempting to do so. Plan for pm interrogation, CXR and d/c home if able to urinate. Patient very fearful during ambulation.  After 20 minutes on the toilet the patient was able to voi goal  Description  INTERVENTIONS:  - Encourage pt to monitor pain and request assistance  - Assess pain using appropriate pain scale  - Administer analgesics based on type and severity of pain and evaluate response  - Implement non-pharmacological measures

## 2019-08-30 NOTE — RESPIRATORY THERAPY NOTE
HIMANSHU ASSESSMENT:    Pt does not have a previous diagnosis of HIMANSHU. Pt does not routinely use a CPAP device at home.  Patient refused CPAP

## 2019-08-30 NOTE — PROGRESS NOTES
[facility] HOSP - [facility]    Progress Note    Bairon Petersen Patient Status:  Inpatient    1935 MRN [id number]   Location [facility] Attending Virgilio Beaulieu MD   Hosp Day # 1 PCP Ivonne Valadez MD       Subjective:   Aparna Mahan or cyanosis  NEUROLOGICAL:  There was no focal deficit. Cranial nerves intact.          Current Scheduled Inpatient Meds:     • Insulin Aspart Pen  1-7 Units Subcutaneous TID CC   • Bethanechol Chloride  10 mg Oral TID   • Buffered Aspirin  325 mg Oral [place] Date: 8/29/2019  CONCLUSION:  1. Interval placement of a single lead left chest wall pacemaker without radiographically apparent complication. 2. Cardiomegaly with pulmonary vascular prominence. Bibasilar atelectasis/scarring.    Dictated by (CST): Nasim

## 2019-08-30 NOTE — PROGRESS NOTES
[facility]    Progress Note    Joshua Peng Patient Status:  Inpatient    1935 MRN [id number]   Location [facility] Attending Luzmaria Griffith MD   Hosp Day # 1 PCP Alfonso Mohan MD        Subjective:     Re Ap Portable  (cpt=71045)    Result Date: 8/29/2019  CONCLUSION:  1. Interval placement of a single lead left chest wall pacemaker without radiographically apparent complication. 2. Cardiomegaly with pulmonary vascular prominence.   Bibasilar atelectasis/sca

## 2019-08-30 NOTE — PHYSICAL THERAPY NOTE
PHYSICAL THERAPY EVALUATION - INPATIENT     Room Number: 510/510-A  Evaluation Date: 8/30/2019  Type of Evaluation: Initial   Physician Order: PT Eval and Treat    Presenting Problem: s/p pacemaker(Recent fall with R rib fx X1 wk ago)  Reason for Therapy: daughter present, RN aware of session. Educated pt and her daughter on PT POC, safety, activity and d/c recs and sternal precautions.  The patient's Approx Degree of Impairment: 72.57% has been calculated based on documentation in the [facility] '6 clicks' In Assisted living facility([facility])   Home Layout: One level                Lives With: Staff 24 hours  Drives: No  Patient Owned Equipment: Rolling walker  Patient Regularly Uses: None    Prior Level of [place]: Prior to admission pt was quentin Impairment: 72.57%   Standardized Score (AM-PAC Scale): 33.86   CMS Modifier (G-Code): CL    FUNCTIONAL ABILITY STATUS  Gait Assessment   Gait Assistance:  Moderate assistance(of 1-2)  Distance (ft): marching in place; unable to take steps fwd/bkwd  Assisti

## 2019-08-30 NOTE — OCCUPATIONAL THERAPY NOTE
OCCUPATIONAL THERAPY EVALUATION - INPATIENT     Room Number: 510/510-A  Evaluation Date: 8/30/2019  Type of Evaluation: Initial  Presenting Problem: (afib;new pacemaker )    Physician Order: IP Consult to Occupational Therapy  Reason for Therapy: ADL/IADL Plan: Patient/Family education;Patient/Family training;Cognitive reorientation; Endurance training;Functional transfer training;ADL training;Balance activities    OCCUPATIONAL THERAPY MEDICAL/SOCIAL HISTORY     Problem List  Principal Problem:    Atrial fib to anxiety and generalized weakness    COGNITION  Alert and oriented to person and place    RANGE OF MOTION   Right upper extremity ROM is within functional limits     STRENGTH ASSESSMENT  Right upper extremity strength is within functional limits     COOR Goals  on: 19  Frequency: 3x/week

## 2019-08-31 LAB
ABSOLUTE IMMATURE GRANULOCYTES (OFFPRE24): NORMAL
ANION GAP SERPL CALC-SCNC: 8 MMOL/L
ANION GAP SERPL CALC-SCNC: 8 MMOL/L (ref 0–18)
BASO+EOS+MONOS # BLD: NORMAL 10*3/UL
BASO+EOS+MONOS NFR BLD: NORMAL %
BASOPHILS # BLD: NORMAL 10*3/UL
BASOPHILS NFR BLD: NORMAL %
BUN BLD-MCNC: 15 MG/DL (ref 7–18)
BUN SERPL-MCNC: 15 MG/DL
BUN/CREAT SERPL: 20.3
BUN/CREAT SERPL: 20.3 (ref 10–20)
CALCIUM BLD-MCNC: 8.6 MG/DL (ref 8.5–10.1)
CALCIUM SERPL-MCNC: 8.6 MG/DL
CHLORIDE SERPL-SCNC: 96 MMOL/L
CHLORIDE SERPL-SCNC: 96 MMOL/L (ref 98–112)
CO2 SERPL-SCNC: 27 MMOL/L
CO2 SERPL-SCNC: 27 MMOL/L (ref 21–32)
CREAT BLD-MCNC: 0.74 MG/DL (ref 0.55–1.02)
CREAT SERPL-MCNC: 0.74 MG/DL
DEPRECATED RDW RBC AUTO: 44.6 FL (ref 35.1–46.3)
DIFFERENTIAL METHOD BLD: NORMAL
EOSINOPHIL # BLD: NORMAL 10*3/UL
EOSINOPHIL NFR BLD: NORMAL %
ERYTHROCYTE [DISTWIDTH] IN BLOOD BY AUTOMATED COUNT: 13 % (ref 11–15)
ERYTHROCYTE [DISTWIDTH] IN BLOOD: NORMAL %
GLUCOSE BLD-MCNC: 144 MG/DL (ref 70–99)
GLUCOSE BLDC GLUCOMTR-MCNC: 152 MG/DL (ref 70–99)
GLUCOSE BLDC GLUCOMTR-MCNC: 178 MG/DL (ref 70–99)
GLUCOSE SERPL-MCNC: 144 MG/DL
HCT VFR BLD AUTO: 33.2 % (ref 35–48)
HCT VFR BLD AUTO: 34.3 % (ref 35–48)
HCT VFR BLD CALC: 33.2 %
HGB BLD-MCNC: 10.9 G/DL
HGB BLD-MCNC: 10.9 G/DL (ref 12–16)
HGB BLD-MCNC: 11.2 G/DL (ref 12–16)
IMMATURE GRANULOCYTES (OFFPRE25): NORMAL
INR BLD: 1.06 (ref 0.9–1.2)
LENGTH OF FAST TIME PATIENT: NORMAL H
LYMPHOCYTES # BLD: NORMAL 10*3/UL
LYMPHOCYTES NFR BLD: NORMAL %
MCH RBC QN AUTO: 31 PG (ref 26–34)
MCH RBC QN AUTO: NORMAL PG
MCHC RBC AUTO-ENTMCNC: 32.8 G/DL (ref 31–37)
MCHC RBC AUTO-ENTMCNC: NORMAL G/DL
MCV RBC AUTO: 94.3 FL (ref 80–100)
MCV RBC AUTO: NORMAL FL
MONOCYTES # BLD: NORMAL 10*3/UL
MONOCYTES NFR BLD: NORMAL %
MPV (OFFPRE2): NORMAL
NEUTROPHILS # BLD: NORMAL 10*3/UL
NEUTROPHILS NFR BLD: NORMAL %
NRBC BLD MANUAL-RTO: NORMAL %
OSMOLALITY SERPL CALC.SUM OF ELEC: 275 MOSM/KG (ref 275–295)
PLAT MORPH BLD: NORMAL
PLATELET # BLD AUTO: 199 10(3)UL (ref 150–450)
PLATELET # BLD: 199 10*3/UL
POTASSIUM SERPL-SCNC: 4.7 MMOL/L
POTASSIUM SERPL-SCNC: 4.7 MMOL/L (ref 3.5–5.1)
PROTHROMBIN TIME: 13.6 SECONDS (ref 11.8–14.5)
RBC # BLD AUTO: 3.52 X10(6)UL (ref 3.8–5.3)
RBC # BLD: 3.52 10*6/UL
RBC MORPH BLD: NORMAL
SODIUM SERPL-SCNC: 131 MMOL/L
SODIUM SERPL-SCNC: 131 MMOL/L (ref 136–145)
WBC # BLD AUTO: 10.3 X10(3) UL (ref 4–11)
WBC # BLD: 10.3 10*3/UL
WBC MORPH BLD: NORMAL

## 2019-08-31 PROCEDURE — 99024 POSTOP FOLLOW-UP VISIT: CPT | Performed by: NURSE PRACTITIONER

## 2019-08-31 PROCEDURE — 99232 SBSQ HOSP IP/OBS MODERATE 35: CPT | Performed by: HOSPITALIST

## 2019-08-31 RX ORDER — ACETAMINOPHEN 500 MG
500 TABLET ORAL 2 TIMES DAILY
Status: DISCONTINUED | OUTPATIENT
Start: 2019-08-31 | End: 2019-09-01

## 2019-08-31 NOTE — OCCUPATIONAL THERAPY NOTE
OCCUPATIONAL THERAPY TREATMENT NOTE - INPATIENT        Room Number: 487/190-D           Presenting Problem: (afib;new pacemaker )    Problem List  Principal Problem:    Atrial fibrillation with slow ventricular response (HCC)      OCCUPATIONAL THERAPY OLIVER SATURATIONS  SPO2 on Room Air at Rest: [address] ‘6-Clicks’ Inpatient Daily Activity Short Form  How much help from another person does the patient currently need…  -   Putting on and taking off regular lo

## 2019-08-31 NOTE — PLAN OF CARE
Problem: Diabetes/Glucose Control  Goal: Glucose maintained within prescribed range  Description  INTERVENTIONS:  - Monitor Blood Glucose as ordered  - Assess for signs and symptoms of hyperglycemia and hypoglycemia  - Administer ordered medications to m assistance  - Assess pain using appropriate pain scale  - Administer analgesics based on type and severity of pain and evaluate response  - Implement non-pharmacological measures as appropriate and evaluate response  - Consider cultural and social influenc

## 2019-08-31 NOTE — PHYSICAL THERAPY NOTE
PHYSICAL THERAPY TREATMENT NOTE - INPATIENT     Room Number: 576/163-E       Presenting Problem: s/p pacemaker(Recent fall with R rib fx X1 wk ago)    Problem List  Principal Problem:    Atrial fibrillation with slow ventricular response ([address]      [facility]    DISCHARGE RECOMMENDATIONS  PT Discharge Recommendations: 24 hour care/supervision;Sub-acute rehabilitation     PLAN  PT Treatment Plan: Bed mobility; Patient education; Energy conservation; Family education;Gait training;Strengthening;Transfer tr -      THERAPEUTIC EXERCISES  Lower Extremity Alternating marching  Ankle pumps  LAQ     Position Sitting       Patient End of Session: Up in chair;Needs met;Call light within reach;RN aware of session/findings; All patient questions and concerns addressed;

## 2019-08-31 NOTE — PROGRESS NOTES
[facility]    Progress Note    Alan Jackson Patient Status:  Inpatient    1935 MRN [id number]   Location [address] [facility] Attending Lee Jimenes MD   Hosp Day # 2 PCP Claudene Newborn, MD       Subjective:   Laurel Kaye deficit. Skin: Warm and dry. Neck: No JVD, carotids 2+, no bruits. Cardiac: RRR. S1, S2 normal. No murmur or gallop. Telemetry -  NSR, rates 70-80's  Lungs: Clear without wheezes, rales, rhonchi. Normal excursions and effort.   Abdomen: Soft, non-ten 274 (H) 12/08/2016    B12 493 03/03/2017       Xr Chest Pa + Lat Chest (cpt=71046)    Result Date: 8/30/2019  CONCLUSION:  1. Right ventricular lead. No pneumothorax. Improvement in the pulmonary congestive changes previously seen.     Dictated by (CST):

## 2019-08-31 NOTE — PROGRESS NOTES
[facility] HOSP - [facility]    Progress Note    Radha Alejandroruthie Patient Status:  Inpatient    1935 MRN [id number]   Location [facility] Attending Andre Perla MD   Hosp Day # 2 PCP Shannan Cabrera MD       Subjective:   Elana Evans deformity. There was full range of motion in all the extremities. EXTREMITIES: There was no edema, clubbing or cyanosis  NEUROLOGICAL:  There was no focal deficit. Cranial nerves intact.          Current Scheduled Inpatient Meds:     • cephALEXin  250 mg (cpt=71046)    Result Date: 8/30/2019  CONCLUSION:  1. Right ventricular lead. No pneumothorax. Improvement in the pulmonary congestive changes previously seen.     Dictated by (CST): Karla Tariq MD on 8/30/2019 at 8:36     Approved by (CST): Cindy Meza

## 2019-08-31 NOTE — CM/SW NOTE
423pm:  IDALMIS spoke w/ dtr, [facility] who is still hoping for [facility] - though SW already informed her there are no beds available. Dtr stated she is agreeable w/ [facility], but stated she needs to d/w pt's 6 other children.  Dtr stated she will either have family

## 2019-09-01 VITALS
DIASTOLIC BLOOD PRESSURE: 55 MMHG | HEIGHT: 64 IN | SYSTOLIC BLOOD PRESSURE: 114 MMHG | RESPIRATION RATE: 18 BRPM | OXYGEN SATURATION: 93 % | BODY MASS INDEX: 24.86 KG/M2 | TEMPERATURE: 98 F | WEIGHT: 145.63 LBS | HEART RATE: 98 BPM

## 2019-09-01 LAB
DEPRECATED RDW RBC AUTO: 44.1 FL (ref 35.1–46.3)
ERYTHROCYTE [DISTWIDTH] IN BLOOD BY AUTOMATED COUNT: 12.8 % (ref 11–15)
GLUCOSE BLDC GLUCOMTR-MCNC: 139 MG/DL (ref 70–99)
GLUCOSE BLDC GLUCOMTR-MCNC: 141 MG/DL (ref 70–99)
GLUCOSE BLDC GLUCOMTR-MCNC: 168 MG/DL (ref 70–99)
HCT VFR BLD AUTO: 32.5 % (ref 35–48)
HGB BLD-MCNC: 11 G/DL (ref 12–16)
INR BLD: 1.09 (ref 0.9–1.2)
MCH RBC QN AUTO: 31.8 PG (ref 26–34)
MCHC RBC AUTO-ENTMCNC: 33.8 G/DL (ref 31–37)
MCV RBC AUTO: 93.9 FL (ref 80–100)
PLATELET # BLD AUTO: 201 10(3)UL (ref 150–450)
PROTHROMBIN TIME: 13.9 SECONDS (ref 11.8–14.5)
RBC # BLD AUTO: 3.46 X10(6)UL (ref 3.8–5.3)
WBC # BLD AUTO: 9.2 X10(3) UL (ref 4–11)

## 2019-09-01 PROCEDURE — 99239 HOSP IP/OBS DSCHRG MGMT >30: CPT | Performed by: HOSPITALIST

## 2019-09-01 PROCEDURE — 99024 POSTOP FOLLOW-UP VISIT: CPT | Performed by: NURSE PRACTITIONER

## 2019-09-01 RX ORDER — CEPHALEXIN 250 MG/1
250 CAPSULE ORAL NIGHTLY
Qty: 30 CAPSULE | Refills: 0 | Status: SHIPPED | COMMUNITY
Start: 2019-09-01

## 2019-09-01 NOTE — PLAN OF CARE
Problem: Diabetes/Glucose Control  Goal: Glucose maintained within prescribed range  Description  INTERVENTIONS:  - Monitor Blood Glucose as ordered  - Assess for signs and symptoms of hyperglycemia and hypoglycemia  - Administer ordered medications to m replacement therapy as ordered  Outcome: Progressing     Problem: PAIN - ADULT  Goal: Verbalizes/displays adequate comfort level or patient's stated pain goal  Description  INTERVENTIONS:  - Encourage pt to monitor pain and request assistance  - Assess caleb

## 2019-09-01 NOTE — CM/SW NOTE
RN informed SW that pt is medically stable to discharge today. SW updated dtr regarding discharge time at 1pm. Dtr requested medicar and stated she will contact [facility] w/ katelyn.      IDALMIS spoke w/ Yu Wolfe from [facility] and arranged medicar at 1pm to [facility]/

## 2019-09-01 NOTE — PROGRESS NOTES
[facility]    Progress Note    Virgil Fair Patient Status:  Inpatient    1935 MRN [id number]   Location [address] [facility] Attending Cheryl Reyna MD   Hosp Day # 3 PCP Jd Mcwilliams MD        Subjective:     Josefina Andrew 03/03/2017       Xr Chest Pa + Lat Chest (cpt=71046)    Result Date: 8/30/2019  CONCLUSION:  1. Right ventricular lead. No pneumothorax. Improvement in the pulmonary congestive changes previously seen.     Dictated by (CST): Barbara Valenzuela MD on 8/30/2019

## 2019-09-01 NOTE — DISCHARGE SUMMARY
[facility] HOSPITALIST  DISCHARGE SUMMARY     Jersey Turner Patient Status:  Inpatient    1935 MRN [id number]   Location [address] [facility] Attending No att. providers found   Hosp Day # 3 PCP Gabriella Elena MD     DATE OF ADMISSION:  breath sounds normal. No distress, wheezes, rales, rhonchi. Abd: Soft, NTND, BS normal, no mass, no HSM, no rebound/guarding. Neuro: Normal reflexes, CN. Sensory/motor exams grossly normal deficit.  Coordination  and gait normal.   MS: No joint effusions PLAVIX      TAKE 1 TABLET BY MOUTH EVERY DAY   Quantity:  90 tablet  Refills:  1     Ezetimibe-Simvastatin 10-10 MG Tabs  Commonly known as:  VYTORIN      TAKE 1 TABLET BY MOUTH DAILY AT BEDTIME   Quantity:  90 tablet  Refills:  1     FLUoxetine HCl 10 MG [facility] [address]  [phone]  On 9/6/2019  Pacemaker Check follow up with RN friday @11:00 am    Rikki Esquivel MD  [address] [place] [address]  [phone]    In 1 week      The above plan and follow-up instructions were re

## 2019-09-01 NOTE — PROGRESS NOTES
[facility] Discharge Instructions Pacemaker / ICD Generator     1. Your appointment for a wound check is on ___9-1-05____  with ___APN__________ at  _1100 AM_____.      2. You need to be seen at the [address] [place] [address] Specialist Pacemaker/ICD Clinic 3 months after impl

## 2019-09-01 NOTE — PLAN OF CARE
Problem: Diabetes/Glucose Control  Goal: Glucose maintained within prescribed range  Description  INTERVENTIONS:  - Monitor Blood Glucose as ordered  - Assess for signs and symptoms of hyperglycemia and hypoglycemia  - Administer ordered medications to m social influences on pain and pain management  - Manage/alleviate anxiety  - Utilize distraction and/or relaxation techniques  - Monitor for opioid side effects  - Notify MD/LIP if interventions unsuccessful or patient reports new pain  - Anticipate increa

## 2019-09-03 ENCOUNTER — TELEPHONE (OUTPATIENT)
Dept: CASE MANAGEMENT | Age: 84
End: 2019-09-03

## 2019-09-03 NOTE — TELEPHONE ENCOUNTER
[facility] APPT[facility] FOR SEPT 6TH @ 11:00 FOR HER MOTHER. SHE WILL BE CALLING [facility] FOR TRANSPORTATION.

## 2019-09-04 ENCOUNTER — TELEPHONE (OUTPATIENT)
Dept: CARDIOLOGY | Age: 84
End: 2019-09-04

## 2019-09-05 RX ORDER — OXCARBAZEPINE 150 MG/1
TABLET, FILM COATED ORAL
Qty: 90 TABLET | Refills: 1 | Status: SHIPPED | OUTPATIENT
Start: 2019-09-05 | End: 2020-03-24

## 2019-09-05 NOTE — TELEPHONE ENCOUNTER
Refill passed per [facility] protocol.  Neurology  Refill Protocol Appointment Criteria  · Appointment scheduled in the past 6 months or in the next 3 months  Recent Outpatient Visits            2 months ago Recurrent UTI    [facility] for

## 2019-09-05 NOTE — TELEPHONE ENCOUNTER
Call patient or daughter (see [place]), clarify the aspirin-- buffered on current med list, also noted it was discontinued by Dr Clemens Shone 8/29/19. Also script 5/29/19 for losartan for #90 x 1 should cover through Nov.   Then send to doctor for the magnesium.

## 2019-09-06 ENCOUNTER — ANCILLARY PROCEDURE (OUTPATIENT)
Dept: CARDIOLOGY | Age: 84
End: 2019-09-06
Attending: INTERNAL MEDICINE

## 2019-09-06 ENCOUNTER — APPOINTMENT (OUTPATIENT)
Dept: CARDIOLOGY | Age: 84
End: 2019-09-06

## 2019-09-06 DIAGNOSIS — Z45.018 PACEMAKER REPROGRAMMING/CHECK: ICD-10-CM

## 2019-09-06 PROCEDURE — 93279 PRGRMG DEV EVAL PM/LDLS PM: CPT | Performed by: INTERNAL MEDICINE

## 2019-09-06 RX ORDER — SENNOSIDES 8.6 MG
17.2 TABLET ORAL PRN
COMMUNITY

## 2019-09-06 RX ORDER — LOSARTAN POTASSIUM 25 MG/1
25 TABLET ORAL DAILY
COMMUNITY
Start: 2019-05-29

## 2019-09-06 RX ORDER — PANTOPRAZOLE SODIUM 40 MG/1
40 TABLET, DELAYED RELEASE ORAL DAILY
COMMUNITY
Start: 2018-12-24

## 2019-09-06 RX ORDER — CEPHALEXIN 250 MG/1
250 CAPSULE ORAL NIGHTLY
COMMUNITY
Start: 2019-09-01

## 2019-09-06 RX ORDER — ACETAMINOPHEN 500 MG
500 TABLET ORAL PRN
COMMUNITY
End: 2019-09-24

## 2019-09-06 RX ORDER — ALBUTEROL SULFATE 90 UG/1
2 AEROSOL, METERED RESPIRATORY (INHALATION) PRN
COMMUNITY
End: 2019-09-24

## 2019-09-06 NOTE — TELEPHONE ENCOUNTER
9/6 Spoke with daughter. Patient currently in SNF and they are supplying medication for patient. Patient has an appt with cardiology today. She will check if aspirin still on med list and if regular or buffered.

## 2019-09-09 ENCOUNTER — INITIAL APN SNF VISIT (OUTPATIENT)
Dept: INTERNAL MEDICINE CLINIC | Facility: SKILLED NURSING FACILITY | Age: 84
End: 2019-09-09

## 2019-09-09 VITALS
WEIGHT: 144.81 LBS | DIASTOLIC BLOOD PRESSURE: 59 MMHG | SYSTOLIC BLOOD PRESSURE: 101 MMHG | HEART RATE: 89 BPM | TEMPERATURE: 97 F | RESPIRATION RATE: 19 BRPM | BODY MASS INDEX: 25 KG/M2

## 2019-09-09 DIAGNOSIS — I10 ESSENTIAL HYPERTENSION: ICD-10-CM

## 2019-09-09 DIAGNOSIS — E78.00 HYPERCHOLESTEREMIA: ICD-10-CM

## 2019-09-09 DIAGNOSIS — E11.9 CONTROLLED TYPE 2 DIABETES MELLITUS WITHOUT COMPLICATION, WITHOUT LONG-TERM CURRENT USE OF INSULIN (HCC): ICD-10-CM

## 2019-09-09 DIAGNOSIS — Z86.73 HISTORY OF RECENT STROKE: ICD-10-CM

## 2019-09-09 DIAGNOSIS — I48.91 ATRIAL FIBRILLATION WITH SLOW VENTRICULAR RESPONSE (HCC): ICD-10-CM

## 2019-09-09 DIAGNOSIS — I48.20 CHRONIC A-FIB (HCC): ICD-10-CM

## 2019-09-09 DIAGNOSIS — Z95.0 S/P PLACEMENT OF CARDIAC PACEMAKER: ICD-10-CM

## 2019-09-09 PROCEDURE — 1123F ACP DISCUSS/DSCN MKR DOCD: CPT | Performed by: NURSE PRACTITIONER

## 2019-09-09 PROCEDURE — 99305 1ST NF CARE MODERATE MDM 35: CPT | Performed by: NURSE PRACTITIONER

## 2019-09-09 RX ORDER — LOSARTAN POTASSIUM 25 MG/1
TABLET ORAL
Qty: 90 TABLET | Refills: 1 | OUTPATIENT
Start: 2019-09-09

## 2019-09-09 RX ORDER — ASPIRIN 325 MG
TABLET, DELAYED RELEASE (ENTERIC COATED) ORAL
Qty: 90 TABLET | Refills: 1 | OUTPATIENT
Start: 2019-09-09

## 2019-09-09 RX ORDER — MAGNESIUM OXIDE 400 MG/1
TABLET ORAL
Qty: 90 TABLET | Refills: 1 | OUTPATIENT
Start: 2019-09-09

## 2019-09-09 NOTE — PROGRESS NOTES
Alesia Sahu  : 1935  Age 80year old  female patient is admitted to [place] [address] for rehabilitation and strengthening. Chief complaint: Sick sinus syndrome/A. Fib with single-chamber pacemaker implant    HPI  Admitted to [facility] History    Tobacco Use      Smoking status: Former Smoker        Quit date: 6/15/1969        Years since quittin.2      Smokeless tobacco: Never Used    Alcohol use: No      Alcohol/week: 2.0 standard drinks      Types: 2 Glasses of wine per week Lido-Capsaicin-Men-Methyl Sal (MEDI-PATCH-LIDOCAINE) 0.5-0.035-5-20 % External Patch Apply topically daily as needed (left Rib cage). Disp:  Rfl:    Magnesium Oxide 400 (240 Mg) MG Oral Tab Take 1 tablet (400 mg total) by mouth once daily.  Disp: 90 tab masses; no bruits; nontender, no guarding, no rebound tenderness.   :no suprapubic distension  LYMPHATIC:no lymphedema  MUSCULOSKELETAL: no acute synovitis upper or lower extremity  EXTREMITIES/VASCULAR:no cyanosis, clubbing or edema  NEUROLOGIC: follows

## 2019-09-12 NOTE — CM/SW NOTE
SW received call from pt's daughter, Mark Mcgarry [phone], who stated pt was recently at [facility] and then dc'd home. Dtr inquiring about pt going to /acute rehab.  Tyrone Muller from [facility] stated that pt will need an outpatient evaluation by PMR from [facility]

## 2019-09-16 ENCOUNTER — SNF VISIT (OUTPATIENT)
Dept: INTERNAL MEDICINE CLINIC | Facility: SKILLED NURSING FACILITY | Age: 84
End: 2019-09-16

## 2019-09-16 VITALS
RESPIRATION RATE: 19 BRPM | TEMPERATURE: 98 F | WEIGHT: 149 LBS | HEART RATE: 78 BPM | BODY MASS INDEX: 26 KG/M2 | SYSTOLIC BLOOD PRESSURE: 138 MMHG | DIASTOLIC BLOOD PRESSURE: 65 MMHG

## 2019-09-16 DIAGNOSIS — I10 ESSENTIAL HYPERTENSION: ICD-10-CM

## 2019-09-16 DIAGNOSIS — Z95.0 S/P PLACEMENT OF CARDIAC PACEMAKER: ICD-10-CM

## 2019-09-16 DIAGNOSIS — E11.9 CONTROLLED TYPE 2 DIABETES MELLITUS WITHOUT COMPLICATION, WITHOUT LONG-TERM CURRENT USE OF INSULIN (HCC): ICD-10-CM

## 2019-09-16 DIAGNOSIS — I48.20 CHRONIC A-FIB (HCC): ICD-10-CM

## 2019-09-16 PROCEDURE — 99308 SBSQ NF CARE LOW MDM 20: CPT | Performed by: NURSE PRACTITIONER

## 2019-09-16 NOTE — PROGRESS NOTES
Alex Abdalla  : 1935  Age 80year old  female patient is admitted to [place] [address] for rehabilitation and strengthening       Chief complaint: Sick sinus syndrome/A. Fib with single-chamber pacemaker implant    HPI   Admitted to [place] strips removed, no redness or swelling at site, patient denies pain. Incision well approximated, no drainage. EYES: PERRLA, EOMI, sclera anicteric, conjunctiva normal; there is no nystagmus, no drainage from eyes.   HENT: normocephalic; normal nose, no ananda treatment with keflex 250 mg daily  - cont bethanecol 10 mg TID     Anxiety/depresion   - cont Prozac 10 mg daily  - cont oxcarbazepine 150 mg daily     Hiatal hernia  - cont Protonix 40 mg daily     Seasonal Allergies  - loratadine 10 mg daily     Constip

## 2019-09-19 ENCOUNTER — SNF VISIT (OUTPATIENT)
Dept: INTERNAL MEDICINE CLINIC | Facility: SKILLED NURSING FACILITY | Age: 84
End: 2019-09-19

## 2019-09-19 VITALS
BODY MASS INDEX: 25 KG/M2 | HEART RATE: 96 BPM | TEMPERATURE: 98 F | DIASTOLIC BLOOD PRESSURE: 67 MMHG | WEIGHT: 146.5 LBS | SYSTOLIC BLOOD PRESSURE: 132 MMHG | RESPIRATION RATE: 18 BRPM

## 2019-09-19 DIAGNOSIS — I10 ESSENTIAL HYPERTENSION: ICD-10-CM

## 2019-09-19 DIAGNOSIS — N18.30 CKD (CHRONIC KIDNEY DISEASE) STAGE 3, GFR 30-59 ML/MIN (HCC): ICD-10-CM

## 2019-09-19 DIAGNOSIS — I48.91 ATRIAL FIBRILLATION WITH SLOW VENTRICULAR RESPONSE (HCC): ICD-10-CM

## 2019-09-19 DIAGNOSIS — E11.9 CONTROLLED TYPE 2 DIABETES MELLITUS WITHOUT COMPLICATION, WITHOUT LONG-TERM CURRENT USE OF INSULIN (HCC): ICD-10-CM

## 2019-09-19 DIAGNOSIS — I48.20 CHRONIC A-FIB (HCC): ICD-10-CM

## 2019-09-19 DIAGNOSIS — R48.2 GAIT APRAXIA OF ELDERLY: ICD-10-CM

## 2019-09-19 PROCEDURE — 99308 SBSQ NF CARE LOW MDM 20: CPT | Performed by: NURSE PRACTITIONER

## 2019-09-19 NOTE — PROGRESS NOTES
Jersey Turner  : 1935  Age 80year old  female patient is admitted to [place] [address] for rehabilitation and strengthening       Chief complaint: Sick sinus syndrome/A. Fib with single-chamber pacemaker implant    HPI   Admitted to [facility] anicteric, conjunctiva normal; there is no nystagmus, no drainage from eyes. HENT: normocephalic; normal nose, no nasal drainage, mucous membranes pink, moist, pharynx no exudate, no visible cerumen. Hearing aides placed.  Slightly hard of hearing with hea mg daily  - cont oxcarbazepine 150 mg daily     Hiatal hernia  - cont Protonix 40 mg daily     Seasonal Allergies  - loratadine 10 mg daily     Constipation  - cont senna 8.6 mg daily    This is a 15 minute visit and greater than 50% of the time was spent

## 2019-09-20 ENCOUNTER — TELEPHONE (OUTPATIENT)
Dept: SURGERY | Facility: CLINIC | Age: 84
End: 2019-09-20

## 2019-09-21 ENCOUNTER — TELEPHONE (OUTPATIENT)
Dept: INTERNAL MEDICINE CLINIC | Facility: CLINIC | Age: 84
End: 2019-09-21

## 2019-09-21 NOTE — TELEPHONE ENCOUNTER
Paged regarding antibiotic question. Pt has history of UTI, on prophylactic dose of keflex 250mg daily. Was apparently stopped in snf. Pt now has URI type symptoms, mild cough, otherwise doing ok, started in [place] by SNF physicians.  Keflex 250mg resume

## 2019-09-23 ENCOUNTER — SNF DISCHARGE (OUTPATIENT)
Dept: INTERNAL MEDICINE CLINIC | Facility: SKILLED NURSING FACILITY | Age: 84
End: 2019-09-23

## 2019-09-23 VITALS
WEIGHT: 144.81 LBS | BODY MASS INDEX: 25 KG/M2 | DIASTOLIC BLOOD PRESSURE: 72 MMHG | SYSTOLIC BLOOD PRESSURE: 134 MMHG | RESPIRATION RATE: 19 BRPM | HEART RATE: 72 BPM | TEMPERATURE: 96 F

## 2019-09-23 DIAGNOSIS — R05.9 COUGH: ICD-10-CM

## 2019-09-23 DIAGNOSIS — I10 ESSENTIAL HYPERTENSION: ICD-10-CM

## 2019-09-23 DIAGNOSIS — N30.01 ACUTE CYSTITIS WITH HEMATURIA: ICD-10-CM

## 2019-09-23 DIAGNOSIS — N18.30 CKD (CHRONIC KIDNEY DISEASE) STAGE 3, GFR 30-59 ML/MIN (HCC): ICD-10-CM

## 2019-09-23 DIAGNOSIS — Z95.0 S/P PLACEMENT OF CARDIAC PACEMAKER: ICD-10-CM

## 2019-09-23 DIAGNOSIS — I48.20 CHRONIC A-FIB (HCC): ICD-10-CM

## 2019-09-23 PROCEDURE — 99316 NF DSCHRG MGMT 30 MIN+: CPT | Performed by: NURSE PRACTITIONER

## 2019-09-23 NOTE — PROGRESS NOTES
Lars Ortiz, 2/19/1935, 80year old, female is being discharged from [place] [address] to home      [address]    Date of Admission to [facility]: 09/01/19    Date of Discharge from [place] [address] [facility]: 09/23/19                          Admitting D single chamber PPM- [facility]. - CXR reviewed in [facility]  - Cont ASA 325 mg daily/Plavix 75 mg daily but at lower dose due to fall risk per cards  - cont Protonix 40 mg daily  - cont Vit.  D 5,000 units daily  - cont acetaminophen 500 mg every 6 hours PRN  - cont m

## 2019-09-24 ENCOUNTER — OFFICE VISIT (OUTPATIENT)
Dept: CARDIOLOGY | Age: 84
End: 2019-09-24

## 2019-09-24 VITALS
BODY MASS INDEX: 25.52 KG/M2 | HEIGHT: 63 IN | OXYGEN SATURATION: 94 % | HEART RATE: 64 BPM | SYSTOLIC BLOOD PRESSURE: 110 MMHG | DIASTOLIC BLOOD PRESSURE: 70 MMHG | WEIGHT: 144 LBS

## 2019-09-24 DIAGNOSIS — I10 BENIGN ESSENTIAL HTN: ICD-10-CM

## 2019-09-24 DIAGNOSIS — E13.9 DIABETES 1.5, MANAGED AS TYPE 2 (CMD): ICD-10-CM

## 2019-09-24 DIAGNOSIS — G45.9 TIA (TRANSIENT ISCHEMIC ATTACK): ICD-10-CM

## 2019-09-24 DIAGNOSIS — I50.22 CHRONIC SYSTOLIC CONGESTIVE HEART FAILURE (CMD): ICD-10-CM

## 2019-09-24 DIAGNOSIS — I48.21 PERMANENT ATRIAL FIBRILLATION (CMD): Primary | ICD-10-CM

## 2019-09-24 PROCEDURE — 99214 OFFICE O/P EST MOD 30 MIN: CPT | Performed by: INTERNAL MEDICINE

## 2019-09-24 RX ORDER — GUAIFENESIN 600 MG/1
1200 TABLET, EXTENDED RELEASE ORAL 2 TIMES DAILY
COMMUNITY

## 2019-09-24 SDOH — HEALTH STABILITY: MENTAL HEALTH: HOW OFTEN DO YOU HAVE A DRINK CONTAINING ALCOHOL?: NEVER

## 2019-09-24 ASSESSMENT — PATIENT HEALTH QUESTIONNAIRE - PHQ9
SUM OF ALL RESPONSES TO PHQ9 QUESTIONS 1 AND 2: 0
SUM OF ALL RESPONSES TO PHQ9 QUESTIONS 1 AND 2: 0
2. FEELING DOWN, DEPRESSED OR HOPELESS: NOT AT ALL
1. LITTLE INTEREST OR PLEASURE IN DOING THINGS: NOT AT ALL

## 2019-09-24 NOTE — TELEPHONE ENCOUNTER
Spoke to pts daughter (HIPPA verified), daughter states just wanted to be informed the dosage for keflex that was prescribed from Dr. Marysol Conner as pt is being discharged from skilled facility and wants to confirm Keflex will be given, since [facility]

## 2019-09-28 ENCOUNTER — TELEPHONE (OUTPATIENT)
Dept: INTERNAL MEDICINE CLINIC | Facility: CLINIC | Age: 84
End: 2019-09-28

## 2019-09-28 NOTE — TELEPHONE ENCOUNTER
Deysi with [facility] called to leave message      She stated Jonel Garcia did the  evaluation yesterday and going forward will see Patient 2 times per week for 3 weeks

## 2019-09-28 NOTE — TELEPHONE ENCOUNTER
Jose Eliasimühlenweherbert Marcum is calling to inform Dr. Michael Coles patient was admitted to [facility].      Please advise     # [phone]

## 2019-09-30 NOTE — TELEPHONE ENCOUNTER
Ruby Kaye, [facility] nurse, confidential phone line, her full name on voicemail, left message of Dr May Moran message below.

## 2019-10-01 ENCOUNTER — TELEPHONE (OUTPATIENT)
Dept: INTERNAL MEDICINE CLINIC | Facility: CLINIC | Age: 84
End: 2019-10-01

## 2019-10-01 ENCOUNTER — APPOINTMENT (OUTPATIENT)
Dept: CARDIOLOGY | Age: 84
End: 2019-10-01

## 2019-10-04 NOTE — TELEPHONE ENCOUNTER
Jada Walker, OT calling back regarding plan of care.   Informed all good with Dr. Brittany Greenwood.

## 2019-10-09 ENCOUNTER — TELEPHONE (OUTPATIENT)
Dept: INTERNAL MEDICINE CLINIC | Facility: CLINIC | Age: 84
End: 2019-10-09

## 2019-10-11 ENCOUNTER — TELEPHONE (OUTPATIENT)
Dept: INTERNAL MEDICINE CLINIC | Facility: CLINIC | Age: 84
End: 2019-10-11

## 2019-10-11 NOTE — TELEPHONE ENCOUNTER
[facility] #[id number], [id number] and [id number] signed by Dr. Talon Don, faxed back and confirmed sent.

## 2019-10-11 NOTE — TELEPHONE ENCOUNTER
Orders for Vancomycin, Senna and Monitor blood sugar weekly signed by Dr. David Estrella, faxed back and confirmed sent.

## 2019-11-05 ENCOUNTER — TELEPHONE (OUTPATIENT)
Dept: INTERNAL MEDICINE CLINIC | Facility: CLINIC | Age: 84
End: 2019-11-05

## 2019-11-05 NOTE — TELEPHONE ENCOUNTER
PT evaluation is fine, but can't make any recommendations if PT is asking if they can evaluate after her fall.

## 2019-11-05 NOTE — TELEPHONE ENCOUNTER
[facility] RN calling once again. Patient had a fall this morning walking back from breakfast. Asking if okay for PT evaluation.

## 2019-11-05 NOTE — TELEPHONE ENCOUNTER
Aria PT from [facility] stated they can do a PT evaluation. The daughter is asking for an evaluation for she believes the patient is getting weaker. The fall was an [place] stating that this is why they are asking for the evaluation.

## 2019-11-06 RX ORDER — ASPIRIN 325 MG
TABLET, DELAYED RELEASE (ENTERIC COATED) ORAL
Qty: 90 TABLET | Refills: 1 | OUTPATIENT
Start: 2019-11-06

## 2019-11-06 RX ORDER — LOSARTAN POTASSIUM 25 MG/1
TABLET ORAL
Qty: 90 TABLET | Refills: 1 | OUTPATIENT
Start: 2019-11-06

## 2019-11-07 NOTE — TELEPHONE ENCOUNTER
Please advise on refill request.     Hypertensive Medications  Protocol Criteria:  · Appointment scheduled in the past 6 months or in the next 3 months  · BMP or CMP in the past 12 months  · Creatinine result < 2  Recent Outpatient Visits            4 edilia encounter    150 Tania Perry MD    Office Visit    7 months ago Controlled type 2 diabetes mellitus without complication, without long-term current use of insulin [facility])    [facility], [address], Zachariah Chandler

## 2019-11-08 RX ORDER — ASPIRIN 325 MG
325 TABLET, DELAYED RELEASE (ENTERIC COATED) ORAL
Qty: 90 TABLET | Refills: 1 | Status: SHIPPED | OUTPATIENT
Start: 2019-11-08 | End: 2020-05-14

## 2019-11-08 RX ORDER — LOSARTAN POTASSIUM 25 MG/1
25 TABLET ORAL
Qty: 90 TABLET | Refills: 1 | Status: SHIPPED | OUTPATIENT
Start: 2019-11-08 | End: 2020-05-19

## 2019-11-11 ENCOUNTER — TELEPHONE (OUTPATIENT)
Dept: INTERNAL MEDICINE CLINIC | Facility: CLINIC | Age: 84
End: 2019-11-11

## 2019-11-11 NOTE — TELEPHONE ENCOUNTER
Deysi LERMA from [facility] called stating order for therapy re evaluation will be rescheduled for this week

## 2019-11-12 ENCOUNTER — TELEPHONE (OUTPATIENT)
Dept: INTERNAL MEDICINE CLINIC | Facility: CLINIC | Age: 84
End: 2019-11-12

## 2019-11-12 NOTE — TELEPHONE ENCOUNTER
[facility] Order # [id number] rec'd and placed in Dr. Eva Rogers in box to sign.

## 2019-11-13 ENCOUNTER — TELEPHONE (OUTPATIENT)
Dept: INTERNAL MEDICINE CLINIC | Facility: CLINIC | Age: 84
End: 2019-11-13

## 2019-11-13 NOTE — TELEPHONE ENCOUNTER
Order # [id number] and [id number] completed by Dr. Faisal Cantrell, faxed back and confirmed sent. Original to scanning, copy to billing

## 2019-11-13 NOTE — TELEPHONE ENCOUNTER
Deysi PT/[facility] called in stating that she did her PT evaluation yesterday. Plan of care is 2x per week for 2 weeks and 1x the last week (total of 5 visits). Please advise.

## 2019-11-14 RX ORDER — MAG HYDROX/ALUMINUM HYD/SIMETH 400-400-40
SUSPENSION, ORAL (FINAL DOSE FORM) ORAL
Qty: 90 CAPSULE | Refills: 1 | Status: SHIPPED | OUTPATIENT
Start: 2019-11-14 | End: 2020-06-14

## 2019-11-14 NOTE — TELEPHONE ENCOUNTER
Review pended refill request as it does not fall under a protocol.     Requested Prescriptions     Pending Prescriptions Disp Refills   • VITAMIN D3 125 MCG (5000 UT) Oral Cap [Pharmacy Med Name: VITAMIN D3 5000 UNIT CAPSULE] 90 capsule 1     Sig: TAKE 1 CA

## 2019-11-16 ENCOUNTER — TELEPHONE (OUTPATIENT)
Dept: INTERNAL MEDICINE CLINIC | Facility: CLINIC | Age: 84
End: 2019-11-16

## 2019-11-16 NOTE — TELEPHONE ENCOUNTER
[facility] #[id number] received and placed in DR. Talbot's in box to sign.

## 2019-11-18 ENCOUNTER — APPOINTMENT (OUTPATIENT)
Dept: GENERAL RADIOLOGY | Age: 84
End: 2019-11-18
Attending: EMERGENCY MEDICINE
Payer: MEDICARE

## 2019-11-18 ENCOUNTER — HOSPITAL ENCOUNTER (OUTPATIENT)
Age: 84
Discharge: HOME | End: 2019-11-18
Attending: EMERGENCY MEDICINE
Payer: MEDICARE

## 2019-11-18 VITALS
SYSTOLIC BLOOD PRESSURE: 132 MMHG | DIASTOLIC BLOOD PRESSURE: 67 MMHG | OXYGEN SATURATION: 95 % | BODY MASS INDEX: 24.59 KG/M2 | WEIGHT: 144 LBS | RESPIRATION RATE: 20 BRPM | TEMPERATURE: 99 F | HEIGHT: 64 IN | HEART RATE: 76 BPM

## 2019-11-18 DIAGNOSIS — R05.9 COUGH: Primary | ICD-10-CM

## 2019-11-18 PROCEDURE — 99213 OFFICE O/P EST LOW 20 MIN: CPT

## 2019-11-18 PROCEDURE — 71046 X-RAY EXAM CHEST 2 VIEWS: CPT | Performed by: EMERGENCY MEDICINE

## 2019-11-18 PROCEDURE — 99214 OFFICE O/P EST MOD 30 MIN: CPT

## 2019-11-18 RX ORDER — BENZONATATE 100 MG/1
100 CAPSULE ORAL 3 TIMES DAILY PRN
Qty: 21 CAPSULE | Refills: 0 | Status: SHIPPED | OUTPATIENT
Start: 2019-11-18

## 2019-11-18 RX ORDER — CEFDINIR 300 MG/1
300 CAPSULE ORAL 2 TIMES DAILY
Qty: 20 CAPSULE | Refills: 0 | Status: SHIPPED | OUTPATIENT
Start: 2019-11-18 | End: 2019-11-28

## 2019-11-18 NOTE — TELEPHONE ENCOUNTER
[facility] # [id number] completed by Dr. Cathryn Garcia, fax back and confirmed sent.

## 2019-11-18 NOTE — ED PROVIDER NOTES
Patient Seen in: [facility]      History   Patient presents with:  Cough/URI    Stated Complaint: COUGH    HPI    Patient's history was obtained from patient's family member. Patient has had a cough for the past week.   P systems reviewed and negative except as noted above.     Physical Exam     ED Triage Vitals [11/18/19 1406]   /67   Pulse 76   Resp 20   Temp 98.7 °F (37.1 °C)   Temp src Oral   SpO2 93 %   O2 Device None (Room air)       Current:/67   Pulse 76 Borderline cardiomegaly. 3. Atherosclerosis. 4. Kyphoscoliosis. 5. Demineralization. 6. Osteoarthritis. 7. Chronic appearing wedging of multiple vertebra. 8. Pacemaker. 9. Hiatal hernia. Dictated by (CST):  Joshua Hamilton MD on 11/18/2019 at 14:33

## 2019-11-19 ENCOUNTER — TELEPHONE (OUTPATIENT)
Dept: INTERNAL MEDICINE CLINIC | Facility: CLINIC | Age: 84
End: 2019-11-19

## 2019-11-19 RX ORDER — CARVEDILOL 3.12 MG/1
TABLET ORAL
Qty: 90 TABLET | Refills: 0 | Status: SHIPPED | OUTPATIENT
Start: 2019-11-19 | End: 2020-02-26

## 2019-11-19 NOTE — TELEPHONE ENCOUNTER
S/CLARK Alva from [facility]. H. And related Dr. Rolo Mcneil message that it is,\" ok\" with him to reinstate nurse visits.

## 2019-11-19 NOTE — TELEPHONE ENCOUNTER
Juan Chamorro 1634 called in stating that she was going to discharge from agency today, however, due to cough and script of abx she would like to re-certify patient.      She is asking for an extra visit this week to re-certify and then will

## 2019-11-19 NOTE — TELEPHONE ENCOUNTER
Protocol failed for lack of appointment only. 90-day one-time refill granted.     Requested Prescriptions     Pending Prescriptions Disp Refills   • CARVEDILOL 3.125 MG Oral Tab [Pharmacy Med Name: CARVEDILOL 3.125 MG TABLET] 90 tablet 0     Sig: TAKE 1 TAB

## 2019-11-21 ENCOUNTER — TELEPHONE (OUTPATIENT)
Dept: INTERNAL MEDICINE CLINIC | Facility: CLINIC | Age: 84
End: 2019-11-21

## 2019-11-21 DIAGNOSIS — E78.2 MIXED HYPERLIPIDEMIA: Primary | ICD-10-CM

## 2019-11-21 NOTE — TELEPHONE ENCOUNTER
Dr Mauri Samaniego please see patient's daughter request for blood work below, [address] [place] [address] 04/22/19.

## 2019-11-21 NOTE — TELEPHONE ENCOUNTER
Patients daughter is requesting lipid, cholesterol, comprehensive metabolic panel for insurance purposes. Please advise. Thank you.

## 2019-11-25 ENCOUNTER — OFFICE VISIT (OUTPATIENT)
Dept: CARDIOLOGY | Age: 84
End: 2019-11-25

## 2019-11-25 VITALS
OXYGEN SATURATION: 97 % | BODY MASS INDEX: 25.16 KG/M2 | WEIGHT: 142 LBS | HEART RATE: 68 BPM | HEIGHT: 63 IN | DIASTOLIC BLOOD PRESSURE: 68 MMHG | SYSTOLIC BLOOD PRESSURE: 112 MMHG

## 2019-11-25 DIAGNOSIS — I50.22 CHRONIC SYSTOLIC CONGESTIVE HEART FAILURE (CMD): ICD-10-CM

## 2019-11-25 DIAGNOSIS — I48.21 PERMANENT ATRIAL FIBRILLATION (CMD): Primary | ICD-10-CM

## 2019-11-25 PROCEDURE — 99214 OFFICE O/P EST MOD 30 MIN: CPT | Performed by: INTERNAL MEDICINE

## 2019-11-25 RX ORDER — FUROSEMIDE 20 MG/1
TABLET ORAL
Qty: 30 TABLET | OUTPATIENT
Start: 2019-11-25

## 2019-11-26 ENCOUNTER — TELEPHONE (OUTPATIENT)
Dept: INTERNAL MEDICINE CLINIC | Facility: CLINIC | Age: 84
End: 2019-11-26

## 2019-11-26 NOTE — TELEPHONE ENCOUNTER
[facility] order # [id number] completed by Jeny Mora, faxed back and confirmed sent. Original placed for scanning to chart.

## 2019-11-29 ENCOUNTER — TELEPHONE (OUTPATIENT)
Dept: INTERNAL MEDICINE CLINIC | Facility: CLINIC | Age: 84
End: 2019-11-29

## 2019-11-29 ENCOUNTER — OFFICE VISIT (OUTPATIENT)
Dept: INTERNAL MEDICINE CLINIC | Facility: CLINIC | Age: 84
End: 2019-11-29
Payer: MEDICARE

## 2019-11-29 ENCOUNTER — APPOINTMENT (OUTPATIENT)
Dept: LAB | Age: 84
End: 2019-11-29
Attending: INTERNAL MEDICINE
Payer: MEDICARE

## 2019-11-29 VITALS
DIASTOLIC BLOOD PRESSURE: 65 MMHG | HEIGHT: 63 IN | TEMPERATURE: 98 F | WEIGHT: 141 LBS | SYSTOLIC BLOOD PRESSURE: 105 MMHG | BODY MASS INDEX: 24.98 KG/M2 | RESPIRATION RATE: 12 BRPM | HEART RATE: 72 BPM

## 2019-11-29 DIAGNOSIS — I49.5 SSS (SICK SINUS SYNDROME) (HCC): ICD-10-CM

## 2019-11-29 DIAGNOSIS — E78.2 MIXED HYPERLIPIDEMIA: ICD-10-CM

## 2019-11-29 DIAGNOSIS — Z95.0 S/P PLACEMENT OF CARDIAC PACEMAKER: ICD-10-CM

## 2019-11-29 DIAGNOSIS — G30.1 LATE ONSET ALZHEIMER'S DISEASE WITHOUT BEHAVIORAL DISTURBANCE (HCC): ICD-10-CM

## 2019-11-29 DIAGNOSIS — F02.80 LATE ONSET ALZHEIMER'S DISEASE WITHOUT BEHAVIORAL DISTURBANCE (HCC): ICD-10-CM

## 2019-11-29 DIAGNOSIS — Z86.73 HISTORY OF STROKE: ICD-10-CM

## 2019-11-29 DIAGNOSIS — I10 ESSENTIAL HYPERTENSION: ICD-10-CM

## 2019-11-29 DIAGNOSIS — E11.9 CONTROLLED TYPE 2 DIABETES MELLITUS WITHOUT COMPLICATION, WITHOUT LONG-TERM CURRENT USE OF INSULIN (HCC): ICD-10-CM

## 2019-11-29 DIAGNOSIS — R05.9 COUGH: Primary | ICD-10-CM

## 2019-11-29 DIAGNOSIS — R48.2 GAIT APRAXIA OF ELDERLY: ICD-10-CM

## 2019-11-29 DIAGNOSIS — I51.9 CHRONIC LEFT VENTRICULAR SYSTOLIC DYSFUNCTION: ICD-10-CM

## 2019-11-29 PROCEDURE — 80053 COMPREHEN METABOLIC PANEL: CPT

## 2019-11-29 PROCEDURE — 36415 COLL VENOUS BLD VENIPUNCTURE: CPT

## 2019-11-29 PROCEDURE — 99214 OFFICE O/P EST MOD 30 MIN: CPT | Performed by: INTERNAL MEDICINE

## 2019-11-29 PROCEDURE — G0463 HOSPITAL OUTPT CLINIC VISIT: HCPCS | Performed by: INTERNAL MEDICINE

## 2019-11-29 PROCEDURE — 80061 LIPID PANEL: CPT

## 2019-11-29 NOTE — TELEPHONE ENCOUNTER
Deysi would like to inform the doctor that the physical therapy evaluation will be moved to next week due to the Thanksgiving holiday.

## 2019-11-30 PROBLEM — S22.42XD CLOSED FRACTURE OF MULTIPLE RIBS OF LEFT SIDE WITH ROUTINE HEALING, SUBSEQUENT ENCOUNTER: Status: RESOLVED | Noted: 2019-03-28 | Resolved: 2019-11-30

## 2019-12-01 NOTE — PROGRESS NOTES
History of Present Illness   Patient ID: Joshua Peng is a 80year old female.   Chief Complaint: Urgent Care F/u (Daughter states patient ehre for f/u urgent care cough)      Patient here for post UC ffup afte vein seen for coughing, was treated wit exhibits no discharge. No scleral icterus. Neck: Normal range of motion. Neck supple. No JVD present. No thyromegaly present. Pulmonary/Chest: Effort normal and breath sounds normal. No respiratory distress. She has no wheezes. She has no rales.  She ex The 2013 ASCVD risk score is only valid for ages 36 to 78      Medical History    Reviewed allergies:    Levaquin [Levofloxa*    OTHER (SEE COMMENTS)    Comment:tendonitis heel     Reviewed:  Patient Active Problem List    Atrial fibrillation with slow ve 2.0 standard drinks      Types: 2 Glasses of wine per week      Frequency: Never    Drug use: No     Reviewed:    Current Outpatient Medications:   •  benzonatate 100 MG Oral Cap, Take 1 capsule (100 mg total) by mouth 3 (three) times daily as needed. , Dis MG Oral Tab EC, TAKE 1 TABLET BY MOUTH DAILY, Disp: 30 tablet, Rfl: 11  •  GLUCOCARD VITAL TEST In Vitro Strip, Use as directed, Disp: , Rfl: 99  •  Spacer/Aero-Holding Chambers (AEROCHAMBER MINI CHAMBER) Does not apply Device, Use with inhaler, Disp: 1 De No follow-ups on file.       Akhil Keith MD  Internal Medicine      Medications This Visit:  Requested Prescriptions      No prescriptions requested or ordered in this encounter

## 2019-12-02 ENCOUNTER — TELEPHONE (OUTPATIENT)
Dept: INTERNAL MEDICINE CLINIC | Facility: CLINIC | Age: 84
End: 2019-12-02

## 2019-12-02 ENCOUNTER — HOSPITAL ENCOUNTER (EMERGENCY)
Facility: HOSPITAL | Age: 84
Discharge: HOME | End: 2019-12-02
Attending: EMERGENCY MEDICINE
Payer: MEDICARE

## 2019-12-02 ENCOUNTER — APPOINTMENT (OUTPATIENT)
Dept: GENERAL RADIOLOGY | Facility: HOSPITAL | Age: 84
End: 2019-12-02
Payer: MEDICARE

## 2019-12-02 VITALS
HEART RATE: 72 BPM | SYSTOLIC BLOOD PRESSURE: 103 MMHG | DIASTOLIC BLOOD PRESSURE: 68 MMHG | TEMPERATURE: 97 F | RESPIRATION RATE: 18 BRPM | WEIGHT: 141 LBS | BODY MASS INDEX: 24.07 KG/M2 | OXYGEN SATURATION: 96 % | HEIGHT: 64 IN

## 2019-12-02 DIAGNOSIS — R55 SYNCOPE AND COLLAPSE: Primary | ICD-10-CM

## 2019-12-02 PROCEDURE — 93005 ELECTROCARDIOGRAM TRACING: CPT

## 2019-12-02 PROCEDURE — 85025 COMPLETE CBC W/AUTO DIFF WBC: CPT | Performed by: EMERGENCY MEDICINE

## 2019-12-02 PROCEDURE — 96374 THER/PROPH/DIAG INJ IV PUSH: CPT

## 2019-12-02 PROCEDURE — 87086 URINE CULTURE/COLONY COUNT: CPT | Performed by: EMERGENCY MEDICINE

## 2019-12-02 PROCEDURE — 71045 X-RAY EXAM CHEST 1 VIEW: CPT | Performed by: EMERGENCY MEDICINE

## 2019-12-02 PROCEDURE — 81001 URINALYSIS AUTO W/SCOPE: CPT | Performed by: EMERGENCY MEDICINE

## 2019-12-02 PROCEDURE — 93010 ELECTROCARDIOGRAM REPORT: CPT | Performed by: EMERGENCY MEDICINE

## 2019-12-02 PROCEDURE — 84484 ASSAY OF TROPONIN QUANT: CPT | Performed by: EMERGENCY MEDICINE

## 2019-12-02 PROCEDURE — 80048 BASIC METABOLIC PNL TOTAL CA: CPT | Performed by: EMERGENCY MEDICINE

## 2019-12-02 PROCEDURE — 99284 EMERGENCY DEPT VISIT MOD MDM: CPT

## 2019-12-02 RX ORDER — ONDANSETRON 2 MG/ML
4 INJECTION INTRAMUSCULAR; INTRAVENOUS ONCE
Status: COMPLETED | OUTPATIENT
Start: 2019-12-02 | End: 2019-12-02

## 2019-12-02 NOTE — TELEPHONE ENCOUNTER
LMTCB with daughter Jacqui Acuna . Need to know which [place] [address] location and the fax number so we can fax over letter she requested be sent with DR. Talbot's order to lower the dose of Bethanechol 10 mg tab to once a day dose. Office phone number given.

## 2019-12-02 NOTE — TELEPHONE ENCOUNTER
Daughter Jennifer Ocampo called with information. States the [facility] in [place], [address] [place] [address] Drive, [address] [facility] [address], fax [phone] Attn: 3rd floor nursing. Routed to clinic site.

## 2019-12-02 NOTE — TELEPHONE ENCOUNTER
Order from Dr. Kelli Garcia instructing [facility] to lower the dose of patient's Bethanechol 10 mg tab to one a day dose was faxed to [facility] and confirmed sent.

## 2019-12-02 NOTE — TELEPHONE ENCOUNTER
Order #[id number] signed by Dr. Michelle Causey, faxed back and confirmed sent. Original placed for billing.

## 2019-12-02 NOTE — ED INITIAL ASSESSMENT (HPI)
Pt to ED via EMS from [facility] c/o unresponsive episode PTA. Pt was walking when she was witnessed slumping to the ground, followed by unresponsive episode lasting approximately 10 minutes. Pt denies pain. No head injury, +ASA.

## 2019-12-03 NOTE — ED PROVIDER NOTES
Patient Seen in: [facility] Emergency Department    History   Patient presents with:  Syncope (cardiovascular, neurologic)    Stated Complaint:     HPI    80year old female presenting with  syncope. Event occurred at assisted living.   Was report once daily. OXCARBAZEPINE 150 MG Oral Tab,  TAKE 1 TABLET BY MOUTH DAILY   cephALEXin 250 MG Oral Cap,  Take 1 capsule (250 mg total) by mouth nightly.    FUROSEMIDE 20 MG Oral Tab,  TAKE 1/2 TABLET BY MOUTH DAILY   CLOPIDOGREL BISULFATE 75 MG Oral Tab, use: No      Review of Systems    Positive for stated complaint:   Other systems are as noted in HPI. Constitutional and vital signs reviewed. All other systems reviewed and negative except as noted above.     PSFH elements reviewed from today and agr Leukocyte Esterase Urine Large (*)     WBC Urine 41 (*)     WBC Clump Many (*)     All other components within normal limits   BASIC METABOLIC PANEL (8) - Abnormal; Notable for the following components:    Glucose 242 (*)     Sodium 132 (*)     Chloride 97 [facility] [address]  [facility] [address]  [phone]          We recommend that you schedule follow up care with a primary care provider within the next three months to obtain basic health screening including reassessment of your blood pressure.     Med

## 2019-12-04 ENCOUNTER — TELEPHONE (OUTPATIENT)
Dept: INTERNAL MEDICINE CLINIC | Facility: CLINIC | Age: 84
End: 2019-12-04

## 2019-12-04 RX ORDER — FUROSEMIDE 20 MG/1
TABLET ORAL
Qty: 30 TABLET | OUTPATIENT
Start: 2019-12-04

## 2019-12-04 NOTE — TELEPHONE ENCOUNTER
Deysi/ Physical Therapist of [facility] is calling to relay to Dr. Rufino Zayas she did physical therapy evaluation today 12/04 and she will see patient 2 times a week for 2 weeks and once a week for 2 weeks.     Deysi states no need for dominga

## 2019-12-10 ENCOUNTER — TELEPHONE (OUTPATIENT)
Dept: INTERNAL MEDICINE CLINIC | Facility: CLINIC | Age: 84
End: 2019-12-10

## 2019-12-10 NOTE — TELEPHONE ENCOUNTER
[facility] Marvin Hollis. Order # [id number]  Completed by Dr. Pineda Hillman, faxed back and confirmed sent. Original sent to billing.

## 2019-12-11 RX ORDER — EZETIMIBE AND SIMVASTATIN 10; 10 MG/1; MG/1
TABLET ORAL
Qty: 90 TABLET | Refills: 1 | Status: SHIPPED | OUTPATIENT
Start: 2019-12-11 | End: 2020-06-02

## 2019-12-13 RX ORDER — FUROSEMIDE 20 MG/1
TABLET ORAL
Qty: 90 TABLET | Refills: 1 | Status: SHIPPED | OUTPATIENT
Start: 2019-12-13 | End: 2020-03-31

## 2019-12-16 RX ORDER — CEPHALEXIN 250 MG/1
CAPSULE ORAL
Qty: 90 CAPSULE | Refills: 3 | Status: SHIPPED | OUTPATIENT
Start: 2019-12-16 | End: 2020-11-25

## 2019-12-30 ENCOUNTER — ANCILLARY ORDERS (OUTPATIENT)
Dept: CARDIOLOGY | Age: 84
End: 2019-12-30

## 2019-12-30 ENCOUNTER — ANCILLARY PROCEDURE (OUTPATIENT)
Dept: CARDIOLOGY | Age: 84
End: 2019-12-30
Attending: INTERNAL MEDICINE

## 2019-12-30 DIAGNOSIS — Z95.0 PACEMAKER: ICD-10-CM

## 2019-12-30 PROCEDURE — X1114 CARDIAC DEVICE HOME CHECK - REMOTE UNSCHEDULED: HCPCS | Performed by: INTERNAL MEDICINE

## 2019-12-31 ENCOUNTER — TELEPHONE (OUTPATIENT)
Dept: INTERNAL MEDICINE CLINIC | Facility: CLINIC | Age: 84
End: 2019-12-31

## 2019-12-31 RX ORDER — FLUOXETINE 10 MG/1
CAPSULE ORAL
Qty: 90 CAPSULE | Refills: 1 | Status: SHIPPED | OUTPATIENT
Start: 2019-12-31 | End: 2020-04-28

## 2019-12-31 NOTE — TELEPHONE ENCOUNTER
Refill passed per [facility], [facility] protocol.   Refill Protocol Appointment Criteria  · Appointment scheduled in the past 6 months or in the next 3 months  Recent Outpatient Visits            1 month ago Cough    [address] Abhijeet Diaz, [address] [facility] [address]

## 2019-12-31 NOTE — TELEPHONE ENCOUNTER
Sarah Sam from [facility] calling and states she want to add nursing visit one time a  week for three week staring January 6 she states verbally will be find             Please advise     [phone]

## 2020-01-03 ENCOUNTER — TELEPHONE (OUTPATIENT)
Dept: INTERNAL MEDICINE CLINIC | Facility: CLINIC | Age: 85
End: 2020-01-03

## 2020-01-03 NOTE — TELEPHONE ENCOUNTER
Deysi (Physical Therapist) with home health is calling to request to add 2 visits to home physical therapy plan of care.

## 2020-01-06 ENCOUNTER — TELEPHONE (OUTPATIENT)
Dept: INTERNAL MEDICINE CLINIC | Facility: CLINIC | Age: 85
End: 2020-01-06

## 2020-01-06 NOTE — TELEPHONE ENCOUNTER
Aria/ Nurse of [place] [address] is requesting verbal order for nurse to see patient once this week, once next week, and discharge patient the week of 01/20/2020. Please advise.

## 2020-01-07 NOTE — TELEPHONE ENCOUNTER
Peterson Lawrence, [facility] RN of Dr. Priya Lawson note.  Karan Torres, [facility] RN verbalized understanding

## 2020-01-08 RX ORDER — BETHANECHOL CHLORIDE 10 MG/1
10 TABLET ORAL 3 TIMES DAILY
Qty: 270 TABLET | Refills: 1 | Status: SHIPPED | OUTPATIENT
Start: 2020-01-08 | End: 2020-01-09

## 2020-01-08 RX ORDER — PANTOPRAZOLE SODIUM 40 MG/1
TABLET, DELAYED RELEASE ORAL
Qty: 90 TABLET | Refills: 1 | Status: SHIPPED | OUTPATIENT
Start: 2020-01-08 | End: 2020-06-12

## 2020-01-08 RX ORDER — MAGNESIUM OXIDE 400 MG/1
TABLET ORAL
Qty: 90 TABLET | Refills: 1 | Status: SHIPPED | OUTPATIENT
Start: 2020-01-08 | End: 2020-07-01

## 2020-01-08 NOTE — TELEPHONE ENCOUNTER
Refill passed per [facility], [facility] protocol.   Refill Protocol Appointment Criteria  · Appointment scheduled in the past 12 months or in the next 3 months  Recent Outpatient Visits            1 month ago Cough    [address], [place] [facility]

## 2020-01-08 NOTE — TELEPHONE ENCOUNTER
Refill passed per [facility] [place], [facility] protocol.   Refill Protocol Appointment Criteria  · Appointment scheduled in the past 12 months or in the next 3 months  Recent Outpatient Visits            1 month ago Cough    [address], [place] [facility]

## 2020-01-08 NOTE — TELEPHONE ENCOUNTER
Review pended refill request as it does not fall under a protocol.   Requested Prescriptions     Pending Prescriptions Disp Refills   • MAGNESIUM OXIDE 400 (241.3 Mg) MG Oral Tab [Pharmacy Med Name: MAGNESIUM OXIDE 400 MG TABLET] 90 tablet 0     Sig: TAKE 1

## 2020-01-09 ENCOUNTER — TELEPHONE (OUTPATIENT)
Dept: INTERNAL MEDICINE CLINIC | Facility: CLINIC | Age: 85
End: 2020-01-09

## 2020-01-09 RX ORDER — BETHANECHOL CHLORIDE 10 MG/1
10 TABLET ORAL 2 TIMES DAILY
Qty: 270 TABLET | Refills: 1 | Status: SHIPPED | OUTPATIENT
Start: 2020-01-09 | End: 2020-09-22

## 2020-01-09 NOTE — TELEPHONE ENCOUNTER
Pharmacy called in to verify directions on medication below. She states that per last discharge it was 1 Tablet daily and they need to confirm that she is taking it 3 times now. Please advise.      Current Outpatient Medications:   •  Bethanechol Ch

## 2020-01-09 NOTE — TELEPHONE ENCOUNTER
Spoke with daughter (GIBRAN on file) clarified pt & and daughter discussed with Dr. Margie Tena at 11/29/19 OV pt is to decrease Bethanechol Chloride to 1 pill 10 mg twice per day from 1 pill three times per day.  Daughter states this has helped with urinary fr

## 2020-01-16 ENCOUNTER — TELEPHONE (OUTPATIENT)
Dept: CARDIOLOGY | Age: 85
End: 2020-01-16

## 2020-01-16 NOTE — TELEPHONE ENCOUNTER
Spoke with Laura Rivas and clarified pt's medication directions on the Betanechol. Pt should be on 1 tablet twice a day.

## 2020-01-17 ENCOUNTER — TELEPHONE (OUTPATIENT)
Dept: CARDIOLOGY | Age: 85
End: 2020-01-17

## 2020-01-17 ENCOUNTER — ANCILLARY PROCEDURE (OUTPATIENT)
Dept: CARDIOLOGY | Age: 85
End: 2020-01-17
Attending: INTERNAL MEDICINE

## 2020-01-17 DIAGNOSIS — Z95.0 CARDIAC PACEMAKER IN SITU: ICD-10-CM

## 2020-01-17 PROCEDURE — X1094 NO CHARGE VISIT: HCPCS | Performed by: INTERNAL MEDICINE

## 2020-01-20 ENCOUNTER — TELEPHONE (OUTPATIENT)
Dept: INTERNAL MEDICINE CLINIC | Facility: CLINIC | Age: 85
End: 2020-01-20

## 2020-01-20 NOTE — TELEPHONE ENCOUNTER
Order #'[id number] signed by Dr. General Sotomayor, faxed back and confirmed sent. Original placed for scanning to chart.

## 2020-02-12 RX ORDER — CLOPIDOGREL BISULFATE 75 MG/1
TABLET ORAL
Qty: 90 TABLET | Refills: 1 | Status: SHIPPED | OUTPATIENT
Start: 2020-02-12 | End: 2020-06-17

## 2020-02-12 NOTE — TELEPHONE ENCOUNTER
Review pended refill request as it does not fall under a protocol.   Requested Prescriptions     Pending Prescriptions Disp Refills   • CLOPIDOGREL BISULFATE 75 MG Oral Tab [Pharmacy Med Name: CLOPIDOGREL 75 MG TABLET] 90 tablet 1     Sig: TAKE 1 TABLET BY

## 2020-02-26 RX ORDER — ASPIRIN 325 MG
TABLET, DELAYED RELEASE (ENTERIC COATED) ORAL
Qty: 90 TABLET | Refills: 1 | OUTPATIENT
Start: 2020-02-26

## 2020-02-27 RX ORDER — CARVEDILOL 3.12 MG/1
TABLET ORAL
Qty: 90 TABLET | Refills: 1 | Status: SHIPPED | OUTPATIENT
Start: 2020-02-27 | End: 2020-07-29

## 2020-02-27 NOTE — TELEPHONE ENCOUNTER
Please review; protocol failed.  D/t GFR  Requested Prescriptions     Pending Prescriptions Disp Refills   • CARVEDILOL 3.125 MG Oral Tab [Pharmacy Med Name: carvedilol 3.125 mg tablet] 90 tablet 0     Sig: TAKE 1 TABLET BY MOUTH DAILY hold FOR sbp less THE

## 2020-03-10 ENCOUNTER — TELEPHONE (OUTPATIENT)
Dept: INTERNAL MEDICINE CLINIC | Facility: CLINIC | Age: 85
End: 2020-03-10

## 2020-03-16 ENCOUNTER — ANCILLARY PROCEDURE (OUTPATIENT)
Dept: CARDIOLOGY | Age: 85
End: 2020-03-16
Attending: INTERNAL MEDICINE

## 2020-03-16 ENCOUNTER — ANCILLARY ORDERS (OUTPATIENT)
Dept: CARDIOLOGY | Age: 85
End: 2020-03-16

## 2020-03-16 DIAGNOSIS — Z95.0 CARDIAC PACEMAKER: ICD-10-CM

## 2020-03-16 PROCEDURE — X1114 CARDIAC DEVICE HOME CHECK - REMOTE UNSCHEDULED: HCPCS | Performed by: INTERNAL MEDICINE

## 2020-03-16 PROCEDURE — 93294 REM INTERROG EVL PM/LDLS PM: CPT | Performed by: INTERNAL MEDICINE

## 2020-03-24 RX ORDER — OXCARBAZEPINE 150 MG/1
TABLET, FILM COATED ORAL
Qty: 90 TABLET | Refills: 1 | Status: SHIPPED | OUTPATIENT
Start: 2020-03-24 | End: 2020-09-16

## 2020-03-31 RX ORDER — LORATADINE 10 MG/1
TABLET ORAL
Qty: 30 TABLET | Refills: 11 | Status: SHIPPED | OUTPATIENT
Start: 2020-03-31 | End: 2021-02-23

## 2020-03-31 RX ORDER — FUROSEMIDE 20 MG/1
TABLET ORAL
Qty: 90 TABLET | Refills: 1 | Status: SHIPPED | OUTPATIENT
Start: 2020-03-31 | End: 2020-09-09

## 2020-04-10 ENCOUNTER — TELEPHONE (OUTPATIENT)
Dept: INTERNAL MEDICINE CLINIC | Facility: CLINIC | Age: 85
End: 2020-04-10

## 2020-04-10 NOTE — TELEPHONE ENCOUNTER
Lab results received from [facility] to be reviewed and signed by Dr. Francoise Vazquez in Dr. Marlin Karimi in box.

## 2020-04-11 ENCOUNTER — TELEPHONE (OUTPATIENT)
Dept: INTERNAL MEDICINE CLINIC | Facility: CLINIC | Age: 85
End: 2020-04-11

## 2020-04-11 NOTE — TELEPHONE ENCOUNTER
3 on-call pages received from assisted living facility nurse. All pages returned individually. Next    First on-call page:  Nurse [facility]) calling to inform of urine culture results.   After some time she was able to relay that patient had 100,000 Citr

## 2020-04-13 ENCOUNTER — TELEPHONE (OUTPATIENT)
Dept: SURGERY | Facility: CLINIC | Age: 85
End: 2020-04-13

## 2020-04-13 ENCOUNTER — TELEPHONE (OUTPATIENT)
Dept: INTERNAL MEDICINE CLINIC | Facility: CLINIC | Age: 85
End: 2020-04-13

## 2020-04-13 RX ORDER — NITROFURANTOIN 25; 75 MG/1; MG/1
100 CAPSULE ORAL 2 TIMES DAILY
Qty: 10 CAPSULE | Refills: 0 | Status: SHIPPED | OUTPATIENT
Start: 2020-04-13 | End: 2020-04-13

## 2020-04-13 RX ORDER — CEFDINIR 300 MG/1
300 CAPSULE ORAL EVERY 12 HOURS
Qty: 14 CAPSULE | Refills: 0 | Status: SHIPPED | OUTPATIENT
Start: 2020-04-13 | End: 2020-04-20

## 2020-04-13 NOTE — TELEPHONE ENCOUNTER
Results received. Urine culture with > 100,000 cfu/ml citrobacter freundii complex with multi drug resistance.     Only sensitivity to amikacin, cipro, levaquin, cefepime, gentamicin, levofloxacin, and meropenem,  Intermediate sensitivity to ertapenem,

## 2020-04-13 NOTE — TELEPHONE ENCOUNTER
Called and spoke with Judy Echols RN at [facility]. She states she did not send results to our office she sent them to pcp. I provided her with our fax number and she will fax results. Will treat based on culture results once received.   Judy Echols RN requested s

## 2020-04-13 NOTE — TELEPHONE ENCOUNTER
Davy Beckman from [address] [place] [address] Janes Nichols states Urinalysis came back positive and results  has been sent over by fax and wants to know can you prescribe more of the medication listed below but for 2xs a day please advise        CEPHALEXIN 250 MG Oral Cap 90 capsule

## 2020-04-13 NOTE — TELEPHONE ENCOUNTER
Nurses notes and UA results signed by Dr. Apoorva Lynch, and new orders  To repeat UA and urine culture faxed back and confirmed sent. Original placed for scanning to chart.

## 2020-04-13 NOTE — TELEPHONE ENCOUNTER
I checked through the faxes and do not see these results. It looks like she talked to patients pcp through the weekend and had received orders. I would like to see results and verify that culture shows sensitivity to cephalosporins before prescribing.

## 2020-04-28 RX ORDER — FLUOXETINE 10 MG/1
CAPSULE ORAL
Qty: 90 CAPSULE | Refills: 1 | Status: SHIPPED | OUTPATIENT
Start: 2020-04-28 | End: 2020-10-27

## 2020-05-14 RX ORDER — ASPIRIN 325 MG
TABLET, DELAYED RELEASE (ENTERIC COATED) ORAL
Qty: 90 TABLET | Refills: 1 | Status: SHIPPED | OUTPATIENT
Start: 2020-05-14 | End: 2020-08-12

## 2020-05-14 NOTE — TELEPHONE ENCOUNTER
[employer] at [phone] and spoke with Raman Pryor. She verified that pt is currently taking Aspirin 325mg but not a buffered aspirin.  She will be faxing us an updated med list.

## 2020-05-19 RX ORDER — LOSARTAN POTASSIUM 25 MG/1
TABLET ORAL
Qty: 90 TABLET | Refills: 1 | Status: SHIPPED | OUTPATIENT
Start: 2020-05-19 | End: 2020-11-18

## 2020-06-02 RX ORDER — EZETIMIBE AND SIMVASTATIN 10; 10 MG/1; MG/1
TABLET ORAL
Qty: 90 TABLET | Refills: 1 | Status: SHIPPED | OUTPATIENT
Start: 2020-06-02 | End: 2020-11-18

## 2020-06-12 RX ORDER — PANTOPRAZOLE SODIUM 40 MG/1
TABLET, DELAYED RELEASE ORAL
Qty: 90 TABLET | Refills: 1 | Status: SHIPPED | OUTPATIENT
Start: 2020-06-12 | End: 2020-10-13

## 2020-06-14 RX ORDER — MAG HYDROX/ALUMINUM HYD/SIMETH 400-400-40
SUSPENSION, ORAL (FINAL DOSE FORM) ORAL
Qty: 90 CAPSULE | Refills: 1 | Status: SHIPPED | OUTPATIENT
Start: 2020-06-14 | End: 2020-11-11

## 2020-06-17 RX ORDER — CLOPIDOGREL BISULFATE 75 MG/1
TABLET ORAL
Qty: 90 TABLET | Refills: 1 | Status: SHIPPED | OUTPATIENT
Start: 2020-06-17 | End: 2020-12-08

## 2020-06-22 ENCOUNTER — ANCILLARY PROCEDURE (OUTPATIENT)
Dept: CARDIOLOGY | Age: 85
End: 2020-06-22
Attending: INTERNAL MEDICINE

## 2020-06-22 ENCOUNTER — ANCILLARY ORDERS (OUTPATIENT)
Dept: CARDIOLOGY | Age: 85
End: 2020-06-22

## 2020-06-22 DIAGNOSIS — Z95.0 CARDIAC PACEMAKER: ICD-10-CM

## 2020-06-22 PROCEDURE — 93294 REM INTERROG EVL PM/LDLS PM: CPT | Performed by: INTERNAL MEDICINE

## 2020-06-22 PROCEDURE — X1114 CARDIAC DEVICE HOME CHECK - REMOTE UNSCHEDULED: HCPCS | Performed by: INTERNAL MEDICINE

## 2020-07-01 RX ORDER — MAGNESIUM OXIDE 400 MG/1
TABLET ORAL
Qty: 90 TABLET | Refills: 1 | Status: SHIPPED | OUTPATIENT
Start: 2020-07-01 | End: 2020-12-29

## 2020-07-08 ENCOUNTER — TELEPHONE (OUTPATIENT)
Dept: INTERNAL MEDICINE CLINIC | Facility: CLINIC | Age: 85
End: 2020-07-08

## 2020-07-08 NOTE — TELEPHONE ENCOUNTER
pls call pt's assisted living place ([address] [place] [address]). We got the fax of pt's ua and consistent with uti.  However no culture result yet which I need since she is already on antibiotic prophylaxis (cephalexin) from urologist due to her recurrent

## 2020-07-08 NOTE — TELEPHONE ENCOUNTER
Spoke with Tomeka Kelsey at [facility] [place], then transferred to 3rd floor nurse line. Spoke with David Felder RN and relayed Dr. Spencer Garza message below--she reports urine sample was picked yesterday morning--takes 2-3 days for final results.  She does

## 2020-07-10 NOTE — TELEPHONE ENCOUNTER
UA C & S results form signed by Dr. Michelle Causey, faxed back to [facility] [address] and confirmed sent. Original placed for scanning to chart.

## 2020-07-29 RX ORDER — CARVEDILOL 3.12 MG/1
TABLET ORAL
Qty: 90 TABLET | Refills: 1 | Status: SHIPPED | OUTPATIENT
Start: 2020-07-29 | End: 2021-04-14

## 2020-08-09 RX ORDER — NYSTATIN 100000 [USP'U]/G
POWDER TOPICAL
Qty: 60 G | Refills: 0 | Status: SHIPPED | OUTPATIENT
Start: 2020-08-09

## 2020-08-12 RX ORDER — ASPIRIN 325 MG
TABLET, DELAYED RELEASE (ENTERIC COATED) ORAL
Qty: 90 TABLET | Refills: 1 | Status: SHIPPED | OUTPATIENT
Start: 2020-08-12 | End: 2021-02-23

## 2020-08-24 ENCOUNTER — TELEPHONE (OUTPATIENT)
Dept: INTERNAL MEDICINE CLINIC | Facility: CLINIC | Age: 85
End: 2020-08-24

## 2020-08-24 NOTE — TELEPHONE ENCOUNTER
[de-identified] Telephone Orders dated  7/6/20 received and placed in DR. Morris's in basket to review and sign.

## 2020-09-09 RX ORDER — FUROSEMIDE 20 MG/1
TABLET ORAL
Qty: 90 TABLET | Refills: 1 | Status: SHIPPED | OUTPATIENT
Start: 2020-09-09 | End: 2021-03-10

## 2020-09-16 RX ORDER — OXCARBAZEPINE 150 MG/1
TABLET, FILM COATED ORAL
Qty: 90 TABLET | Refills: 1 | Status: SHIPPED | OUTPATIENT
Start: 2020-09-16 | End: 2021-03-23

## 2020-09-21 ENCOUNTER — TELEPHONE (OUTPATIENT)
Dept: INTERNAL MEDICINE CLINIC | Facility: CLINIC | Age: 85
End: 2020-09-21

## 2020-09-21 NOTE — TELEPHONE ENCOUNTER
Paged by SNF, pt's glucose this am at 190; last few days in the 130 to 170s. Pt known diabetic but only on diet. I ordered a1c to be done. (last one was 7.2 only). Continue to monitor glucose.

## 2020-09-22 RX ORDER — BETHANECHOL CHLORIDE 10 MG/1
TABLET ORAL
Qty: 270 TABLET | Refills: 1 | Status: SHIPPED | OUTPATIENT
Start: 2020-09-22 | End: 2021-06-15

## 2020-09-25 ENCOUNTER — TELEPHONE (OUTPATIENT)
Dept: INTERNAL MEDICINE CLINIC | Facility: CLINIC | Age: 85
End: 2020-09-25

## 2020-09-30 ENCOUNTER — TELEPHONE (OUTPATIENT)
Dept: INTERNAL MEDICINE CLINIC | Facility: CLINIC | Age: 85
End: 2020-09-30

## 2020-09-30 NOTE — TELEPHONE ENCOUNTER
a1c result received and was 9.3; talked to nurse in SNF and told to start pt on glipizide 2.5mg po q am with breakfast. Recheck a1c in 3mos. Check glucose twice a week and call if >180. Cut down on sweets and carbs.

## 2020-10-01 ENCOUNTER — ANCILLARY ORDERS (OUTPATIENT)
Dept: CARDIOLOGY | Age: 85
End: 2020-10-01

## 2020-10-01 ENCOUNTER — TELEPHONE (OUTPATIENT)
Dept: INTERNAL MEDICINE CLINIC | Facility: CLINIC | Age: 85
End: 2020-10-01

## 2020-10-01 ENCOUNTER — ANCILLARY PROCEDURE (OUTPATIENT)
Dept: CARDIOLOGY | Age: 85
End: 2020-10-01
Attending: INTERNAL MEDICINE

## 2020-10-01 DIAGNOSIS — Z95.0 CARDIAC PACEMAKER: ICD-10-CM

## 2020-10-01 PROCEDURE — X1114 CARDIAC DEVICE HOME CHECK - REMOTE UNSCHEDULED: HCPCS | Performed by: INTERNAL MEDICINE

## 2020-10-01 NOTE — TELEPHONE ENCOUNTER
Jaspreet Restrepo , Pharmacist -[employer] [employer]  facility called in Glimepiride 2.5 XR 1 daily , please verify  order if correct , will add to med list

## 2020-10-01 NOTE — TELEPHONE ENCOUNTER
No. It would be glipizide 5mg given 1/2 tab po q am (she has CKD and dont want to use glipizide ER). Thanks.

## 2020-10-13 RX ORDER — PANTOPRAZOLE SODIUM 40 MG/1
TABLET, DELAYED RELEASE ORAL
Qty: 90 TABLET | Refills: 1 | Status: SHIPPED | OUTPATIENT
Start: 2020-10-13 | End: 2021-04-14

## 2020-10-27 RX ORDER — GLIPIZIDE 5 MG/1
2.5 TABLET ORAL EVERY MORNING
Qty: 45 TABLET | Refills: 1 | Status: SHIPPED | OUTPATIENT
Start: 2020-10-27 | End: 2021-01-26

## 2020-10-27 RX ORDER — FLUOXETINE 10 MG/1
CAPSULE ORAL
Qty: 90 CAPSULE | Refills: 1 | Status: SHIPPED | OUTPATIENT
Start: 2020-10-27 | End: 2021-04-21

## 2020-11-11 RX ORDER — MAG HYDROX/ALUMINUM HYD/SIMETH 400-400-40
SUSPENSION, ORAL (FINAL DOSE FORM) ORAL
Qty: 90 CAPSULE | Refills: 1 | Status: SHIPPED | OUTPATIENT
Start: 2020-11-11 | End: 2021-02-23

## 2020-11-18 RX ORDER — LOSARTAN POTASSIUM 25 MG/1
TABLET ORAL
Qty: 90 TABLET | Refills: 1 | Status: SHIPPED | OUTPATIENT
Start: 2020-11-18 | End: 2021-05-18

## 2020-11-18 RX ORDER — EZETIMIBE AND SIMVASTATIN 10; 10 MG/1; MG/1
TABLET ORAL
Qty: 90 TABLET | Refills: 1 | Status: SHIPPED | OUTPATIENT
Start: 2020-11-18 | End: 2021-05-18

## 2020-11-25 RX ORDER — CEPHALEXIN 250 MG/1
250 CAPSULE ORAL EVERY EVENING
Qty: 90 CAPSULE | Refills: 3 | Status: SHIPPED | OUTPATIENT
Start: 2020-11-25 | End: 2021-12-07

## 2020-11-25 NOTE — TELEPHONE ENCOUNTER
RN pended the Cephalexin refill to LLUVIA Prado for approval. Patient's last office visit was 6/11/2019. See office notes of LLUVIA below:     \"Patient was unable to provide urine sample today.   She will provide urine sample at nursing home to send for UA and

## 2020-12-08 RX ORDER — CLOPIDOGREL BISULFATE 75 MG/1
TABLET ORAL
Qty: 90 TABLET | Refills: 1 | Status: SHIPPED | OUTPATIENT
Start: 2020-12-08 | End: 2021-06-22

## 2020-12-16 ENCOUNTER — TELEPHONE (OUTPATIENT)
Dept: CARDIOLOGY | Age: 85
End: 2020-12-16

## 2020-12-16 ENCOUNTER — TELEPHONE (OUTPATIENT)
Dept: INTERNAL MEDICINE CLINIC | Facility: CLINIC | Age: 85
End: 2020-12-16

## 2020-12-16 NOTE — TELEPHONE ENCOUNTER
Dr Rosie Saldaña RN from [facility] called to report patient low BP readings. Please advise. 98/66 P75, Monday 90/69 P62  Tuesday 90/51 P72 Today    No dizziness, no other symptoms.   Takes:  Lasix 20mg daily  losartan 25mg daily  Carvedilo

## 2020-12-16 NOTE — TELEPHONE ENCOUNTER
[address] assisted Living RN station. Spoke to [facility] and advised patient needs to go to ER for evaluation. She will contact family. nAita Reina also states she called cardiologist, and they told her same disposition ER. RN f/u Thursday.

## 2020-12-17 ENCOUNTER — TELEPHONE (OUTPATIENT)
Dept: CARDIOLOGY | Age: 85
End: 2020-12-17

## 2020-12-17 RX ORDER — BLOOD-GLUCOSE METER
KIT MISCELLANEOUS
COMMUNITY
Start: 2020-10-01

## 2020-12-17 RX ORDER — GLIPIZIDE 5 MG/1
1 TABLET ORAL DAILY
COMMUNITY
Start: 2020-11-03

## 2020-12-17 NOTE — TELEPHONE ENCOUNTER
Spoke with Edwar Zhang from [facility] who reports the patient did not go to the ER yesterday 12/16/20 because the Power of  Cong Cortes RN believes it was Robert Res) refused for the patient to go to the ER.  Eleni Andujar RN reports per the notes the patient's B/

## 2020-12-17 NOTE — TELEPHONE ENCOUNTER
Spoke with Shoshana Holt and informed her of Dr. Jackson Thomas message from below. Shoshana Holt reports the patient has a pending appointment with Dr. Emy Leo on Monday 12/21/20.  Shoshana Holt reports it will be difficult to get the patient to Dr. Jackson Thomas office tomorrow 12/18

## 2020-12-17 NOTE — TELEPHONE ENCOUNTER
Spoke with Herbert Howard, patient's daughter, daughter then placed this nurse on hold for over 5 minutes. Attempt will be made to reach out to the daughter later.

## 2020-12-21 ENCOUNTER — OFFICE VISIT (OUTPATIENT)
Dept: CARDIOLOGY | Age: 85
End: 2020-12-21

## 2020-12-21 VITALS
WEIGHT: 152 LBS | BODY MASS INDEX: 26.93 KG/M2 | DIASTOLIC BLOOD PRESSURE: 66 MMHG | HEIGHT: 63 IN | SYSTOLIC BLOOD PRESSURE: 116 MMHG | OXYGEN SATURATION: 95 % | HEART RATE: 58 BPM

## 2020-12-21 DIAGNOSIS — I34.0 NONRHEUMATIC MITRAL (VALVE) INSUFFICIENCY: ICD-10-CM

## 2020-12-21 DIAGNOSIS — I50.22 CHRONIC SYSTOLIC CONGESTIVE HEART FAILURE (CMD): ICD-10-CM

## 2020-12-21 DIAGNOSIS — I48.21 PERMANENT ATRIAL FIBRILLATION (CMD): Primary | ICD-10-CM

## 2020-12-21 PROCEDURE — 99214 OFFICE O/P EST MOD 30 MIN: CPT | Performed by: INTERNAL MEDICINE

## 2020-12-21 ASSESSMENT — PATIENT HEALTH QUESTIONNAIRE - PHQ9
SUM OF ALL RESPONSES TO PHQ9 QUESTIONS 1 AND 2: 0
2. FEELING DOWN, DEPRESSED OR HOPELESS: NOT AT ALL
CLINICAL INTERPRETATION OF PHQ9 SCORE: NO FURTHER SCREENING NEEDED
1. LITTLE INTEREST OR PLEASURE IN DOING THINGS: NOT AT ALL
SUM OF ALL RESPONSES TO PHQ9 QUESTIONS 1 AND 2: 0
CLINICAL INTERPRETATION OF PHQ2 SCORE: NO FURTHER SCREENING NEEDED

## 2020-12-23 ENCOUNTER — TELEPHONE (OUTPATIENT)
Dept: CARDIOLOGY | Age: 85
End: 2020-12-23

## 2020-12-23 ENCOUNTER — TELEPHONE (OUTPATIENT)
Dept: CARDIOLOGY CLINIC | Facility: CLINIC | Age: 85
End: 2020-12-23

## 2020-12-23 NOTE — TELEPHONE ENCOUNTER
This patient was seen at [place] [address] Cardiology. Returned call and correct phone number provided.

## 2020-12-23 NOTE — TELEPHONE ENCOUNTER
Alicia requesting to speak with RN regarding patient's recent office visit with Dr Lidia Doherty. Please call. Thank you.

## 2020-12-29 RX ORDER — MAGNESIUM OXIDE 400 MG/1
TABLET ORAL
Qty: 90 TABLET | Refills: 1 | Status: SHIPPED | OUTPATIENT
Start: 2020-12-29 | End: 2021-05-18

## 2021-01-07 ENCOUNTER — TELEPHONE (OUTPATIENT)
Dept: CARDIOLOGY | Age: 86
End: 2021-01-07

## 2021-01-07 ENCOUNTER — ANCILLARY PROCEDURE (OUTPATIENT)
Dept: CARDIOLOGY | Age: 86
End: 2021-01-07
Attending: INTERNAL MEDICINE

## 2021-01-07 DIAGNOSIS — Z95.0 CARDIAC PACEMAKER: ICD-10-CM

## 2021-01-07 PROCEDURE — 93294 REM INTERROG EVL PM/LDLS PM: CPT | Performed by: INTERNAL MEDICINE

## 2021-01-13 ENCOUNTER — TELEPHONE (OUTPATIENT)
Dept: INTERNAL MEDICINE CLINIC | Facility: CLINIC | Age: 86
End: 2021-01-13

## 2021-01-13 NOTE — TELEPHONE ENCOUNTER
Completed lab results received to be reviewed, signed, dated by Dr. Leona Parks, placed in Dr. Joss Townsend in basket to complete.

## 2021-01-26 RX ORDER — GLIPIZIDE 5 MG/1
TABLET ORAL
Qty: 45 TABLET | Refills: 1 | Status: SHIPPED | OUTPATIENT
Start: 2021-01-26 | End: 2021-07-27

## 2021-02-05 NOTE — TELEPHONE ENCOUNTER
Actions Requested: MD advice on Gabapentin Rx/ possible D/C of Rx  Problem: pt fell without injury at nursing home according to daughter  Onset and Timing: this week  Associated Symptoms: none  Aggravating by: none  Alleviated by: none  Triage Note: Genoveva Luna
Daughter left a message on the voicemail stating she would like for  To send an order to [facility] to stop the gabapentin. Daughter states medication is making her dizzy and patient fell but is ok.        Please fax it to [facility]
Fax order  May discontinue gabapentine
Pt's daughter Kylie Lepe) contacted and informed that the Gabapentin was d/c'd. Letter generated with order to d/c Gabapentin and faxed to [facility] #[phone] as per Dr. Nikki Belcher note below.
No

## 2021-02-23 RX ORDER — MAG HYDROX/ALUMINUM HYD/SIMETH 400-400-40
SUSPENSION, ORAL (FINAL DOSE FORM) ORAL
Qty: 90 CAPSULE | Refills: 1 | Status: SHIPPED | OUTPATIENT
Start: 2021-02-23 | End: 2021-08-03

## 2021-02-23 RX ORDER — LORATADINE 10 MG/1
TABLET ORAL
Qty: 30 TABLET | Refills: 11 | Status: SHIPPED | OUTPATIENT
Start: 2021-02-23

## 2021-02-23 RX ORDER — ASPIRIN 325 MG
TABLET, DELAYED RELEASE (ENTERIC COATED) ORAL
Qty: 90 TABLET | Refills: 1 | Status: SHIPPED | OUTPATIENT
Start: 2021-02-23 | End: 2021-08-10

## 2021-03-01 ENCOUNTER — TELEPHONE (OUTPATIENT)
Dept: INTERNAL MEDICINE CLINIC | Facility: CLINIC | Age: 86
End: 2021-03-01

## 2021-03-01 NOTE — TELEPHONE ENCOUNTER
[facility] Physician Certification and Medical Survey received and placed in Dr. Mckeon Brotracy in -basket to complete.

## 2021-03-03 DIAGNOSIS — Z23 NEED FOR VACCINATION: ICD-10-CM

## 2021-03-10 RX ORDER — FUROSEMIDE 20 MG/1
TABLET ORAL
Qty: 90 TABLET | Refills: 1 | Status: SHIPPED | OUTPATIENT
Start: 2021-03-10 | End: 2021-09-01

## 2021-03-18 ENCOUNTER — TELEPHONE (OUTPATIENT)
Dept: INTERNAL MEDICINE CLINIC | Facility: CLINIC | Age: 86
End: 2021-03-18

## 2021-03-18 NOTE — TELEPHONE ENCOUNTER
Got paged by nurse Reed Boyce from [facility] yesterday am, pt had a fall but no injury noted. Was going to her bathroom, no head injury, no bruises. No syncope noted. Pt currently not complaining of pain per Reed Boyce and VS are good.  To continue to monitor pt, call if

## 2021-03-23 RX ORDER — OXCARBAZEPINE 150 MG/1
TABLET, FILM COATED ORAL
Qty: 90 TABLET | Refills: 1 | Status: SHIPPED | OUTPATIENT
Start: 2021-03-23 | End: 2021-09-22

## 2021-03-29 ENCOUNTER — TELEPHONE (OUTPATIENT)
Dept: INTERNAL MEDICINE CLINIC | Facility: CLINIC | Age: 86
End: 2021-03-29

## 2021-03-29 NOTE — TELEPHONE ENCOUNTER
Calling to check status of home health orders faxed this weekend, want to know if Dr. Justino Soliman will sign off on patient's PT    Fax: [phone]

## 2021-03-30 NOTE — TELEPHONE ENCOUNTER
[facility] Jac Ragland. Order # [id number], was signed by Dr. Justino Soliman, faxed back, and confirmed sent. On 3/29/21. [address] [facility] [address] and states there were no message to fax to [facility].  Person states she will get the order from [facility].

## 2021-03-31 NOTE — TELEPHONE ENCOUNTER
Staff with [employer] and requesting order for [facility] PT visits be faxed over to them at F: [phone].

## 2021-04-02 ENCOUNTER — TELEPHONE (OUTPATIENT)
Dept: INTERNAL MEDICINE CLINIC | Facility: CLINIC | Age: 86
End: 2021-04-02

## 2021-04-14 RX ORDER — PANTOPRAZOLE SODIUM 40 MG/1
TABLET, DELAYED RELEASE ORAL
Qty: 90 TABLET | Refills: 1 | Status: SHIPPED | OUTPATIENT
Start: 2021-04-14 | End: 2021-10-12

## 2021-04-14 RX ORDER — CARVEDILOL 3.12 MG/1
TABLET ORAL
Qty: 90 TABLET | Refills: 1 | Status: SHIPPED | OUTPATIENT
Start: 2021-04-14 | End: 2021-10-25

## 2021-04-16 ENCOUNTER — ANCILLARY PROCEDURE (OUTPATIENT)
Dept: CARDIOLOGY | Age: 86
End: 2021-04-16
Attending: INTERNAL MEDICINE

## 2021-04-16 ENCOUNTER — ANCILLARY ORDERS (OUTPATIENT)
Dept: CARDIOLOGY | Age: 86
End: 2021-04-16

## 2021-04-16 DIAGNOSIS — Z95.0 CARDIAC PACEMAKER: ICD-10-CM

## 2021-04-16 PROCEDURE — 93294 REM INTERROG EVL PM/LDLS PM: CPT | Performed by: INTERNAL MEDICINE

## 2021-04-16 PROCEDURE — X1114 CARDIAC DEVICE HOME CHECK - REMOTE UNSCHEDULED: HCPCS | Performed by: INTERNAL MEDICINE

## 2021-04-19 ENCOUNTER — TELEPHONE (OUTPATIENT)
Dept: INTERNAL MEDICINE CLINIC | Facility: CLINIC | Age: 86
End: 2021-04-19

## 2021-04-19 NOTE — TELEPHONE ENCOUNTER
[address] [facility] results signed by Andreia Murphy, faxed to [facility]. Fax confirmation received.

## 2021-04-21 RX ORDER — FLUOXETINE 10 MG/1
CAPSULE ORAL
Qty: 90 CAPSULE | Refills: 1 | Status: SHIPPED | OUTPATIENT
Start: 2021-04-21 | End: 2021-10-19

## 2021-04-22 ENCOUNTER — VIRTUAL PHONE E/M (OUTPATIENT)
Dept: INTERNAL MEDICINE CLINIC | Facility: CLINIC | Age: 86
End: 2021-04-22

## 2021-04-22 DIAGNOSIS — Z95.0 S/P PLACEMENT OF CARDIAC PACEMAKER: ICD-10-CM

## 2021-04-22 DIAGNOSIS — E78.2 MIXED HYPERLIPIDEMIA: ICD-10-CM

## 2021-04-22 DIAGNOSIS — I51.9 CHRONIC LEFT VENTRICULAR SYSTOLIC DYSFUNCTION: ICD-10-CM

## 2021-04-22 DIAGNOSIS — E11.9 CONTROLLED TYPE 2 DIABETES MELLITUS WITHOUT COMPLICATION, WITHOUT LONG-TERM CURRENT USE OF INSULIN (HCC): ICD-10-CM

## 2021-04-22 DIAGNOSIS — R48.2 GAIT APRAXIA OF ELDERLY: ICD-10-CM

## 2021-04-22 DIAGNOSIS — F41.1 ANXIETY STATE: ICD-10-CM

## 2021-04-22 DIAGNOSIS — I10 ESSENTIAL HYPERTENSION: Primary | ICD-10-CM

## 2021-04-22 DIAGNOSIS — Z86.73 HISTORY OF STROKE: ICD-10-CM

## 2021-04-22 DIAGNOSIS — Z87.440 HISTORY OF RECURRENT UTI (URINARY TRACT INFECTION): ICD-10-CM

## 2021-04-22 DIAGNOSIS — E55.9 VITAMIN D DEFICIENCY: ICD-10-CM

## 2021-04-22 DIAGNOSIS — N18.31 STAGE 3A CHRONIC KIDNEY DISEASE (HCC): ICD-10-CM

## 2021-04-22 DIAGNOSIS — G30.1 LATE ONSET ALZHEIMER'S DISEASE WITHOUT BEHAVIORAL DISTURBANCE (HCC): ICD-10-CM

## 2021-04-22 DIAGNOSIS — N31.9 NEUROMUSCULAR DYSFUNCTION OF BLADDER: ICD-10-CM

## 2021-04-22 DIAGNOSIS — I49.5 SSS (SICK SINUS SYNDROME) (HCC): ICD-10-CM

## 2021-04-22 DIAGNOSIS — F02.80 LATE ONSET ALZHEIMER'S DISEASE WITHOUT BEHAVIORAL DISTURBANCE (HCC): ICD-10-CM

## 2021-04-22 PROBLEM — N30.01 ACUTE CYSTITIS WITH HEMATURIA: Status: RESOLVED | Noted: 2019-03-28 | Resolved: 2021-04-22

## 2021-04-22 PROCEDURE — 99443 PHONE E/M BY PHYS 21-30 MIN: CPT | Performed by: INTERNAL MEDICINE

## 2021-04-22 NOTE — PROGRESS NOTES
HPI/Subjective:     Patient ID: Blanca Lobo is a 80year old female. This is a telemedicine visit with live, interactive  audio.       Patient understands and accepts financial responsibility for any deductible, co-insurance and/or co-pays associ diabetes mellitus. Her disease course has been stable. There are no hypoglycemic associated symptoms. Pertinent negatives for hypoglycemia include no confusion or nervousness/anxiousness. There are no diabetic associated symptoms.  Pertinent negatives for d TAKE 1 TABLET BY MOUTH EVERY DAY 90 tablet 1   • VITAMIN D3 125 MCG (5000 UT) Oral Cap TAKE 1 CAPSULE BY MOUTH DAILY 90 capsule 1   • GLIPIZIDE 5 MG Oral Tab TAKE 1/2 TABLET (2.5MG) BY MOUTH DAILY 45 tablet 1   • MAGNESIUM OXIDE 400 (241.3 Mg) MG Oral Tab disease     2019   • Anxiety state    • Atrial fibrillation [facility])    • Cataract, bilateral 2005   • Chronic a-fib (HCC)    • Congestive heart disease (HCC)     LV ej fraction of 30%   • Conjunctival cyst of left eye    • Cup to disc asymmetry 2006    OU glipizide 2.5mg daily. Also had recommended to see optha for her eye exam.     (I51.9) Chronic left ventricular systolic dysfunction  Plan: This CHF secondary to decreased systolic function with last 2D echo showing ejection fraction of about 30%.   She i had no agitation and had remained Calm.        (Z87.440) History of recurrent UTI (urinary tract infection)  Plan: pt on prophylactic cephalexin as per urologist.     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

## 2021-04-26 ENCOUNTER — TELEPHONE (OUTPATIENT)
Dept: INTERNAL MEDICINE CLINIC | Facility: CLINIC | Age: 86
End: 2021-04-26

## 2021-05-03 ENCOUNTER — TELEPHONE (OUTPATIENT)
Dept: INTERNAL MEDICINE CLINIC | Facility: CLINIC | Age: 86
End: 2021-05-03

## 2021-05-03 NOTE — TELEPHONE ENCOUNTER
Order from [facility] signed by Dr. Chiquita Harrison, faxed back and confirmed sent.

## 2021-05-11 ENCOUNTER — TELEPHONE (OUTPATIENT)
Dept: INTERNAL MEDICINE CLINIC | Facility: CLINIC | Age: 86
End: 2021-05-11

## 2021-05-11 NOTE — TELEPHONE ENCOUNTER
Physician Certification and Medical Survey form received and placed in Dr. Jg Heath in basket to complete.

## 2021-05-13 ENCOUNTER — MED REC SCAN ONLY (OUTPATIENT)
Dept: INTERNAL MEDICINE CLINIC | Facility: CLINIC | Age: 86
End: 2021-05-13

## 2021-05-18 ENCOUNTER — ANCILLARY PROCEDURE (OUTPATIENT)
Dept: CARDIOLOGY | Age: 86
End: 2021-05-18
Attending: INTERNAL MEDICINE

## 2021-05-18 DIAGNOSIS — I34.0 NONRHEUMATIC MITRAL (VALVE) INSUFFICIENCY: ICD-10-CM

## 2021-05-18 DIAGNOSIS — I50.22 CHRONIC SYSTOLIC CONGESTIVE HEART FAILURE (CMD): ICD-10-CM

## 2021-05-18 DIAGNOSIS — I48.21 PERMANENT ATRIAL FIBRILLATION (CMD): ICD-10-CM

## 2021-05-18 PROCEDURE — 93306 TTE W/DOPPLER COMPLETE: CPT | Performed by: INTERNAL MEDICINE

## 2021-05-18 RX ORDER — EZETIMIBE AND SIMVASTATIN 10; 10 MG/1; MG/1
TABLET ORAL
Qty: 90 TABLET | Refills: 1 | Status: SHIPPED | OUTPATIENT
Start: 2021-05-18 | End: 2021-11-09

## 2021-05-18 RX ORDER — MAGNESIUM OXIDE 400 MG (241.3 MG MAGNESIUM) TABLET
TABLET
Qty: 90 TABLET | Refills: 1 | Status: SHIPPED | OUTPATIENT
Start: 2021-05-18 | End: 2021-09-29

## 2021-05-18 RX ORDER — LOSARTAN POTASSIUM 25 MG/1
TABLET ORAL
Qty: 90 TABLET | Refills: 0 | Status: SHIPPED | OUTPATIENT
Start: 2021-05-18 | End: 2021-08-27

## 2021-05-19 NOTE — TELEPHONE ENCOUNTER
Dr. Cornell Vazquez, received 2nd form request. Do still have the first one in your basket? I do not show that it was completed.

## 2021-05-24 ENCOUNTER — TELEPHONE (OUTPATIENT)
Dept: CARDIOLOGY | Age: 86
End: 2021-05-24

## 2021-05-26 ENCOUNTER — TELEPHONE (OUTPATIENT)
Dept: INTERNAL MEDICINE CLINIC | Facility: CLINIC | Age: 86
End: 2021-05-26

## 2021-05-26 NOTE — TELEPHONE ENCOUNTER
Received plan of care from [facility]. Forms given to Dr. Venecia Houston for review. Will fax back once signed.

## 2021-05-26 NOTE — TELEPHONE ENCOUNTER
Ngozi Merino need office notes from last visit for patient with Dr. Rosa Sawant      Also, she will be sending over plan of care for the patient for his signature       Fax# [phone]

## 2021-05-28 ENCOUNTER — TELEPHONE (OUTPATIENT)
Dept: INTERNAL MEDICINE CLINIC | Facility: CLINIC | Age: 86
End: 2021-05-28

## 2021-05-28 NOTE — TELEPHONE ENCOUNTER
Certification and Plan of Care, [facility] 4-2-21 50 5-31-21 received and placed in Dr. Dora Chou in basket to complete.

## 2021-05-29 NOTE — TELEPHONE ENCOUNTER
Start of Care dated 4/2/21 completed by Dr. Maryam Lundy faxed back and successful confirmation received.

## 2021-06-08 ENCOUNTER — OFFICE VISIT (OUTPATIENT)
Dept: CARDIOLOGY | Age: 86
End: 2021-06-08

## 2021-06-08 ENCOUNTER — ANCILLARY PROCEDURE (OUTPATIENT)
Dept: CARDIOLOGY | Age: 86
End: 2021-06-08
Attending: INTERNAL MEDICINE

## 2021-06-08 VITALS
WEIGHT: 139 LBS | BODY MASS INDEX: 24.62 KG/M2 | HEART RATE: 96 BPM | SYSTOLIC BLOOD PRESSURE: 108 MMHG | DIASTOLIC BLOOD PRESSURE: 70 MMHG | RESPIRATION RATE: 14 BRPM

## 2021-06-08 VITALS
HEART RATE: 96 BPM | DIASTOLIC BLOOD PRESSURE: 70 MMHG | BODY MASS INDEX: 24.63 KG/M2 | WEIGHT: 139 LBS | SYSTOLIC BLOOD PRESSURE: 108 MMHG | HEIGHT: 63 IN

## 2021-06-08 DIAGNOSIS — I48.21 PERMANENT ATRIAL FIBRILLATION (CMD): ICD-10-CM

## 2021-06-08 DIAGNOSIS — I34.0 NONRHEUMATIC MITRAL (VALVE) INSUFFICIENCY: ICD-10-CM

## 2021-06-08 DIAGNOSIS — I50.22 CHRONIC SYSTOLIC CONGESTIVE HEART FAILURE (CMD): Primary | ICD-10-CM

## 2021-06-08 DIAGNOSIS — Z95.0 CARDIAC PACEMAKER: Primary | ICD-10-CM

## 2021-06-08 PROCEDURE — 99214 OFFICE O/P EST MOD 30 MIN: CPT | Performed by: INTERNAL MEDICINE

## 2021-06-08 ASSESSMENT — PATIENT HEALTH QUESTIONNAIRE - PHQ9
SUM OF ALL RESPONSES TO PHQ9 QUESTIONS 1 AND 2: 0
CLINICAL INTERPRETATION OF PHQ9 SCORE: NO FURTHER SCREENING NEEDED
CLINICAL INTERPRETATION OF PHQ2 SCORE: NO FURTHER SCREENING NEEDED
1. LITTLE INTEREST OR PLEASURE IN DOING THINGS: NOT AT ALL
SUM OF ALL RESPONSES TO PHQ9 QUESTIONS 1 AND 2: 0
2. FEELING DOWN, DEPRESSED OR HOPELESS: NOT AT ALL

## 2021-06-15 RX ORDER — BETHANECHOL CHLORIDE 10 MG/1
TABLET ORAL
Qty: 270 TABLET | Refills: 1 | Status: SHIPPED | OUTPATIENT
Start: 2021-06-15 | End: 2021-08-11

## 2021-06-21 ENCOUNTER — OFFICE VISIT (OUTPATIENT)
Dept: CARDIOLOGY | Age: 86
End: 2021-06-21

## 2021-06-21 VITALS
WEIGHT: 139 LBS | OXYGEN SATURATION: 99 % | BODY MASS INDEX: 24.63 KG/M2 | SYSTOLIC BLOOD PRESSURE: 124 MMHG | HEART RATE: 76 BPM | HEIGHT: 63 IN | DIASTOLIC BLOOD PRESSURE: 64 MMHG

## 2021-06-21 DIAGNOSIS — I50.22 CHRONIC SYSTOLIC CONGESTIVE HEART FAILURE (CMD): ICD-10-CM

## 2021-06-21 DIAGNOSIS — I48.21 PERMANENT ATRIAL FIBRILLATION (CMD): Primary | ICD-10-CM

## 2021-06-21 DIAGNOSIS — I34.0 NONRHEUMATIC MITRAL (VALVE) INSUFFICIENCY: ICD-10-CM

## 2021-06-21 PROCEDURE — 99214 OFFICE O/P EST MOD 30 MIN: CPT | Performed by: INTERNAL MEDICINE

## 2021-06-21 ASSESSMENT — PATIENT HEALTH QUESTIONNAIRE - PHQ9
CLINICAL INTERPRETATION OF PHQ9 SCORE: NO FURTHER SCREENING NEEDED
2. FEELING DOWN, DEPRESSED OR HOPELESS: NOT AT ALL
CLINICAL INTERPRETATION OF PHQ2 SCORE: NO FURTHER SCREENING NEEDED
SUM OF ALL RESPONSES TO PHQ9 QUESTIONS 1 AND 2: 0
SUM OF ALL RESPONSES TO PHQ9 QUESTIONS 1 AND 2: 0
1. LITTLE INTEREST OR PLEASURE IN DOING THINGS: NOT AT ALL

## 2021-06-22 ENCOUNTER — CLINICAL ABSTRACT (OUTPATIENT)
Dept: CARDIOLOGY | Age: 86
End: 2021-06-22

## 2021-06-22 LAB
ABSOLUTE IMMATURE GRANULOCYTES (OFFPRE24): NORMAL
ABSOLUTE NRBC (AUTO): NORMAL
ALT SERPL-CCNC: 7 UNITS/L
AST SERPL-CCNC: 10 UNITS/L
BASO+EOS+MONOS # BLD: NORMAL 10*3/UL
BASO+EOS+MONOS NFR BLD: NORMAL %
BASOPHILS # BLD: NORMAL 10*3/UL
BASOPHILS NFR BLD: NORMAL %
CHOLEST SERPL-MCNC: 146 MG/DL
CHOLEST/HDLC SERPL: NORMAL {RATIO}
DIFFERENTIAL METHOD BLD: NORMAL
EOSINOPHIL # BLD: NORMAL 10*3/UL
EOSINOPHIL NFR BLD: NORMAL %
ERYTHROCYTE [DISTWIDTH] IN BLOOD BY AUTOMATED COUNT: NORMAL %
ERYTHROCYTE [DISTWIDTH] IN BLOOD: NORMAL %
HCT CALC (HGB X3) (OFFPRE23): NORMAL
HCT VFR BLD CALC: 39.8 %
HDLC SERPL-MCNC: 54 MG/DL
HGB BLD-MCNC: 12.6 G/DL
IMMATURE GRANULOCYTES (OFFPRE25): NORMAL
LDL/HDL (OFFPRE3): NORMAL
LDLC SERPL CALC-MCNC: 54 MG/DL
LENGTH OF FAST TIME PATIENT: NORMAL H
LYMPHOCYTES # BLD: NORMAL 10*3/UL
LYMPHOCYTES NFR BLD: NORMAL %
MCH RBC QN AUTO: 31 PG
MCHC RBC AUTO-ENTMCNC: 31.7 G/DL
MCV RBC AUTO: 98 FL
MONOCYTES # BLD: NORMAL 10*3/UL
MONOCYTES NFR BLD: NORMAL %
MPV (OFFPRE2): NORMAL
NEUTROPHILS # BLD: NORMAL 10*3/UL
NEUTROPHILS NFR BLD: NORMAL %
NONHDLC SERPL-MCNC: NORMAL MG/DL
NRBC BLD MANUAL-RTO: NORMAL %
PLAT MORPH BLD: NORMAL
PLATELET # BLD: 205 K/MCL
RBC # BLD: 4.06 10*6/UL
RBC MORPH BLD: NORMAL
TRIG/HDL: NORMAL
TRIGL SERPL-MCNC: 191 MG/DL
VLDLC SERPL CALC-MCNC: NORMAL MG/DL
WBC # BLD: 9.95 K/MCL
WBC MORPH BLD: NORMAL

## 2021-06-22 RX ORDER — CLOPIDOGREL BISULFATE 75 MG/1
TABLET ORAL
Qty: 90 TABLET | Refills: 1 | Status: SHIPPED | OUTPATIENT
Start: 2021-06-22

## 2021-06-24 ENCOUNTER — TELEPHONE (OUTPATIENT)
Dept: INTERNAL MEDICINE CLINIC | Facility: CLINIC | Age: 86
End: 2021-06-24

## 2021-06-24 RX ORDER — NITROFURANTOIN 25; 75 MG/1; MG/1
100 CAPSULE ORAL 2 TIMES DAILY
Qty: 14 CAPSULE | Refills: 0 | Status: SHIPPED | OUTPATIENT
Start: 2021-06-24

## 2021-06-24 NOTE — TELEPHONE ENCOUNTER
Patient daughter, [facility] ph# attempted. No answer. Unable to leave ms. Will need to try again later today.

## 2021-06-24 NOTE — TELEPHONE ENCOUNTER
[facility]. Discussed results with daughter. I sent RX to [address] which usually delivers to facility. Patient resides at [facility] assisted living at [facility]. 3rd -[phone]. Called and spoke to [facility].  She asked if we can

## 2021-06-24 NOTE — TELEPHONE ENCOUNTER
pls call assisted living where she stays and notify her nurse  Her labs showed cbc was fine, cholesterol levels are good , LFT were also good. Urine culture showed less than 10K growth though her ua was abnormal with lots of wbcs and + for nitrite.    I w

## 2021-06-25 ENCOUNTER — TELEPHONE (OUTPATIENT)
Dept: INTERNAL MEDICINE CLINIC | Facility: CLINIC | Age: 86
End: 2021-06-25

## 2021-06-25 NOTE — TELEPHONE ENCOUNTER
Rafael order dated 0/44/59 and Physician certification and Medical Survey completed by Dr. Chanel Leroy, faxed back and confirmed sent.

## 2021-06-28 ENCOUNTER — TELEPHONE (OUTPATIENT)
Dept: CARDIOLOGY | Age: 86
End: 2021-06-28

## 2021-07-09 ENCOUNTER — TELEPHONE (OUTPATIENT)
Dept: INTERNAL MEDICINE CLINIC | Facility: CLINIC | Age: 86
End: 2021-07-09

## 2021-07-09 NOTE — TELEPHONE ENCOUNTER
Signed orders for OP Pt dated 7/7/21 and lab order dated 4/22/21 faxed back and confirmed sent.
denies difficulty swallowing/chewing; LBM PTA/other (specify)

## 2021-07-16 ENCOUNTER — TELEPHONE (OUTPATIENT)
Dept: INTERNAL MEDICINE CLINIC | Facility: CLINIC | Age: 86
End: 2021-07-16

## 2021-07-16 NOTE — TELEPHONE ENCOUNTER
Sarah is requesting home health order for physical therapy to be faxed back to them.     Fax # [phone]

## 2021-07-27 RX ORDER — GLIPIZIDE 5 MG/1
TABLET ORAL
Qty: 45 TABLET | Refills: 1 | Status: SHIPPED | OUTPATIENT
Start: 2021-07-27

## 2021-08-03 NOTE — TELEPHONE ENCOUNTER
Protocol failed or has No Protocol, please review  Requested Prescriptions   Pending Prescriptions Disp Refills    VITAMIN D3 125 MCG (5000 UT) Oral Cap [Pharmacy Med Name: cholecalciferol (vitamin D3) 125 mcg (5,000 unit) capsule] 90 capsule 1     Sig: TA

## 2021-08-05 RX ORDER — MAG HYDROX/ALUMINUM HYD/SIMETH 400-400-40
5000 SUSPENSION, ORAL (FINAL DOSE FORM) ORAL DAILY
Qty: 90 CAPSULE | Refills: 1 | Status: SHIPPED | OUTPATIENT
Start: 2021-08-05

## 2021-08-06 ENCOUNTER — TELEPHONE (OUTPATIENT)
Dept: INTERNAL MEDICINE CLINIC | Facility: CLINIC | Age: 86
End: 2021-08-06

## 2021-08-06 NOTE — TELEPHONE ENCOUNTER
Written order for UA received from [address] [place] [address]. Dr. Bean Cheng out of the office until 8/16/21. Order signed by Dr. Marlon Taylor in Dr. Julio César Friedman absence. . Order faxed back. Confirmation pending.

## 2021-08-09 ENCOUNTER — TELEPHONE (OUTPATIENT)
Dept: INTERNAL MEDICINE CLINIC | Facility: CLINIC | Age: 86
End: 2021-08-09

## 2021-08-09 NOTE — TELEPHONE ENCOUNTER
Pt's daughter, All Kramer (GIBRAN in place), states pt is urinating non-stop, she is a resident at [facility] assisted living facility. States pt is taking Bethanechol 10 mg twice a day and thought pt was to take med only once a day. Noted med last filled 6/15/21.

## 2021-08-10 NOTE — TELEPHONE ENCOUNTER
Urine culture results from [facility] received, placed on Dr Priya haas for review and signature.

## 2021-08-11 ENCOUNTER — TELEPHONE (OUTPATIENT)
Dept: INTERNAL MEDICINE CLINIC | Facility: CLINIC | Age: 86
End: 2021-08-11

## 2021-08-11 RX ORDER — BETHANECHOL CHLORIDE 10 MG/1
10 TABLET ORAL DAILY
Qty: 90 TABLET | Refills: 1 | Status: SHIPPED | OUTPATIENT
Start: 2021-08-11

## 2021-08-11 NOTE — TELEPHONE ENCOUNTER
Changed sig on Bethanechol Chloride 10mg to take once daily per note below.      Dr Jhonatan Christina on behalf of Dr Moises Henry out of office, please sign pended script and have office staff fax to [address] [place] [address] at [phone] per note below, thanks

## 2021-08-11 NOTE — TELEPHONE ENCOUNTER
Start of Care dated 7-17-21 to 9-16 21 received and placed in DR. Hernandez's in basket to complete.

## 2021-08-12 NOTE — TELEPHONE ENCOUNTER
Can you please assist with this. Pt is a resident of [address] and pharmacy is currently closed where [address] was sent to.

## 2021-08-17 NOTE — TELEPHONE ENCOUNTER
Adam Riley Certification and Plan of Care completed by Dr. Radha Quiles, faxed back and confirmed sent.

## 2021-08-19 NOTE — TELEPHONE ENCOUNTER
Physician Order dated 7/19/2021 from [address] received, placed on Dr Reymundo haas for review and signature.

## 2021-08-24 ENCOUNTER — TELEPHONE (OUTPATIENT)
Dept: INTERNAL MEDICINE CLINIC | Facility: CLINIC | Age: 86
End: 2021-08-24

## 2021-08-24 NOTE — TELEPHONE ENCOUNTER
[facility] states the sent admission order 08/19 and checking if doctor has received form. Nothing noted on chart, they will be sending forms again.

## 2021-08-24 NOTE — TELEPHONE ENCOUNTER
Spoke with Brent Betancur and informed her that admission order has been signed and faxed this morning, Lisa verbalized understanding and stated she did receive the order.

## 2021-08-24 NOTE — TELEPHONE ENCOUNTER
Reviewed and signed urine culture results faxed to [facility] at [phone], confirmation received.

## 2021-08-24 NOTE — TELEPHONE ENCOUNTER
Physician order dated 7/19/2021 signed and faxed to Sheree Hahn at [phone], confirmation received.

## 2021-08-27 RX ORDER — LOSARTAN POTASSIUM 25 MG/1
TABLET ORAL
Qty: 90 TABLET | Refills: 0 | Status: SHIPPED | OUTPATIENT
Start: 2021-08-27 | End: 2021-12-13

## 2021-08-27 NOTE — TELEPHONE ENCOUNTER
Please review. Protocol failed / No protocol.     Requested Prescriptions   Pending Prescriptions Disp Refills    LOSARTAN POTASSIUM 25 MG Oral Tab [Pharmacy Med Name: losartan 25 mg tablet] 90 tablet 0     Sig: TAKE 1 TABLET BY MOUTH DAILY hold for sbp <

## 2021-09-01 RX ORDER — FUROSEMIDE 20 MG/1
TABLET ORAL
Qty: 90 TABLET | Refills: 1 | Status: SHIPPED | OUTPATIENT
Start: 2021-09-01

## 2021-09-15 NOTE — TELEPHONE ENCOUNTER
Physican order discharge dated 8/5/2021 signed and faxed to [address] [place] [address] at [phone], confirmation received.

## 2021-09-15 NOTE — TELEPHONE ENCOUNTER
Physician Order discharge dated 8/5/2021 from [address] received, placed on Dr Maxi haas for review and signature.

## 2021-09-22 RX ORDER — OXCARBAZEPINE 150 MG/1
TABLET, FILM COATED ORAL
Qty: 90 TABLET | Refills: 1 | Status: SHIPPED | OUTPATIENT
Start: 2021-09-22

## 2021-09-29 RX ORDER — MAGNESIUM OXIDE 400 MG (241.3 MG MAGNESIUM) TABLET
TABLET
Qty: 90 TABLET | Refills: 1 | Status: SHIPPED | OUTPATIENT
Start: 2021-09-29

## 2021-10-12 RX ORDER — PANTOPRAZOLE SODIUM 40 MG/1
40 TABLET, DELAYED RELEASE ORAL DAILY
Qty: 90 TABLET | Refills: 0 | Status: SHIPPED | OUTPATIENT
Start: 2021-10-12

## 2021-10-12 NOTE — TELEPHONE ENCOUNTER
Please review. Protocol failed / No protocol. CSS- please assist patient in scheduling appointment. Per our protocol, 90 day supply with no refill given.       Requested Prescriptions   Pending Prescriptions Disp Refills    PANTOPRAZOLE 40 MG Oral Ta

## 2021-10-19 RX ORDER — FLUOXETINE 10 MG/1
CAPSULE ORAL
Qty: 90 CAPSULE | Refills: 1 | Status: SHIPPED | OUTPATIENT
Start: 2021-10-19

## 2021-10-25 RX ORDER — CARVEDILOL 3.12 MG/1
TABLET ORAL
Qty: 90 TABLET | Refills: 1 | Status: SHIPPED | OUTPATIENT
Start: 2021-10-25

## 2021-11-10 RX ORDER — EZETIMIBE AND SIMVASTATIN 10; 10 MG/1; MG/1
1 TABLET ORAL NIGHTLY
Qty: 90 TABLET | Refills: 0 | Status: SHIPPED | OUTPATIENT
Start: 2021-11-10

## 2021-11-10 NOTE — TELEPHONE ENCOUNTER
Please review refill protocol failed/ no protocol  Requested Prescriptions   Pending Prescriptions Disp Refills    EZETIMIBE-SIMVASTATIN 10-10 MG Oral Tab [Pharmacy Med Name: ezetimibe 10 mg-simvastatin 10 mg tablet] 90 tablet 1     Sig: TAKE 1 TABLET BY M

## 2021-12-01 ENCOUNTER — TELEPHONE (OUTPATIENT)
Dept: INTERNAL MEDICINE CLINIC | Facility: CLINIC | Age: 86
End: 2021-12-01

## 2021-12-01 NOTE — TELEPHONE ENCOUNTER
Spoke with daughter Edyta James (GIBRAN verified)--states, \"She's fine from the fall last week. Her legs are weak, like usual. If Dr. Shoshana Stacy wants to do a video visit tomorrow or Friday before 3 p.m., that would be great.  If not, we will hang on to next Wednesd

## 2021-12-01 NOTE — TELEPHONE ENCOUNTER
dont really have opening that they requesting for tomorrow or Friday. Would just keep her scheduled apptment. If she is getting worse, go to ER.

## 2021-12-02 RX ORDER — CEPHALEXIN 250 MG/1
CAPSULE ORAL
Qty: 90 CAPSULE | Refills: 3 | OUTPATIENT
Start: 2021-12-02

## 2021-12-06 ENCOUNTER — APPOINTMENT (OUTPATIENT)
Dept: CARDIOLOGY | Age: 86
End: 2021-12-06

## 2021-12-07 RX ORDER — CEPHALEXIN 250 MG/1
250 CAPSULE ORAL EVERY EVENING
Qty: 90 CAPSULE | Refills: 3 | Status: SHIPPED | OUTPATIENT
Start: 2021-12-07 | End: 2022-04-29

## 2021-12-08 ENCOUNTER — TELEMEDICINE (OUTPATIENT)
Dept: INTERNAL MEDICINE CLINIC | Facility: CLINIC | Age: 86
End: 2021-12-08
Payer: MEDICARE

## 2021-12-08 ENCOUNTER — TELEPHONE (OUTPATIENT)
Dept: INTERNAL MEDICINE CLINIC | Facility: CLINIC | Age: 86
End: 2021-12-08

## 2021-12-08 DIAGNOSIS — I49.5 SSS (SICK SINUS SYNDROME) (HCC): ICD-10-CM

## 2021-12-08 DIAGNOSIS — R26.81 UNSTEADY GAIT: Primary | ICD-10-CM

## 2021-12-08 DIAGNOSIS — N18.31 STAGE 3A CHRONIC KIDNEY DISEASE (HCC): ICD-10-CM

## 2021-12-08 DIAGNOSIS — E11.9 CONTROLLED TYPE 2 DIABETES MELLITUS WITHOUT COMPLICATION, WITHOUT LONG-TERM CURRENT USE OF INSULIN (HCC): ICD-10-CM

## 2021-12-08 DIAGNOSIS — Z86.73 HISTORY OF STROKE: ICD-10-CM

## 2021-12-08 DIAGNOSIS — Z95.0 S/P PLACEMENT OF CARDIAC PACEMAKER: ICD-10-CM

## 2021-12-08 DIAGNOSIS — I10 ESSENTIAL HYPERTENSION: ICD-10-CM

## 2021-12-08 DIAGNOSIS — E83.42 HYPOMAGNESEMIA: ICD-10-CM

## 2021-12-08 DIAGNOSIS — Z87.440 HISTORY OF RECURRENT UTI (URINARY TRACT INFECTION): ICD-10-CM

## 2021-12-08 DIAGNOSIS — R48.2 GAIT APRAXIA OF ELDERLY: ICD-10-CM

## 2021-12-08 DIAGNOSIS — F02.80 LATE ONSET ALZHEIMER'S DISEASE WITHOUT BEHAVIORAL DISTURBANCE (HCC): ICD-10-CM

## 2021-12-08 DIAGNOSIS — G30.1 LATE ONSET ALZHEIMER'S DISEASE WITHOUT BEHAVIORAL DISTURBANCE (HCC): ICD-10-CM

## 2021-12-08 DIAGNOSIS — N30.00 ACUTE CYSTITIS WITHOUT HEMATURIA: ICD-10-CM

## 2021-12-08 DIAGNOSIS — E78.2 MIXED HYPERLIPIDEMIA: ICD-10-CM

## 2021-12-08 PROCEDURE — 99442 PHONE E/M BY PHYS 11-20 MIN: CPT | Performed by: INTERNAL MEDICINE

## 2021-12-08 NOTE — TELEPHONE ENCOUNTER
Home health physical therapy order and lab orders faxed to [facility], [place] at [phone], confirmation received.

## 2021-12-10 NOTE — PROGRESS NOTES
Subjective:     Patient ID: Michelle Lipscomb is a 80year old female. This is a telemedicine visit with live, interactive doximity  audio.       Patient understands and accepts financial responsibility for any deductible, co-insurance and/or co-pays a hypoglycemic associated symptoms. Pertinent negatives for diabetes include no chest pain. There are no hypoglycemic complications. Diabetic complications include a CVA. Pertinent negatives for diabetic complications include no peripheral neuropathy.  Risk f MOUTH DAILY 90 tablet 1   • FUROSEMIDE 20 MG Oral Tab TAKE 1 TABLET BY MOUTH DAILY 90 tablet 1   • LOSARTAN POTASSIUM 25 MG Oral Tab TAKE 1 TABLET BY MOUTH DAILY hold for sbp < 110 90 tablet 0   • bethanechol chloride 10 MG Oral Tab Take 1 tablet (10 mg to Comment:tendonitis heel    Past Medical History:   Diagnosis Date   • Acute, but ill-defined, cerebrovascular disease     2019   • Anxiety state    • Atrial fibrillation [facility])    • Cataract, bilateral 2005   • [facility]    • Congestive heart dise nsaids.    (I10) Essential hypertension  Plan: CBC WITH DIFFERENTIAL WITH PLATELET        bp controlled and good range per nurse.    (I49.5) SSS (sick sinus syndrome) ([facility]  Plan: pt had PPM and had been ff by Dr Yolanda Badillo cardio.  She is only on antiplatelet t [E]      Meds This Visit:  Requested Prescriptions      No prescriptions requested or ordered in this encounter       Imaging & Referrals:  HOME HEALTH (EXT)

## 2021-12-13 RX ORDER — LOSARTAN POTASSIUM 25 MG/1
TABLET ORAL
Qty: 30 TABLET | Refills: 0 | Status: SHIPPED | OUTPATIENT
Start: 2021-12-13 | End: 2022-01-09

## 2021-12-14 ENCOUNTER — TELEPHONE (OUTPATIENT)
Dept: INTERNAL MEDICINE CLINIC | Facility: CLINIC | Age: 86
End: 2021-12-14

## 2021-12-14 NOTE — TELEPHONE ENCOUNTER
[facility], spoke with Clarissa, informed that labs and urinalysis results were faxed, Sydney Dave verbalized understanding and stated they have not received the signed urinalysis results. Re faxed to [facility] at [phone], confirmation received.

## 2021-12-14 NOTE — TELEPHONE ENCOUNTER
Urinalysis results received from [facility], placed on Dr Rolo Mcneil desrose for review and signature.

## 2021-12-14 NOTE — TELEPHONE ENCOUNTER
Sarah with [facility] will be faxing a copy of patient's urine analysis results. Please advise if any medications will be ordered. Passed

## 2021-12-14 NOTE — TELEPHONE ENCOUNTER
Labs reviewed, signed, and faxed to Assisted Living Nurse Office at [phone], confirmation received.

## 2021-12-22 NOTE — TELEPHONE ENCOUNTER
Physical therapy orders from [address] [place] [address] received, placed on Dr [facility] review and signature.

## 2021-12-23 NOTE — TELEPHONE ENCOUNTER
Physical Therapy orders signed and faxed to [address] [place] [address] at [phone] and [phone], confirmation received.

## 2021-12-29 ENCOUNTER — TELEPHONE (OUTPATIENT)
Dept: INTERNAL MEDICINE CLINIC | Facility: CLINIC | Age: 86
End: 2021-12-29

## 2021-12-29 NOTE — TELEPHONE ENCOUNTER
Nathaniel Galicia RN from [address] [place] [address] she needs copy of face to face encounter faxed to [phone].      On-site staff can you please assist?

## 2022-01-06 NOTE — TELEPHONE ENCOUNTER
Physician order dated 12/10/2021 from [address] received, placed on Dr Priya Lawson desrose for review and signature.

## 2022-01-06 NOTE — TELEPHONE ENCOUNTER
Physician order dated 4/1/2021 signed and faxed to [address] [place] [address] at [phone], confirmation received.

## 2022-01-06 NOTE — TELEPHONE ENCOUNTER
Labs reviewed, signed, and faxed to [facility] at [phone], confirmation received.

## 2022-01-08 ENCOUNTER — MED REC SCAN ONLY (OUTPATIENT)
Dept: INTERNAL MEDICINE CLINIC | Facility: CLINIC | Age: 87
End: 2022-01-08

## 2022-01-09 RX ORDER — LOSARTAN POTASSIUM 25 MG/1
TABLET ORAL
Qty: 90 TABLET | Refills: 1 | Status: SHIPPED | OUTPATIENT
Start: 2022-01-09

## 2022-01-19 NOTE — TELEPHONE ENCOUNTER
Home Health certification and plan of care dated 12/10/2021-2/7/2022 faxed to [facility] [address] at [phone] and [phone], confirmation received.

## 2022-01-26 ENCOUNTER — TELEPHONE (OUTPATIENT)
Dept: INTERNAL MEDICINE CLINIC | Facility: CLINIC | Age: 87
End: 2022-01-26

## 2022-01-26 NOTE — TELEPHONE ENCOUNTER
Physician Orders dated 1/17/23 and P.T. Discharge NOte received and placed in Dr. Napoleon Ortega in basket to review and sign.

## 2022-01-27 NOTE — TELEPHONE ENCOUNTER
Physician order dated 1/17/2022 and PT discharge notes signed and faxed to [facility] at [phone], confirmation received.

## 2022-02-01 ENCOUNTER — TELEPHONE (OUTPATIENT)
Dept: INTERNAL MEDICINE CLINIC | Facility: CLINIC | Age: 87
End: 2022-02-01

## 2022-02-01 NOTE — TELEPHONE ENCOUNTER
Order dated 1/18/22 received for Dr. Annika Wan to review and sign. Order placed in Dr. Maribeth Newman .

## 2022-02-05 NOTE — TELEPHONE ENCOUNTER
Lab results reviewed and initialed by Dr. Noemy Medina, faxed back to [address] [employer]. Fax confirmation received.

## 2022-02-08 ENCOUNTER — TELEPHONE (OUTPATIENT)
Dept: INTERNAL MEDICINE CLINIC | Facility: CLINIC | Age: 87
End: 2022-02-08

## 2022-02-11 NOTE — TELEPHONE ENCOUNTER
[facility].      Fax [phone]

## 2022-03-19 ENCOUNTER — TELEPHONE (OUTPATIENT)
Dept: INTERNAL MEDICINE CLINIC | Facility: CLINIC | Age: 87
End: 2022-03-19

## 2022-03-19 NOTE — TELEPHONE ENCOUNTER
Physician Order dated 1-18-22 received and placed in Dr. Sanam Gallardo in basket to review and sign.

## 2022-04-01 NOTE — TELEPHONE ENCOUNTER
UA results placed in Dr. Maza No desk to review. Sarecycline Counseling: Patient advised regarding possible photosensitivity and discoloration of the teeth, skin, lips, tongue and gums.  Patient instructed to avoid sunlight, if possible.  When exposed to sunlight, patients should wear protective clothing, sunglasses, and sunscreen.  The patient was instructed to call the office immediately if the following severe adverse effects occur:  hearing changes, easy bruising/bleeding, severe headache, or vision changes.  The patient verbalized understanding of the proper use and possible adverse effects of sarecycline.  All of the patient's questions and concerns were addressed.

## 2022-04-14 RX ORDER — LOSARTAN POTASSIUM 25 MG/1
25 TABLET ORAL DAILY
Qty: 90 TABLET | Refills: 0 | Status: SHIPPED | OUTPATIENT
Start: 2022-04-14

## 2022-04-14 NOTE — TELEPHONE ENCOUNTER
CSS=please call and assists patient to schedule for FU OV for further medication refills. Please review; protocol failed/no protocol.      Requested Prescriptions   Pending Prescriptions Disp Refills    LOSARTAN 25 MG Oral Tab [Pharmacy Med Name: losartan 25 mg tablet] 90 tablet 1     Sig: TAKE 1 TABLET BY MOUTH DAILY hold for sbp < 110        Hypertensive Medications Protocol Failed - 4/13/2022 10:54 PM        Failed - CMP or BMP in past 12 months        Failed - Appointment in past 6 or next 3 months        Failed - GFR Non- > 50     Lab Results   Component Value Date    GFRNAA 48 (L) 12/02/2019                        Recent Outpatient Visits              4 months ago Unsteady gait    [address] MD    Telemedicine    11 months ago Essential hypertension    [facility], [address], Doni Lowry MD    Virtual Phone E/M    2 years ago Jaya Sharma MD    Office Visit    2 years ago Recurrent UTI    [facility], [place], [employer], Catarina Farah, [employer] Visit    2 years ago Closed fracture of multiple ribs of left side with routine healing, subsequent encounter    [address] MD    Office Visit

## 2022-04-27 RX ORDER — CARVEDILOL 3.12 MG/1
TABLET ORAL
Qty: 90 TABLET | Refills: 1 | Status: SHIPPED | OUTPATIENT
Start: 2022-04-27

## 2022-04-27 NOTE — TELEPHONE ENCOUNTER
Please review refill protocol failed/ no protocol  Requested Prescriptions   Pending Prescriptions Disp Refills    CARVEDILOL 3.125 MG Oral Tab [Pharmacy Med Name: carvedilol 3.125 mg tablet] 90 tablet 1     Sig: TAKE 1 TABLET BY MOUTH DAILY hold FOR sbp less THEN 100 OR HR LESS THEN 60        Hypertensive Medications Protocol Failed - 4/26/2022  3:48 PM        Failed - CMP or BMP in past 12 months        Failed - GFR Non- > 50     Lab Results   Component Value Date    GFRNAA 48 (L) 12/02/2019                 Passed - Appointment in past 6 or next 3 months

## 2022-04-29 ENCOUNTER — OFFICE VISIT (OUTPATIENT)
Dept: INTERNAL MEDICINE CLINIC | Facility: CLINIC | Age: 87
End: 2022-04-29
Payer: MEDICARE

## 2022-04-29 VITALS
BODY MASS INDEX: 25.38 KG/M2 | HEIGHT: 64 IN | WEIGHT: 148.63 LBS | SYSTOLIC BLOOD PRESSURE: 130 MMHG | HEART RATE: 75 BPM | TEMPERATURE: 98 F | DIASTOLIC BLOOD PRESSURE: 85 MMHG

## 2022-04-29 DIAGNOSIS — D64.9 NORMOCYTIC ANEMIA: ICD-10-CM

## 2022-04-29 DIAGNOSIS — N31.9 NEUROMUSCULAR DYSFUNCTION OF BLADDER: ICD-10-CM

## 2022-04-29 DIAGNOSIS — H91.93 BILATERAL HEARING LOSS, UNSPECIFIED HEARING LOSS TYPE: ICD-10-CM

## 2022-04-29 DIAGNOSIS — N18.31 STAGE 3A CHRONIC KIDNEY DISEASE (HCC): ICD-10-CM

## 2022-04-29 DIAGNOSIS — E11.9 CONTROLLED TYPE 2 DIABETES MELLITUS WITHOUT COMPLICATION, WITHOUT LONG-TERM CURRENT USE OF INSULIN (HCC): ICD-10-CM

## 2022-04-29 DIAGNOSIS — R48.2 GAIT APRAXIA OF ELDERLY: ICD-10-CM

## 2022-04-29 DIAGNOSIS — E83.42 HYPOMAGNESEMIA: ICD-10-CM

## 2022-04-29 DIAGNOSIS — E78.2 MIXED HYPERLIPIDEMIA: ICD-10-CM

## 2022-04-29 DIAGNOSIS — F41.1 ANXIETY STATE: ICD-10-CM

## 2022-04-29 DIAGNOSIS — E55.9 VITAMIN D DEFICIENCY: ICD-10-CM

## 2022-04-29 DIAGNOSIS — Z95.0 S/P PLACEMENT OF CARDIAC PACEMAKER: ICD-10-CM

## 2022-04-29 DIAGNOSIS — I10 ESSENTIAL HYPERTENSION: ICD-10-CM

## 2022-04-29 DIAGNOSIS — F02.80 LATE ONSET ALZHEIMER'S DISEASE WITHOUT BEHAVIORAL DISTURBANCE (HCC): ICD-10-CM

## 2022-04-29 DIAGNOSIS — I50.22 CHRONIC SYSTOLIC HEART FAILURE (HCC): ICD-10-CM

## 2022-04-29 DIAGNOSIS — Z91.81 AT RISK FOR FALLS: ICD-10-CM

## 2022-04-29 DIAGNOSIS — Z86.73 HISTORY OF STROKE: ICD-10-CM

## 2022-04-29 DIAGNOSIS — G30.1 LATE ONSET ALZHEIMER'S DISEASE WITHOUT BEHAVIORAL DISTURBANCE (HCC): ICD-10-CM

## 2022-04-29 DIAGNOSIS — Z87.440 HISTORY OF RECURRENT UTI (URINARY TRACT INFECTION): ICD-10-CM

## 2022-04-29 DIAGNOSIS — Z00.00 MEDICARE ANNUAL WELLNESS VISIT, SUBSEQUENT: Primary | ICD-10-CM

## 2022-04-29 DIAGNOSIS — I49.5 SSS (SICK SINUS SYNDROME) (HCC): ICD-10-CM

## 2022-04-29 DIAGNOSIS — I48.20 CHRONIC A-FIB (HCC): ICD-10-CM

## 2022-04-29 PROCEDURE — G0439 PPPS, SUBSEQ VISIT: HCPCS | Performed by: INTERNAL MEDICINE

## 2022-04-29 RX ORDER — CEPHALEXIN 250 MG/1
250 CAPSULE ORAL 4 TIMES DAILY
COMMUNITY

## 2022-05-01 PROBLEM — E83.42 HYPOMAGNESEMIA: Status: ACTIVE | Noted: 2022-05-01

## 2022-05-01 PROBLEM — I48.91 ATRIAL FIBRILLATION WITH SLOW VENTRICULAR RESPONSE (HCC): Status: RESOLVED | Noted: 2019-08-29 | Resolved: 2022-05-01

## 2022-05-01 PROBLEM — Z86.73 HISTORY OF STROKE: Status: RESOLVED | Noted: 2019-04-27 | Resolved: 2022-05-01

## 2022-05-01 PROBLEM — Z95.0 S/P PLACEMENT OF CARDIAC PACEMAKER: Status: ACTIVE | Noted: 2022-05-01

## 2022-05-01 PROBLEM — Z91.81 AT RISK FOR FALLS: Status: ACTIVE | Noted: 2022-05-01

## 2022-05-01 PROBLEM — I49.5 SSS (SICK SINUS SYNDROME) (HCC): Status: ACTIVE | Noted: 2022-05-01

## 2022-05-01 PROBLEM — Z00.00 MEDICARE ANNUAL WELLNESS VISIT, SUBSEQUENT: Status: ACTIVE | Noted: 2022-05-01

## 2022-05-01 PROBLEM — H91.93 BILATERAL HEARING LOSS: Status: ACTIVE | Noted: 2022-05-01

## 2022-05-01 PROBLEM — Z86.73 HISTORY OF STROKE: Status: ACTIVE | Noted: 2019-04-27

## 2022-05-01 PROBLEM — D64.9 NORMOCYTIC ANEMIA: Status: ACTIVE | Noted: 2022-05-01

## 2022-05-01 PROBLEM — F41.1 ANXIETY STATE: Status: ACTIVE | Noted: 2022-05-01

## 2022-05-02 ENCOUNTER — TELEPHONE (OUTPATIENT)
Dept: INTERNAL MEDICINE CLINIC | Facility: CLINIC | Age: 87
End: 2022-05-02

## 2022-05-05 RX ORDER — FLUOXETINE 10 MG/1
CAPSULE ORAL
Qty: 90 CAPSULE | Refills: 1 | Status: SHIPPED | OUTPATIENT
Start: 2022-05-05 | End: 2023-01-16

## 2022-05-05 RX ORDER — LORATADINE 10 MG/1
10 TABLET ORAL DAILY
Qty: 90 TABLET | Refills: 1 | Status: SHIPPED | OUTPATIENT
Start: 2022-05-05 | End: 2022-11-20

## 2022-05-05 RX ORDER — ASPIRIN 325 MG
325 TABLET, DELAYED RELEASE (ENTERIC COATED) ORAL DAILY
Qty: 90 TABLET | Refills: 1 | Status: SHIPPED | OUTPATIENT
Start: 2022-05-05 | End: 2022-11-20

## 2022-05-05 NOTE — TELEPHONE ENCOUNTER
Refill passed per [facility] protocol.   Requested Prescriptions   Pending Prescriptions Disp Refills    ALLERGY RELIEF 10 MG Oral Tab [Pharmacy Med Name: Allergy Relief (loratadine) 10 mg tablet] 30 tablet 11     Sig: TAKE 1 TABLET BY MOUTH DAILY        Allergy Medication Protocol Passed - 5/5/2022 10:53 AM        Passed - Appointment in past 15 or next 3 months           VITAMIN D3 125 MCG (5000 UT) Oral Cap [Pharmacy Med Name: cholecalciferol (vitamin D3) 125 mcg (5,000 unit) capsule] 90 capsule 11     Sig: TAKE 1 CAPSULE BY MOUTH DAILY        There is no refill protocol information for this order        ASPIRIN 325 MG Oral Tab EC [Pharmacy Med Name: aspirin 325 mg tablet,delayed release] 90 tablet 11     Sig: TAKE 1 TABLET BY MOUTH EVERY DAY        Aspirin Protocol Passed - 5/5/2022 10:53 AM        Passed - Appointment in past 6 or next 3 months           FUROSEMIDE 20 MG Oral Tab [Pharmacy Med Name: furosemide 20 mg tablet] 90 tablet 11     Sig: TAKE 1 TABLET BY MOUTH DAILY        Hypertensive Medications Protocol Failed - 5/5/2022 10:53 AM        Failed - CMP or BMP in past 12 months        Failed - GFR Non- > 50     Lab Results   Component Value Date    GFRNAA 48 (L) 12/02/2019                 Passed - Appointment in past 6 or next 3 months           MAGNESIUM OXIDE 400 (240 Mg) MG Oral Tab [Pharmacy Med Name: magnesium oxide 400 mg (241.3 mg magnesium) tablet] 90 tablet 1     Sig: TAKE 1 TABLET BY MOUTH EVERY DAY        There is no refill protocol information for this order        FLUOXETINE 10 MG Oral Cap [Pharmacy Med Name: fluoxetine 10 mg capsule] 90 capsule 1     Sig: TAKE 1 CAPSULE BY MOUTH EVERY DAY        Psychiatric Non-Scheduled (Anti-Anxiety) Passed - 5/5/2022 10:53 AM        Passed - Appointment in last 6 or next 3 months           EZETIMIBE-SIMVASTATIN 10-10 MG Oral Tab [Pharmacy Med Name: ezetimibe 10 mg-simvastatin 10 mg tablet] 90 tablet 11     Sig: TAKE 1 TABLET BY MOUTH DAILY AT BEDTIME        Cholesterol Medication Protocol Failed - 5/5/2022 10:53 AM        Failed - ALT in past 12 months        Failed - LDL in past 12 months        Failed - Last ALT < 80       Lab Results   Component Value Date    ALT 11 (L) 11/29/2019             Failed - Last LDL < 130     Lab Results   Component Value Date    LDL 46 11/29/2019               Passed - Appointment in past 12 or next 3 months             Recent Outpatient Visits              6 days ago Delmy Hope annual wellness visit, subsequent    [facility], Jacob Urban MD    Office Visit    4 months ago Unsteady gait    [facility], Jacob Urban MD    Telemedicine    1 year ago Essential hypertension    [facility], Jacob Urban MD    Virtual Phone E/M    2 years ago Darío Morrell MD    Office Visit    2 years ago Recurrent UTI    [facility], [address], [employer], Som Tran, [employer]

## 2022-05-05 NOTE — TELEPHONE ENCOUNTER
Please review. Protocol failed / No protocol. Requested Prescriptions   Pending Prescriptions Disp Refills    cholecalciferol (VITAMIN D3) 125 MCG (5000 UT) Oral Cap [Pharmacy Med Name: cholecalciferol (vitamin D3) 125 mcg (5,000 unit) capsule] 90 capsule 1     Sig: Take 1 capsule (5,000 Units total) by mouth daily. There is no refill protocol information for this order        FUROSEMIDE 20 MG Oral Tab [Pharmacy Med Name: furosemide 20 mg tablet] 90 tablet 11     Sig: TAKE 1 TABLET BY MOUTH DAILY        Hypertensive Medications Protocol Failed - 5/5/2022 10:53 AM        Failed - CMP or BMP in past 12 months        Failed - GFR Non- > 50     Lab Results   Component Value Date    GFRNAA 48 (L) 12/02/2019                 Passed - Appointment in past 6 or next 3 months           MAGNESIUM OXIDE 400 (240 Mg) MG Oral Tab [Pharmacy Med Name: magnesium oxide 400 mg (241.3 mg magnesium) tablet] 90 tablet 1     Sig: TAKE 1 TABLET BY MOUTH EVERY DAY        There is no refill protocol information for this order        ezetimibe-simvastatin 10-10 MG Oral Tab [Pharmacy Med Name: ezetimibe 10 mg-simvastatin 10 mg tablet] 90 tablet 1     Sig: Take 1 tablet by mouth nightly. Cholesterol Medication Protocol Failed - 5/5/2022 10:53 AM        Failed - ALT in past 12 months        Failed - LDL in past 12 months        Failed - Last ALT < 80       Lab Results   Component Value Date    ALT 11 (L) 11/29/2019             Failed - Last LDL < 130     Lab Results   Component Value Date    LDL 46 11/29/2019               Passed - Appointment in past 12 or next 3 months          Signed Prescriptions Disp Refills    loratadine (ALLERGY RELIEF) 10 MG Oral Tab 90 tablet 1     Sig: Take 1 tablet (10 mg total) by mouth daily.         Allergy Medication Protocol Passed - 5/5/2022 10:53 AM        Passed - Appointment in past 12 or next 3 months           aspirin 325 MG Oral Tab EC 90 tablet 1     Sig: Take 1 tablet (325 mg total) by mouth daily.         Aspirin Protocol Passed - 5/5/2022 10:53 AM        Passed - Appointment in past 6 or next 3 months           FLUOXETINE 10 MG Oral Cap 90 capsule 1     Sig: TAKE 1 CAPSULE BY MOUTH EVERY DAY        Psychiatric Non-Scheduled (Anti-Anxiety) Passed - 5/5/2022 10:53 AM        Passed - Appointment in last 6 or next 3 months             Recent Outpatient Visits              6 days ago Delmy Hope annual wellness visit, subsequent    [address], Oleg Limon MD    Office Visit    4 months ago Unsteady gait    [address], Jaylen Delgado MD    Telemedicine    1 year ago Essential hypertension    [address], Oleg Limon MD    Virtual Phone E/M    2 years ago Bryce Trujillo MD    Office Visit    2 years ago Recurrent UTI    [facility], [address], [employer], Lydia Barker, [employer]

## 2022-05-24 ENCOUNTER — TELEPHONE (OUTPATIENT)
Dept: INTERNAL MEDICINE CLINIC | Facility: CLINIC | Age: 87
End: 2022-05-24

## 2022-05-24 ENCOUNTER — NURSE TRIAGE (OUTPATIENT)
Dept: INTERNAL MEDICINE CLINIC | Facility: CLINIC | Age: 87
End: 2022-05-24

## 2022-05-24 DIAGNOSIS — R26.81 UNSTEADY GAIT: Primary | ICD-10-CM

## 2022-05-24 DIAGNOSIS — R35.0 URINARY FREQUENCY: Primary | ICD-10-CM

## 2022-05-24 NOTE — TELEPHONE ENCOUNTER
Labs faxed via [employer]ax to number listed below. Spoke to Girish, patient's daughter,  (on GIBRAN, verified patient's name and ). Relayed that orders were placed and faxed to requested facility. She verbalized understanding.

## 2022-05-24 NOTE — TELEPHONE ENCOUNTER
Patients daughter is requesting referral for physical therapy. Would like it to go to patients facility - [facility]. Daughter did not have name of provider to give. Reason- patient is stumbling.

## 2022-05-24 NOTE — TELEPHONE ENCOUNTER
pt daughter is wanting a order for p tto have physical therapy. Please see first message below. Marily Parisi is calling and she will like for the Physical therapy order to be  Faxed  #- [phone] Attention to Meli  Reason for the request pt is stumbling.

## 2022-05-25 ENCOUNTER — MED REC SCAN ONLY (OUTPATIENT)
Dept: INTERNAL MEDICINE CLINIC | Facility: CLINIC | Age: 87
End: 2022-05-25

## 2022-05-25 ENCOUNTER — TELEPHONE (OUTPATIENT)
Dept: INTERNAL MEDICINE CLINIC | Facility: CLINIC | Age: 87
End: 2022-05-25

## 2022-05-25 NOTE — TELEPHONE ENCOUNTER
Annual medical survey for pt received from [facility], placed on Dr Don haas for review and signature.

## 2022-05-25 NOTE — TELEPHONE ENCOUNTER
Face to face encounter has not been received, it can be faxed to [phone]. Annual exam assessment received only from [facility].

## 2022-05-25 NOTE — TELEPHONE ENCOUNTER
Medicare wellness visit 4/29/2022 faxed to [address] at [phone], confirmation received.

## 2022-05-25 NOTE — TELEPHONE ENCOUNTER
Annual medical survey signed and faxed to [facility] at [phone], confirmation received.

## 2022-05-28 ENCOUNTER — TELEPHONE (OUTPATIENT)
Dept: INTERNAL MEDICINE CLINIC | Facility: CLINIC | Age: 87
End: 2022-05-28

## 2022-05-28 NOTE — TELEPHONE ENCOUNTER
Lab results from [facility] received from [address] placed in Dr. Lanie Arias in-basket to review.

## 2022-06-11 ENCOUNTER — TELEPHONE (OUTPATIENT)
Dept: INTERNAL MEDICINE CLINIC | Facility: CLINIC | Age: 87
End: 2022-06-11

## 2022-06-11 NOTE — TELEPHONE ENCOUNTER
Urine Culture Results and other lab results reviewed by Dr. Ness Hdez, faxed back and confirmed sent.

## 2022-06-17 ENCOUNTER — TELEPHONE (OUTPATIENT)
Dept: INTERNAL MEDICINE CLINIC | Facility: CLINIC | Age: 87
End: 2022-06-17

## 2022-06-21 ENCOUNTER — TELEPHONE (OUTPATIENT)
Dept: INTERNAL MEDICINE CLINIC | Facility: CLINIC | Age: 87
End: 2022-06-21

## 2022-06-22 NOTE — TELEPHONE ENCOUNTER
Kimberley's order dated 5/26/2022 signed and faxed to [address] [place] [address] at [phone], confirmation received.

## 2022-06-24 ENCOUNTER — TELEPHONE (OUTPATIENT)
Dept: INTERNAL MEDICINE CLINIC | Facility: CLINIC | Age: 87
End: 2022-06-24

## 2022-06-24 NOTE — TELEPHONE ENCOUNTER
Order dated 6/22/22 and Patient Discharge Note received and placed in Dr. Charlee Gray in basket to review and sign.

## 2022-07-06 RX ORDER — EZETIMIBE AND SIMVASTATIN 10; 10 MG/1; MG/1
1 TABLET ORAL NIGHTLY
Qty: 90 TABLET | Refills: 1 | OUTPATIENT
Start: 2022-07-06

## 2022-07-06 RX ORDER — MELATONIN
Qty: 90 TABLET | Refills: 1 | OUTPATIENT
Start: 2022-07-06

## 2022-07-06 RX ORDER — EZETIMIBE AND SIMVASTATIN 10; 10 MG/1; MG/1
1 TABLET ORAL NIGHTLY
Qty: 90 TABLET | Refills: 0 | Status: SHIPPED | OUTPATIENT
Start: 2022-07-06

## 2022-07-06 RX ORDER — FUROSEMIDE 20 MG/1
20 TABLET ORAL DAILY
Qty: 90 TABLET | Refills: 1 | OUTPATIENT
Start: 2022-07-06

## 2022-07-06 RX ORDER — MAG HYDROX/ALUMINUM HYD/SIMETH 400-400-40
5000 SUSPENSION, ORAL (FINAL DOSE FORM) ORAL DAILY
Qty: 90 CAPSULE | Refills: 0 | Status: SHIPPED | OUTPATIENT
Start: 2022-07-06

## 2022-07-06 RX ORDER — FUROSEMIDE 20 MG/1
20 TABLET ORAL DAILY
Qty: 90 TABLET | Refills: 0 | Status: SHIPPED | OUTPATIENT
Start: 2022-07-06

## 2022-07-09 ENCOUNTER — TELEPHONE (OUTPATIENT)
Dept: INTERNAL MEDICINE CLINIC | Facility: CLINIC | Age: 87
End: 2022-07-09

## 2022-07-09 NOTE — TELEPHONE ENCOUNTER
Physician orders dated 6/23/22, 9:37 am received and placed in Dr. Cathryn Martinez in basket to review and sign.

## 2022-07-14 ENCOUNTER — TELEPHONE (OUTPATIENT)
Dept: INTERNAL MEDICINE CLINIC | Facility: CLINIC | Age: 87
End: 2022-07-14

## 2022-07-14 NOTE — TELEPHONE ENCOUNTER
Per Rick Burr patient is gaging on mucous. Per daughter Rick Burr patient was dry heaving, gaging and spitting up clear thick mucous. Rick Burr states the nurse told her patient has a swallowing problem. Rick Burr is requesting a swallow eval. Nurse at facility is asking the order be faxed to dean Calix at [facility] 3rd floor [phone]. Please advise.

## 2022-07-14 NOTE — TELEPHONE ENCOUNTER
Letter for swallow evaluation faxed to [address] [place] [address] at [phone], confirmation received.

## 2022-07-14 NOTE — TELEPHONE ENCOUNTER
Daughter advised of Dr Cassie Lancaster note and that orders were faxed to [facility]. Daughter indicated that her whole family seems to be having similar symptoms with clear phlegm in the back of the throat that they seem to be choking on. Daughter not sure if patient should take an expectorant or antihistamine to see if it would help, but also wanted doctor to be aware.

## 2022-07-15 NOTE — TELEPHONE ENCOUNTER
Spoke with daughter relayed PCP message, she verbalized understanding. Per daughter she may take patient to the IC if s/sx do not improve.

## 2022-07-27 ENCOUNTER — TELEPHONE (OUTPATIENT)
Dept: INTERNAL MEDICINE CLINIC | Facility: CLINIC | Age: 87
End: 2022-07-27

## 2022-07-27 NOTE — TELEPHONE ENCOUNTER
Home Health Certification and Plan of Care dated [facility] 7-15-22 to 7-15-22 received and placed in Dr. Arthur Gilliland in basket to review and sign.

## 2022-07-29 ENCOUNTER — TELEPHONE (OUTPATIENT)
Dept: INTERNAL MEDICINE CLINIC | Facility: CLINIC | Age: 87
End: 2022-07-29

## 2022-07-29 NOTE — TELEPHONE ENCOUNTER
Medication List, Speech Therapy, Speech Therapy Evaluation, and Speech Therapy Plan of Care received and placed in Dr. Will Buitrago in basket to review and sign.

## 2022-08-04 ENCOUNTER — TELEPHONE (OUTPATIENT)
Dept: INTERNAL MEDICINE CLINIC | Facility: CLINIC | Age: 87
End: 2022-08-04

## 2022-08-04 NOTE — TELEPHONE ENCOUNTER
Spoke with Dominguez Julio from [address] [place] [address] who states she received order for speech therapy on 7/13/2022, pt started therapy on 7/15/2022 and needs face to face progress notes within the last 30 days. Informed Dominguez Julio that pt is staying at [facility] and a video visit can be completed, Dominguez Julio verbalized understanding and states that will be fine. Spoke with Deyanira Gant nurse at [facility] [phone], scheduled video visit on 8/9/2022 at 4pm for pt.

## 2022-08-04 NOTE — TELEPHONE ENCOUNTER
Dami Jackson with [address] is requesting a copy of the most recent visit with the patient at [facility].  Please fax [phone]

## 2022-08-04 NOTE — TELEPHONE ENCOUNTER
Pt's daughter Anthony Mckee states that she will not be available at the time of video visit due to having surgery and that the nurse from [facility] will call to provide phone number where link can be sent.

## 2022-08-09 ENCOUNTER — TELEMEDICINE (OUTPATIENT)
Dept: INTERNAL MEDICINE CLINIC | Facility: CLINIC | Age: 87
End: 2022-08-09
Payer: MEDICARE

## 2022-08-09 DIAGNOSIS — R29.6 RECURRENT FALLS: ICD-10-CM

## 2022-08-09 DIAGNOSIS — R48.2 GAIT APRAXIA OF ELDERLY: ICD-10-CM

## 2022-08-09 DIAGNOSIS — R13.12 OROPHARYNGEAL DYSPHAGIA: Primary | ICD-10-CM

## 2022-08-09 PROCEDURE — 99213 OFFICE O/P EST LOW 20 MIN: CPT | Performed by: INTERNAL MEDICINE

## 2022-08-12 ENCOUNTER — TELEPHONE (OUTPATIENT)
Dept: INTERNAL MEDICINE CLINIC | Facility: CLINIC | Age: 87
End: 2022-08-12

## 2022-08-12 RX ORDER — FUROSEMIDE 20 MG/1
TABLET ORAL
Qty: 90 TABLET | Refills: 0 | Status: SHIPPED | OUTPATIENT
Start: 2022-08-12

## 2022-08-12 RX ORDER — EZETIMIBE AND SIMVASTATIN 10; 10 MG/1; MG/1
TABLET ORAL
Qty: 90 TABLET | Refills: 0 | Status: SHIPPED | OUTPATIENT
Start: 2022-08-12

## 2022-08-12 RX ORDER — MAG HYDROX/ALUMINUM HYD/SIMETH 400-400-40
SUSPENSION, ORAL (FINAL DOSE FORM) ORAL
Qty: 90 CAPSULE | Refills: 0 | Status: SHIPPED | OUTPATIENT
Start: 2022-08-12

## 2022-08-23 ENCOUNTER — TELEPHONE (OUTPATIENT)
Dept: INTERNAL MEDICINE CLINIC | Facility: CLINIC | Age: 87
End: 2022-08-23

## 2022-08-23 NOTE — TELEPHONE ENCOUNTER
Please review; Protocol Failed / No protocol.     Requested Prescriptions   Pending Prescriptions Disp Refills    MAGNESIUM OXIDE 400 (240 Mg) MG Oral Tab [Pharmacy Med Name: magnesium oxide 400 mg (241.3 mg magnesium) tablet] 90 tablet 1     Sig: TAKE 1 TABLET BY MOUTH EVERY DAY        There is no refill protocol information for this order            Recent Outpatient Visits              2 weeks ago Oropharyngeal dysphagia    [facility], Rissace Kaur, Jaylen Cruz MD    Telemedicine    3 months ago Medicare annual wellness visit, subsequent    [facility], Kenya Kaur, Harini Hernandez MD    Office Visit    8 months ago Unsteady gait    [facility], Kenya Kaur, Harini Hernandez MD    Telemedicine    1 year ago Essential hypertension    [facility], Rissace Kaur, Harini Hernandez MD    Virtual Phone E/M    2 years ago Kenya Polanco, Harini Hernandez MD    Office Visit

## 2022-08-23 NOTE — TELEPHONE ENCOUNTER
Physician telephone order for u/a order received from [facility] assisted living, will place on Dr. Michaelle Gonzales for signature and review.

## 2022-08-24 ENCOUNTER — TELEPHONE (OUTPATIENT)
Dept: INTERNAL MEDICINE CLINIC | Facility: CLINIC | Age: 87
End: 2022-08-24

## 2022-08-24 RX ORDER — MELATONIN
Qty: 90 TABLET | Refills: 1 | Status: SHIPPED | OUTPATIENT
Start: 2022-08-24

## 2022-08-24 NOTE — TELEPHONE ENCOUNTER
Occupational Therapy Initial Evaluation order received and placed in Dr. Lanie Arias in- basket to complete.

## 2022-08-24 NOTE — TELEPHONE ENCOUNTER
Spoke with Sarah NEFF of [facility]. Reports patient's mobility is declining. She is reluctant to get out of bed, unable to ambulate, transfer with max assist. She is lethargic. Her left leg is weak. Denies facial drooping, left hand  is strong. /58, 84, 97.4, 93%, 18.  States she faxed abnormal UA. I advised her to send patient to ER to evaluate acute changes.   Dr. Mo Prado

## 2022-08-25 NOTE — TELEPHONE ENCOUNTER
Dr. Agustin Sons -   1. she is doing better today, want to hold off on ER for now. Any other recommendations? [facility] faxing recent UA today, please advise on results, if this might be affecting her status    [employer] - Please look out for Fax, Labs form [facility] ()    Chart reveiwed - no ER visit. RN called Kellie Nelson of [employer]. Patient's date of birth and full name both confirmed. She had advised patient's POA about ER disposition, however POA wanted to wait until today to see if patient's status improves. Sarah, TAWANDA says, patient doing better today. Walking normal again with physical therapy. Has energy for ADLs. So they are asking if there's other recommendations other than ER.

## 2022-08-25 NOTE — TELEPHONE ENCOUNTER
Her urine culture was negative so no uti even though ua was abnormal.   We dont have explanation for her symptoms yesterday. My concern is if that was a TIA given her neurologic symptoms described yesterday even though er symptoms are better today. I would still want her to be evaluated in ER today for eval for possible TIA.

## 2022-08-25 NOTE — TELEPHONE ENCOUNTER
received from [facility] U/A results, left on Dr. Matty haas for signature and review.

## 2022-08-25 NOTE — TELEPHONE ENCOUNTER
Attempted to call [facility] twice.  Phone rings, then goes to busy signal

## 2022-08-25 NOTE — TELEPHONE ENCOUNTER
Spoke to TAWANDA Pendleton and advised her of 's note below. TAWANDA Smith states she will contact patient's POA.

## 2022-08-27 RX ORDER — LOSARTAN POTASSIUM 25 MG/1
25 TABLET ORAL DAILY
Qty: 90 TABLET | Refills: 1 | Status: SHIPPED | OUTPATIENT
Start: 2022-08-27

## 2022-08-27 NOTE — TELEPHONE ENCOUNTER
Refill passed per [address] protocol.- see scanned labs dated 22  Requested Prescriptions   Pending Prescriptions Disp Refills    LOSARTAN 25 MG Oral Tab [Pharmacy Med Name: losartan 25 mg tablet] 90 tablet 0     Sig: Take 1 tablet (25 mg total) by mouth daily. Hold for blood pressure  less than 110        Hypertensive Medications Protocol Failed - 2022  8:45 AM        Failed - CMP or BMP in past 6 months     No results found for this or any previous visit (from the past 4392 hour(s)).               Failed - GFR > 50     No results found for: [facility]              Passed - In person appointment in the past 12 or next 3 months       Recent Outpatient Visits              2 weeks ago Oropharyngeal dysphagia    [address] MD    Telemedicine    4 months ago [employer] annual wellness visit, subsequent    [address] Remer Natal, MD    Office Visit    8 months ago [facility] [address] Remer Natal, MD    Telemedicine    1 year ago Essential hypertension    [address] Remer Natal, MD    Virtual Phone E/M    2 years ago Cough    [address] Remer Natal, MD    Office Visit                 Passed - Last BP reading less than 140/90     BP Readings from Last 1 Encounters:  22 : 130/85                Passed - In person appointment or virtual visit in the past 6 months       Recent Outpatient Visits              2 weeks ago Oropharyngeal dysphagia    [address] Remer Natal, MD    Telemedicine    4 months ago Medicare annual wellness visit, subsequent    [address] Remer Natal, MD    Office Visit    8 months ago [facility] [address] Remer Natal, MD    Telemedicine    1 year ago Essential hypertension [address] MD    Virtual Phone E/M    2 years ago Cough    [address], Toya Carrillo MD    Office Visit                       Recent Outpatient Visits              2 weeks ago Oropharyngeal dysphagia    [address] MD    Telemedicine    4 months ago Medicare annual wellness visit, subsequent    [address], Toya Carrillo MD    Office Visit    8 months ago Unsteady gait    [address], Toya Carrillo MD    Telemedicine    1 year ago Essential hypertension    [address], Toya Carrillo MD    Virtual Phone E/M    2 years ago Toya Prather MD    Office Visit

## 2022-08-31 ENCOUNTER — TELEPHONE (OUTPATIENT)
Dept: INTERNAL MEDICINE CLINIC | Facility: CLINIC | Age: 87
End: 2022-08-31

## 2022-08-31 NOTE — TELEPHONE ENCOUNTER
Physician orders 8/11/022  from [facility] for PT, left on Dr. Anne-Marie haas for signature and review.

## 2022-09-01 ENCOUNTER — TELEPHONE (OUTPATIENT)
Dept: INTERNAL MEDICINE CLINIC | Facility: CLINIC | Age: 87
End: 2022-09-01

## 2022-09-01 NOTE — TELEPHONE ENCOUNTER
Physician orders dated 8/11/2022 for PT signed and faxed to [address] [place] [address] at [phone], confirmation received.

## 2022-09-01 NOTE — TELEPHONE ENCOUNTER
Paged on call by Ashly Phillips. Wants labs. Patient has been falling. Periods of confusion. Per marilee, \"Very significant decline in strength and neurological function\". Urinalysis negative. Falls started beginning of august, last fall this AM. Patient on asa+ plavix. Advised I can not evaluate patient and given what Marilee informs me via phone, I have asked her to call 911 and transport patient to ER to rule out acute trauma from today's fall as well as any prior injuries and causes of her current symptoms. There is mention of dysphagia per PCP, thus would benefit from aspiration pna rule out. Horace Bishop states she will contact family, I advised 911 if patient is having acute changes and she wants labs; code status states full code- if stroke needs to be evaluated asap- she again states she will contact family and hung up.

## 2022-09-02 ENCOUNTER — APPOINTMENT (OUTPATIENT)
Dept: CT IMAGING | Facility: HOSPITAL | Age: 87
End: 2022-09-02
Attending: EMERGENCY MEDICINE
Payer: MEDICARE

## 2022-09-02 ENCOUNTER — APPOINTMENT (OUTPATIENT)
Dept: GENERAL RADIOLOGY | Facility: HOSPITAL | Age: 87
End: 2022-09-02
Attending: EMERGENCY MEDICINE
Payer: MEDICARE

## 2022-09-02 ENCOUNTER — HOSPITAL ENCOUNTER (EMERGENCY)
Facility: HOSPITAL | Age: 87
Discharge: HOME | End: 2022-09-02
Attending: EMERGENCY MEDICINE
Payer: MEDICARE

## 2022-09-02 VITALS
WEIGHT: 145 LBS | HEART RATE: 63 BPM | RESPIRATION RATE: 20 BRPM | DIASTOLIC BLOOD PRESSURE: 81 MMHG | BODY MASS INDEX: 25 KG/M2 | OXYGEN SATURATION: 94 % | TEMPERATURE: 98 F | SYSTOLIC BLOOD PRESSURE: 169 MMHG

## 2022-09-02 DIAGNOSIS — N30.00 ACUTE CYSTITIS WITHOUT HEMATURIA: Primary | ICD-10-CM

## 2022-09-02 LAB
ANION GAP SERPL CALC-SCNC: 8 MMOL/L (ref 0–18)
BASOPHILS # BLD AUTO: 0.07 X10(3) UL (ref 0–0.2)
BASOPHILS NFR BLD AUTO: 0.8 %
BILIRUB UR QL: NEGATIVE
BUN BLD-MCNC: 25 MG/DL (ref 7–18)
BUN/CREAT SERPL: 23.4 (ref 10–20)
CALCIUM BLD-MCNC: 9.1 MG/DL (ref 8.5–10.1)
CHLORIDE SERPL-SCNC: 102 MMOL/L (ref 98–112)
CO2 SERPL-SCNC: 27 MMOL/L (ref 21–32)
COLOR UR: YELLOW
CREAT BLD-MCNC: 1.07 MG/DL
DEPRECATED RDW RBC AUTO: 44.8 FL (ref 35.1–46.3)
EOSINOPHIL # BLD AUTO: 0.26 X10(3) UL (ref 0–0.7)
EOSINOPHIL NFR BLD AUTO: 3 %
ERYTHROCYTE [DISTWIDTH] IN BLOOD BY AUTOMATED COUNT: 12.4 % (ref 11–15)
GFR SERPLBLD BASED ON 1.73 SQ M-ARVRAT: 50 ML/MIN/1.73M2 (ref 60–?)
GLUCOSE BLD-MCNC: 179 MG/DL (ref 70–99)
GLUCOSE UR-MCNC: NEGATIVE MG/DL
HCT VFR BLD AUTO: 42.4 %
HGB BLD-MCNC: 13.2 G/DL
IMM GRANULOCYTES # BLD AUTO: 0.02 X10(3) UL (ref 0–1)
IMM GRANULOCYTES NFR BLD: 0.2 %
KETONES UR-MCNC: NEGATIVE MG/DL
LYMPHOCYTES # BLD AUTO: 1.16 X10(3) UL (ref 1–4)
LYMPHOCYTES NFR BLD AUTO: 13.2 %
MCH RBC QN AUTO: 30.7 PG (ref 26–34)
MCHC RBC AUTO-ENTMCNC: 31.1 G/DL (ref 31–37)
MCV RBC AUTO: 98.6 FL
MONOCYTES # BLD AUTO: 1.07 X10(3) UL (ref 0.1–1)
MONOCYTES NFR BLD AUTO: 12.2 %
NEUTROPHILS # BLD AUTO: 6.18 X10 (3) UL (ref 1.5–7.7)
NEUTROPHILS # BLD AUTO: 6.18 X10(3) UL (ref 1.5–7.7)
NEUTROPHILS NFR BLD AUTO: 70.6 %
NITRITE UR QL STRIP.AUTO: POSITIVE
OSMOLALITY SERPL CALC.SUM OF ELEC: 293 MOSM/KG (ref 275–295)
PH UR: 5 [PH] (ref 5–8)
PLATELET # BLD AUTO: 225 10(3)UL (ref 150–450)
POTASSIUM SERPL-SCNC: 4.3 MMOL/L (ref 3.5–5.1)
RBC # BLD AUTO: 4.3 X10(6)UL
RBC #/AREA URNS AUTO: >10 /HPF
RBC #/AREA URNS AUTO: >10 /HPF
SODIUM SERPL-SCNC: 137 MMOL/L (ref 136–145)
SP GR UR STRIP: 1.02 (ref 1–1.03)
TROPONIN I HIGH SENSITIVITY: 24 NG/L
UROBILINOGEN UR STRIP-ACNC: 0.2
WBC # BLD AUTO: 8.8 X10(3) UL (ref 4–11)
WBC #/AREA URNS AUTO: >50 /HPF
WBC #/AREA URNS AUTO: >50 /HPF
WBC CLUMPS UR QL AUTO: PRESENT /HPF
WBC CLUMPS UR QL AUTO: PRESENT /HPF

## 2022-09-02 PROCEDURE — 87086 URINE CULTURE/COLONY COUNT: CPT | Performed by: EMERGENCY MEDICINE

## 2022-09-02 PROCEDURE — 93005 ELECTROCARDIOGRAM TRACING: CPT

## 2022-09-02 PROCEDURE — 96365 THER/PROPH/DIAG IV INF INIT: CPT

## 2022-09-02 PROCEDURE — 70450 CT HEAD/BRAIN W/O DYE: CPT | Performed by: EMERGENCY MEDICINE

## 2022-09-02 PROCEDURE — 99284 EMERGENCY DEPT VISIT MOD MDM: CPT

## 2022-09-02 PROCEDURE — 80048 BASIC METABOLIC PNL TOTAL CA: CPT | Performed by: EMERGENCY MEDICINE

## 2022-09-02 PROCEDURE — 85025 COMPLETE CBC W/AUTO DIFF WBC: CPT | Performed by: EMERGENCY MEDICINE

## 2022-09-02 PROCEDURE — 84484 ASSAY OF TROPONIN QUANT: CPT | Performed by: EMERGENCY MEDICINE

## 2022-09-02 PROCEDURE — 99285 EMERGENCY DEPT VISIT HI MDM: CPT

## 2022-09-02 PROCEDURE — 71045 X-RAY EXAM CHEST 1 VIEW: CPT | Performed by: EMERGENCY MEDICINE

## 2022-09-02 PROCEDURE — 93010 ELECTROCARDIOGRAM REPORT: CPT | Performed by: EMERGENCY MEDICINE

## 2022-09-02 PROCEDURE — 81015 MICROSCOPIC EXAM OF URINE: CPT | Performed by: EMERGENCY MEDICINE

## 2022-09-02 PROCEDURE — 81001 URINALYSIS AUTO W/SCOPE: CPT | Performed by: EMERGENCY MEDICINE

## 2022-09-02 RX ORDER — CEPHALEXIN 500 MG/1
500 CAPSULE ORAL 2 TIMES DAILY
Qty: 14 CAPSULE | Refills: 0 | Status: SHIPPED | OUTPATIENT
Start: 2022-09-02 | End: 2022-09-09

## 2022-09-02 NOTE — TELEPHONE ENCOUNTER
Patient's daughter, Milton Mcleod calling (name and , GIBRAN verified) to notify us that the patient is in the ER for evaluation right now. Family was instructed to discuss the plan of care with ER Physician. Family was also instructed that the PCP will receive an after care summary after the ER visit.

## 2022-09-02 NOTE — ED INITIAL ASSESSMENT (HPI)
Per daughter patient is a shazia, patient has had more trouble walking since last night, states she has discoloration to her urine as well, denies pain

## 2022-09-06 ENCOUNTER — TELEPHONE (OUTPATIENT)
Dept: INTERNAL MEDICINE CLINIC | Facility: CLINIC | Age: 87
End: 2022-09-06

## 2022-09-06 NOTE — TELEPHONE ENCOUNTER
[facility] faxed over labs and summary/ct and xray and they are requesting signature. Placed on Dr. Matty Henderson desk for review and signature.

## 2022-09-08 NOTE — TELEPHONE ENCOUNTER
Faxed over signed paperwork for [facility], confirmation received.

## 2022-09-20 ENCOUNTER — TELEPHONE (OUTPATIENT)
Dept: INTERNAL MEDICINE CLINIC | Facility: CLINIC | Age: 87
End: 2022-09-20

## 2022-09-20 ENCOUNTER — HOSPITAL ENCOUNTER (INPATIENT)
Facility: HOSPITAL | Age: 87
LOS: 3 days | Discharge: HOME HEALTH | End: 2022-09-23
Attending: EMERGENCY MEDICINE | Admitting: HOSPITALIST
Payer: MEDICARE

## 2022-09-20 ENCOUNTER — APPOINTMENT (OUTPATIENT)
Dept: GENERAL RADIOLOGY | Facility: HOSPITAL | Age: 87
End: 2022-09-20
Attending: EMERGENCY MEDICINE
Payer: MEDICARE

## 2022-09-20 ENCOUNTER — HOSPITAL ENCOUNTER (INPATIENT)
Facility: HOSPITAL | Age: 87
LOS: 3 days | Discharge: NURSING, REHAB OR LONG-TERM CARE | End: 2022-09-23
Attending: EMERGENCY MEDICINE | Admitting: HOSPITALIST
Payer: MEDICARE

## 2022-09-20 DIAGNOSIS — R53.1 WEAKNESS GENERALIZED: ICD-10-CM

## 2022-09-20 DIAGNOSIS — N17.9 AKI (ACUTE KIDNEY INJURY) (HCC): ICD-10-CM

## 2022-09-20 DIAGNOSIS — N30.00 ACUTE CYSTITIS WITHOUT HEMATURIA: Primary | ICD-10-CM

## 2022-09-20 LAB
ALBUMIN SERPL-MCNC: 3.6 G/DL (ref 3.4–5)
ALP LIVER SERPL-CCNC: 85 U/L
ALT SERPL-CCNC: 13 U/L
ANION GAP SERPL CALC-SCNC: 9 MMOL/L (ref 0–18)
AST SERPL-CCNC: 12 U/L (ref 15–37)
BASOPHILS # BLD AUTO: 0.06 X10(3) UL (ref 0–0.2)
BASOPHILS NFR BLD AUTO: 0.6 %
BILIRUB DIRECT SERPL-MCNC: 0.2 MG/DL (ref 0–0.2)
BILIRUB SERPL-MCNC: 0.6 MG/DL (ref 0.1–2)
BILIRUB UR QL CFM: NEGATIVE
BUN BLD-MCNC: 20 MG/DL (ref 7–18)
BUN/CREAT SERPL: 16.4 (ref 10–20)
CALCIUM BLD-MCNC: 8.9 MG/DL (ref 8.5–10.1)
CHLORIDE SERPL-SCNC: 100 MMOL/L (ref 98–112)
CO2 SERPL-SCNC: 24 MMOL/L (ref 21–32)
COLOR UR: YELLOW
CREAT BLD-MCNC: 1.22 MG/DL
DEPRECATED RDW RBC AUTO: 46 FL (ref 35.1–46.3)
EOSINOPHIL # BLD AUTO: 0.34 X10(3) UL (ref 0–0.7)
EOSINOPHIL NFR BLD AUTO: 3.3 %
ERYTHROCYTE [DISTWIDTH] IN BLOOD BY AUTOMATED COUNT: 13 % (ref 11–15)
EST. AVERAGE GLUCOSE BLD GHB EST-MCNC: 151 MG/DL (ref 68–126)
GFR SERPLBLD BASED ON 1.73 SQ M-ARVRAT: 43 ML/MIN/1.73M2 (ref 60–?)
GLUCOSE BLD-MCNC: 185 MG/DL (ref 70–99)
GLUCOSE BLDC GLUCOMTR-MCNC: 141 MG/DL (ref 70–99)
GLUCOSE BLDC GLUCOMTR-MCNC: 188 MG/DL (ref 70–99)
GLUCOSE UR-MCNC: NEGATIVE MG/DL
HBA1C MFR BLD: 6.9 % (ref ?–5.7)
HCT VFR BLD AUTO: 41 %
HGB BLD-MCNC: 13.2 G/DL
IMM GRANULOCYTES # BLD AUTO: 0.04 X10(3) UL (ref 0–1)
IMM GRANULOCYTES NFR BLD: 0.4 %
LYMPHOCYTES # BLD AUTO: 0.89 X10(3) UL (ref 1–4)
LYMPHOCYTES NFR BLD AUTO: 8.8 %
MCH RBC QN AUTO: 31.1 PG (ref 26–34)
MCHC RBC AUTO-ENTMCNC: 32.2 G/DL (ref 31–37)
MCV RBC AUTO: 96.5 FL
MONOCYTES # BLD AUTO: 1 X10(3) UL (ref 0.1–1)
MONOCYTES NFR BLD AUTO: 9.8 %
NEUTROPHILS # BLD AUTO: 7.83 X10 (3) UL (ref 1.5–7.7)
NEUTROPHILS # BLD AUTO: 7.83 X10(3) UL (ref 1.5–7.7)
NEUTROPHILS NFR BLD AUTO: 77.1 %
NITRITE UR QL STRIP.AUTO: NEGATIVE
OSMOLALITY SERPL CALC.SUM OF ELEC: 283 MOSM/KG (ref 275–295)
PH UR: 6 [PH] (ref 5–8)
PLATELET # BLD AUTO: 239 10(3)UL (ref 150–450)
POTASSIUM SERPL-SCNC: 4.2 MMOL/L (ref 3.5–5.1)
PROCALCITONIN SERPL-MCNC: 0.04 NG/ML (ref ?–0.16)
PROT SERPL-MCNC: 7.4 G/DL (ref 6.4–8.2)
RBC # BLD AUTO: 4.25 X10(6)UL
RBC #/AREA URNS AUTO: >10 /HPF
RBC #/AREA URNS AUTO: >10 /HPF
SARS-COV-2 RNA RESP QL NAA+PROBE: NOT DETECTED
SODIUM SERPL-SCNC: 133 MMOL/L (ref 136–145)
SP GR UR STRIP: 1.02 (ref 1–1.03)
UROBILINOGEN UR STRIP-ACNC: 1
WBC # BLD AUTO: 10.2 X10(3) UL (ref 4–11)
WBC #/AREA URNS AUTO: >50 /HPF
WBC #/AREA URNS AUTO: >50 /HPF
WBC CLUMPS UR QL AUTO: PRESENT /HPF
WBC CLUMPS UR QL AUTO: PRESENT /HPF

## 2022-09-20 PROCEDURE — 99223 1ST HOSP IP/OBS HIGH 75: CPT | Performed by: HOSPITALIST

## 2022-09-20 PROCEDURE — 71045 X-RAY EXAM CHEST 1 VIEW: CPT | Performed by: EMERGENCY MEDICINE

## 2022-09-20 RX ORDER — NICOTINE POLACRILEX 4 MG
30 LOZENGE BUCCAL
Status: DISCONTINUED | OUTPATIENT
Start: 2022-09-20 | End: 2022-09-23

## 2022-09-20 RX ORDER — BETHANECHOL CHLORIDE 5 MG
10 TABLET ORAL DAILY
Status: DISCONTINUED | OUTPATIENT
Start: 2022-09-20 | End: 2022-09-23

## 2022-09-20 RX ORDER — MECLIZINE HCL 12.5 MG/1
12.5 TABLET ORAL DAILY PRN
Status: DISCONTINUED | OUTPATIENT
Start: 2022-09-20 | End: 2022-09-23

## 2022-09-20 RX ORDER — ASPIRIN 325 MG
325 TABLET, DELAYED RELEASE (ENTERIC COATED) ORAL DAILY
Status: DISCONTINUED | OUTPATIENT
Start: 2022-09-20 | End: 2022-09-23

## 2022-09-20 RX ORDER — FLUOXETINE 10 MG/1
10 CAPSULE ORAL DAILY
Status: DISCONTINUED | OUTPATIENT
Start: 2022-09-20 | End: 2022-09-21 | Stop reason: CLARIF

## 2022-09-20 RX ORDER — SENNOSIDES 8.6 MG
8.6 TABLET ORAL
COMMUNITY

## 2022-09-20 RX ORDER — BENZONATATE 100 MG/1
100 CAPSULE ORAL 3 TIMES DAILY PRN
COMMUNITY

## 2022-09-20 RX ORDER — OXCARBAZEPINE 150 MG/1
150 TABLET, FILM COATED ORAL DAILY
Status: DISCONTINUED | OUTPATIENT
Start: 2022-09-20 | End: 2022-09-23

## 2022-09-20 RX ORDER — ALBUTEROL SULFATE 90 UG/1
2 AEROSOL, METERED RESPIRATORY (INHALATION) EVERY 6 HOURS PRN
Status: DISCONTINUED | OUTPATIENT
Start: 2022-09-20 | End: 2022-09-23

## 2022-09-20 RX ORDER — DEXTROSE MONOHYDRATE 25 G/50ML
50 INJECTION, SOLUTION INTRAVENOUS
Status: DISCONTINUED | OUTPATIENT
Start: 2022-09-20 | End: 2022-09-23

## 2022-09-20 RX ORDER — ONDANSETRON 2 MG/ML
4 INJECTION INTRAMUSCULAR; INTRAVENOUS EVERY 6 HOURS PRN
Status: DISCONTINUED | OUTPATIENT
Start: 2022-09-20 | End: 2022-09-23

## 2022-09-20 RX ORDER — SENNOSIDES 8.6 MG
8.6 TABLET ORAL
Status: DISCONTINUED | OUTPATIENT
Start: 2022-09-20 | End: 2022-09-23

## 2022-09-20 RX ORDER — ALBUTEROL SULFATE 90 UG/1
2 AEROSOL, METERED RESPIRATORY (INHALATION) EVERY 6 HOURS PRN
COMMUNITY

## 2022-09-20 RX ORDER — PANTOPRAZOLE SODIUM 40 MG/1
40 TABLET, DELAYED RELEASE ORAL DAILY
Status: DISCONTINUED | OUTPATIENT
Start: 2022-09-20 | End: 2022-09-23

## 2022-09-20 RX ORDER — MAGNESIUM OXIDE 400 MG/1
400 TABLET ORAL DAILY
Status: DISCONTINUED | OUTPATIENT
Start: 2022-09-20 | End: 2022-09-23

## 2022-09-20 RX ORDER — CARVEDILOL 3.12 MG/1
3.12 TABLET ORAL 2 TIMES DAILY WITH MEALS
Status: DISCONTINUED | OUTPATIENT
Start: 2022-09-20 | End: 2022-09-23

## 2022-09-20 RX ORDER — HEPARIN SODIUM 5000 [USP'U]/ML
5000 INJECTION, SOLUTION INTRAVENOUS; SUBCUTANEOUS EVERY 12 HOURS SCHEDULED
Status: DISCONTINUED | OUTPATIENT
Start: 2022-09-20 | End: 2022-09-23

## 2022-09-20 RX ORDER — LOSARTAN POTASSIUM 25 MG/1
25 TABLET ORAL DAILY
Status: DISCONTINUED | OUTPATIENT
Start: 2022-09-20 | End: 2022-09-23

## 2022-09-20 RX ORDER — BENZONATATE 100 MG/1
100 CAPSULE ORAL 3 TIMES DAILY PRN
Status: DISCONTINUED | OUTPATIENT
Start: 2022-09-20 | End: 2022-09-23

## 2022-09-20 RX ORDER — CLOPIDOGREL BISULFATE 75 MG/1
75 TABLET ORAL DAILY
Status: DISCONTINUED | OUTPATIENT
Start: 2022-09-20 | End: 2022-09-23

## 2022-09-20 RX ORDER — ACETAMINOPHEN 500 MG
500 TABLET ORAL EVERY 4 HOURS PRN
Status: DISCONTINUED | OUTPATIENT
Start: 2022-09-20 | End: 2022-09-23

## 2022-09-20 RX ORDER — NICOTINE POLACRILEX 4 MG
15 LOZENGE BUCCAL
Status: DISCONTINUED | OUTPATIENT
Start: 2022-09-20 | End: 2022-09-23

## 2022-09-20 NOTE — ED QUICK NOTES
Orders for admission, patient is aware of plan and ready to go upstairs. Any questions, please call ED RN Natalia Solano at extension [phone].      Patient Covid vaccination status: Fully vaccinated     COVID Test Ordered in ED: Rapid SARS-CoV-2 by PCR    COVID Suspicion at Admission: No risk with negative rapid    Running Infusions:  Zosyn    Mental Status/LOC at time of transport: axox4    Other pertinent information:   CIWA score: N/A   NIH score:  N/A

## 2022-09-20 NOTE — H&P
[facility]    PATIENT'S NAME: Te Carroll   ATTENDING PHYSICIAN: Cathie Boston DO   PATIENT ACCOUNT#:   [de-identified]    LOCATION:  [address]  MEDICAL RECORD #:   [id number]       YOB: 1935  ADMISSION DATE:       09/20/2022    HISTORY AND PHYSICAL EXAMINATION    CHIEF COMPLAINT:  Fatigue, weakness, urinary tract infection, possible aspiration. HISTORY OF PRESENT ILLNESS:  The patient is an 80-year-old  female with mild dementia, chronic atrial fibrillation, who presented to the emergency department with progressive weakness for the last 4 to 5 days per her daughter. The patient was seen in the emergency room on September 2 and diagnosed with urinary tract infection, treated with antibiotics with some improvement in her symptoms, though urine culture from the ER visit showed no growth. Today, CBC showed white blood cell count of 10.2 with left shift. Chemistry showed GFR of 43, which is at baseline. Glucose 185. Urinalysis showed evidence of urinary tract infection with large leukocyte esterase and 2+ bacteria. Chest x-ray showed bibasilar patchy airspace disease suggestive of possible bibasilar pneumonia though it has improved from prior studies. The patient was started empirically on IV Zosyn. Procalcitonin still pending. She will be admitted to the hospital for further management. PAST MEDICAL HISTORY:  Generalized osteoarthritis, osteoporosis, and chronic atrial fibrillation, not on any anticoagulation secondary to high risk of falling. She has history of recurrent transient ischemic attacks and multiple strokes with possible underlying multiinfarct dementia, left ventricular diastolic dysfunction, diabetes mellitus type 2, gastroesophageal reflux disease, hiatal hernia, hypothyroidism, hyperlipidemia, and obstructive sleep apnea. PAST SURGICAL HISTORY:  Cataract procedure, single-chamber pacemaker for sick sinus syndrome.     MEDICATIONS: Please see medication reconciliation list.    ALLERGIES:  Levaquin. FAMILY HISTORY:  Positive for diabetes and hypertension. SOCIAL HISTORY:  No tobacco, alcohol, or drug use. Lives in a nursing facility. Requires some assistance in her basic activities of daily living. Uses a walker. REVIEW OF SYSTEMS:  Difficult to obtain from the patient herself. Per the daughter, the patient has been progressively weaker. She lives in an assisted living facility. Daughter said that usually she becomes weak and more confused, slightly agitated when she gets urinary tract infections. Also, daughter reported that the patient chokes on liquids or solid diet at times. Other 12-point review of systems unobtainable. PHYSICAL EXAMINATION:    GENERAL:  Alert, slightly agitated and anxious. VITAL SIGNS:  Temperature 98.4, pulse 75, respiratory rate 20, blood pressure 144/80, pulse ox 96% on room air. HEENT:  Atraumatic. Oropharynx clear. Dry mucous membranes. Normal hard and soft palate. Eyes:  Anicteric sclerae. NECK:  Supple. No lymphadenopathy. Trachea midline. Full range of motion. LUNGS:  Clear to auscultation bilaterally. Faint rhonchi auscultated on both lung bases. HEART:  Irregularly irregular rhythm. S1 and S2 auscultated. No murmur. ABDOMEN:  Soft, nondistended. No tenderness. Positive bowel sounds. EXTREMITIES:  No edema, clubbing, or cyanosis. NEUROLOGIC:  No focal defects. ASSESSMENT AND PLAN:    1. Encephalopathy and generalized weakness. 2.   Urinary tract infection. 3.   Possible aspiration pneumonia. 4.   Diabetes mellitus type 2.  5.   Chronic atrial fibrillation. The patient will be admitted to general medical floor. Remote telemetry. Start her empirically on IV Zosyn to cover both aspiration pneumonia and urinary tract infection. Follow up on urine culture. Obtain speech and swallow evaluation. Fall precautions. Monitor Accu-Cheks.   Place on low-dose heparin for DVT prophylaxis. Further recommendations to follow.     Dictated By Paul Rosenberg MD  d: 09/20/2022 17:05:02  t: 09/20/2022 17:06:32  [facility] [id number]  /

## 2022-09-20 NOTE — ED INITIAL ASSESSMENT (HPI)
Patient comes in with generalized weakness and lethargy. Per EMS, patient had recent UTI, according EMS. Patient tested negative 2 weeks after antibiotic administration. Patient is AX2/3 per baseline. BG per 214.

## 2022-09-21 LAB
ANION GAP SERPL CALC-SCNC: 8 MMOL/L (ref 0–18)
BASOPHILS # BLD AUTO: 0.06 X10(3) UL (ref 0–0.2)
BASOPHILS NFR BLD AUTO: 0.7 %
BUN BLD-MCNC: 18 MG/DL (ref 7–18)
BUN/CREAT SERPL: 22.5 (ref 10–20)
CALCIUM BLD-MCNC: 8.4 MG/DL (ref 8.5–10.1)
CHLORIDE SERPL-SCNC: 102 MMOL/L (ref 98–112)
CO2 SERPL-SCNC: 26 MMOL/L (ref 21–32)
CREAT BLD-MCNC: 0.8 MG/DL
DEPRECATED RDW RBC AUTO: 44.8 FL (ref 35.1–46.3)
EOSINOPHIL # BLD AUTO: 0.31 X10(3) UL (ref 0–0.7)
EOSINOPHIL NFR BLD AUTO: 3.6 %
ERYTHROCYTE [DISTWIDTH] IN BLOOD BY AUTOMATED COUNT: 12.9 % (ref 11–15)
GFR SERPLBLD BASED ON 1.73 SQ M-ARVRAT: 71 ML/MIN/1.73M2 (ref 60–?)
GLUCOSE BLD-MCNC: 127 MG/DL (ref 70–99)
GLUCOSE BLDC GLUCOMTR-MCNC: 137 MG/DL (ref 70–99)
GLUCOSE BLDC GLUCOMTR-MCNC: 144 MG/DL (ref 70–99)
GLUCOSE BLDC GLUCOMTR-MCNC: 147 MG/DL (ref 70–99)
GLUCOSE BLDC GLUCOMTR-MCNC: 184 MG/DL (ref 70–99)
HCT VFR BLD AUTO: 36.5 %
HGB BLD-MCNC: 11.9 G/DL
IMM GRANULOCYTES # BLD AUTO: 0.04 X10(3) UL (ref 0–1)
IMM GRANULOCYTES NFR BLD: 0.5 %
LYMPHOCYTES # BLD AUTO: 0.84 X10(3) UL (ref 1–4)
LYMPHOCYTES NFR BLD AUTO: 9.7 %
MCH RBC QN AUTO: 31 PG (ref 26–34)
MCHC RBC AUTO-ENTMCNC: 32.6 G/DL (ref 31–37)
MCV RBC AUTO: 95.1 FL
MONOCYTES # BLD AUTO: 0.87 X10(3) UL (ref 0.1–1)
MONOCYTES NFR BLD AUTO: 10 %
NEUTROPHILS # BLD AUTO: 6.58 X10 (3) UL (ref 1.5–7.7)
NEUTROPHILS # BLD AUTO: 6.58 X10(3) UL (ref 1.5–7.7)
NEUTROPHILS NFR BLD AUTO: 75.5 %
OSMOLALITY SERPL CALC.SUM OF ELEC: 285 MOSM/KG (ref 275–295)
PLATELET # BLD AUTO: 214 10(3)UL (ref 150–450)
POTASSIUM SERPL-SCNC: 3.8 MMOL/L (ref 3.5–5.1)
RBC # BLD AUTO: 3.84 X10(6)UL
SODIUM SERPL-SCNC: 136 MMOL/L (ref 136–145)
WBC # BLD AUTO: 8.7 X10(3) UL (ref 4–11)

## 2022-09-21 PROCEDURE — 99233 SBSQ HOSP IP/OBS HIGH 50: CPT | Performed by: HOSPITALIST

## 2022-09-21 RX ORDER — FLUOXETINE 10 MG/1
10 TABLET, FILM COATED ORAL DAILY
Status: DISCONTINUED | OUTPATIENT
Start: 2022-09-21 | End: 2022-09-23

## 2022-09-21 NOTE — PLAN OF CARE
No c/o pain. Daughter, Mark Hensley present at bedside. Daughter updated on plan of care. Safety precautions maintained. Call light within reach. Problem: Patient Centered Care  Goal: Patient preferences are identified and integrated in the patient's plan of care  Description: Interventions:  - What would you like us to know as we care for you?  I live at [facility]  - Provide timely, complete, and accurate information to patient/family  - Incorporate patient and family knowledge, values, beliefs, and cultural backgrounds into the planning and delivery of care  - Encourage patient/family to participate in care and decision-making at the level they choose  - Honor patient and family perspectives and choices  Outcome: Progressing     Problem: PAIN - ADULT  Goal: Verbalizes/displays adequate comfort level or patient's stated pain goal  Description: INTERVENTIONS:  - Encourage pt to monitor pain and request assistance  - Assess pain using appropriate pain scale  - Administer analgesics based on type and severity of pain and evaluate response  - Implement non-pharmacological measures as appropriate and evaluate response  - Consider cultural and social influences on pain and pain management  - Manage/alleviate anxiety  - Utilize distraction and/or relaxation techniques  - Monitor for opioid side effects  - Notify MD/LIP if interventions unsuccessful or patient reports new pain  - Anticipate increased pain with activity and pre-medicate as appropriate  Outcome: Progressing     Problem: RISK FOR INFECTION - ADULT  Goal: Absence of fever/infection during anticipated neutropenic period  Description: INTERVENTIONS  - Monitor WBC  - Administer growth factors as ordered  - Implement neutropenic guidelines  Outcome: Progressing     Problem: SAFETY ADULT - FALL  Goal: Free from fall injury  Description: INTERVENTIONS:  - Assess pt frequently for physical needs  - Identify cognitive and physical deficits and behaviors that affect risk of falls.   - [place] fall precautions as indicated by assessment.  - Educate pt/family on patient safety including physical limitations  - Instruct pt to call for assistance with activity based on assessment  - Modify environment to reduce risk of injury  - Provide assistive devices as appropriate  - Consider OT/PT consult to assist with strengthening/mobility  - Encourage toileting schedule  Outcome: Progressing     Problem: DISCHARGE PLANNING  Goal: Discharge to home or other facility with appropriate resources  Description: INTERVENTIONS:  - Identify barriers to discharge w/pt and caregiver  - Include patient/family/discharge partner in discharge planning  - Arrange for needed discharge resources and transportation as appropriate  - Identify discharge learning needs (meds, wound care, etc)  - Arrange for interpreters to assist at discharge as needed  - Consider post-discharge preferences of patient/family/discharge partner  - Complete POLST form as appropriate  - Assess patient's ability to be responsible for managing their own health  - Refer to Case Management Department for coordinating discharge planning if the patient needs post-hospital services based on physician/LIP order or complex needs related to functional status, cognitive ability or social support system  Outcome: Progressing     Problem: Altered Communication/Language Barrier  Goal: Patient/Family is able to understand and participate in their care  Description: Interventions:  - Assess communication ability and preferred communication style  - Implement communication aides and strategies  - Use visual cues when possible  - Listen attentively, be patient, do not interrupt  - Minimize distractions  - Allow time for understanding and response  - Establish method for patient to ask for assistance (call light)  - Provide an  as needed  - Communicate barriers and strategies to overcome with those who interact with patient  Outcome: Progressing

## 2022-09-21 NOTE — CM/SW NOTE
09/21/22 1600   CM/SW Referral Data   Referral Source    Reason for Referral Discharge planning   Informant Patient;Daughter   Pertinent Medical Hx   Does patient have an established PCP? Yes  Vinh Sheth)   Patient Info   Patient's Current Mental Status at Time of Assessment Alert;Oriented;Memory Impairments   Patient's Home Environment Assisted Living   Post Acute Care Provider Upon Admission [facility]   Patient lives with Alone   Patient Status Prior to Admission   Independent with ADLs and Mobility No   Pt. requires assistance with Housework;Driving;Meals; Bathing; Ambulating;Dressing;Medications; Toileting;Finances   Discharge Needs   Anticipated D/C needs Home health care   Choice of Post-Acute Provider   Informed patient of right to choose their preferred provider Yes     Pt discussed during nursing rounds. Dx acute cystitis. From [address]. PT recommending HH w/PT at OR, pt and daughter agreeable to OR recommendation. [facility] referrals sent in [place], [place] completed. List of accepting agencies will be needed for choice. Plan: [facility] w/HH pending agency choice and medical clearance. / to remain available for support and/or discharge planning.      MARGY Sanchez    [phone]

## 2022-09-21 NOTE — PLAN OF CARE
No changes overnight. Cont'd IV Zosyn. Safety precautions in place. Frequent rounding by nursing. Vitals stable. Problem: Patient Centered Care  Goal: Patient preferences are identified and integrated in the patient's plan of care  Description: Interventions:  - What would you like us to know as we care for you? I live at [facility]  - Provide timely, complete, and accurate information to patient/family  - Incorporate patient and family knowledge, values, beliefs, and cultural backgrounds into the planning and delivery of care  - Encourage patient/family to participate in care and decision-making at the level they choose  - Honor patient and family perspectives and choices  Outcome: Progressing     Problem: PAIN - ADULT  Goal: Verbalizes/displays adequate comfort level or patient's stated pain goal  Description: INTERVENTIONS:  - Encourage pt to monitor pain and request assistance  - Assess pain using appropriate pain scale  - Administer analgesics based on type and severity of pain and evaluate response  - Implement non-pharmacological measures as appropriate and evaluate response  - Consider cultural and social influences on pain and pain management  - Manage/alleviate anxiety  - Utilize distraction and/or relaxation techniques  - Monitor for opioid side effects  - Notify MD/LIP if interventions unsuccessful or patient reports new pain  - Anticipate increased pain with activity and pre-medicate as appropriate  Outcome: Progressing     Problem: RISK FOR INFECTION - ADULT  Goal: Absence of fever/infection during anticipated neutropenic period  Description: INTERVENTIONS  - Monitor WBC  - Administer growth factors as ordered  - Implement neutropenic guidelines  Outcome: Progressing     Problem: SAFETY ADULT - FALL  Goal: Free from fall injury  Description: INTERVENTIONS:  - Assess pt frequently for physical needs  - Identify cognitive and physical deficits and behaviors that affect risk of falls.   - [place] fall precautions as indicated by assessment.  - Educate pt/family on patient safety including physical limitations  - Instruct pt to call for assistance with activity based on assessment  - Modify environment to reduce risk of injury  - Provide assistive devices as appropriate  - Consider OT/PT consult to assist with strengthening/mobility  - Encourage toileting schedule  Outcome: Progressing     Problem: DISCHARGE PLANNING  Goal: Discharge to home or other facility with appropriate resources  Description: INTERVENTIONS:  - Identify barriers to discharge w/pt and caregiver  - Include patient/family/discharge partner in discharge planning  - Arrange for needed discharge resources and transportation as appropriate  - Identify discharge learning needs (meds, wound care, etc)  - Arrange for interpreters to assist at discharge as needed  - Consider post-discharge preferences of patient/family/discharge partner  - Complete POLST form as appropriate  - Assess patient's ability to be responsible for managing their own health  - Refer to Case Management Department for coordinating discharge planning if the patient needs post-hospital services based on physician/LIP order or complex needs related to functional status, cognitive ability or social support system  Outcome: Progressing     Problem: Altered Communication/Language Barrier  Goal: Patient/Family is able to understand and participate in their care  Description: Interventions:  - Assess communication ability and preferred communication style  - Implement communication aides and strategies  - Use visual cues when possible  - Listen attentively, be patient, do not interrupt  - Minimize distractions  - Allow time for understanding and response  - Establish method for patient to ask for assistance (call light)  - Provide an  as needed  - Communicate barriers and strategies to overcome with those who interact with patient  Outcome: Progressing

## 2022-09-21 NOTE — PLAN OF CARE
Problem: Patient Centered Care  Goal: Patient preferences are identified and integrated in the patient's plan of care  Description: Interventions:  - What would you like us to know as we care for you? I live at [facility]  - Provide timely, complete, and accurate information to patient/family  - Incorporate patient and family knowledge, values, beliefs, and cultural backgrounds into the planning and delivery of care  - Encourage patient/family to participate in care and decision-making at the level they choose  - Honor patient and family perspectives and choices  Outcome: Progressing     Problem: PAIN - ADULT  Goal: Verbalizes/displays adequate comfort level or patient's stated pain goal  Description: INTERVENTIONS:  - Encourage pt to monitor pain and request assistance  - Assess pain using appropriate pain scale  - Administer analgesics based on type and severity of pain and evaluate response  - Implement non-pharmacological measures as appropriate and evaluate response  - Consider cultural and social influences on pain and pain management  - Manage/alleviate anxiety  - Utilize distraction and/or relaxation techniques  - Monitor for opioid side effects  - Notify MD/LIP if interventions unsuccessful or patient reports new pain  - Anticipate increased pain with activity and pre-medicate as appropriate  Outcome: Progressing     Problem: RISK FOR INFECTION - ADULT  Goal: Absence of fever/infection during anticipated neutropenic period  Description: INTERVENTIONS  - Monitor WBC  - Administer growth factors as ordered  - Implement neutropenic guidelines  Outcome: Progressing     Problem: SAFETY ADULT - FALL  Goal: Free from fall injury  Description: INTERVENTIONS:  - Assess pt frequently for physical needs  - Identify cognitive and physical deficits and behaviors that affect risk of falls.   - [place] fall precautions as indicated by assessment.  - Educate pt/family on patient safety including physical limitations  - Instruct pt to call for assistance with activity based on assessment  - Modify environment to reduce risk of injury  - Provide assistive devices as appropriate  - Consider OT/PT consult to assist with strengthening/mobility  - Encourage toileting schedule  Outcome: Progressing     Problem: DISCHARGE PLANNING  Goal: Discharge to home or other facility with appropriate resources  Description: INTERVENTIONS:  - Identify barriers to discharge w/pt and caregiver  - Include patient/family/discharge partner in discharge planning  - Arrange for needed discharge resources and transportation as appropriate  - Identify discharge learning needs (meds, wound care, etc)  - Arrange for interpreters to assist at discharge as needed  - Consider post-discharge preferences of patient/family/discharge partner  - Complete POLST form as appropriate  - Assess patient's ability to be responsible for managing their own health  - Refer to Case Management Department for coordinating discharge planning if the patient needs post-hospital services based on physician/LIP order or complex needs related to functional status, cognitive ability or social support system  Outcome: Progressing     Problem: Altered Communication/Language Barrier  Goal: Patient/Family is able to understand and participate in their care  Description: Interventions:  - Assess communication ability and preferred communication style  - Implement communication aides and strategies  - Use visual cues when possible  - Listen attentively, be patient, do not interrupt  - Minimize distractions  - Allow time for understanding and response  - Establish method for patient to ask for assistance (call light)  - Provide an  as needed  - Communicate barriers and strategies to overcome with those who interact with patient  Outcome: Progressing     Patient is presently sitting up in chair. Alert x 2, but forgetful. Daughter present at bedside.  Vital signs taken and stable. Patient was seen by PT/OT per eval and treat on today. Patient was seen by Speech therapist for evaluation. Patient will have video swallowing test done on tomorrow. Aspiration and Fall risk precautions are followed at all times to ensure safety. Patient receives intravenous antibiotics per orders. Call light within reach at all times. Bed and chair alarm in use at all times for safety. Jeris Epp in place to suction for incontinence.

## 2022-09-21 NOTE — TELEPHONE ENCOUNTER
[facility] physician 9/7/2022 faxed to [phone], confirmation received.

## 2022-09-22 ENCOUNTER — TELEPHONE (OUTPATIENT)
Dept: INTERNAL MEDICINE CLINIC | Facility: CLINIC | Age: 87
End: 2022-09-22

## 2022-09-22 ENCOUNTER — APPOINTMENT (OUTPATIENT)
Dept: GENERAL RADIOLOGY | Facility: HOSPITAL | Age: 87
End: 2022-09-22
Attending: HOSPITALIST
Payer: MEDICARE

## 2022-09-22 PROBLEM — R53.1 WEAKNESS GENERALIZED: Status: RESOLVED | Noted: 2022-09-20 | Resolved: 2022-09-22

## 2022-09-22 PROBLEM — F41.1 ANXIETY STATE: Status: RESOLVED | Noted: 2022-05-01 | Resolved: 2022-09-22

## 2022-09-22 PROBLEM — N30.00 ACUTE CYSTITIS WITHOUT HEMATURIA: Status: RESOLVED | Noted: 2022-09-20 | Resolved: 2022-09-22

## 2022-09-22 PROBLEM — E83.42 HYPOMAGNESEMIA: Status: RESOLVED | Noted: 2022-05-01 | Resolved: 2022-09-22

## 2022-09-22 LAB
GLUCOSE BLDC GLUCOMTR-MCNC: 117 MG/DL (ref 70–99)
GLUCOSE BLDC GLUCOMTR-MCNC: 118 MG/DL (ref 70–99)
GLUCOSE BLDC GLUCOMTR-MCNC: 127 MG/DL (ref 70–99)
GLUCOSE BLDC GLUCOMTR-MCNC: 154 MG/DL (ref 70–99)

## 2022-09-22 PROCEDURE — 74230 X-RAY XM SWLNG FUNCJ C+: CPT | Performed by: HOSPITALIST

## 2022-09-22 PROCEDURE — 99233 SBSQ HOSP IP/OBS HIGH 50: CPT | Performed by: HOSPITALIST

## 2022-09-22 RX ORDER — CEPHALEXIN 500 MG/1
500 CAPSULE ORAL 2 TIMES DAILY
Qty: 6 CAPSULE | Refills: 0 | Status: SHIPPED | OUTPATIENT
Start: 2022-09-22 | End: 2022-09-25

## 2022-09-22 NOTE — TELEPHONE ENCOUNTER
Left message for Velnelia Back with [facility] on confidential voicemail box informing that Dr Maira Song will sign orders for pt.

## 2022-09-22 NOTE — PHYSICAL THERAPY NOTE
PHYSICAL THERAPY TREATMENT NOTE - INPATIENT     Room Number: 661/672-W       Presenting Problem: acute cystitis without hematuria   Co-Morbidities : dementia     Problem List  Active Problems:    KENYA (acute kidney injury) ([facility] [address]      PHYSICAL THERAPY ASSESSMENT     Patient is a 80year old female admitted 9/20/2022 for acute cystitis without hematuria, increased weakness. Patient's current functional deficits include bed mobility, transfers, and gait, which are below the patient's pre-admission status. Pt ok to see per rn. Pt recd sitting up in bedside chair, dtr present, educated in goals for session. Shoes and left AFO in place. Pt appeared lethargic, but following one step commands, very [place], HA in place via dtr. Pt stood to rw with mod a, pt stating \"well help me up\". Gait training with rw with emphasis on upright posture, safe rw use. Pt dragging right foot, improved with verbal cueing. Pt amb 30' with cga, pt refused further ambulation due to fatigue, returned to bedside chair, practiced sit to stand again, mod a to stand. Pt remained lethargic. Per dtr, at [facility], pt able to ambulate to restroom without assist, so remains below her baseline. Recommend pt also ambulate with [facility] staff for restroom use, in order to improve pt strength and endurance. Recommend pt stay one more day to improve strength and activity tolerance, balance, as pt currently at risk for falls as she remains below her baseline. Pt will benefit from additional therapy session tomorrow also. Discussed with dtr, rn. Discussed that if pt does not return to her baseline, she may need to go to SNF, dtr reports that she does not want this. The patient's Approx Degree of Impairment: 54.16% has been calculated based on documentation in the [facility] '6 clicks' Inpatient Basic Mobility Short Form.   Research supports that patients with this level of impairment may benefit from return to [facility] with home PT.    DISCHARGE RECOMMENDATIONS  PT Discharge Recommendations: Long term care;Home with home health PT (x1 assist for mobility per baseline )     PLAN  PT Treatment Plan: Bed mobility; Body mechanics; Endurance; Energy conservation; Family education;Gait training;Range of motion;Stoop training;Transfer training;Balance training  Frequency (Obs): 5x/week    SUBJECTIVE  \"well let's go\"    OBJECTIVE  Precautions: Limb alert - left; Orthotic/prosthesis;Hard of hearing;Bed/chair alarm (custom ankle brace )    WEIGHT BEARING RESTRICTION                PAIN ASSESSMENT      Location: denies pain at this time  Management Techniques: Activity promotion    BALANCE  Static Sitting: Good  Dynamic Sitting: Fair  Static Standing: Poor +  Dynamic Standing: Poor +     O2 WALK       AM-PAC '6-Clicks' INPATIENT SHORT FORM - BASIC MOBILITY  How much difficulty does the patient currently have. .. Patient Difficulty: Turning over in bed (including adjusting bedclothes, sheets and blankets)?: A Little   Patient Difficulty: Sitting down on and standing up from a chair with arms (e.g., wheelchair, bedside commode, etc.): A Lot   Patient Difficulty: Moving from lying on back to sitting on the side of the bed?: A Little   How much help from another person does the patient currently need. .. Help from Another: Moving to and from a bed to a chair (including a wheelchair)?: A Little   Help from Another: Need to walk in hospital room?: A Little   Help from Another: Climbing 3-5 steps with a railing?: A Lot     AM-PAC Score:  Raw Score: 16   Approx Degree of Impairment: 54.16%   Standardized Score (AM-PAC Scale): 40.78   CMS Modifier (G-Code): CK    FUNCTIONAL ABILITY STATUS  Functional Mobility/Gait Assessment  Gait Assistance: Contact guard assist  Distance (ft): 30  Assistive Device: Rolling walker  Pattern: Shuffle;R Foot drag      Patient End of Session: Up in chair;Needs met;Call light within reach;RN aware of session/findings; All patient questions and concerns addressed; Alarm set; Family present    CURRENT GOALS   Goals to be met by: 9/30  Patient Goal Patient's self-stated goal is: unstated    Goal #1 Patient is able to demonstrate supine - sit EOB @ level: supervision      Goal #1   Current Status  NT   Goal #2 Patient is able to demonstrate transfers Sit to/from Stand at assistance level: supervision with walker - rolling      Goal #2  Current Status  mod a   Goal #3 Patient is able to ambulate 100 feet with assist device: walker - rolling at assistance level: supervision   Goal #3   Current Status  cga 30', pt refusing further ambulation               Goal #5 Patient to demonstrate independence with home activity/exercise instructions provided to patient in preparation for discharge.    Goal #5   Current Status     Goal #6     Goal #6  Current Status

## 2022-09-22 NOTE — HOME CARE LIAISON
Patient and dtr provided with list of [facility] providers from [address], patient choice is [address]. Agency reserved in [address] and contact information placed on AVS. Financial interest disclosure provided to patient. JOEL wilhelm.

## 2022-09-22 NOTE — DISCHARGE PLANNING
Patient chart reviewed for discharge: Medication Reconciliation completed, Specialist/PCP follow up listed, and disease specific Instructions/Education included in After Visit Summary. Discharge RN notified patient's RN of AVS completion and verified all consultants have signed off. Patient's RN to notify DC RN if discharge status changes.       Zeina Schmidt RN, Discharge Leader [id number]

## 2022-09-22 NOTE — PLAN OF CARE
Problem: Patient Centered Care  Goal: Patient preferences are identified and integrated in the patient's plan of care  Description: Interventions:  - What would you like us to know as we care for you? I live at [facility]  - Provide timely, complete, and accurate information to patient/family  - Incorporate patient and family knowledge, values, beliefs, and cultural backgrounds into the planning and delivery of care  - Encourage patient/family to participate in care and decision-making at the level they choose  - Honor patient and family perspectives and choices  Outcome: Progressing     Problem: PAIN - ADULT  Goal: Verbalizes/displays adequate comfort level or patient's stated pain goal  Description: INTERVENTIONS:  - Encourage pt to monitor pain and request assistance  - Assess pain using appropriate pain scale  - Administer analgesics based on type and severity of pain and evaluate response  - Implement non-pharmacological measures as appropriate and evaluate response  - Consider cultural and social influences on pain and pain management  - Manage/alleviate anxiety  - Utilize distraction and/or relaxation techniques  - Monitor for opioid side effects  - Notify MD/LIP if interventions unsuccessful or patient reports new pain  - Anticipate increased pain with activity and pre-medicate as appropriate  Outcome: Progressing     Problem: RISK FOR INFECTION - ADULT  Goal: Absence of fever/infection during anticipated neutropenic period  Description: INTERVENTIONS  - Monitor WBC  - Administer growth factors as ordered  - Implement neutropenic guidelines  Outcome: Progressing     Problem: SAFETY ADULT - FALL  Goal: Free from fall injury  Description: INTERVENTIONS:  - Assess pt frequently for physical needs  - Identify cognitive and physical deficits and behaviors that affect risk of falls.   - [place] fall precautions as indicated by assessment.  - Educate pt/family on patient safety including physical limitations  - Instruct pt to call for assistance with activity based on assessment  - Modify environment to reduce risk of injury  - Provide assistive devices as appropriate  - Consider OT/PT consult to assist with strengthening/mobility  - Encourage toileting schedule  Outcome: Progressing     Problem: DISCHARGE PLANNING  Goal: Discharge to home or other facility with appropriate resources  Description: INTERVENTIONS:  - Identify barriers to discharge w/pt and caregiver  - Include patient/family/discharge partner in discharge planning  - Arrange for needed discharge resources and transportation as appropriate  - Identify discharge learning needs (meds, wound care, etc)  - Arrange for interpreters to assist at discharge as needed  - Consider post-discharge preferences of patient/family/discharge partner  - Complete POLST form as appropriate  - Assess patient's ability to be responsible for managing their own health  - Refer to Case Management Department for coordinating discharge planning if the patient needs post-hospital services based on physician/LIP order or complex needs related to functional status, cognitive ability or social support system  Outcome: Progressing     Problem: Altered Communication/Language Barrier  Goal: Patient/Family is able to understand and participate in their care  Description: Interventions:  - Assess communication ability and preferred communication style  - Implement communication aides and strategies  - Use visual cues when possible  - Listen attentively, be patient, do not interrupt  - Minimize distractions  - Allow time for understanding and response  - Establish method for patient to ask for assistance (call light)  - Provide an  as needed  - Communicate barriers and strategies to overcome with those who interact with patient  Outcome: Progressing     Patient is presently sitting up in chair. Vital signs taken and stable.  Fall risk precautions are followed to ensure safety and comfort. Patient denied pain or discomfort. Patient had a Video Swallowing test done this morning. Patient was seen by PT per consult. Possible discharge tomorrow once medically cleared. Bed and chair alarm in place at all times for safety. Call light within reach at all times.

## 2022-09-22 NOTE — TELEPHONE ENCOUNTER
Radha from [place] [address] calling to verify Dr. Paola Youssef will be signing for patients home health, patient may be discharged to day from hospital. Can leave a detailed voicemail as voicemail is confidential at [phone].

## 2022-09-22 NOTE — PLAN OF CARE
Problem: Patient Centered Care  Goal: Patient preferences are identified and integrated in the patient's plan of care  Description: Interventions:  - What would you like us to know as we care for you? I live at [facility]  - Provide timely, complete, and accurate information to patient/family  - Incorporate patient and family knowledge, values, beliefs, and cultural backgrounds into the planning and delivery of care  - Encourage patient/family to participate in care and decision-making at the level they choose  - Honor patient and family perspectives and choices  Outcome: Progressing     Problem: PAIN - ADULT  Goal: Verbalizes/displays adequate comfort level or patient's stated pain goal  Description: INTERVENTIONS:  - Encourage pt to monitor pain and request assistance  - Assess pain using appropriate pain scale  - Administer analgesics based on type and severity of pain and evaluate response  - Implement non-pharmacological measures as appropriate and evaluate response  - Consider cultural and social influences on pain and pain management  - Manage/alleviate anxiety  - Utilize distraction and/or relaxation techniques  - Monitor for opioid side effects  - Notify MD/LIP if interventions unsuccessful or patient reports new pain  - Anticipate increased pain with activity and pre-medicate as appropriate  Outcome: Progressing     Problem: RISK FOR INFECTION - ADULT  Goal: Absence of fever/infection during anticipated neutropenic period  Description: INTERVENTIONS  - Monitor WBC  - Administer growth factors as ordered  - Implement neutropenic guidelines  Outcome: Progressing     Problem: SAFETY ADULT - FALL  Goal: Free from fall injury  Description: INTERVENTIONS:  - Assess pt frequently for physical needs  - Identify cognitive and physical deficits and behaviors that affect risk of falls.   - [place] fall precautions as indicated by assessment.  - Educate pt/family on patient safety including physical limitations  - Instruct pt to call for assistance with activity based on assessment  - Modify environment to reduce risk of injury  - Provide assistive devices as appropriate  - Consider OT/PT consult to assist with strengthening/mobility  - Encourage toileting schedule  Outcome: Progressing     Problem: DISCHARGE PLANNING  Goal: Discharge to home or other facility with appropriate resources  Description: INTERVENTIONS:  - Identify barriers to discharge w/pt and caregiver  - Include patient/family/discharge partner in discharge planning  - Arrange for needed discharge resources and transportation as appropriate  - Identify discharge learning needs (meds, wound care, etc)  - Arrange for interpreters to assist at discharge as needed  - Consider post-discharge preferences of patient/family/discharge partner  - Complete POLST form as appropriate  - Assess patient's ability to be responsible for managing their own health  - Refer to Case Management Department for coordinating discharge planning if the patient needs post-hospital services based on physician/LIP order or complex needs related to functional status, cognitive ability or social support system  Outcome: Progressing     Patient is resting comfortably in bed, denies pain, VSS, safety/fall precautions in place, call light within reach, all needs met at this time.

## 2022-09-23 VITALS
HEART RATE: 72 BPM | BODY MASS INDEX: 23.9 KG/M2 | OXYGEN SATURATION: 92 % | SYSTOLIC BLOOD PRESSURE: 153 MMHG | DIASTOLIC BLOOD PRESSURE: 79 MMHG | TEMPERATURE: 98 F | WEIGHT: 140 LBS | RESPIRATION RATE: 17 BRPM | HEIGHT: 64 IN

## 2022-09-23 LAB
GLUCOSE BLDC GLUCOMTR-MCNC: 149 MG/DL (ref 70–99)
GLUCOSE BLDC GLUCOMTR-MCNC: 163 MG/DL (ref 70–99)

## 2022-09-23 PROCEDURE — 99239 HOSP IP/OBS DSCHRG MGMT >30: CPT | Performed by: HOSPITALIST

## 2022-09-23 NOTE — PLAN OF CARE
Problem: Patient Centered Care  Goal: Patient preferences are identified and integrated in the patient's plan of care  Description: Interventions:  - What would you like us to know as we care for you? I live at [facility]  - Provide timely, complete, and accurate information to patient/family  - Incorporate patient and family knowledge, values, beliefs, and cultural backgrounds into the planning and delivery of care  - Encourage patient/family to participate in care and decision-making at the level they choose  - Honor patient and family perspectives and choices  Outcome: Progressing     Problem: PAIN - ADULT  Goal: Verbalizes/displays adequate comfort level or patient's stated pain goal  Description: INTERVENTIONS:  - Encourage pt to monitor pain and request assistance  - Assess pain using appropriate pain scale  - Administer analgesics based on type and severity of pain and evaluate response  - Implement non-pharmacological measures as appropriate and evaluate response  - Consider cultural and social influences on pain and pain management  - Manage/alleviate anxiety  - Utilize distraction and/or relaxation techniques  - Monitor for opioid side effects  - Notify MD/LIP if interventions unsuccessful or patient reports new pain  - Anticipate increased pain with activity and pre-medicate as appropriate  Outcome: Progressing     Problem: RISK FOR INFECTION - ADULT  Goal: Absence of fever/infection during anticipated neutropenic period  Description: INTERVENTIONS  - Monitor WBC  - Administer growth factors as ordered  - Implement neutropenic guidelines  Outcome: Progressing     Problem: SAFETY ADULT - FALL  Goal: Free from fall injury  Description: INTERVENTIONS:  - Assess pt frequently for physical needs  - Identify cognitive and physical deficits and behaviors that affect risk of falls.   - [place] fall precautions as indicated by assessment.  - Educate pt/family on patient safety including physical limitations  - Instruct pt to call for assistance with activity based on assessment  - Modify environment to reduce risk of injury  - Provide assistive devices as appropriate  - Consider OT/PT consult to assist with strengthening/mobility  - Encourage toileting schedule  Outcome: Progressing     Problem: DISCHARGE PLANNING  Goal: Discharge to home or other facility with appropriate resources  Description: INTERVENTIONS:  - Identify barriers to discharge w/pt and caregiver  - Include patient/family/discharge partner in discharge planning  - Arrange for needed discharge resources and transportation as appropriate  - Identify discharge learning needs (meds, wound care, etc)  - Arrange for interpreters to assist at discharge as needed  - Consider post-discharge preferences of patient/family/discharge partner  - Complete POLST form as appropriate  - Assess patient's ability to be responsible for managing their own health  - Refer to Case Management Department for coordinating discharge planning if the patient needs post-hospital services based on physician/LIP order or complex needs related to functional status, cognitive ability or social support system  Outcome: Progressing     Problem: Altered Communication/Language Barrier  Goal: Patient/Family is able to understand and participate in their care  Description: Interventions:  - Assess communication ability and preferred communication style  - Implement communication aides and strategies  - Use visual cues when possible  - Listen attentively, be patient, do not interrupt  - Minimize distractions  - Allow time for understanding and response  - Establish method for patient to ask for assistance (call light)  - Provide an  as needed  - Communicate barriers and strategies to overcome with those who interact with patient  Outcome: Progressing

## 2022-09-23 NOTE — CM/SW NOTE
BPCI Bundled Advanced Medicare Program    Plan of care reviewed for care coordination and discharge planning. Noted pt falls under  BPCI/Medicare program, with , W for Urinary Tract Infection     NSOC JUAN Proposal:  Home Health pending progress. Plan: Home w[facility] today. / to remain available for support and/or discharge planning.      MARGY Carr    [phone]

## 2022-09-23 NOTE — CM/SW NOTE
09/23/22 1245   Discharge disposition   Expected discharge disposition [facility]   Post Acute Care Provider [facility]   Discharge transportation [facility]     Pt discussed during nursing rounds. Pt is stable for dc today. MD dc order entered. [facility] will provide RN and PT services at [place], liaison Nael Everett notified of pt's dc back to [place] today. Pt's daughter requesting assist w/transport at [place]. [facility] scheduled for 3pm . 1610: [facility] contacted CM to notify him that pt's daughter clarified that patient resides at [address] [place] [address], not [address] [place] [address]. [facility] confirmed they will transport patient to [facility] [place] [place]. PCS updated and faxed to [phone] per their request.    Plan: [facility] [address] w/H today. / to remain available for support and/or discharge planning.      MARGY Evans    [phone]

## 2022-09-23 NOTE — PHYSICAL THERAPY NOTE
PHYSICAL THERAPY TREATMENT NOTE - INPATIENT     Room Number: 801/818-P       Presenting Problem: acute cystitis without hematuria   Co-Morbidities : dementia     Problem List  Active Problems:    KENYA (acute kidney injury) ([facility] [address]      PHYSICAL THERAPY ASSESSMENT     Patient is a 80year old female admitted 9/20/2022 for acute cystitis without hematuria, increased weakness. Patient's current functional deficits include bed mobility, transfers, and gait, which are below the patient's pre-admission status. Pt ok to see per rn. Pt recd in supine, educated in goals for session. Shoes with left ankle brace donned. Pt required encouragement to participate with therapy, remains confused, but able to follow simple commands. Pt transferred supine to sit with min a, good sitting balance. Pt stood to rw with cga, found to be soiled with BM, pt unaware. Pt ambulated into restroom, required total assist for extensive pericare. Pt required min a to stand from higher surfaces, more assist from lower surfaces. Gait training with emphasis on safe rw use, upright posture. Pt requires cueing for safety, appears with poor insight into her deficits. Pt remained up in bedside chair, chair alarm activated. Discussed session with rn. The patient's Approx Degree of Impairment: 50.57% has been calculated based on documentation in the [facility] '6 clicks' Inpatient Basic Mobility Short Form. Research supports that patients with this level of impairment may benefit from return to [facility] with home PT.    DISCHARGE RECOMMENDATIONS  PT Discharge Recommendations: Long term care;Home with home health PT (x1 assist for mobility per baseline )     PLAN  PT Treatment Plan: Bed mobility; Body mechanics; Endurance; Energy conservation; Family education;Gait training;Range of motion;Stoop training;Transfer training;Balance training  Frequency (Obs): 5x/week    SUBJECTIVE  \"I don't want to walk, I'm going home\"    OBJECTIVE  Precautions: Limb alert - left;Orthotic/prosthesis;Hard of hearing;Bed/chair alarm (custom ankle brace )    WEIGHT BEARING RESTRICTION                PAIN ASSESSMENT   Ratin  Location: denies at this time  Management Techniques: Activity promotion    BALANCE  Static Sitting: Good  Dynamic Sitting: Fair  Static Standing: Fair -  Dynamic Standing: Fair -     O2 WALK       AM-PAC '6-Clicks' INPATIENT SHORT FORM - BASIC MOBILITY  How much difficulty does the patient currently have. .. Patient Difficulty: Turning over in bed (including adjusting bedclothes, sheets and blankets)?: A Little   Patient Difficulty: Sitting down on and standing up from a chair with arms (e.g., wheelchair, bedside commode, etc.): A Little   Patient Difficulty: Moving from lying on back to sitting on the side of the bed?: A Little   How much help from another person does the patient currently need. .. Help from Another: Moving to and from a bed to a chair (including a wheelchair)?: A Little   Help from Another: Need to walk in hospital room?: A Little   Help from Another: Climbing 3-5 steps with a railing?: A Lot     AM-PAC Score:  Raw Score: 17   Approx Degree of Impairment: 50.57%   Standardized Score (AM-PAC Scale): 42.13   CMS Modifier (G-Code): CK    FUNCTIONAL ABILITY STATUS  Functional Mobility/Gait Assessment  Gait Assistance: Contact guard assist  Distance (ft): 40  Assistive Device: Rolling walker  Pattern: Shuffle      Patient End of Session: Up in chair;Needs met;Call light within reach;RN aware of session/findings; All patient questions and concerns addressed; Alarm set    CURRENT GOALS   Goals to be met by:   Patient Goal Patient's self-stated goal is: unstated    Goal #1 Patient is able to demonstrate supine - sit EOB @ level: supervision      Goal #1   Current Status Min a   Goal #2 Patient is able to demonstrate transfers Sit to/from Stand at assistance level: supervision with walker - rolling      Goal #2  Current Status  min a   Goal #3 Patient is able to ambulate 100 feet with assist device: walker - rolling at assistance level: supervision   Goal #3   Current Status  cga 40'               Goal #5 Patient to demonstrate independence with home activity/exercise instructions provided to patient in preparation for discharge.    Goal #5   Current Status     Goal #6     Goal #6  Current Status

## 2022-09-23 NOTE — DISCHARGE PLANNING
Patient discharge back to [facility]. PIV removed. Discharge education and AVS provided by this RN. Discharge packet prepared. Patient transported back to [place] by [facility]. Bedside belongings packed up and taken with at time of discharge. Needs met.

## 2022-09-23 NOTE — PLAN OF CARE
A/O x2, no complaints of pain overnight. No acute changes. Call light within reach, bed alarm on, non-skid socks on, frequent rounding done. Problem: Patient Centered Care  Goal: Patient preferences are identified and integrated in the patient's plan of care  Description: Interventions:  - What would you like us to know as we care for you?  I live at [facility]  - Provide timely, complete, and accurate information to patient/family  - Incorporate patient and family knowledge, values, beliefs, and cultural backgrounds into the planning and delivery of care  - Encourage patient/family to participate in care and decision-making at the level they choose  - Honor patient and family perspectives and choices  Outcome: Progressing     Problem: PAIN - ADULT  Goal: Verbalizes/displays adequate comfort level or patient's stated pain goal  Description: INTERVENTIONS:  - Encourage pt to monitor pain and request assistance  - Assess pain using appropriate pain scale  - Administer analgesics based on type and severity of pain and evaluate response  - Implement non-pharmacological measures as appropriate and evaluate response  - Consider cultural and social influences on pain and pain management  - Manage/alleviate anxiety  - Utilize distraction and/or relaxation techniques  - Monitor for opioid side effects  - Notify MD/LIP if interventions unsuccessful or patient reports new pain  - Anticipate increased pain with activity and pre-medicate as appropriate  Outcome: Progressing     Problem: RISK FOR INFECTION - ADULT  Goal: Absence of fever/infection during anticipated neutropenic period  Description: INTERVENTIONS  - Monitor WBC  - Administer growth factors as ordered  - Implement neutropenic guidelines  Outcome: Progressing     Problem: SAFETY ADULT - FALL  Goal: Free from fall injury  Description: INTERVENTIONS:  - Assess pt frequently for physical needs  - Identify cognitive and physical deficits and behaviors that affect risk of falls.   - [place] fall precautions as indicated by assessment.  - Educate pt/family on patient safety including physical limitations  - Instruct pt to call for assistance with activity based on assessment  - Modify environment to reduce risk of injury  - Provide assistive devices as appropriate  - Consider OT/PT consult to assist with strengthening/mobility  - Encourage toileting schedule  Outcome: Progressing     Problem: DISCHARGE PLANNING  Goal: Discharge to home or other facility with appropriate resources  Description: INTERVENTIONS:  - Identify barriers to discharge w/pt and caregiver  - Include patient/family/discharge partner in discharge planning  - Arrange for needed discharge resources and transportation as appropriate  - Identify discharge learning needs (meds, wound care, etc)  - Arrange for interpreters to assist at discharge as needed  - Consider post-discharge preferences of patient/family/discharge partner  - Complete POLST form as appropriate  - Assess patient's ability to be responsible for managing their own health  - Refer to Case Management Department for coordinating discharge planning if the patient needs post-hospital services based on physician/LIP order or complex needs related to functional status, cognitive ability or social support system  Outcome: Progressing     Problem: Altered Communication/Language Barrier  Goal: Patient/Family is able to understand and participate in their care  Description: Interventions:  - Assess communication ability and preferred communication style  - Implement communication aides and strategies  - Use visual cues when possible  - Listen attentively, be patient, do not interrupt  - Minimize distractions  - Allow time for understanding and response  - Establish method for patient to ask for assistance (call light)  - Provide an  as needed  - Communicate barriers and strategies to overcome with those who interact with patient  Outcome: Progressing

## 2022-09-26 ENCOUNTER — PATIENT OUTREACH (OUTPATIENT)
Dept: CASE MANAGEMENT | Age: 87
End: 2022-09-26

## 2022-09-26 ENCOUNTER — TELEPHONE (OUTPATIENT)
Dept: INTERNAL MEDICINE CLINIC | Facility: CLINIC | Age: 87
End: 2022-09-26

## 2022-09-26 DIAGNOSIS — N18.31 STAGE 3A CHRONIC KIDNEY DISEASE (HCC): ICD-10-CM

## 2022-09-26 DIAGNOSIS — E11.9 CONTROLLED TYPE 2 DIABETES MELLITUS WITHOUT COMPLICATION, WITHOUT LONG-TERM CURRENT USE OF INSULIN (HCC): ICD-10-CM

## 2022-09-26 DIAGNOSIS — Z02.9 ENCOUNTERS FOR UNSPECIFIED ADMINISTRATIVE PURPOSE: ICD-10-CM

## 2022-09-26 DIAGNOSIS — I10 ESSENTIAL HYPERTENSION: ICD-10-CM

## 2022-09-26 DIAGNOSIS — I48.20 CHRONIC A-FIB (HCC): Primary | ICD-10-CM

## 2022-09-26 PROCEDURE — 1111F DSCHRG MED/CURRENT MED MERGE: CPT

## 2022-09-26 NOTE — TELEPHONE ENCOUNTER
DALTON spoke with the patient's daughter today for TCM. The patient was recently admitted for acute cystitis. A TE has been sent to the office for an appointment request.      The daughter reported the patient completed the 3 day Keflex course as directed: The daughter expressed concern that there was no prophylactic treatment for UTI's prescribed at discharge. The daughter explained the patient was on Keflex 250mg daily since 6/11/2019. The daughter was also questioning if a repeat urine culture would be recommended. TRIAGE Please follow up with the patient's daughter and address concerns. Thank you.

## 2022-09-26 NOTE — TELEPHONE ENCOUNTER
Elise Norman requesting signature for medications, placed on Dr. Grady haas for signature and review.

## 2022-09-26 NOTE — TELEPHONE ENCOUNTER
Spoke to the pt's daughter today for TCM. The patient was recently hospitalized for acute cystitis. The pt does not have a HFU appt scheduled at this time. A TCM/HFU appt is recommended by 9/30/2022 as the pt is a /high risk for readmission. The patient can not be seen on a Thursday, and there were no other availble appointments within the TCM time frame. Please advise. BOOK BY DATE (last date for TCM): 10/7/2022      Please f/u with the pt's daughter and assist with scheduling a TCM/HFU appt. Thank you!

## 2022-09-26 NOTE — TELEPHONE ENCOUNTER
Salomon Schroeder called from [facility]. She said that a coworker had spoken to someone earlier and was told a Rx for Atorvastatin would be sent to [facility]. Writer does not see any documentation about Atorvastatin. Dr. Jeny Langford, please advise if you would like Atorvastatin sent to Pharmacy.  Medication not on current med list.

## 2022-09-26 NOTE — TELEPHONE ENCOUNTER
Per discharge summary from [facility], pt is to continue vytorin (generic is ezetimibe/simvastatin) which is cholesterol med. She is not on atorvastatin.

## 2022-09-26 NOTE — TELEPHONE ENCOUNTER
Sp[facility]; she was informed patient should be on ezetimibe/simvastatin 10-10mg. She confirmed with patient's medications, and she has medication on profile. No further action needed.

## 2022-09-29 ENCOUNTER — TELEMEDICINE (OUTPATIENT)
Dept: INTERNAL MEDICINE CLINIC | Facility: CLINIC | Age: 87
End: 2022-09-29
Payer: MEDICARE

## 2022-09-29 DIAGNOSIS — Z86.73 HISTORY OF STROKE: ICD-10-CM

## 2022-09-29 DIAGNOSIS — Z95.0 S/P PLACEMENT OF CARDIAC PACEMAKER: ICD-10-CM

## 2022-09-29 DIAGNOSIS — N18.31 STAGE 3A CHRONIC KIDNEY DISEASE (HCC): ICD-10-CM

## 2022-09-29 DIAGNOSIS — I10 ESSENTIAL HYPERTENSION: ICD-10-CM

## 2022-09-29 DIAGNOSIS — I48.20 CHRONIC A-FIB (HCC): ICD-10-CM

## 2022-09-29 DIAGNOSIS — F02.80 LATE ONSET ALZHEIMER'S DISEASE WITHOUT BEHAVIORAL DISTURBANCE (HCC): ICD-10-CM

## 2022-09-29 DIAGNOSIS — E11.9 CONTROLLED TYPE 2 DIABETES MELLITUS WITHOUT COMPLICATION, WITHOUT LONG-TERM CURRENT USE OF INSULIN (HCC): ICD-10-CM

## 2022-09-29 DIAGNOSIS — R13.12 OROPHARYNGEAL DYSPHAGIA: ICD-10-CM

## 2022-09-29 DIAGNOSIS — N39.0 RECURRENT UTI: Primary | ICD-10-CM

## 2022-09-29 DIAGNOSIS — G30.1 LATE ONSET ALZHEIMER'S DISEASE WITHOUT BEHAVIORAL DISTURBANCE (HCC): ICD-10-CM

## 2022-09-29 DIAGNOSIS — E55.9 VITAMIN D DEFICIENCY: ICD-10-CM

## 2022-09-29 DIAGNOSIS — I49.5 SSS (SICK SINUS SYNDROME) (HCC): ICD-10-CM

## 2022-09-29 DIAGNOSIS — I50.22 CHRONIC SYSTOLIC HEART FAILURE (HCC): ICD-10-CM

## 2022-09-29 DIAGNOSIS — E78.2 MIXED HYPERLIPIDEMIA: ICD-10-CM

## 2022-09-29 PROCEDURE — 99214 OFFICE O/P EST MOD 30 MIN: CPT | Performed by: INTERNAL MEDICINE

## 2022-09-29 PROCEDURE — 1111F DSCHRG MED/CURRENT MED MERGE: CPT | Performed by: INTERNAL MEDICINE

## 2022-09-30 ENCOUNTER — TELEPHONE (OUTPATIENT)
Dept: INTERNAL MEDICINE CLINIC | Facility: CLINIC | Age: 87
End: 2022-09-30

## 2022-09-30 NOTE — TELEPHONE ENCOUNTER
[facility] order# [id number], placed on Dr. Michaelle haas for signature and review.

## 2022-10-04 ENCOUNTER — TELEPHONE (OUTPATIENT)
Dept: INTERNAL MEDICINE CLINIC | Facility: CLINIC | Age: 87
End: 2022-10-04

## 2022-10-04 NOTE — TELEPHONE ENCOUNTER
Physician orders dated 9/12/2022 received from [facility], placed on Dr Keaton Ward desk for review and signature.

## 2022-10-06 NOTE — TELEPHONE ENCOUNTER
Physician orders dated 9/12/2022 signed and faxed to [facility] at [phone], confirmation received.

## 2022-10-09 ENCOUNTER — TELEPHONE (OUTPATIENT)
Dept: INTERNAL MEDICINE CLINIC | Facility: CLINIC | Age: 87
End: 2022-10-09

## 2022-10-09 NOTE — TELEPHONE ENCOUNTER
On call  Saskia Galvin from [address] [place] [address] Drive [phone]  Pt asymptomatic right now but had runny nose 5 days ago  Tested postive today , rapid tests so far was neg   Will hold off on paxlovid for now since sympt were over 5 days ago and pt is doing better  Will fwd to pcp   Encounter closed

## 2022-10-10 ENCOUNTER — MED REC SCAN ONLY (OUTPATIENT)
Dept: INTERNAL MEDICINE CLINIC | Facility: CLINIC | Age: 87
End: 2022-10-10

## 2022-10-11 ENCOUNTER — TELEPHONE (OUTPATIENT)
Dept: INTERNAL MEDICINE CLINIC | Facility: CLINIC | Age: 87
End: 2022-10-11

## 2022-10-14 ENCOUNTER — TELEPHONE (OUTPATIENT)
Dept: INTERNAL MEDICINE CLINIC | Facility: CLINIC | Age: 87
End: 2022-10-14

## 2022-10-28 ENCOUNTER — TELEPHONE (OUTPATIENT)
Dept: INTERNAL MEDICINE CLINIC | Facility: CLINIC | Age: 87
End: 2022-10-28

## 2022-10-28 NOTE — TELEPHONE ENCOUNTER
Left message on patient's cell (which is daughter Yessenia's cell, on GIBRAN) asking her to call back to go over request for refill of Nystatin.

## 2022-10-29 ENCOUNTER — HOSPITAL ENCOUNTER (EMERGENCY)
Facility: HOSPITAL | Age: 87
Discharge: HOME | End: 2022-10-29
Attending: STUDENT IN AN ORGANIZED HEALTH CARE EDUCATION/TRAINING PROGRAM
Payer: MEDICARE

## 2022-10-29 ENCOUNTER — APPOINTMENT (OUTPATIENT)
Dept: GENERAL RADIOLOGY | Facility: HOSPITAL | Age: 87
End: 2022-10-29
Attending: STUDENT IN AN ORGANIZED HEALTH CARE EDUCATION/TRAINING PROGRAM
Payer: MEDICARE

## 2022-10-29 ENCOUNTER — APPOINTMENT (OUTPATIENT)
Dept: CT IMAGING | Facility: HOSPITAL | Age: 87
End: 2022-10-29
Attending: STUDENT IN AN ORGANIZED HEALTH CARE EDUCATION/TRAINING PROGRAM
Payer: MEDICARE

## 2022-10-29 VITALS
RESPIRATION RATE: 19 BRPM | WEIGHT: 138 LBS | HEIGHT: 64 IN | DIASTOLIC BLOOD PRESSURE: 90 MMHG | HEART RATE: 67 BPM | SYSTOLIC BLOOD PRESSURE: 127 MMHG | TEMPERATURE: 98 F | OXYGEN SATURATION: 96 % | BODY MASS INDEX: 23.56 KG/M2

## 2022-10-29 DIAGNOSIS — N30.90 CYSTITIS: Primary | ICD-10-CM

## 2022-10-29 LAB
ANION GAP SERPL CALC-SCNC: 10 MMOL/L (ref 0–18)
BASOPHILS # BLD AUTO: 0.05 X10(3) UL (ref 0–0.2)
BASOPHILS NFR BLD AUTO: 0.4 %
BILIRUB UR QL: NEGATIVE
BUN BLD-MCNC: 21 MG/DL (ref 7–18)
BUN/CREAT SERPL: 17.4 (ref 10–20)
CALCIUM BLD-MCNC: 8.8 MG/DL (ref 8.5–10.1)
CHLORIDE SERPL-SCNC: 101 MMOL/L (ref 98–112)
CO2 SERPL-SCNC: 27 MMOL/L (ref 21–32)
COLOR UR: YELLOW
CREAT BLD-MCNC: 1.21 MG/DL
DEPRECATED RDW RBC AUTO: 45.4 FL (ref 35.1–46.3)
EOSINOPHIL # BLD AUTO: 0.27 X10(3) UL (ref 0–0.7)
EOSINOPHIL NFR BLD AUTO: 2.2 %
ERYTHROCYTE [DISTWIDTH] IN BLOOD BY AUTOMATED COUNT: 12.7 % (ref 11–15)
GFR SERPLBLD BASED ON 1.73 SQ M-ARVRAT: 43 ML/MIN/1.73M2 (ref 60–?)
GLUCOSE BLD-MCNC: 177 MG/DL (ref 70–99)
GLUCOSE BLDC GLUCOMTR-MCNC: 179 MG/DL (ref 70–99)
GLUCOSE BLDC GLUCOMTR-MCNC: 198 MG/DL (ref 70–99)
GLUCOSE UR-MCNC: NEGATIVE MG/DL
HCT VFR BLD AUTO: 41.2 %
HGB BLD-MCNC: 13.1 G/DL
IMM GRANULOCYTES # BLD AUTO: 0.07 X10(3) UL (ref 0–1)
IMM GRANULOCYTES NFR BLD: 0.6 %
LACTATE SERPL-SCNC: 1.7 MMOL/L (ref 0.4–2)
LACTATE SERPL-SCNC: 2.5 MMOL/L (ref 0.4–2)
LYMPHOCYTES # BLD AUTO: 0.9 X10(3) UL (ref 1–4)
LYMPHOCYTES NFR BLD AUTO: 7.4 %
MCH RBC QN AUTO: 31 PG (ref 26–34)
MCHC RBC AUTO-ENTMCNC: 31.8 G/DL (ref 31–37)
MCV RBC AUTO: 97.4 FL
MONOCYTES # BLD AUTO: 0.96 X10(3) UL (ref 0.1–1)
MONOCYTES NFR BLD AUTO: 7.9 %
NEUTROPHILS # BLD AUTO: 9.96 X10 (3) UL (ref 1.5–7.7)
NEUTROPHILS # BLD AUTO: 9.96 X10(3) UL (ref 1.5–7.7)
NEUTROPHILS NFR BLD AUTO: 81.5 %
NITRITE UR QL STRIP.AUTO: NEGATIVE
OSMOLALITY SERPL CALC.SUM OF ELEC: 293 MOSM/KG (ref 275–295)
PH UR: 5.5 [PH] (ref 5–8)
PLATELET # BLD AUTO: 253 10(3)UL (ref 150–450)
POTASSIUM SERPL-SCNC: 4.1 MMOL/L (ref 3.5–5.1)
RBC # BLD AUTO: 4.23 X10(6)UL
SODIUM SERPL-SCNC: 138 MMOL/L (ref 136–145)
SP GR UR STRIP: 1.02 (ref 1–1.03)
TROPONIN I HIGH SENSITIVITY: 28 NG/L
UROBILINOGEN UR STRIP-ACNC: 0.2
WBC # BLD AUTO: 12.2 X10(3) UL (ref 4–11)
WBC #/AREA URNS AUTO: >50 /HPF
WBC CLUMPS UR QL AUTO: PRESENT /HPF

## 2022-10-29 PROCEDURE — 99285 EMERGENCY DEPT VISIT HI MDM: CPT

## 2022-10-29 PROCEDURE — 84484 ASSAY OF TROPONIN QUANT: CPT | Performed by: STUDENT IN AN ORGANIZED HEALTH CARE EDUCATION/TRAINING PROGRAM

## 2022-10-29 PROCEDURE — 87077 CULTURE AEROBIC IDENTIFY: CPT | Performed by: STUDENT IN AN ORGANIZED HEALTH CARE EDUCATION/TRAINING PROGRAM

## 2022-10-29 PROCEDURE — 70450 CT HEAD/BRAIN W/O DYE: CPT | Performed by: STUDENT IN AN ORGANIZED HEALTH CARE EDUCATION/TRAINING PROGRAM

## 2022-10-29 PROCEDURE — 87186 SC STD MICRODIL/AGAR DIL: CPT | Performed by: STUDENT IN AN ORGANIZED HEALTH CARE EDUCATION/TRAINING PROGRAM

## 2022-10-29 PROCEDURE — 96361 HYDRATE IV INFUSION ADD-ON: CPT

## 2022-10-29 PROCEDURE — 96365 THER/PROPH/DIAG IV INF INIT: CPT

## 2022-10-29 PROCEDURE — 93010 ELECTROCARDIOGRAM REPORT: CPT | Performed by: STUDENT IN AN ORGANIZED HEALTH CARE EDUCATION/TRAINING PROGRAM

## 2022-10-29 PROCEDURE — 81001 URINALYSIS AUTO W/SCOPE: CPT | Performed by: STUDENT IN AN ORGANIZED HEALTH CARE EDUCATION/TRAINING PROGRAM

## 2022-10-29 PROCEDURE — 87086 URINE CULTURE/COLONY COUNT: CPT | Performed by: STUDENT IN AN ORGANIZED HEALTH CARE EDUCATION/TRAINING PROGRAM

## 2022-10-29 PROCEDURE — 83605 ASSAY OF LACTIC ACID: CPT | Performed by: STUDENT IN AN ORGANIZED HEALTH CARE EDUCATION/TRAINING PROGRAM

## 2022-10-29 PROCEDURE — 80048 BASIC METABOLIC PNL TOTAL CA: CPT | Performed by: STUDENT IN AN ORGANIZED HEALTH CARE EDUCATION/TRAINING PROGRAM

## 2022-10-29 PROCEDURE — 82962 GLUCOSE BLOOD TEST: CPT

## 2022-10-29 PROCEDURE — 71045 X-RAY EXAM CHEST 1 VIEW: CPT | Performed by: STUDENT IN AN ORGANIZED HEALTH CARE EDUCATION/TRAINING PROGRAM

## 2022-10-29 PROCEDURE — 81015 MICROSCOPIC EXAM OF URINE: CPT | Performed by: STUDENT IN AN ORGANIZED HEALTH CARE EDUCATION/TRAINING PROGRAM

## 2022-10-29 PROCEDURE — 93005 ELECTROCARDIOGRAM TRACING: CPT

## 2022-10-29 PROCEDURE — 85025 COMPLETE CBC W/AUTO DIFF WBC: CPT | Performed by: STUDENT IN AN ORGANIZED HEALTH CARE EDUCATION/TRAINING PROGRAM

## 2022-10-29 RX ORDER — CEFUROXIME AXETIL 500 MG/1
250 TABLET ORAL 2 TIMES DAILY
Qty: 7 TABLET | Refills: 0 | Status: SHIPPED | OUTPATIENT
Start: 2022-10-29 | End: 2022-10-29

## 2022-10-29 RX ORDER — CEFUROXIME AXETIL 500 MG/1
250 TABLET ORAL 2 TIMES DAILY
Qty: 7 TABLET | Refills: 0 | Status: SHIPPED | OUTPATIENT
Start: 2022-10-29 | End: 2022-11-05

## 2022-10-29 RX ORDER — NYSTATIN 100000 [USP'U]/G
1 POWDER TOPICAL 4 TIMES DAILY
Qty: 30 G | Refills: 0 | Status: SHIPPED | OUTPATIENT
Start: 2022-10-29

## 2022-10-29 NOTE — ED INITIAL ASSESSMENT (HPI)
Patient is here with low BP- 95/56, per nursing home. Per EMS , they had 116/76. Patient is AX1 per baseline. Asymptomatic.

## 2022-10-30 RX ORDER — BLOOD-GLUCOSE METER
1 KIT MISCELLANEOUS DAILY
Qty: 100 STRIP | Refills: 3 | Status: SHIPPED | OUTPATIENT
Start: 2022-10-30

## 2022-10-30 RX ORDER — NYSTATIN 100000 [USP'U]/G
POWDER TOPICAL
Qty: 60 G | Refills: 0 | OUTPATIENT
Start: 2022-10-30

## 2022-10-30 NOTE — TELEPHONE ENCOUNTER
Med was filled yesterday 10/29/22 by Ainsley Jesus MD    Nystatin 300133 UNIT/GM External Powder 30 g 0 10/29/2022     Sig - Route: Apply 1 Application topically 4 (four) times daily. - Topical    Sent to pharmacy as: Nystatin 745792 UNIT/GM External Powder

## 2022-11-04 ENCOUNTER — TELEPHONE (OUTPATIENT)
Dept: INTERNAL MEDICINE CLINIC | Facility: CLINIC | Age: 87
End: 2022-11-04

## 2022-11-04 NOTE — TELEPHONE ENCOUNTER
Per Marily Ailin daughter of patient, patient needs to come and see doctor for ER follow up and patient can come only after 11AM in the morning or later, no soon available appointment. Please advise.     Please reply to pool: EM CC IM FM ALG RHE

## 2022-11-07 ENCOUNTER — TELEPHONE (OUTPATIENT)
Dept: INTERNAL MEDICINE CLINIC | Facility: CLINIC | Age: 87
End: 2022-11-07

## 2022-11-07 NOTE — TELEPHONE ENCOUNTER
Patient's daughter calling, confirmed name and . Assisted to schedule:   Future Appointments   Date Time Provider Shabnam Moreno   2022  2:30 PM MD KRISTI CasasO

## 2022-11-07 NOTE — TELEPHONE ENCOUNTER
Tim Barber [facility] -called to notify that patient will be discharge from home health the week of 11/20/22.

## 2022-11-08 ENCOUNTER — TELEPHONE (OUTPATIENT)
Dept: INTERNAL MEDICINE CLINIC | Facility: CLINIC | Age: 87
End: 2022-11-08

## 2022-11-09 ENCOUNTER — TELEPHONE (OUTPATIENT)
Dept: INTERNAL MEDICINE CLINIC | Facility: CLINIC | Age: 87
End: 2022-11-09

## 2022-11-09 ENCOUNTER — OFFICE VISIT (OUTPATIENT)
Dept: INTERNAL MEDICINE CLINIC | Facility: CLINIC | Age: 87
End: 2022-11-09
Payer: MEDICARE

## 2022-11-09 VITALS
DIASTOLIC BLOOD PRESSURE: 74 MMHG | TEMPERATURE: 98 F | HEART RATE: 99 BPM | HEIGHT: 64 IN | BODY MASS INDEX: 24.48 KG/M2 | OXYGEN SATURATION: 85 % | WEIGHT: 143.38 LBS | SYSTOLIC BLOOD PRESSURE: 116 MMHG

## 2022-11-09 DIAGNOSIS — I50.22 CHRONIC SYSTOLIC HEART FAILURE (HCC): ICD-10-CM

## 2022-11-09 DIAGNOSIS — N18.31 STAGE 3A CHRONIC KIDNEY DISEASE (HCC): ICD-10-CM

## 2022-11-09 DIAGNOSIS — I49.5 SSS (SICK SINUS SYNDROME) (HCC): ICD-10-CM

## 2022-11-09 DIAGNOSIS — E11.9 CONTROLLED TYPE 2 DIABETES MELLITUS WITHOUT COMPLICATION, WITHOUT LONG-TERM CURRENT USE OF INSULIN (HCC): ICD-10-CM

## 2022-11-09 DIAGNOSIS — R48.2 GAIT APRAXIA OF ELDERLY: ICD-10-CM

## 2022-11-09 DIAGNOSIS — G30.1 LATE ONSET ALZHEIMER'S DISEASE WITHOUT BEHAVIORAL DISTURBANCE (HCC): ICD-10-CM

## 2022-11-09 DIAGNOSIS — I48.20 CHRONIC A-FIB (HCC): ICD-10-CM

## 2022-11-09 DIAGNOSIS — F02.80 LATE ONSET ALZHEIMER'S DISEASE WITHOUT BEHAVIORAL DISTURBANCE (HCC): ICD-10-CM

## 2022-11-09 DIAGNOSIS — N39.0 RECURRENT UTI: Primary | ICD-10-CM

## 2022-11-09 PROCEDURE — 1126F AMNT PAIN NOTED NONE PRSNT: CPT | Performed by: INTERNAL MEDICINE

## 2022-11-09 PROCEDURE — 99214 OFFICE O/P EST MOD 30 MIN: CPT | Performed by: INTERNAL MEDICINE

## 2022-11-09 NOTE — TELEPHONE ENCOUNTER
[de-identified] telephone order for change to nystatin completed and several attempts made to fax to [facility] at [phone], each attempt failure status. Called Rafael at [phone], spoke with [facility], who states that the fax machine is down, provided me with fax number for second floor. Faxed order to , confirmation received.

## 2022-11-30 ENCOUNTER — OFFICE VISIT (OUTPATIENT)
Dept: SURGERY | Facility: CLINIC | Age: 87
End: 2022-11-30
Payer: MEDICARE

## 2022-11-30 DIAGNOSIS — N39.0 RECURRENT UTI: Primary | ICD-10-CM

## 2022-11-30 PROCEDURE — 99204 OFFICE O/P NEW MOD 45 MIN: CPT | Performed by: NURSE PRACTITIONER

## 2022-11-30 RX ORDER — CEPHALEXIN 250 MG/1
250 CAPSULE ORAL NIGHTLY
Qty: 90 CAPSULE | Refills: 3 | Status: SHIPPED | OUTPATIENT
Start: 2022-11-30

## 2022-11-30 NOTE — PATIENT INSTRUCTIONS
Please submit UA and urine culture today or tomorrow. Please fax results to [phone]    After submitting urine, ok to start Keflex 250 mg by mouth nightly. Check urine culture as needed for UTI symptoms. Follow up in 1 year.

## 2022-12-05 ENCOUNTER — TELEPHONE (OUTPATIENT)
Dept: SURGERY | Facility: CLINIC | Age: 87
End: 2022-12-05

## 2022-12-05 NOTE — TELEPHONE ENCOUNTER
[facility] [employer] in [place] calling for patient to inform urinalysis was positive and starting on rx Macrobid, her profolatic antibiotic was discontinued. Patients power of  wanted this message to be passed on to physician. Please call at [phone],[facility].

## 2022-12-06 NOTE — TELEPHONE ENCOUNTER
Can we please obtain those records? I would like to see the culture results. If it shows sensitivity then after completing her treatment course, she should be restarted on Keflex 250 mg PO nightly as she has had recurrent infections.

## 2022-12-06 NOTE — TELEPHONE ENCOUNTER
I called Raafel and s/w the nurse Dom Desai and asked if she could fax pt's recent urine test results. I gave the fax # and also instructed on the restart of the Keflex suppressive [address]. She verbalized understanding and compliance.

## 2022-12-07 ENCOUNTER — TELEPHONE (OUTPATIENT)
Dept: INTERNAL MEDICINE CLINIC | Facility: CLINIC | Age: 87
End: 2022-12-07

## 2022-12-07 ENCOUNTER — TELEPHONE (OUTPATIENT)
Dept: SURGERY | Facility: CLINIC | Age: 87
End: 2022-12-07

## 2022-12-07 NOTE — TELEPHONE ENCOUNTER
Her previous cultures that I have in her chart have historically grown E. Coli, the Keflex suppressive therapy is geared toward that bacteria. It looks like this time she grew a different bacteria called enterococcus faecalis, which is not typically sensitive to Keflex, which is why she had a break through infection. It looks like they are treating it appropriately. We will continue with Keflex suppressive therapy for now after her treatment course. If this continue to be a problem we will need to revise our plan.

## 2022-12-07 NOTE — TELEPHONE ENCOUNTER
Physician telephone order dated 12/4/2022 received from [facility], placed on Dr Chapis Braun desrose for review and signature.

## 2022-12-07 NOTE — TELEPHONE ENCOUNTER
Rodney Christian returned the signed orders from [facility] and I faxed them back and received a fax confirmation.

## 2022-12-12 ENCOUNTER — APPOINTMENT (OUTPATIENT)
Dept: CT IMAGING | Facility: HOSPITAL | Age: 87
End: 2022-12-12
Attending: EMERGENCY MEDICINE
Payer: MEDICARE

## 2022-12-12 ENCOUNTER — HOSPITAL ENCOUNTER (EMERGENCY)
Facility: HOSPITAL | Age: 87
Discharge: HOME | End: 2022-12-12
Attending: EMERGENCY MEDICINE
Payer: MEDICARE

## 2022-12-12 VITALS
DIASTOLIC BLOOD PRESSURE: 75 MMHG | RESPIRATION RATE: 20 BRPM | HEART RATE: 76 BPM | TEMPERATURE: 97 F | SYSTOLIC BLOOD PRESSURE: 146 MMHG | OXYGEN SATURATION: 94 %
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
## 2022-12-12 DIAGNOSIS — S02.2XXA CLOSED FRACTURE OF NASAL BONE, INITIAL ENCOUNTER: ICD-10-CM

## 2022-12-12 DIAGNOSIS — S09.90XA INJURY OF HEAD, INITIAL ENCOUNTER: ICD-10-CM

## 2022-12-12 LAB
BILIRUB UR QL: NEGATIVE
CLARITY UR: CLEAR
COLOR UR: YELLOW
GLUCOSE UR-MCNC: NEGATIVE MG/DL
NITRITE UR QL STRIP.AUTO: NEGATIVE
PH UR: 5 [PH] (ref 5–8)
PROT UR-MCNC: NEGATIVE MG/DL
RBC #/AREA URNS AUTO: >10 /HPF
SP GR UR STRIP: 1.02 (ref 1–1.03)
UROBILINOGEN UR STRIP-ACNC: <2
VIT C UR-MCNC: NEGATIVE MG/DL

## 2022-12-12 PROCEDURE — 99284 EMERGENCY DEPT VISIT MOD MDM: CPT

## 2022-12-12 PROCEDURE — 81001 URINALYSIS AUTO W/SCOPE: CPT | Performed by: EMERGENCY MEDICINE

## 2022-12-12 PROCEDURE — 70450 CT HEAD/BRAIN W/O DYE: CPT | Performed by: EMERGENCY MEDICINE

## 2022-12-12 PROCEDURE — 70486 CT MAXILLOFACIAL W/O DYE: CPT | Performed by: EMERGENCY MEDICINE

## 2022-12-12 PROCEDURE — 87086 URINE CULTURE/COLONY COUNT: CPT | Performed by: EMERGENCY MEDICINE

## 2022-12-12 RX ORDER — NITROFURANTOIN 25; 75 MG/1; MG/1
100 CAPSULE ORAL 2 TIMES DAILY
Qty: 6 CAPSULE | Refills: 0 | Status: SHIPPED | OUTPATIENT
Start: 2022-12-12 | End: 2022-12-15

## 2022-12-13 ENCOUNTER — TELEPHONE (OUTPATIENT)
Dept: INTERNAL MEDICINE CLINIC | Facility: CLINIC | Age: 87
End: 2022-12-13

## 2022-12-13 NOTE — TELEPHONE ENCOUNTER
Pt was sent to ER yesterday due to fall, and was discharged around midnight. Per Sarah, RN Pt was prescribed macrobid to take for 3 days. Pt was already on Macrobid for 7 days due to UTI, and finished on Sunday. Sarah is asking if Pt should continue taking Macrobid prescribed in the ER. Pt is also on cephalexin daily for prophylaxis (on hold when Pt was on Macrobid), now resumed. Sarah states on Pt's discharge paperwork, Ceftin 500mg twice a day for 7 days is also listed. Please advise if Pt needs to take Ceftin and to continue taking Macrobid?

## 2022-12-13 NOTE — ED QUICK NOTES
Care endorsed to [facility], patient resting comfortably on stretcher. No current needs, awaiting results.

## 2022-12-13 NOTE — TELEPHONE ENCOUNTER
Called and spoke to nurse Je Padilla and advised of the note below. She will call again tomorrow.  No further questions

## 2022-12-13 NOTE — TELEPHONE ENCOUNTER
Urine culture is negative so far so dont restart macrobid, ok to put her back on her cephalexin for uti prophylaxis. Pt is not no ceftin, no orders noted in ER notes I I revieiwed.   Call us back tomorrow for final urine culture result

## 2022-12-13 NOTE — ED INITIAL ASSESSMENT (HPI)
Patient arrives via [address] [place] [address] with complaints of a fall. Patient has no complaints, denies blood thinners.

## 2022-12-16 NOTE — TELEPHONE ENCOUNTER
Physician telephone order dated 12/4/2022 received from [place], faxed to [phone] confirmation received.

## 2023-02-03 ENCOUNTER — TELEPHONE (OUTPATIENT)
Dept: INTERNAL MEDICINE CLINIC | Facility: CLINIC | Age: 88
End: 2023-02-03

## 2023-02-03 NOTE — TELEPHONE ENCOUNTER
RN returned call to verify medication being added for patient since already had loratadine. Informed of flonase 2 sprays each nostril daily and she voiced understanding.

## 2023-02-03 NOTE — TELEPHONE ENCOUNTER
pls clarify with nurse ; is it from her siinuses/nasal passages or her chest as to lots of phlegm since I may recommend doing cxr pa lateral. thanks

## 2023-02-03 NOTE — TELEPHONE ENCOUNTER
TAWANDA Suarez from [facility] is asking for orders for a medication. Patient has a lot of phlegm/mucous and this is making it hard for her to eat. No fever or cough. Please advise.

## 2023-02-03 NOTE — TELEPHONE ENCOUNTER
Verneice Printers, please disregard. Nurse Mana Mohr states patient was eating breakfast and started coughing in the morning with clear mucous, all drainage appears to be nasal, not in the chest, lungs are clear.

## 2023-02-07 ENCOUNTER — TELEPHONE (OUTPATIENT)
Dept: INTERNAL MEDICINE CLINIC | Facility: CLINIC | Age: 88
End: 2023-02-07

## 2023-02-07 NOTE — TELEPHONE ENCOUNTER
[facility] physician telephone order for flonase faxed to [phone] confirmation received.

## 2023-02-13 ENCOUNTER — TELEPHONE (OUTPATIENT)
Dept: INTERNAL MEDICINE CLINIC | Facility: CLINIC | Age: 88
End: 2023-02-13

## 2023-02-13 DIAGNOSIS — R29.6 RECURRENT FALLS: ICD-10-CM

## 2023-02-13 DIAGNOSIS — R48.2 GAIT APRAXIA OF ELDERLY: Primary | ICD-10-CM

## 2023-02-13 RX ORDER — CARVEDILOL 3.12 MG/1
TABLET ORAL
Qty: 90 TABLET | Refills: 1 | Status: SHIPPED | OUTPATIENT
Start: 2023-02-13

## 2023-02-13 NOTE — TELEPHONE ENCOUNTER
Please review.  Protocol failed/No protocol      Requested Prescriptions   Pending Prescriptions Disp Refills    CARVEDILOL 3.125 MG Oral Tab [Pharmacy Med Name: carvedilol 3.125 mg tablet] 90 tablet 1     Sig: TAKE 1 TABLET BY MOUTH DAILY hold FOR sbp less THEN 100 OR HR LESS THEN 60       Hypertensive Medications Protocol Failed - 2/11/2023 10:32 AM        Failed - Last BP reading less than 140/90     BP Readings from Last 1 Encounters:  12/12/22 : 146/75              Failed - EGFRCR or GFRNAA > 50     GFR Evaluation  EGFRCR: 43 , resulted on 10/29/2022          Passed - In person appointment in the past 12 or next 3 months     Recent Outpatient Visits              2 months ago Recurrent UTI    [facility], [address], [employer], [address], APRN    Office Visit    3 months ago Recurrent UTI    Jaylen Crowe MD    Office Visit    4 months ago Recurrent UTI    [address], Jaylen Becerra MD    Telemedicine    6 months ago Oropharyngeal dysphagia    [address], Jaylen Becerra MD    Telemedicine    9 months ago Medicare annual wellness visit, subsequent    Ramakrishna Crowe MD    Office Visit                      Passed - CMP or BMP in past 6 months     Recent Results (from the past 4392 hour(s))   Basic Metabolic Panel (8)    Collection Time: 10/29/22  9:01 AM   Result Value Ref Range    Glucose 177 (H) 70 - 99 mg/dL    Sodium 138 136 - 145 mmol/L    Potassium 4.1 3.5 - 5.1 mmol/L    Chloride 101 98 - 112 mmol/L    CO2 27.0 21.0 - 32.0 mmol/L    Anion Gap 10 0 - 18 mmol/L    BUN 21 (H) 7 - 18 mg/dL    Creatinine 1.21 (H) 0.55 - 1.02 mg/dL    BUN/CREA Ratio 17.4 10.0 - 20.0    Calcium, Total 8.8 8.5 - 10.1 mg/dL    Calculated Osmolality 293 275 - 295 mOsm/kg    eGFR-Cr 43 (L) >=60 mL/min/1.73m2     *Note: Due to a large number of results and/or encounters for the requested time period, some results have not been displayed. A complete set of results can be found in Results Review.                Passed - In person appointment or virtual visit in the past 6 months     Recent Outpatient Visits              2 months ago Recurrent UTI    [facility], [employer], [address], APRN    Office Visit    3 months ago Recurrent UTI    [address], Litzy Deng MD    Office Visit    4 months ago Recurrent UTI    [address], Jaylen Collins MD    Telemedicine    6 months ago Oropharyngeal dysphagia    Ken[facility] Aguirre Nerissa Register, MD    Telemedicine    9 months ago [facility], subsequent    [address], Litzy Deng MD    Office Visit                           Recent Outpatient Visits              2 months ago Recurrent UTI    [facility], [email or web address], [address], APRN    Office Visit    3 months ago Recurrent UTI    [address], Litzy Deng MD    Office Visit    4 months ago Recurrent UTI    [address], Jaylen Collins MD    Telemedicine    6 months ago Oropharyngeal dysphagia    Kenya Aguirre Nerissa Register, MD    Telemedicine    9 months ago [facility], subsequent    [address], Litzy Deng MD    Office Visit

## 2023-02-14 NOTE — TELEPHONE ENCOUNTER
On call   Nurse Theodore Kidd from [facility] reporting patient slid from chair and fell report no  injury ,vital signs are normal   no signs of open skin or any injuries    Patient may need referral for physical therapy will discuss with PCP

## 2023-02-15 NOTE — TELEPHONE ENCOUNTER
Isabel Valerio from [facility] following up requesting an order for PT and OT for patient to regain strength after her fall. Orders pended for your review.      Once signed, fax orders to:  Attn: Nurse's office  Fax #[phone]

## 2023-02-19 NOTE — TELEPHONE ENCOUNTER
Please review. Protocol failed / No Protocol.     Please advise LOV:    Not taking ASA/Ezetimbe    Requested Prescriptions   Pending Prescriptions Disp Refills    VITAMIN D3 125 MCG (5000 UT) Oral Cap [Pharmacy Med Name: cholecalciferol (vitamin D3) 125 mcg (5,000 unit) capsule] 90 capsule 0     Sig: TAKE 1 CAPSULE BY MOUTH DAILY       There is no refill protocol information for this order       FUROSEMIDE 20 MG Oral Tab [Pharmacy Med Name: furosemide 20 mg tablet] 90 tablet 0     Sig: TAKE 1 TABLET BY MOUTH DAILY       Hypertensive Medications Protocol Failed - 2/17/2023  9:56 PM        Failed - Last BP reading less than 140/90     BP Readings from Last 1 Encounters:  12/12/22 : 146/75              Failed - EGFRCR or GFRNAA > 50     GFR Evaluation  EGFRCR: 43 , resulted on 10/29/2022          Passed - In person appointment in the past 12 or next 3 months     Recent Outpatient Visits              2 months ago Recurrent UTI    [facility], [address] [facility],[address], [employer], [address], APRN    Office Visit    3 months ago Recurrent UTI    Anita Roberts MD    Office Visit    4 months ago Recurrent UTI    Michael Breen, Jaylen Anton MD    Telemedicine    6 months ago Oropharyngeal dysphagia    Anita Roberts MD    Telemedicine    9 months ago Medicare annual wellness visit, subsequent    Rubén Mayorga MD    Office Visit                      Passed - CMP or BMP in past 6 months     Recent Results (from the past 4392 hour(s))   Basic Metabolic Panel (8)    Collection Time: 10/29/22  9:01 AM   Result Value Ref Range    Glucose 177 (H) 70 - 99 mg/dL    Sodium 138 136 - 145 mmol/L    Potassium 4.1 3.5 - 5.1 mmol/L    Chloride 101 98 - 112 mmol/L    CO2 27.0 21.0 - 32.0 mmol/L    Anion Gap 10 0 - 18 mmol/L    BUN 21 (H) 7 - 18 mg/dL    Creatinine 1.21 (H) 0.55 - 1.02 mg/dL    BUN/CREA Ratio 17.4 10.0 - 20.0    Calcium, Total 8.8 8.5 - 10.1 mg/dL    Calculated Osmolality 293 275 - 295 mOsm/kg    eGFR-Cr 43 (L) >=60 mL/min/1.73m2     *Note: Due to a large number of results and/or encounters for the requested time period, some results have not been displayed. A complete set of results can be found in Results Review.                Passed - In person appointment or virtual visit in the past 6 months     Recent Outpatient Visits              2 months ago Recurrent UTI    Rubén Orellana, [employer], [address], APRN    Office Visit    3 months ago Recurrent UTI    Karla Mccabe MD    Office Visit    4 months ago Recurrent UTI    Rubén Orellana, Jayeln Dee MD    Telemedicine    6 months ago Oropharyngeal dysphagia    [facility], [address], Karla Tejada MD    Telemedicine    9 months ago Medicare annual wellness visit, subsequent    Karla Mccabe MD    Office Visit                        EZETIMIBE-SIMVASTATIN 10-10 MG Oral Tab [Pharmacy Med Name: ezetimibe 10 mg-simvastatin 10 mg tablet] 90 tablet 0     Sig: TAKE 1 TABLET BY MOUTH DAILY AT BEDTIME       Cholesterol Medication Protocol Failed - 2023  9:56 PM        Failed - LDL in past 12 months        Failed - Last LDL < 130     Lab Results   Component Value Date    LDL 46 2019             Passed - ALT in past 12 months        Passed - Last ALT < 80     Lab Results   Component Value Date    ALT 13 2022             Passed - In person appointment or virtual visit in the past 12 mos or appointment in next 3 mos     Recent Outpatient Visits              2 months ago Recurrent UTI    [facility], [address], [facility] [employer], [address], APRN    Office Visit    3 months ago Recurrent UTI    [address] [facility], Derek Bucio MD    Office Visit    4 months ago Recurrent UTI    [address] [facility], Jaylen Mesa MD    Telemedicine    6 months ago Oropharyngeal dysphagia    [facility], [address], Derek Bucio MD    Telemedicine    9 months ago [employer] annual wellness visit, subsequent    [address] [facility], Derek Bucio MD    Office Visit                        ASPIRIN 325 MG Oral Tab EC [Pharmacy Med Name: aspirin 325 mg tablet,delayed release] 90 tablet 1     Sig: TAKE 1 TABLET BY MOUTH EVERY DAY       Aspirin Protocol Passed - 2/17/2023  9:56 PM        Passed - In person appointment or virtual visit in the past 6 mos or appointment in next 3 mos     Recent Outpatient Visits              2 months ago Recurrent UTI    [facility], [address], [facility], [address], APRN    Office Visit    3 months ago Recurrent UTI    [facility], [address], Derek Bucio MD    Office Visit    4 months ago Recurrent UTI    [facility], [address], Jaylen Mesa MD    Telemedicine    6 months ago Oropharyngeal dysphagia    [facility], [address], Derek Bucio MD    Telemedicine    9 months ago [employer] annual wellness visit, subsequent    [address] [facility], Derek Bucio MD    Office Visit                        FLUOXETINE 10 MG Oral Cap [place] Med Name: fluoxetine 10 mg capsule] 90 capsule 1     Sig: TAKE 1 CAPSULE BY MOUTH EVERY DAY       Psychiatric Non-Scheduled (Anti-Anxiety) Passed - 2/17/2023  9:56 PM        Passed - In person appointment or virtual visit in the past 6 mos or appointment in next 3 mos     Recent Outpatient Visits              2 months ago Recurrent UTI    [facility], [address], [employer], [address], APRN    Office Visit    3 months ago Recurrent UTI    Ramakrishna Velez MD    Office Visit    4 months ago Recurrent UTI    [facility] Deleon, Jaylen Becerra MD    Telemedicine    6 months ago Oropharyngeal dysphagia    [facility], [facility], Jaylen Becerra MD    Telemedicine    9 months ago [facility] annual [facility] visit, subsequent    Jaylen Velez MD    Office Visit                        ALLERGY RELIEF 10 MG Oral Tab [Pharmacy Med Name: Allergy Relief (loratadine) 10 mg tablet] 90 tablet 1     Sig: TAKE 1 TABLET BY MOUTH DAILY       Allergy Medication Protocol Passed - 2/17/2023  9:56 PM        Passed - In person appointment or virtual visit in the past 12 mos or appointment in next 3 mos     Recent Outpatient Visits              2 months ago Recurrent UTI    [address], [address], Richard Qua[place], [address], APRN    Office Visit    3 months ago Recurrent UTI    [address], [facility], Ramakrishna Mcleod MD    Office Visit    4 months ago Recurrent UTI    [address], [facility], Jaylen Becerra MD    Telemedicine    6 months ago Oropharyngeal dysphagia    [address], [facility], Ramakrishna Mcleod MD    Telemedicine    9 months ago [employer] annual wellness visit, subsequent    Ramakrishna Velez MD    Office Visit

## 2023-02-20 RX ORDER — LORATADINE 10 MG/1
TABLET ORAL
Qty: 90 TABLET | Refills: 1 | Status: SHIPPED | OUTPATIENT
Start: 2023-02-20

## 2023-02-20 RX ORDER — FUROSEMIDE 20 MG/1
TABLET ORAL
Qty: 90 TABLET | Refills: 0 | Status: SHIPPED | OUTPATIENT
Start: 2023-02-20

## 2023-02-20 RX ORDER — EZETIMIBE AND SIMVASTATIN 10; 10 MG/1; MG/1
1 TABLET ORAL NIGHTLY
Qty: 90 TABLET | Refills: 1 | Status: SHIPPED | OUTPATIENT
Start: 2023-02-20

## 2023-02-20 RX ORDER — ASPIRIN 325 MG
325 TABLET, DELAYED RELEASE (ENTERIC COATED) ORAL DAILY
Qty: 90 TABLET | Refills: 1 | Status: SHIPPED | OUTPATIENT
Start: 2023-02-20

## 2023-02-20 RX ORDER — MELATONIN
Qty: 90 TABLET | Refills: 1 | Status: SHIPPED | OUTPATIENT
Start: 2023-02-20

## 2023-02-20 RX ORDER — EZETIMIBE AND SIMVASTATIN 10; 10 MG/1; MG/1
TABLET ORAL
Qty: 90 TABLET | Refills: 0 | Status: SHIPPED | OUTPATIENT
Start: 2023-02-20

## 2023-02-20 RX ORDER — FUROSEMIDE 20 MG/1
20 TABLET ORAL DAILY
Qty: 90 TABLET | Refills: 1 | Status: SHIPPED | OUTPATIENT
Start: 2023-02-20

## 2023-02-20 RX ORDER — FLUOXETINE 10 MG/1
CAPSULE ORAL
Qty: 90 CAPSULE | Refills: 1 | Status: SHIPPED | OUTPATIENT
Start: 2023-02-20

## 2023-02-21 ENCOUNTER — MED REC SCAN ONLY (OUTPATIENT)
Dept: INTERNAL MEDICINE CLINIC | Facility: CLINIC | Age: 88
End: 2023-02-21

## 2023-02-21 ENCOUNTER — TELEPHONE (OUTPATIENT)
Dept: INTERNAL MEDICINE CLINIC | Facility: CLINIC | Age: 88
End: 2023-02-21

## 2023-02-21 NOTE — TELEPHONE ENCOUNTER
Order number [id number] received from [facility], placed on Dr Carl Landrum desk for review and signature.

## 2023-03-09 ENCOUNTER — TELEPHONE (OUTPATIENT)
Dept: INTERNAL MEDICINE CLINIC | Facility: CLINIC | Age: 88
End: 2023-03-09

## 2023-03-09 ENCOUNTER — MED REC SCAN ONLY (OUTPATIENT)
Dept: INTERNAL MEDICINE CLINIC | Facility: CLINIC | Age: 88
End: 2023-03-09

## 2023-03-09 NOTE — TELEPHONE ENCOUNTER
Order number [id number] received from [facility], placed on Dr Alcira Prabhakar desk for review and signature.

## 2023-03-09 NOTE — TELEPHONE ENCOUNTER
Paged by Tim Barry Nurse in [place]; pt had been couging for 2 weeks, no SOB, no fevers noted. I advsied to get cxr today stat and also check for rapid covid test; call us back with results. Cn give mucinex dm 1 tab bid also. Send to ER if worsens.

## 2023-03-10 NOTE — TELEPHONE ENCOUNTER
[facility] and xray report placed on Dr. Latonya Vizcaino desrose for signature and review. Right breast excision of carcinoma

## 2023-03-11 ENCOUNTER — TELEPHONE (OUTPATIENT)
Dept: INTERNAL MEDICINE CLINIC | Facility: CLINIC | Age: 88
End: 2023-03-11

## 2023-03-11 DIAGNOSIS — R13.10 SWALLOWING IMPAIRED: Primary | ICD-10-CM

## 2023-03-11 RX ORDER — FLUTICASONE PROPIONATE 110 UG/1
2 AEROSOL, METERED RESPIRATORY (INHALATION) 2 TIMES DAILY
Qty: 1 EACH | Refills: 0 | Status: SHIPPED | OUTPATIENT
Start: 2023-03-11 | End: 2023-04-10

## 2023-03-11 NOTE — TELEPHONE ENCOUNTER
I think she may still have [facility] Aakash seeing her so we can order it and then can have their speech therapist see her.

## 2023-03-11 NOTE — TELEPHONE ENCOUNTER
cxr was clear and no pneumonia; hiatal hernia usually doesn't cause much of problem other than can sometines cause GERD. She is already on pantorpazole for GERD amd also started her before loratadine and flonase NS for possible allergy which cna also cause coughing; Would recommend trial of steroid inhaler to cover for reactive airway/cough variant asthma; I will send erx  Also want to have pt do swallow eval by speech therapist, just want to make sure she isnt aspirating and causing her cough.

## 2023-03-11 NOTE — TELEPHONE ENCOUNTER
, daughter states patient is at [facility]. Can swallow evaluation be ordered to be done there? Daughter advised of 's notes and verbalized understanding.

## 2023-03-11 NOTE — TELEPHONE ENCOUNTER
Pt's daughter stated Pt had Chest Xray on Thursday asking if you received results and what is Plan, concern it's her hiatal hernia, cough more, phlegm/food, asked if she was on stomach med, only see on med hx-pantoprazole

## 2023-03-13 NOTE — TELEPHONE ENCOUNTER
[facility] , spoke with Elysia Love ,  Call transferred to supervisor     Spoke with Chandrika Fitzgerald iInformed of need to have swallow evaluation  and speech therapy see the Patient    Dai Ramirez took the above as a verbal order and  will  send  the information to the [place] office to begin  processing the orders

## 2023-03-13 NOTE — TELEPHONE ENCOUNTER
Order# [id number] received from [facility] placed on Dr. Marcela Ovalles desk for signature and review.

## 2023-03-20 ENCOUNTER — TELEPHONE (OUTPATIENT)
Dept: INTERNAL MEDICINE CLINIC | Facility: CLINIC | Age: 88
End: 2023-03-20

## 2023-03-20 NOTE — TELEPHONE ENCOUNTER
FYI only. No call back needed, unless Questions. Sheri Mcdonough, [address] [place] [address] office. Patient's date of birth and full name both confirmed. FYI for MD:   Speech therapy Evaluation will be completed this week - week of 3/20/23.

## 2023-03-24 ENCOUNTER — TELEPHONE (OUTPATIENT)
Dept: INTERNAL MEDICINE CLINIC | Facility: CLINIC | Age: 88
End: 2023-03-24

## 2023-03-24 NOTE — TELEPHONE ENCOUNTER
Miriam with [facility] is updating Dr. Justen Estrella on patient. Patient was seen today and is scheduled for 3 visits to target her swallow function.      If any questions or concerns call Miriam

## 2023-03-26 ENCOUNTER — TELEPHONE (OUTPATIENT)
Dept: INTERNAL MEDICINE CLINIC | Facility: CLINIC | Age: 88
End: 2023-03-26

## 2023-03-26 NOTE — TELEPHONE ENCOUNTER
On-call page received and returned. Nursing home nurse calling with urine culture results. Patient positive for Proteus vulgaris that is sensitive to Macrobid. Macrobid 100mg twice a day for 7 days ordered.

## 2023-03-27 ENCOUNTER — TELEPHONE (OUTPATIENT)
Dept: INTERNAL MEDICINE CLINIC | Facility: CLINIC | Age: 88
End: 2023-03-27

## 2023-03-27 RX ORDER — SULFAMETHOXAZOLE AND TRIMETHOPRIM 800; 160 MG/1; MG/1
1 TABLET ORAL 2 TIMES DAILY
Qty: 14 TABLET | Refills: 0 | Status: SHIPPED | OUTPATIENT
Start: 2023-03-27

## 2023-03-27 NOTE — TELEPHONE ENCOUNTER
Spoke to Pacheco Luis, Nurse. Verbal orders given. She states she has been trying to encourage increase fluids and urology.

## 2023-03-27 NOTE — TELEPHONE ENCOUNTER
ASHKAN RN states that pt was just prescribed Bactrim for a UTI. She wants to verify with PCP that the prophylactic Keflex 250mg that she is taking for recurrent UTIs should be held while taking the Bactrim. Please advise.

## 2023-03-27 NOTE — TELEPHONE ENCOUNTER
Harini Potter at the nursing home needs claification on medication. .    Please verify if the Keflex should be on hold while patient is taking the Bactrim for her UTI

## 2023-03-27 NOTE — TELEPHONE ENCOUNTER
Patient's daughter Kelsey Jaramillo called (on GIBRAN), verified patient's Name and . She is calling to follow up regarding new antibiotic prescription. Advised to re-fax lab test to [phone].  [facility] Staff kindly follow up on faxed lab result.

## 2023-03-27 NOTE — TELEPHONE ENCOUNTER
[address] called to inform that Dr. Henrietta Parekh Prescribed patient Macrobid 100 mg [facility] us that she requires a new prescription due to patient being resistant to med. Requesting a call back as soon as possible, labs were faxed yesterday to view.

## 2023-03-27 NOTE — TELEPHONE ENCOUNTER
Yes and I would recommend also seeing her urologist since we are having breakthrough utis inspite of having keflex prophylaxis

## 2023-03-27 NOTE — TELEPHONE ENCOUNTER
Spoke with Wallace Vargas (dtg) GIBRAN  verified full name and . Informed her of message and asked if Bactrim was a sulfa drug advised it is a combination drug and yes it contains sulfa.     Did advise her that Dr. Roshan Moreno is aware of mom Levaquin allergy    No further questions

## 2023-03-27 NOTE — TELEPHONE ENCOUNTER
I received a copy of pt's urine cuolture; positive for proteus and sensitive to bactrim/sulfa; it's resistant to a lot of other abx and pt is allergic to levaquin  I sent erx for bactrim DS to her [facility]

## 2023-03-27 NOTE — TELEPHONE ENCOUNTER
IM ADO, please check if labs were received and give to Dr. Annika Wan for review. Patient needs new antibiotic.

## 2023-04-06 ENCOUNTER — TELEPHONE (OUTPATIENT)
Dept: INTERNAL MEDICINE CLINIC | Facility: CLINIC | Age: 88
End: 2023-04-06

## 2023-04-06 ENCOUNTER — MED REC SCAN ONLY (OUTPATIENT)
Dept: INTERNAL MEDICINE CLINIC | Facility: CLINIC | Age: 88
End: 2023-04-06

## 2023-04-06 NOTE — TELEPHONE ENCOUNTER
Fax received from [facility] stating that pt is refusing Flovent HFA and flonase, asking to change to PRN. Ok to change to PRN, signed and faxed to [phone], confirmation received.

## 2023-05-22 ENCOUNTER — TELEPHONE (OUTPATIENT)
Dept: INTERNAL MEDICINE CLINIC | Facility: CLINIC | Age: 88
End: 2023-05-22

## 2023-05-22 NOTE — TELEPHONE ENCOUNTER
Patient Outreach to schedule an [facility] Wellness appointment with Dr Paola Youssef.

## 2023-05-22 NOTE — TELEPHONE ENCOUNTER
[facility] physical certification and medical survey placed on Dr. Golda Severin desk for signature and review.

## 2023-07-01 ENCOUNTER — APPOINTMENT (OUTPATIENT)
Dept: GENERAL RADIOLOGY | Facility: HOSPITAL | Age: 88
End: 2023-07-01
Attending: EMERGENCY MEDICINE
Payer: MEDICARE

## 2023-07-01 ENCOUNTER — HOSPITAL ENCOUNTER (INPATIENT)
Facility: HOSPITAL | Age: 88
LOS: 4 days | Discharge: HOME HEALTH | End: 2023-07-05
Attending: EMERGENCY MEDICINE | Admitting: HOSPITALIST
Payer: MEDICARE

## 2023-07-01 ENCOUNTER — APPOINTMENT (OUTPATIENT)
Dept: CT IMAGING | Facility: HOSPITAL | Age: 88
End: 2023-07-01
Attending: HOSPITALIST
Payer: MEDICARE

## 2023-07-01 ENCOUNTER — APPOINTMENT (OUTPATIENT)
Dept: CT IMAGING | Facility: HOSPITAL | Age: 88
End: 2023-07-01
Attending: EMERGENCY MEDICINE
Payer: MEDICARE

## 2023-07-01 DIAGNOSIS — N39.0 URINARY TRACT INFECTION WITHOUT HEMATURIA, SITE UNSPECIFIED: Primary | ICD-10-CM

## 2023-07-01 DIAGNOSIS — K04.7 DENTAL ABSCESS: ICD-10-CM

## 2023-07-01 DIAGNOSIS — R09.02 HYPOXIA: ICD-10-CM

## 2023-07-01 PROBLEM — G93.40 ENCEPHALOPATHY: Status: ACTIVE | Noted: 2023-07-01

## 2023-07-01 LAB
ANION GAP SERPL CALC-SCNC: 5 MMOL/L (ref 0–18)
BASOPHILS # BLD AUTO: 0.06 X10(3) UL (ref 0–0.2)
BASOPHILS NFR BLD AUTO: 0.6 %
BILIRUB UR QL: NEGATIVE
BUN BLD-MCNC: 16 MG/DL (ref 7–18)
BUN/CREAT SERPL: 19.5 (ref 10–20)
CALCIUM BLD-MCNC: 8.7 MG/DL (ref 8.5–10.1)
CHLORIDE SERPL-SCNC: 101 MMOL/L (ref 98–112)
CLARITY UR: CLEAR
CO2 SERPL-SCNC: 29 MMOL/L (ref 21–32)
COLOR UR: YELLOW
CREAT BLD-MCNC: 0.82 MG/DL
D DIMER PPP FEU-MCNC: 0.81 UG/ML FEU (ref ?–0.88)
DEPRECATED RDW RBC AUTO: 45.9 FL (ref 35.1–46.3)
EOSINOPHIL # BLD AUTO: 0.11 X10(3) UL (ref 0–0.7)
EOSINOPHIL NFR BLD AUTO: 1 %
ERYTHROCYTE [DISTWIDTH] IN BLOOD BY AUTOMATED COUNT: 13 % (ref 11–15)
EST. AVERAGE GLUCOSE BLD GHB EST-MCNC: 131 MG/DL (ref 68–126)
GFR SERPLBLD BASED ON 1.73 SQ M-ARVRAT: 69 ML/MIN/1.73M2 (ref 60–?)
GLUCOSE BLD-MCNC: 125 MG/DL (ref 70–99)
GLUCOSE BLDC GLUCOMTR-MCNC: 123 MG/DL (ref 70–99)
GLUCOSE BLDC GLUCOMTR-MCNC: 129 MG/DL (ref 70–99)
GLUCOSE BLDC GLUCOMTR-MCNC: 141 MG/DL (ref 70–99)
GLUCOSE BLDC GLUCOMTR-MCNC: 162 MG/DL (ref 70–99)
GLUCOSE UR-MCNC: NORMAL MG/DL
HBA1C MFR BLD: 6.2 % (ref ?–5.7)
HCT VFR BLD AUTO: 38.6 %
HGB BLD-MCNC: 12.6 G/DL
IMM GRANULOCYTES # BLD AUTO: 0.03 X10(3) UL (ref 0–1)
IMM GRANULOCYTES NFR BLD: 0.3 %
KETONES UR-MCNC: NEGATIVE MG/DL
LACTATE SERPL-SCNC: 1.2 MMOL/L (ref 0.4–2)
LEUKOCYTE ESTERASE UR QL STRIP.AUTO: 75
LYMPHOCYTES # BLD AUTO: 0.71 X10(3) UL (ref 1–4)
LYMPHOCYTES NFR BLD AUTO: 6.6 %
MCH RBC QN AUTO: 31.3 PG (ref 26–34)
MCHC RBC AUTO-ENTMCNC: 32.6 G/DL (ref 31–37)
MCV RBC AUTO: 96 FL
MONOCYTES # BLD AUTO: 1.44 X10(3) UL (ref 0.1–1)
MONOCYTES NFR BLD AUTO: 13.4 %
MRSA DNA SPEC QL NAA+PROBE: POSITIVE
NEUTROPHILS # BLD AUTO: 8.41 X10 (3) UL (ref 1.5–7.7)
NEUTROPHILS # BLD AUTO: 8.41 X10(3) UL (ref 1.5–7.7)
NEUTROPHILS NFR BLD AUTO: 78.1 %
NITRITE UR QL STRIP.AUTO: NEGATIVE
NT-PROBNP SERPL-MCNC: 3264 PG/ML (ref ?–450)
OSMOLALITY SERPL CALC.SUM OF ELEC: 283 MOSM/KG (ref 275–295)
PH UR: 5.5 [PH] (ref 5–8)
PLATELET # BLD AUTO: 221 10(3)UL (ref 150–450)
POTASSIUM SERPL-SCNC: 4.2 MMOL/L (ref 3.5–5.1)
PROT UR-MCNC: 30 MG/DL
Q-T INTERVAL: 362 MS
QRS DURATION: 78 MS
QTC CALCULATION (BEZET): 466 MS
R AXIS: -1 DEGREES
RBC # BLD AUTO: 4.02 X10(6)UL
RBC #/AREA URNS AUTO: >10 /HPF
SODIUM SERPL-SCNC: 135 MMOL/L (ref 136–145)
SP GR UR STRIP: >1.03 (ref 1–1.03)
T AXIS: 194 DEGREES
TROPONIN I HIGH SENSITIVITY: 47 NG/L
TSI SER-ACNC: 1.35 MIU/ML (ref 0.36–3.74)
UROBILINOGEN UR STRIP-ACNC: NORMAL
VENTRICULAR RATE: 100 BPM
WBC # BLD AUTO: 10.8 X10(3) UL (ref 4–11)

## 2023-07-01 PROCEDURE — 99223 1ST HOSP IP/OBS HIGH 75: CPT | Performed by: HOSPITALIST

## 2023-07-01 PROCEDURE — 70450 CT HEAD/BRAIN W/O DYE: CPT | Performed by: HOSPITALIST

## 2023-07-01 PROCEDURE — 71045 X-RAY EXAM CHEST 1 VIEW: CPT | Performed by: EMERGENCY MEDICINE

## 2023-07-01 PROCEDURE — 70487 CT MAXILLOFACIAL W/DYE: CPT | Performed by: EMERGENCY MEDICINE

## 2023-07-01 RX ORDER — ACETAMINOPHEN 325 MG/1
650 TABLET ORAL EVERY 4 HOURS PRN
Status: DISCONTINUED | OUTPATIENT
Start: 2023-07-01 | End: 2023-07-05

## 2023-07-01 RX ORDER — ALBUTEROL SULFATE 90 UG/1
2 AEROSOL, METERED RESPIRATORY (INHALATION) EVERY 6 HOURS PRN
Status: DISCONTINUED | OUTPATIENT
Start: 2023-07-01 | End: 2023-07-05

## 2023-07-01 RX ORDER — PANTOPRAZOLE SODIUM 40 MG/1
40 TABLET, DELAYED RELEASE ORAL DAILY
Status: DISCONTINUED | OUTPATIENT
Start: 2023-07-01 | End: 2023-07-05

## 2023-07-01 RX ORDER — ENEMA 19; 7 G/133ML; G/133ML
1 ENEMA RECTAL ONCE AS NEEDED
Status: DISCONTINUED | OUTPATIENT
Start: 2023-07-01 | End: 2023-07-05

## 2023-07-01 RX ORDER — SENNOSIDES 8.6 MG
17.2 TABLET ORAL NIGHTLY PRN
Status: DISCONTINUED | OUTPATIENT
Start: 2023-07-01 | End: 2023-07-05

## 2023-07-01 RX ORDER — CARVEDILOL 3.12 MG/1
3.12 TABLET ORAL 2 TIMES DAILY WITH MEALS
Status: DISCONTINUED | OUTPATIENT
Start: 2023-07-01 | End: 2023-07-05

## 2023-07-01 RX ORDER — HYDROCODONE BITARTRATE AND ACETAMINOPHEN 5; 325 MG/1; MG/1
2 TABLET ORAL EVERY 4 HOURS PRN
Status: DISCONTINUED | OUTPATIENT
Start: 2023-07-01 | End: 2023-07-05

## 2023-07-01 RX ORDER — BETHANECHOL CHLORIDE 5 MG
10 TABLET ORAL DAILY
Status: DISCONTINUED | OUTPATIENT
Start: 2023-07-01 | End: 2023-07-05

## 2023-07-01 RX ORDER — CLOPIDOGREL BISULFATE 75 MG/1
75 TABLET ORAL DAILY
Status: DISCONTINUED | OUTPATIENT
Start: 2023-07-01 | End: 2023-07-05

## 2023-07-01 RX ORDER — FUROSEMIDE 10 MG/ML
20 INJECTION INTRAMUSCULAR; INTRAVENOUS ONCE
Status: COMPLETED | OUTPATIENT
Start: 2023-07-01 | End: 2023-07-01

## 2023-07-01 RX ORDER — OXCARBAZEPINE 150 MG/1
150 TABLET, FILM COATED ORAL DAILY
Status: DISCONTINUED | OUTPATIENT
Start: 2023-07-01 | End: 2023-07-05

## 2023-07-01 RX ORDER — POLYETHYLENE GLYCOL 3350 17 G/17G
17 POWDER, FOR SOLUTION ORAL DAILY PRN
Status: DISCONTINUED | OUTPATIENT
Start: 2023-07-01 | End: 2023-07-05

## 2023-07-01 RX ORDER — METOCLOPRAMIDE HYDROCHLORIDE 5 MG/ML
10 INJECTION INTRAMUSCULAR; INTRAVENOUS EVERY 8 HOURS PRN
Status: DISCONTINUED | OUTPATIENT
Start: 2023-07-01 | End: 2023-07-05

## 2023-07-01 RX ORDER — HEPARIN SODIUM 5000 [USP'U]/ML
5000 INJECTION, SOLUTION INTRAVENOUS; SUBCUTANEOUS EVERY 12 HOURS SCHEDULED
Status: DISCONTINUED | OUTPATIENT
Start: 2023-07-01 | End: 2023-07-05

## 2023-07-01 RX ORDER — HYDROCODONE BITARTRATE AND ACETAMINOPHEN 5; 325 MG/1; MG/1
1 TABLET ORAL EVERY 4 HOURS PRN
Status: DISCONTINUED | OUTPATIENT
Start: 2023-07-01 | End: 2023-07-05

## 2023-07-01 RX ORDER — NICOTINE POLACRILEX 4 MG
15 LOZENGE BUCCAL
Status: DISCONTINUED | OUTPATIENT
Start: 2023-07-01 | End: 2023-07-05

## 2023-07-01 RX ORDER — CODEINE PHOSPHATE AND GUAIFENESIN 10; 100 MG/5ML; MG/5ML
5 SOLUTION ORAL EVERY 4 HOURS PRN
Status: DISCONTINUED | OUTPATIENT
Start: 2023-07-01 | End: 2023-07-05

## 2023-07-01 RX ORDER — FLUOXETINE 10 MG/1
10 TABLET, FILM COATED ORAL DAILY
Status: DISCONTINUED | OUTPATIENT
Start: 2023-07-01 | End: 2023-07-05

## 2023-07-01 RX ORDER — ONDANSETRON 2 MG/ML
4 INJECTION INTRAMUSCULAR; INTRAVENOUS EVERY 6 HOURS PRN
Status: DISCONTINUED | OUTPATIENT
Start: 2023-07-01 | End: 2023-07-05

## 2023-07-01 RX ORDER — BENZONATATE 100 MG/1
200 CAPSULE ORAL 3 TIMES DAILY PRN
Status: DISCONTINUED | OUTPATIENT
Start: 2023-07-01 | End: 2023-07-05

## 2023-07-01 RX ORDER — LOSARTAN POTASSIUM 25 MG/1
25 TABLET ORAL DAILY
Status: DISCONTINUED | OUTPATIENT
Start: 2023-07-01 | End: 2023-07-05

## 2023-07-01 RX ORDER — DEXTROSE MONOHYDRATE 25 G/50ML
50 INJECTION, SOLUTION INTRAVENOUS
Status: DISCONTINUED | OUTPATIENT
Start: 2023-07-01 | End: 2023-07-05

## 2023-07-01 RX ORDER — SODIUM CHLORIDE 9 MG/ML
INJECTION, SOLUTION INTRAVENOUS CONTINUOUS
Status: DISCONTINUED | OUTPATIENT
Start: 2023-07-01 | End: 2023-07-02

## 2023-07-01 RX ORDER — NICOTINE POLACRILEX 4 MG
30 LOZENGE BUCCAL
Status: DISCONTINUED | OUTPATIENT
Start: 2023-07-01 | End: 2023-07-05

## 2023-07-01 RX ORDER — BISACODYL 10 MG
10 SUPPOSITORY, RECTAL RECTAL
Status: DISCONTINUED | OUTPATIENT
Start: 2023-07-01 | End: 2023-07-05

## 2023-07-01 NOTE — ED QUICK NOTES
Orders for admission, patient is aware of plan and ready to go upstairs. Any questions, please call ED RN Marty Barclay at extension [phone].      Patient Covid vaccination status: Fully vaccinated     COVID Test Ordered in ED: None    COVID Suspicion at Admission: N/A    Running Infusions:  None    Mental Status/LOC at time of transport: axox2    Other pertinent information:   CIWA score: N/A   NIH score:  N/A

## 2023-07-01 NOTE — SLP NOTE
ADULT SWALLOWING EVALUATION    ASSESSMENT    ASSESSMENT/OVERALL IMPRESSION:  PPE REQUIRED. THIS THERAPIST WORE GLOVES AND MASK FOR DURATION OF EVALUATION. HANDS WASHED UPON ENTRANCE/EXIT. Per MD note, Chaparro Jose is a(n) 80year old female, PMH Chronic A.fib, SSS s/p PPM, mild dementia, hearing loss who presents to the ER with dtr from assisted living with complaints of poor PO intake x 1 week and development of L facial swelling x 2 days. The patient has progressively gotten weaker for the past week. No SOB no F/C no LE edema no orthopnea. In the ER UA abnormal, no leukoctyosis, no fevers, CXR mild interstitial edema, CT facial soft tissue no abscess given Unasyn\". SLP BSSE orders received and acknowledged. A swallow evaluation warranted per \"bedside swallow\". Pt afebrile with clear/weak vocal quality, on 1L/Min, with oxygen saturation at 97%. Pt with hx of dysphagia at [facility], VFSS 9/22/22 with recommendations for regular/thin liquids. Pt positioned 90 degrees in bed, alert/cooperative/[place]/family present. Oral motor skills within functional limits. Pt presented with trials of hard solids, puree, and thin liquids via straw. Pt with adequate oral acceptance and bilabial seal across all trials. Pt with intact bite, mastication of solids, and timely A-P transit. Pt's swallow response appears timely with reduced hyolaryngeal elevation/excursion. No clinical signs of aspiration (e.g., immediate/delayed throat clear, immediate/delayed cough, wet vocal quality, increased O2 effort) observed across all trials. 7/1 CXR indicates \"Borderline cardiomegaly. Mild bilateral interstitial opacity may reflect mild interstitial edema\". Oxygen status remained stable t/o the entire evaluation. At this time, pt presents with functional  oral swallow stage and probable pharyngeal dysfunction. Recommend a regular diet and thin liquids with strict adherence to safe swallowing compensatory strategies.  Results and recommendations reviewed with RN and family. PLAN: SLP to f/u x1 meal assessment, monitor imaging, and VFSS if clinically indicated         RECOMMENDATIONS   Diet Recommendations - Solids: Regular  Diet Recommendations - Liquids: Thin Liquids                        Compensatory Strategies Recommended: Slow rate  Aspiration Precautions: Upright position; Slow rate  Medication Administration Recommendations:  (as tolerated)  Treatment Plan/Recommendations: Aspiration precautions  Discharge Recommendations/Plan: Undetermined    HISTORY   MEDICAL HISTORY  Reason for Referral:  (\"bedside swallow\")    Problem List  Principal Problem:    Urinary tract infection without hematuria, site unspecified  Active Problems:    Encephalopathy    Dental abscess    Hypoxia      Past Medical History  Past Medical History:   Diagnosis Date    Acute, but ill-defined, cerebrovascular disease     2019    Anxiety state     Atrial fibrillation ([address]     Cataract, bilateral 2005    Chronic a-fib ([address]     Congestive heart disease ([address]     LV ej fraction of 30%    Conjunctival cyst of left eye     Cup to disc asymmetry 2006    OU    Depression     Diabetes ([address]     diet alone    Esophageal reflux     Essential hypertension     Hearing impairment     Hiatal hernia     Hyperlipidemia     Meibomian gland dysfunction (MGD), bilateral, both upper and lower lids 2009    Osteoarthritis     TIA (transient ischemic attack)     Unspecified atrial fibrillation ([address]        Prior Living Situation: Assisted living  Diet Prior to Admission: Regular; Thin liquids  Precautions: Aspiration    Patient/Family Goals: did not state this session    SWALLOWING HISTORY  Current Diet Consistency: Regular; Thin liquids  Dysphagia History: see above  Imaging Results: see above    SUBJECTIVE       OBJECTIVE   ORAL MOTOR EXAMINATION  Dentition: Functional  Symmetry: Within Functional Limits  Strength:  Within Functional Limits  Tone: Within Functional Limits  Range of Motion: Within Functional Limits  Rate of Motion: Reduced       Respiratory Status: Nasal cannula; Supplemental O2  Consistencies Trialed: Thin liquids;Puree;Hard solid  Method of Presentation: Staff/Clinician assistance;Self presentation;Straw  Patient Positioning: Upright;Midline    Oral Phase of Swallow: Within Functional Limits                      Pharyngeal Phase of Swallow: Impaired  Laryngeal Elevation Timing: Appears intact  Laryngeal Elevation Strength: Appears impaired  Laryngeal Elevation Coordination: Appears intact  (Please note: Silent aspiration cannot be evaluated clinically. Videofluoroscopic Swallow Study is required to rule-out silent aspiration.)    Esophageal Phase of Swallow: No complaints consistent with possible esophageal involvement  Comments: NA              GOALS  Goal #1 The patient will tolerate regular consistency and thin liquids without overt signs or symptoms of aspiration with 100 % accuracy over 1 session(s). In Progress   Goal #2 The patient/family/caregiver will demonstrate understanding and implementation of aspiration precautions and swallow strategies independently over 1 session(s).     In Progress     FOLLOW UP  Treatment Plan/Recommendations: Aspiration precautions  Number of Visits to Meet Established Goals: 1  Follow Up Needed (Documentation Required): Yes  SLP Follow-up Date: 07/03/23    Thank you for your referral.   If you have any questions, please contact JAYDE Coleman. Deandre Simms Pathologist  Phone Number [phone]

## 2023-07-01 NOTE — ED INITIAL ASSESSMENT (HPI)
Patient is here with left facial swelling since this AM. No new meds reported. NIH negative per EMS.  
Standing/Walking

## 2023-07-01 NOTE — PLAN OF CARE
Patient alert and oriented to time and person. Currently 1L  o2, weaning as tolerated. IVF, IV abx. PT/OT. SlP consulted. Fall precautions in place. Famil at bedside and updated on plan of care. Problem: Patient Centered Care  Goal: Patient preferences are identified and integrated in the patient's plan of care  Description: Interventions:  - What would you like us to know as we care for you? From [facility]  - Provide timely, complete, and accurate information to patient/family  - Incorporate patient and family knowledge, values, beliefs, and cultural backgrounds into the planning and delivery of care  - Encourage patient/family to participate in care and decision-making at the level they choose  - Honor patient and family perspectives and choices  7/1/2023 1801 by Caro Thomas RN  Outcome: Progressing  7/1/2023 1801 by Caro Thomas RN  Outcome: Progressing     Problem: CARDIOVASCULAR - ADULT  Goal: Maintains optimal cardiac output and hemodynamic stability  Description: INTERVENTIONS:  - Monitor vital signs, rhythm, and trends  - Monitor for bleeding, hypotension and signs of decreased cardiac output  - Evaluate effectiveness of vasoactive medications to optimize hemodynamic stability  - Monitor arterial and/or venous puncture sites for bleeding and/or hematoma  - Assess quality of pulses, skin color and temperature  - Assess for signs of decreased coronary artery perfusion - ex.  Angina  - Evaluate fluid balance, assess for edema, trend weights  7/1/2023 1801 by Caro Thomas RN  Outcome: Progressing  7/1/2023 1801 by Caro Thomas RN  Outcome: Progressing  Goal: Absence of cardiac arrhythmias or at baseline  Description: INTERVENTIONS:  - Continuous cardiac monitoring, monitor vital signs, obtain 12 lead EKG if indicated  - Evaluate effectiveness of antiarrhythmic and heart rate control medications as ordered  - Initiate emergency measures for life threatening arrhythmias  - Monitor electrolytes and administer replacement therapy as ordered  7/1/2023 1801 by Rosie Ojeda RN  Outcome: Progressing  7/1/2023 1801 by Rosie Ojeda RN  Outcome: Progressing     Problem: MUSCULOSKELETAL - ADULT  Goal: Return mobility to safest level of function  Description: INTERVENTIONS:  - Assess patient stability and activity tolerance for standing, transferring and ambulating w/ or w/o assistive devices  - Assist with transfers and ambulation using safe patient handling equipment as needed  - Ensure adequate protection for wounds/incisions during mobilization  - Obtain PT/OT consults as needed  - Advance activity as appropriate  - Communicate ordered activity level and limitations with patient/family  7/1/2023 1801 by Rosie Ojeda RN  Outcome: Progressing  7/1/2023 1801 by Rosie Ojeda RN  Outcome: Progressing

## 2023-07-02 LAB
ANION GAP SERPL CALC-SCNC: 8 MMOL/L (ref 0–18)
BUN BLD-MCNC: 14 MG/DL (ref 7–18)
BUN/CREAT SERPL: 18.4 (ref 10–20)
CALCIUM BLD-MCNC: 8.3 MG/DL (ref 8.5–10.1)
CHLORIDE SERPL-SCNC: 100 MMOL/L (ref 98–112)
CO2 SERPL-SCNC: 26 MMOL/L (ref 21–32)
CREAT BLD-MCNC: 0.76 MG/DL
DEPRECATED RDW RBC AUTO: 44.4 FL (ref 35.1–46.3)
ERYTHROCYTE [DISTWIDTH] IN BLOOD BY AUTOMATED COUNT: 12.7 % (ref 11–15)
GFR SERPLBLD BASED ON 1.73 SQ M-ARVRAT: 75 ML/MIN/1.73M2 (ref 60–?)
GLUCOSE BLD-MCNC: 137 MG/DL (ref 70–99)
GLUCOSE BLDC GLUCOMTR-MCNC: 116 MG/DL (ref 70–99)
GLUCOSE BLDC GLUCOMTR-MCNC: 205 MG/DL (ref 70–99)
GLUCOSE BLDC GLUCOMTR-MCNC: 328 MG/DL (ref 70–99)
GLUCOSE BLDC GLUCOMTR-MCNC: 74 MG/DL (ref 70–99)
HCT VFR BLD AUTO: 37.1 %
HGB BLD-MCNC: 12.3 G/DL
MAGNESIUM SERPL-MCNC: 1.9 MG/DL (ref 1.6–2.6)
MCH RBC QN AUTO: 31.5 PG (ref 26–34)
MCHC RBC AUTO-ENTMCNC: 33.2 G/DL (ref 31–37)
MCV RBC AUTO: 95.1 FL
OSMOLALITY SERPL CALC.SUM OF ELEC: 281 MOSM/KG (ref 275–295)
PHOSPHATE SERPL-MCNC: 4 MG/DL (ref 2.5–4.9)
PLATELET # BLD AUTO: 173 10(3)UL (ref 150–450)
POTASSIUM SERPL-SCNC: 4 MMOL/L (ref 3.5–5.1)
PROCALCITONIN SERPL-MCNC: 0.06 NG/ML (ref ?–0.16)
RBC # BLD AUTO: 3.9 X10(6)UL
SODIUM SERPL-SCNC: 134 MMOL/L (ref 136–145)
WBC # BLD AUTO: 9.5 X10(3) UL (ref 4–11)

## 2023-07-02 PROCEDURE — 99233 SBSQ HOSP IP/OBS HIGH 50: CPT | Performed by: HOSPITALIST

## 2023-07-02 RX ORDER — ASPIRIN 81 MG/1
81 TABLET, CHEWABLE ORAL DAILY
Status: DISCONTINUED | OUTPATIENT
Start: 2023-07-02 | End: 2023-07-05

## 2023-07-02 NOTE — PLAN OF CARE
Patient alert and oriented. Can be confused and forgetful at times. PT/OT consulted. Neuro checks dc'd per MD. ID consulted. PRN pain meds as ordered. Family at bedside and updated on plan of care. Problem: Patient Centered Care  Goal: Patient preferences are identified and integrated in the patient's plan of care  Description: Interventions:  - What would you like us to know as we care for you? I have 6 children  - Provide timely, complete, and accurate information to patient/family  - Incorporate patient and family knowledge, values, beliefs, and cultural backgrounds into the planning and delivery of care  - Encourage patient/family to participate in care and decision-making at the level they choose  - Honor patient and family perspectives and choices  Outcome: Progressing     Problem: CARDIOVASCULAR - ADULT  Goal: Maintains optimal cardiac output and hemodynamic stability  Description: INTERVENTIONS:  - Monitor vital signs, rhythm, and trends  - Monitor for bleeding, hypotension and signs of decreased cardiac output  - Evaluate effectiveness of vasoactive medications to optimize hemodynamic stability  - Monitor arterial and/or venous puncture sites for bleeding and/or hematoma  - Assess quality of pulses, skin color and temperature  - Assess for signs of decreased coronary artery perfusion - ex.  Angina  - Evaluate fluid balance, assess for edema, trend weights  Outcome: Progressing  Goal: Absence of cardiac arrhythmias or at baseline  Description: INTERVENTIONS:  - Continuous cardiac monitoring, monitor vital signs, obtain 12 lead EKG if indicated  - Evaluate effectiveness of antiarrhythmic and heart rate control medications as ordered  - Initiate emergency measures for life threatening arrhythmias  - Monitor electrolytes and administer replacement therapy as ordered  Outcome: Progressing     Problem: MUSCULOSKELETAL - ADULT  Goal: Return mobility to safest level of function  Description: INTERVENTIONS:  - Assess patient stability and activity tolerance for standing, transferring and ambulating w/ or w/o assistive devices  - Assist with transfers and ambulation using safe patient handling equipment as needed  - Ensure adequate protection for wounds/incisions during mobilization  - Obtain PT/OT consults as needed  - Advance activity as appropriate  - Communicate ordered activity level and limitations with patient/family  Outcome: Progressing

## 2023-07-02 NOTE — PROGRESS NOTES
Patient alert and oriented to time and person. Pt is in bed, received  and participated in prayer. Pt and family practice [employer].  No other concerns      07/01/23 1800   Clinical Encounter Type   Visited With Patient   Routine Visit Introduction   Continue Visiting No   Patient's Supportive Strategies/Resources Family   Referral From    Referral To    [facility] Encounters   [facility] Needs Prayer   Spiritual Requests During Visit / Hospitalization Requests  Visit

## 2023-07-02 NOTE — PHYSICAL THERAPY NOTE
PHYSICAL THERAPY EVALUATION - INPATIENT     Room Number: 571/571-A  Evaluation Date: 7/2/2023  Type of Evaluation: Initial   Physician Order: PT Eval and Treat    Presenting Problem: UTI  Co-Morbidities : L sided facial swelling and pain  Reason for Therapy: Mobility Dysfunction and Discharge Planning    PHYSICAL THERAPY ASSESSMENT     Patient is a 80year old female admitted 7/1/2023 for UTI without hematuria. Patient's current functional deficits include decreased strength, difficulty with mobility, extremely hard of hearing, reduced alertness/orientation, which are consistent the patient's pre-admission status. Order received and chart reviewed. PT obtaining clearance from RN prior to initiation of evaluation. Upon entry pt has family visiting in the room. Subjective gathered from chart review and family as pt is unreliable historian and overall does not communicate throughout session. Pt very hard of hearing and even increased volume to commands is received inconsistently. Pt needs increased time and occasionally smiles, follows commands if given increased time and tactile cues. Pt also repeats PT name \"Cristin\" at random times throughout the session. With increased time and PROM example, pt performs supine ankle pumps, hip ABD and SLR (B) for 5 repetitions each. With good tolerance to initial activity PT helps pt transfer supine <> EOB with max assist. Pt needing ongoing cues, reinforcement and CGA on her back to maintain posture. In sitting she needs cues and correction to sit upright as she had to tolerance to lean forward and present with increased thoracic kyphosis. Once EOB PT continues to give support on pt back and remains anterior to the patient for safety purposes. She performs modified LAQ 5x ea side. Nods in agreement to attempt STS. Gait belt donned for safety, bed lowered to be flat, and (B) guarding at the knee prior to attempting. PT provides moderate support to initiate standing. She tolerates ~30seconds of standing and gradually moves from mod to max assist with increased time in standing. Pt returned to supine with pt giving min assist with bed mobility to bring LE back onto the bed. Pt repositioned higher in bed, needs met, call light within reach, family still in the room. Family continues to endorse this is similar to pt baseline as she spends most time in a wheelchair which she needs assist to pivot transfer to. The patient's Approx Degree of Impairment: 81.38% has been calculated based on documentation in the [facility] '6 clicks' Inpatient Basic Mobility Short Form. Research supports that patients with this level of impairment may benefit from return to assisted living with 24hr staff support. Patient will benefit from continued IP PT services to address these deficits in preparation for discharge. DISCHARGE RECOMMENDATIONS  PT Discharge Recommendations: 24 hour care/supervision; Other (Comment) (Assisted living facility)    PLAN  PT Treatment Plan: Bed mobility; Body mechanics; Energy conservation; Family education;Strengthening;Transfer training  Rehab Potential : Guarded  Frequency (Obs): 3-5x/week       PHYSICAL THERAPY MEDICAL/SOCIAL HISTORY     History related to current admission: Decreased mobility/strength at baseline     Problem List  Principal Problem:    Urinary tract infection without hematuria, site unspecified  Active Problems:    Encephalopathy    Dental abscess    Hypoxia      HOME SITUATION  Home Situation  Type of Home: Assisted living facility  Home Layout: Able to live on main level  Lives With: Staff 24 hours  Drives: No  Patient Owned Equipment: Wheelchair  Patient Regularly Uses: None     Prior Level of [place]: assist needed for all IADL    SUBJECTIVE  \"Cristin\"    PHYSICAL THERAPY EXAMINATION     OBJECTIVE  Precautions: Hard of hearing  Fall Risk: High fall risk    WEIGHT BEARING RESTRICTION                PAIN ASSESSMENT  Rating: Unable to rate  Location: L side of face  Management Techniques: Activity promotion;Repositioning    COGNITION  Overall Cognitive Status:  Impaired    RANGE OF MOTION AND STRENGTH ASSESSMENT  Upper extremity ROM and strength are NOT within functional limits   Lower extremity ROM is NOT within functional limits   Lower extremity strength is NOT within functional limits     BALANCE  Static Sitting: Poor  Dynamic Sitting: Poor -  Static Standing: Poor -  Dynamic Standing: Dependent    AM-PAC '6-Clicks' INPATIENT SHORT FORM - BASIC MOBILITY  How much difficulty does the patient currently have. .. Patient Difficulty: Turning over in bed (including adjusting bedclothes, sheets and blankets)?: A Lot   Patient Difficulty: Sitting down on and standing up from a chair with arms (e.g., wheelchair, bedside commode, etc.): A Lot   Patient Difficulty: Moving from lying on back to sitting on the side of the bed?: A Lot   How much help from another person does the patient currently need. .. Help from Another: Moving to and from a bed to a chair (including a wheelchair)?: Total   Help from Another: Need to walk in hospital room?: Total   Help from Another: Climbing 3-5 steps with a railing?: Total     AM-PAC Score:  Raw Score: 9   Approx Degree of Impairment: 81.38%   Standardized Score (AM-PAC Scale): 30.55   CMS Modifier (G-Code): CM    FUNCTIONAL ABILITY STATUS       Bed Mobility: modA     Transfers: maxA    Exercise/Education Provided:  Bed mobility  ROM  Strengthening  Transfer training    Patient End of Session: In bed;Needs met;Call light within reach;RN aware of session/findings; Alarm set; Family present    CURRENT GOALS    Goals to be met by: 7/8/23  Patient Goal Patient's self-stated goal is not stated   Goal #1 Patient is able to demonstrate supine - sit EOB @ level: moderate assistance     Goal #1   Current Status    Goal #2 Patient is able to demonstrate transfers EOB to/from Chair/Wheelchair at assistance level: moderate assistance with 1 person     Goal #2  Current Status                                        Patient Evaluation Complexity Level:  History Moderate - 1 or 2 personal factors and/or co-morbidities   Examination of body systems Moderate - addressing a total of 3 or more elements   Clinical Presentation Moderate - Evolving   Clinical Decision Making Moderate Complexity

## 2023-07-02 NOTE — PROGRESS NOTES
07/02/23 1100   VISIT TYPE   SLP Inpatient Visit Type (Documentation Required) Attempted Treatment       RN called SLP service to request augmentative communication board/system for pt 2/2 [place] and difficulty with verbal expression. SLP unable to locate a dry erase board, but had a Medical/Surgical communication board with dry eraser sent to pt's room. SLP will f/u in 1-3 days for dysphagia tx or recommend f/u at the next level of care.     Renetta Bruce MA, CCC-SLP  Speech and Language Pathologist

## 2023-07-02 NOTE — PLAN OF CARE
Pt continued confusion, a/o x 1-2, saturating well on RA. Safety precautions in place. Problem: Patient Centered Care  Goal: Patient preferences are identified and integrated in the patient's plan of care  Description: Interventions:  - What would you like us to know as we care for you? From [facility] assisted living.  - Provide timely, complete, and accurate information to patient/family  - Incorporate patient and family knowledge, values, beliefs, and cultural backgrounds into the planning and delivery of care  - Encourage patient/family to participate in care and decision-making at the level they choose  - Honor patient and family perspectives and choices  Outcome: Progressing     Problem: CARDIOVASCULAR - ADULT  Goal: Maintains optimal cardiac output and hemodynamic stability  Description: INTERVENTIONS:  - Monitor vital signs, rhythm, and trends  - Monitor for bleeding, hypotension and signs of decreased cardiac output  - Evaluate effectiveness of vasoactive medications to optimize hemodynamic stability  - Monitor arterial and/or venous puncture sites for bleeding and/or hematoma  - Assess quality of pulses, skin color and temperature  - Assess for signs of decreased coronary artery perfusion - ex.  Angina  - Evaluate fluid balance, assess for edema, trend weights  Outcome: Progressing  Goal: Absence of cardiac arrhythmias or at baseline  Description: INTERVENTIONS:  - Continuous cardiac monitoring, monitor vital signs, obtain 12 lead EKG if indicated  - Evaluate effectiveness of antiarrhythmic and heart rate control medications as ordered  - Initiate emergency measures for life threatening arrhythmias  - Monitor electrolytes and administer replacement therapy as ordered  Outcome: Progressing     Problem: MUSCULOSKELETAL - ADULT  Goal: Return mobility to safest level of function  Description: INTERVENTIONS:  - Assess patient stability and activity tolerance for standing, transferring and ambulating w/ or w/o assistive devices  - Assist with transfers and ambulation using safe patient handling equipment as needed  - Ensure adequate protection for wounds/incisions during mobilization  - Obtain PT/OT consults as needed  - Advance activity as appropriate  - Communicate ordered activity level and limitations with patient/family  Outcome: Progressing

## 2023-07-03 ENCOUNTER — APPOINTMENT (OUTPATIENT)
Dept: CV DIAGNOSTICS | Facility: HOSPITAL | Age: 88
End: 2023-07-03
Attending: HOSPITALIST
Payer: MEDICARE

## 2023-07-03 LAB
ANION GAP SERPL CALC-SCNC: 9 MMOL/L (ref 0–18)
BUN BLD-MCNC: 15 MG/DL (ref 7–18)
BUN/CREAT SERPL: 22.4 (ref 10–20)
CALCIUM BLD-MCNC: 8.6 MG/DL (ref 8.5–10.1)
CHLORIDE SERPL-SCNC: 100 MMOL/L (ref 98–112)
CO2 SERPL-SCNC: 27 MMOL/L (ref 21–32)
CREAT BLD-MCNC: 0.67 MG/DL
DEPRECATED RDW RBC AUTO: 44.4 FL (ref 35.1–46.3)
ERYTHROCYTE [DISTWIDTH] IN BLOOD BY AUTOMATED COUNT: 12.7 % (ref 11–15)
GFR SERPLBLD BASED ON 1.73 SQ M-ARVRAT: 84 ML/MIN/1.73M2 (ref 60–?)
GLUCOSE BLD-MCNC: 91 MG/DL (ref 70–99)
GLUCOSE BLDC GLUCOMTR-MCNC: 103 MG/DL (ref 70–99)
GLUCOSE BLDC GLUCOMTR-MCNC: 128 MG/DL (ref 70–99)
GLUCOSE BLDC GLUCOMTR-MCNC: 141 MG/DL (ref 70–99)
GLUCOSE BLDC GLUCOMTR-MCNC: 152 MG/DL (ref 70–99)
HCT VFR BLD AUTO: 34.8 %
HGB BLD-MCNC: 11.4 G/DL
MCH RBC QN AUTO: 31.2 PG (ref 26–34)
MCHC RBC AUTO-ENTMCNC: 32.8 G/DL (ref 31–37)
MCV RBC AUTO: 95.3 FL
OSMOLALITY SERPL CALC.SUM OF ELEC: 282 MOSM/KG (ref 275–295)
PLATELET # BLD AUTO: 218 10(3)UL (ref 150–450)
POTASSIUM SERPL-SCNC: 3.9 MMOL/L (ref 3.5–5.1)
RBC # BLD AUTO: 3.65 X10(6)UL
SODIUM SERPL-SCNC: 136 MMOL/L (ref 136–145)
WBC # BLD AUTO: 8.3 X10(3) UL (ref 4–11)

## 2023-07-03 PROCEDURE — 93306 TTE W/DOPPLER COMPLETE: CPT | Performed by: HOSPITALIST

## 2023-07-03 PROCEDURE — 99233 SBSQ HOSP IP/OBS HIGH 50: CPT | Performed by: HOSPITALIST

## 2023-07-03 RX ORDER — FUROSEMIDE 20 MG/1
20 TABLET ORAL DAILY
Status: DISCONTINUED | OUTPATIENT
Start: 2023-07-03 | End: 2023-07-05

## 2023-07-03 NOTE — CM/SW NOTE
Department  notified of request for [facility], aidin referrals started. Assigned CM/SW to follow up with pt/family on further discharge planning.

## 2023-07-03 NOTE — CM/SW NOTE
Received MDO for discharge planning. PT/OT recommending 24/7 supervision, return to [facility]. Spoke with patient's daughter Wallace Vargas for assessment. Patient lives at [facility]. She receives assist for ADLs/IADLs, she does not have a caregiver. She has a h/o services through [employer]. No recent h/o SNF. Discussed discharge plan. Patient to return to her apartment in [address] [facility] at [address]. Wallace Vargas would like home health & prefers [facility]. [facility] [place] referral, F2F complete.     Franklyn Favre, [address]

## 2023-07-03 NOTE — PLAN OF CARE
Patient resting overnight. Maintaining adequate O2 on room air. Receiving IV abx per MD order. Safety precautions in place. Problem: Patient Centered Care  Goal: Patient preferences are identified and integrated in the patient's plan of care  Description: Interventions:  - What would you like us to know as we care for you?  - Provide timely, complete, and accurate information to patient/family  - Incorporate patient and family knowledge, values, beliefs, and cultural backgrounds into the planning and delivery of care  - Encourage patient/family to participate in care and decision-making at the level they choose  - Honor patient and family perspectives and choices  Outcome: Progressing     Problem: CARDIOVASCULAR - ADULT  Goal: Maintains optimal cardiac output and hemodynamic stability  Description: INTERVENTIONS:  - Monitor vital signs, rhythm, and trends  - Monitor for bleeding, hypotension and signs of decreased cardiac output  - Evaluate effectiveness of vasoactive medications to optimize hemodynamic stability  - Monitor arterial and/or venous puncture sites for bleeding and/or hematoma  - Assess quality of pulses, skin color and temperature  - Assess for signs of decreased coronary artery perfusion - ex.  Angina  - Evaluate fluid balance, assess for edema, trend weights  Outcome: Progressing  Goal: Absence of cardiac arrhythmias or at baseline  Description: INTERVENTIONS:  - Continuous cardiac monitoring, monitor vital signs, obtain 12 lead EKG if indicated  - Evaluate effectiveness of antiarrhythmic and heart rate control medications as ordered  - Initiate emergency measures for life threatening arrhythmias  - Monitor electrolytes and administer replacement therapy as ordered  Outcome: Progressing     Problem: MUSCULOSKELETAL - ADULT  Goal: Return mobility to safest level of function  Description: INTERVENTIONS:  - Assess patient stability and activity tolerance for standing, transferring and ambulating w/ or w/o assistive devices  - Assist with transfers and ambulation using safe patient handling equipment as needed  - Ensure adequate protection for wounds/incisions during mobilization  - Obtain PT/OT consults as needed  - Advance activity as appropriate  - Communicate ordered activity level and limitations with patient/family  Outcome: Progressing

## 2023-07-03 NOTE — DIETARY NOTE
Brief Nutrition Note:    Pt admitted for urinary tract infection without hematuria. Pt screened at nutrition risk by RN for decreased appetite and unintentional weight loss (MST 3). Chart reviewed, poor oral intake x 1 week noted per H&P. Intake review, variable intake noted since admission, 0-90% x 5 meals noted (averaging 56% - fair). Discussion with RN, confirms intake is improving, pt hard of hearing - left facial cellulitis. Pt visited, no family at bedside. Pt reports eating ok, typically eats 3 meals a day. Pt denies weight loss but unsure of usual body weight. Current weight 131# 11.2oz. EMR weight review utilizing care everywhere, last known weight prior to admission - 143# 11/9/22 - 8.2% weight loss x 8 months (non-significant). Nutrition focused physical exam (NFPE) completed, no wasting noted. Pt declined oral nutritional supplement (ONS) at this time. Encouraged adequate energy and protein intake. Pt is at no nutrition risk at this time. F/u at length of stay (LOS) per protocol. Please consult nutrition services if earlier intervention is indicated.      Wt Readings from Last 6 Encounters:  07/03/23 : 59.7 kg (131 lb 11.2 oz)  11/09/22 : 65 kg (143 lb 6.4 oz)  10/29/22 : 62.6 kg (138 lb)  09/20/22 : 63.5 kg (140 lb)  09/02/22 : 65.8 kg (145 lb)  04/29/22 : 67.4 kg (148 lb 9.6 oz)    Percent Meals Eaten (last 6 days)       Date/Time Percent Meals Eaten (%)    07/01/23 1608 90 %    07/02/23 1116 50 %    07/02/23 1545 0 %     Percent Meals Eaten (%): pt too lethargic to eat at 07/02/23 1545    07/02/23 1828 50 %    07/03/23 1411 90 %          Armin Denton MS, KAT, RDN, LDN  Clinical Dietitian  P: [phone]

## 2023-07-03 NOTE — PLAN OF CARE
No acute changes. Echo done today. PT/OT saw patient today. Fall precautions in place, call light within reach. Problem: Patient Centered Care  Goal: Patient preferences are identified and integrated in the patient's plan of care  Description: Interventions:  - What would you like us to know as we care for you? From [facility]  - Provide timely, complete, and accurate information to patient/family  - Incorporate patient and family knowledge, values, beliefs, and cultural backgrounds into the planning and delivery of care  - Encourage patient/family to participate in care and decision-making at the level they choose  - Honor patient and family perspectives and choices  Outcome: Progressing     Problem: CARDIOVASCULAR - ADULT  Goal: Maintains optimal cardiac output and hemodynamic stability  Description: INTERVENTIONS:  - Monitor vital signs, rhythm, and trends  - Monitor for bleeding, hypotension and signs of decreased cardiac output  - Evaluate effectiveness of vasoactive medications to optimize hemodynamic stability  - Monitor arterial and/or venous puncture sites for bleeding and/or hematoma  - Assess quality of pulses, skin color and temperature  - Assess for signs of decreased coronary artery perfusion - ex.  Angina  - Evaluate fluid balance, assess for edema, trend weights  Outcome: Progressing  Goal: Absence of cardiac arrhythmias or at baseline  Description: INTERVENTIONS:  - Continuous cardiac monitoring, monitor vital signs, obtain 12 lead EKG if indicated  - Evaluate effectiveness of antiarrhythmic and heart rate control medications as ordered  - Initiate emergency measures for life threatening arrhythmias  - Monitor electrolytes and administer replacement therapy as ordered  Outcome: Progressing     Problem: MUSCULOSKELETAL - ADULT  Goal: Return mobility to safest level of function  Description: INTERVENTIONS:  - Assess patient stability and activity tolerance for standing, transferring and ambulating w/ or w/o assistive devices  - Assist with transfers and ambulation using safe patient handling equipment as needed  - Ensure adequate protection for wounds/incisions during mobilization  - Obtain PT/OT consults as needed  - Advance activity as appropriate  - Communicate ordered activity level and limitations with patient/family  Outcome: Progressing

## 2023-07-04 LAB
ANION GAP SERPL CALC-SCNC: 10 MMOL/L (ref 0–18)
BUN BLD-MCNC: 21 MG/DL (ref 7–18)
BUN/CREAT SERPL: 31.3 (ref 10–20)
CALCIUM BLD-MCNC: 8.9 MG/DL (ref 8.5–10.1)
CHLORIDE SERPL-SCNC: 103 MMOL/L (ref 98–112)
CO2 SERPL-SCNC: 26 MMOL/L (ref 21–32)
CREAT BLD-MCNC: 0.67 MG/DL
GFR SERPLBLD BASED ON 1.73 SQ M-ARVRAT: 84 ML/MIN/1.73M2 (ref 60–?)
GLUCOSE BLD-MCNC: 143 MG/DL (ref 70–99)
GLUCOSE BLDC GLUCOMTR-MCNC: 125 MG/DL (ref 70–99)
GLUCOSE BLDC GLUCOMTR-MCNC: 128 MG/DL (ref 70–99)
GLUCOSE BLDC GLUCOMTR-MCNC: 146 MG/DL (ref 70–99)
GLUCOSE BLDC GLUCOMTR-MCNC: 152 MG/DL (ref 70–99)
OSMOLALITY SERPL CALC.SUM OF ELEC: 293 MOSM/KG (ref 275–295)
POTASSIUM SERPL-SCNC: 3.6 MMOL/L (ref 3.5–5.1)
SODIUM SERPL-SCNC: 139 MMOL/L (ref 136–145)

## 2023-07-04 PROCEDURE — 99233 SBSQ HOSP IP/OBS HIGH 50: CPT | Performed by: HOSPITALIST

## 2023-07-04 NOTE — DISCHARGE INSTRUCTIONS
Sometimes managing your health at home requires assistance. The [employer] team has recognized your preference to use [facility]. They can be reached by phone at [phone]. The fax number for your reference is [phone]. A representative from the home health agency will contact you or your family to schedule your first visit.

## 2023-07-04 NOTE — PLAN OF CARE
Up to chair via stand pivot. Pt very weak, PT consult ordered for reeval. Tolerating diet. Daughter updated via phone. All safety measures in place. Care endorsed to [place], RN    Problem: Patient Centered Care  Goal: Patient preferences are identified and integrated in the patient's plan of care  Description: Interventions:  - What would you like us to know as we care for you? I am [place]  - Provide timely, complete, and accurate information to patient/family  - Incorporate patient and family knowledge, values, beliefs, and cultural backgrounds into the planning and delivery of care  - Encourage patient/family to participate in care and decision-making at the level they choose  - Honor patient and family perspectives and choices  Outcome: Progressing     Problem: CARDIOVASCULAR - ADULT  Goal: Maintains optimal cardiac output and hemodynamic stability  Description: INTERVENTIONS:  - Monitor vital signs, rhythm, and trends  - Monitor for bleeding, hypotension and signs of decreased cardiac output  - Evaluate effectiveness of vasoactive medications to optimize hemodynamic stability  - Monitor arterial and/or venous puncture sites for bleeding and/or hematoma  - Assess quality of pulses, skin color and temperature  - Assess for signs of decreased coronary artery perfusion - ex.  Angina  - Evaluate fluid balance, assess for edema, trend weights  Outcome: Progressing  Goal: Absence of cardiac arrhythmias or at baseline  Description: INTERVENTIONS:  - Continuous cardiac monitoring, monitor vital signs, obtain 12 lead EKG if indicated  - Evaluate effectiveness of antiarrhythmic and heart rate control medications as ordered  - Initiate emergency measures for life threatening arrhythmias  - Monitor electrolytes and administer replacement therapy as ordered  Outcome: Progressing     Problem: MUSCULOSKELETAL - ADULT  Goal: Return mobility to safest level of function  Description: INTERVENTIONS:  - Assess patient stability and activity tolerance for standing, transferring and ambulating w/ or w/o assistive devices  - Assist with transfers and ambulation using safe patient handling equipment as needed  - Ensure adequate protection for wounds/incisions during mobilization  - Obtain PT/OT consults as needed  - Advance activity as appropriate  - Communicate ordered activity level and limitations with patient/family  Outcome: Progressing

## 2023-07-04 NOTE — PLAN OF CARE
No acute changes this shift, daughter visited bedside assisted with patient episode of anxiety. Safety precautions in place. Problem: Patient Centered Care  Goal: Patient preferences are identified and integrated in the patient's plan of care  Description: Interventions:  - What would you like us to know as we care for you? From [facility]  - Provide timely, complete, and accurate information to patient/family  - Incorporate patient and family knowledge, values, beliefs, and cultural backgrounds into the planning and delivery of care  - Encourage patient/family to participate in care and decision-making at the level they choose  - Honor patient and family perspectives and choices  Outcome: Progressing     Problem: CARDIOVASCULAR - ADULT  Goal: Maintains optimal cardiac output and hemodynamic stability  Description: INTERVENTIONS:  - Monitor vital signs, rhythm, and trends  - Monitor for bleeding, hypotension and signs of decreased cardiac output  - Evaluate effectiveness of vasoactive medications to optimize hemodynamic stability  - Monitor arterial and/or venous puncture sites for bleeding and/or hematoma  - Assess quality of pulses, skin color and temperature  - Assess for signs of decreased coronary artery perfusion - ex.  Angina  - Evaluate fluid balance, assess for edema, trend weights  Outcome: Progressing  Goal: Absence of cardiac arrhythmias or at baseline  Description: INTERVENTIONS:  - Continuous cardiac monitoring, monitor vital signs, obtain 12 lead EKG if indicated  - Evaluate effectiveness of antiarrhythmic and heart rate control medications as ordered  - Initiate emergency measures for life threatening arrhythmias  - Monitor electrolytes and administer replacement therapy as ordered  Outcome: Progressing     Problem: MUSCULOSKELETAL - ADULT  Goal: Return mobility to safest level of function  Description: INTERVENTIONS:  - Assess patient stability and activity tolerance for standing, transferring and ambulating w/ or w/o assistive devices  - Assist with transfers and ambulation using safe patient handling equipment as needed  - Ensure adequate protection for wounds/incisions during mobilization  - Obtain PT/OT consults as needed  - Advance activity as appropriate  - Communicate ordered activity level and limitations with patient/family  Outcome: Progressing

## 2023-07-04 NOTE — HOME CARE LIAISON
Received referral via [facility] for Home Health services. Patient has a history with [facility]. Spoke w/ patient's daughter Treasure Walker and [facility] with [facility] from [address] [place] [address], choice is [facility]. Agency reserved in [address] [place] [address] and contact information placed on AVS.Financial interest disclosure provided.  Notified CM Nelly Alvarez

## 2023-07-05 VITALS
RESPIRATION RATE: 16 BRPM | WEIGHT: 132.38 LBS | OXYGEN SATURATION: 95 % | DIASTOLIC BLOOD PRESSURE: 84 MMHG | HEIGHT: 64 IN | SYSTOLIC BLOOD PRESSURE: 148 MMHG | BODY MASS INDEX: 22.6 KG/M2 | HEART RATE: 84 BPM | TEMPERATURE: 98 F

## 2023-07-05 LAB
ANION GAP SERPL CALC-SCNC: 7 MMOL/L (ref 0–18)
BUN BLD-MCNC: 19 MG/DL (ref 7–18)
BUN/CREAT SERPL: 27.5 (ref 10–20)
CALCIUM BLD-MCNC: 8.7 MG/DL (ref 8.5–10.1)
CHLORIDE SERPL-SCNC: 104 MMOL/L (ref 98–112)
CO2 SERPL-SCNC: 26 MMOL/L (ref 21–32)
CREAT BLD-MCNC: 0.69 MG/DL
DEPRECATED RDW RBC AUTO: 44.8 FL (ref 35.1–46.3)
ERYTHROCYTE [DISTWIDTH] IN BLOOD BY AUTOMATED COUNT: 12.8 % (ref 11–15)
GFR SERPLBLD BASED ON 1.73 SQ M-ARVRAT: 83 ML/MIN/1.73M2 (ref 60–?)
GLUCOSE BLD-MCNC: 142 MG/DL (ref 70–99)
GLUCOSE BLDC GLUCOMTR-MCNC: 121 MG/DL (ref 70–99)
GLUCOSE BLDC GLUCOMTR-MCNC: 139 MG/DL (ref 70–99)
GLUCOSE BLDC GLUCOMTR-MCNC: 157 MG/DL (ref 70–99)
HCT VFR BLD AUTO: 37.5 %
HGB BLD-MCNC: 12.2 G/DL
MCH RBC QN AUTO: 31.4 PG (ref 26–34)
MCHC RBC AUTO-ENTMCNC: 32.5 G/DL (ref 31–37)
MCV RBC AUTO: 96.4 FL
OSMOLALITY SERPL CALC.SUM OF ELEC: 289 MOSM/KG (ref 275–295)
PLATELET # BLD AUTO: 224 10(3)UL (ref 150–450)
POTASSIUM SERPL-SCNC: 4.4 MMOL/L (ref 3.5–5.1)
POTASSIUM SERPL-SCNC: 4.4 MMOL/L (ref 3.5–5.1)
RBC # BLD AUTO: 3.89 X10(6)UL
SODIUM SERPL-SCNC: 137 MMOL/L (ref 136–145)
WBC # BLD AUTO: 10.1 X10(3) UL (ref 4–11)

## 2023-07-05 PROCEDURE — 99233 SBSQ HOSP IP/OBS HIGH 50: CPT | Performed by: HOSPITALIST

## 2023-07-05 RX ORDER — AMOXICILLIN AND CLAVULANATE POTASSIUM 875; 125 MG/1; MG/1
875 TABLET, FILM COATED ORAL EVERY 12 HOURS SCHEDULED
Status: DISCONTINUED | OUTPATIENT
Start: 2023-07-05 | End: 2023-07-05

## 2023-07-05 RX ORDER — AMOXICILLIN AND CLAVULANATE POTASSIUM 875; 125 MG/1; MG/1
875 TABLET, FILM COATED ORAL EVERY 12 HOURS SCHEDULED
Qty: 10 TABLET | Refills: 0 | Status: SHIPPED | OUTPATIENT
Start: 2023-07-05 | End: 2023-07-10

## 2023-07-05 NOTE — PHYSICAL THERAPY NOTE
PHYSICAL THERAPY TREATMENT NOTE - INPATIENT     Room Number: 800/907-I       Presenting Problem: UTI  Co-Morbidities : L sided facial swelling and pain    Problem List  Principal Problem:    Urinary tract infection without hematuria, site unspecified  Active Problems:    Encephalopathy    Dental abscess    Hypoxia      PHYSICAL THERAPY ASSESSMENT   RN approved participation with physical therapy. Pt was received resting in bed and agreeable to activity with encouragement and education provided. Educated on role of PT, goals for this session, benefits of OOB mobility. Pt with delayed responses, demos poor effort and command following this session, also very [place]. Unclear weather lack of effort/motivation to participate or unable to understand/cognitive impairment preventing participation. Bed mobility: Max A x 1 for supine to sit with HOB elevated, max A to scoot toward EOB  Transfers:  Max A x 2 for STS; 3 attempts at standing. First 2 attempts with partial stand achieved with RW and max A x 2. On 3rd attempt, PT instructed pt to stand up on her own and pt was able to complete with Min A x 1, 2nd person SBA. Found to be incontinent of bowel; RN present in room and made aware; ok'd to continue transfer to chair. Gait: steps to chair with RW, approximately 3 ft. Mod A x 2. Max verbal cues for turning, sequencing, management of RW, upright posture. Very slow and very unsteady with significant posterior lean. No overall LOB. Pt was left sitting in chair with needs within reach, chair alarm on, handoff to RN complete. At this time pt is performing well below her functional baseline and is not safe to return to [facility]; requiring 2 person assist for very limited mobility. Will require further skilled PT services upon DC to maximize safety and facilitate return to PLOF.  The patient's Approx Degree of Impairment: 72.57% has been calculated based on documentation in the [facility] '6 clicks' Inpatient Basic Mobility Short Form.  Research supports that patients with this level of impairment may benefit from LTAC. DISCHARGE RECOMMENDATIONS  PT Discharge Recommendations: Sub-acute rehabilitation     PLAN  PT Treatment Plan: Bed mobility; Body mechanics; Endurance; Energy conservation;Patient education;Gait training;Stair training;Balance training;Transfer training;Strengthening  Frequency (Obs): 3-5x/week    SUBJECTIVE  Agreeable to activity. OBJECTIVE  Precautions: Bed/chair alarm;Hard of hearing    WEIGHT BEARING RESTRICTION  none    PAIN ASSESSMENT   Ratin  Location: L side of face  Management Techniques: Activity promotion;Repositioning    BALANCE  Static Sitting: Fair -  Dynamic Sitting: Poor +  Static Standing: Poor  Dynamic Standing: Poor -    AM-PAC '6-Clicks' INPATIENT SHORT FORM - BASIC MOBILITY  How much difficulty does the patient currently have. .. Patient Difficulty: Turning over in bed (including adjusting bedclothes, sheets and blankets)?: A Lot   Patient Difficulty: Sitting down on and standing up from a chair with arms (e.g., wheelchair, bedside commode, etc.): A Lot   Patient Difficulty: Moving from lying on back to sitting on the side of the bed?: A Lot   How much help from another person does the patient currently need. .. Help from Another: Moving to and from a bed to a chair (including a wheelchair)?: A Lot   Help from Another: Need to walk in hospital room?: A Lot   Help from Another: Climbing 3-5 steps with a railing?: Total     AM-PAC Score:  Raw Score: 11   Approx Degree of Impairment: 72.57%   Standardized Score (AM-PAC Scale): 33.86   CMS Modifier (G-Code): CL    FUNCTIONAL ABILITY STATUS  Functional Mobility/Gait Assessment  Gait Assistance: Moderate assistance (x2)  Distance (ft): 2  Assistive Device: Rolling walker  Pattern: Shuffle; Ataxic (posterior lean, narrow GUILLERMO)    Patient End of Session: Up in chair;Needs met;Call light within reach;RN aware of session/findings; All patient questions and concerns addressed; Alarm set    CURRENT GOALS   Goals to be met by: 7/8/23  Patient Goal Patient's self-stated goal is not stated   Goal #1 Patient is able to demonstrate supine - sit EOB @ level: moderate assistance      Goal #1   Current Status  Max A    Goal #2 Patient is able to demonstrate transfers EOB to/from Chair/Wheelchair at assistance level: moderate assistance with 1 person      Goal #2  Current Status Mod A x 2 with RW                                                       Therapeutic Activity: 18 minutes

## 2023-07-05 NOTE — PLAN OF CARE
Confusion and anxiety noted this shift, able to redirect and reorient with presence of daughter Girish in room. Safety precautions in place. Problem: Patient Centered Care  Goal: Patient preferences are identified and integrated in the patient's plan of care  Description: Interventions:  - What would you like us to know as we care for you? From [facility].  - Provide timely, complete, and accurate information to patient/family  - Incorporate patient and family knowledge, values, beliefs, and cultural backgrounds into the planning and delivery of care  - Encourage patient/family to participate in care and decision-making at the level they choose  - Honor patient and family perspectives and choices  Outcome: Progressing     Problem: CARDIOVASCULAR - ADULT  Goal: Maintains optimal cardiac output and hemodynamic stability  Description: INTERVENTIONS:  - Monitor vital signs, rhythm, and trends  - Monitor for bleeding, hypotension and signs of decreased cardiac output  - Evaluate effectiveness of vasoactive medications to optimize hemodynamic stability  - Monitor arterial and/or venous puncture sites for bleeding and/or hematoma  - Assess quality of pulses, skin color and temperature  - Assess for signs of decreased coronary artery perfusion - ex.  Angina  - Evaluate fluid balance, assess for edema, trend weights  Outcome: Progressing  Goal: Absence of cardiac arrhythmias or at baseline  Description: INTERVENTIONS:  - Continuous cardiac monitoring, monitor vital signs, obtain 12 lead EKG if indicated  - Evaluate effectiveness of antiarrhythmic and heart rate control medications as ordered  - Initiate emergency measures for life threatening arrhythmias  - Monitor electrolytes and administer replacement therapy as ordered  Outcome: Progressing     Problem: MUSCULOSKELETAL - ADULT  Goal: Return mobility to safest level of function  Description: INTERVENTIONS:  - Assess patient stability and activity tolerance for standing, transferring and ambulating w/ or w/o assistive devices  - Assist with transfers and ambulation using safe patient handling equipment as needed  - Ensure adequate protection for wounds/incisions during mobilization  - Obtain PT/OT consults as needed  - Advance activity as appropriate  - Communicate ordered activity level and limitations with patient/family  Outcome: Progressing

## 2023-07-05 NOTE — PLAN OF CARE
Pt in stable condition. Up in chair. Pt able to ambulate from chair to hallway with walker. Poor appetite, meals and fluids encouraged. Daughter at bedside updated on plan of care and discharge instructions reviewed. Amoxicillin script called in to [facility]. IV taken out. Pt discharged back to assisted living with daughter. All safety measures in place. Problem: Patient Centered Care  Goal: Patient preferences are identified and integrated in the patient's plan of care  Description: Interventions:  - What would you like us to know as we care for you? I have 6 kids  - Provide timely, complete, and accurate information to patient/family  - Incorporate patient and family knowledge, values, beliefs, and cultural backgrounds into the planning and delivery of care  - Encourage patient/family to participate in care and decision-making at the level they choose  - Honor patient and family perspectives and choices  Outcome: Completed     Problem: CARDIOVASCULAR - ADULT  Goal: Maintains optimal cardiac output and hemodynamic stability  Description: INTERVENTIONS:  - Monitor vital signs, rhythm, and trends  - Monitor for bleeding, hypotension and signs of decreased cardiac output  - Evaluate effectiveness of vasoactive medications to optimize hemodynamic stability  - Monitor arterial and/or venous puncture sites for bleeding and/or hematoma  - Assess quality of pulses, skin color and temperature  - Assess for signs of decreased coronary artery perfusion - ex.  Angina  - Evaluate fluid balance, assess for edema, trend weights  Outcome: Completed  Goal: Absence of cardiac arrhythmias or at baseline  Description: INTERVENTIONS:  - Continuous cardiac monitoring, monitor vital signs, obtain 12 lead EKG if indicated  - Evaluate effectiveness of antiarrhythmic and heart rate control medications as ordered  - Initiate emergency measures for life threatening arrhythmias  - Monitor electrolytes and administer replacement therapy as ordered  Outcome: Completed     Problem: MUSCULOSKELETAL - ADULT  Goal: Return mobility to safest level of function  Description: INTERVENTIONS:  - Assess patient stability and activity tolerance for standing, transferring and ambulating w/ or w/o assistive devices  - Assist with transfers and ambulation using safe patient handling equipment as needed  - Ensure adequate protection for wounds/incisions during mobilization  - Obtain PT/OT consults as needed  - Advance activity as appropriate  - Communicate ordered activity level and limitations with patient/family  Outcome: Completed

## 2023-07-05 NOTE — CM/SW NOTE
Department  notified of request for [facility] referrals started. Assigned CM/SW to follow up with pt/family on further discharge planning.      Graciela Krystyna   July 05, 2023   12:01

## 2023-07-05 NOTE — CM/SW NOTE
07/05/23 1400   Discharge disposition   Expected discharge disposition [facility]   Post Acute Care Provider   ([facility])   Additional Home Care/Hospice Provider [facility]   Patient Declines Recommended Services Yes  ([facility])   Discharge transportation Private car     Pt received MDO for discharge. Pt's daughter refused [facility], PT recs. Per last SW/CM note, pt was reserved with [facility]. Nael Everett from [facility] made aware of discharge today. SW/CM to remain available for support and/or discharge planning.      Juju Roman, MSW, LSW   x [phone]

## 2023-07-05 NOTE — PROGRESS NOTES
Pt able to ambulate from chair all the way to hallway with one assist and walker. Daughter at bedside helping with cues. MD notified, pt to discharge back to assisted living.

## 2023-07-06 ENCOUNTER — PATIENT OUTREACH (OUTPATIENT)
Dept: CASE MANAGEMENT | Age: 88
End: 2023-07-06

## 2023-07-06 ENCOUNTER — TELEPHONE (OUTPATIENT)
Dept: INTERNAL MEDICINE CLINIC | Facility: CLINIC | Age: 88
End: 2023-07-06

## 2023-07-06 ENCOUNTER — MED REC SCAN ONLY (OUTPATIENT)
Dept: INTERNAL MEDICINE CLINIC | Facility: CLINIC | Age: 88
End: 2023-07-06

## 2023-07-06 NOTE — TELEPHONE ENCOUNTER
Patient's home health nurse calling, confirmed patient's name and . Drew RN is informing of 1-day delay of start of care due to scheduling conflict for patient. No need to call Drew back unless orders given.

## 2023-07-06 NOTE — PROGRESS NOTES
Attempted to contact pt for TCM however no answer. Call continued to ring and then disconnected. Will await a returned phone call. TE will be sent to PCP office to FU on HFU appt as pt is a high risk for readmission.

## 2023-07-06 NOTE — TELEPHONE ENCOUNTER
Medication list for review and signature received from [facility], signed and faxed to [phone], confirmation received.

## 2023-07-06 NOTE — TELEPHONE ENCOUNTER
Attempted to contact pt for TCM today however no answer. Pt does not have HFU appt scheduled at this time. TCM/HFU appt recommended by 7/12/23 as pt is a high risk for readmission. Please advise. BOOK BY DATE (last date for TCM): 7/19/23    Clinical staff:  Please f/u with pt and try to get them to schedule as pt would greatly benefit from TCM/HFU appt. Thank you!

## 2023-07-07 ENCOUNTER — TELEPHONE (OUTPATIENT)
Dept: INTERNAL MEDICINE CLINIC | Facility: CLINIC | Age: 88
End: 2023-07-07

## 2023-07-07 NOTE — TELEPHONE ENCOUNTER
[facility] indicated that daughter delaying start of care till Tuesday 7/11/2023.

## 2023-07-07 NOTE — TELEPHONE ENCOUNTER
Left detailed message on TAWANDA Russell's confidential voicemail that Dr Roshan Moreno did note the message. If any questions to give us a call back.

## 2023-07-10 ENCOUNTER — TELEPHONE (OUTPATIENT)
Dept: INTERNAL MEDICINE CLINIC | Facility: CLINIC | Age: 88
End: 2023-07-10

## 2023-07-10 NOTE — TELEPHONE ENCOUNTER
Attempted to reach Rocky at [facility]- phone rang many times and went to busy signal.    Please try again later.

## 2023-07-10 NOTE — TELEPHONE ENCOUNTER
Jacquie Carr called from [id number]. She said that patient just finished Augmentin 875 mg BID x 5 days. She wanted to know if patient should resume Keflex daily for prophylactic treatment for UTI. Dr. Komal Mcmahan, you referred patient to Urology in November. Please see Arely Arce's note below:    I recommended UA and urine culture to ensure no bacteria. Will start on Keflex 250 mg PO nightly as this has helped in the past.  She is unable to provide a sample today. Her daughter will have facility in which she resides obtain specimen and fax us results. Please advise if patient should restart Keflex 250 mg PO nightly.

## 2023-07-10 NOTE — DISCHARGE SUMMARY
[facility]    Discharge Summary    Jose Eldridge Patient Status:  Inpatient    1935 MRN [id number]   Location [facility] 5SW/SE Attending No att. providers found   [address] 4 PCP Washington Hines MD     Date of Admission: 2023   Date of Discharge: 2023  3:55 PM    Admitting Diagnosis: Dental abscess [K04.7]  Hypoxia [R09.02]  Urinary tract infection without hematuria, site unspecified [N39.0]    Disposition: [address] [place] [address] Discharge Diagnoses: Acute facial cellulitis    Lace+ Score: 69  59-90 High Risk  29-58 Medium Risk  0-28   Low Risk. TCM Follow-Up Recommendation:  LACE > 58: High Risk of readmission after discharge from the hospital.        Discharge Diagnosis: . Principal Problem:    Urinary tract infection without hematuria, site unspecified  Active Problems:    Encephalopathy    Dental abscess    Hypoxia      Hospital Course:   Reason for Admission:   Jose Eldridge is a(n) 80year old female, PMH Chronic A.fib, SSS s/p PPM, mild dementia, hearing loss who presents to the ER with dtr from assisted living with complaints of poor PO intake x 1 week and development of L facial swelling x 2 days. The patient has progressively gotten weaker for the past week. No SOB no F/C no LE edema no orthopnea. In the ER UA abnormal, no leukoctyosis, no fevers, CXR mild interstitial edema, CT facial soft tissue no abscess given Unasyn        Discharge Physical Exam:   Physical Exam:    General: No acute distress. Respiratory: Clear to auscultation bilaterally. No wheezes. No rhonchi. Cardiovascular: S1, S2. Regular rate and rhythm. No murmurs, rubs or gallops. Abdomen: Soft, nontender, nondistended. Positive bowel sounds. No rebound or guarding. Neurologic: No focal neurological deficits. Musculoskeletal: Moves all extremities. Hospital Course:      Left facial edema.    # L facial cellulitis secondary to periodontal disease.   - CT negative for abscess but limited due to her dental hardware  - IV unasyn ---> Augmentin on discharge. - ID on consult. - outpt fu with dentist d/w pt and family      # Dehydration   # Poor PO intake   - slowly improving.   - dietitian to see  - SLP eval.   - PT/OT eval 24hr care - AL      Deconditioning  - PT/OT eval- home with home health today     # Chronic Atrial fibrillation not on A/C due to falls. # SSS s/p PPM   # Chronic HFpEF - appears dry at this time. - coreg, plavix + ASA 81mg, zetia/pravastatin, losartan,   - resume lasix 20mg daily   - ECHO   - I/O, daily weight      # DM II  - A1c 6.2  - ISS      Other medical problems  Hearing loss  Mild dementia   Severe B/L hearing loss   H/o multiple CVA's         Quality:  DVT Prophylaxis: heparin  CODE status:Full       Complications: none    Consultants         Provider   Role Specialty     Joan Severe, MD  Consulting Physician INFECTIOUS DISEASES     Reggie Villa MD  Consulting Physician Physical Medicine                Discharge Plan:   Discharge Condition: Stable    Discharge Medication List as of 7/5/2023  2:50 PM    New Orders    amoxicillin clavulanate 875-125 MG Oral Tab  Take 1 tablet (875 mg total) by mouth every 12 (twelve) hours for 5 days. , Print Script, Disp-10 tablet, R-0      Home Meds - Unchanged    MAGNESIUM OXIDE 400 (240 Mg) MG Oral Tab  TAKE 1 TABLET BY MOUTH EVERY DAY, Normal, Disp-90 tablet, R-1    EZETIMIBE-SIMVASTATIN 10-10 MG Oral Tab  TAKE 1 TABLET BY MOUTH DAILY AT BEDTIME, Normal, Disp-90 tablet, R-0    furosemide 20 MG Oral Tab  Take 1 tablet (20 mg total) by mouth daily. , Normal, Disp-90 tablet, R-1    aspirin 325 MG Oral Tab EC  Take 1 tablet (325 mg total) by mouth daily. , Normal, Disp-90 tablet, R-1    FLUOXETINE 10 MG Oral Cap  TAKE 1 CAPSULE BY MOUTH EVERY DAY, Normal, Disp-90 capsule, R-1    ALLERGY RELIEF 10 MG Oral Tab  TAKE 1 TABLET BY MOUTH DAILY, Normal, Disp-90 tablet, R-1    carvedilol 3.125 MG Oral Tab  TAKE 1 TABLET BY MOUTH DAILY hold FOR sbp less THEN 100 OR HR LESS THEN 60, Normal, Disp-90 tablet, R-1    GLUCOCARD VITAL TEST In Vitro Strip  1 strip by Finger stick route daily. Use as directed, Normal, Disp-100 strip, R-3, DAWDx:E11.9 patient doesn't use insulin    albuterol 108 (90 Base) MCG/ACT Inhalation Aero Soln  Inhale 2 puffs into the lungs every 6 (six) hours as needed for Wheezing or Shortness of Breath., Historical    benzonatate 100 MG Oral Cap  Take 1 capsule (100 mg total) by mouth 3 (three) times daily as needed for cough., Historical    sennosides 8.6 MG Oral Tab  Take 1 tablet (8.6 mg total) by mouth daily as needed (constipation). , Historical    losartan 25 MG Oral Tab  Take 1 tablet (25 mg total) by mouth daily. Hold for blood pressure  less than 110, Normal, Disp-90 tablet, R-1    pantoprazole 40 MG Oral Tab EC  Take 1 tablet (40 mg total) by mouth daily. , Normal, Disp-90 tablet, R-090 day refill given on 10/12/21, appointment needed for further refills. OXCARBAZEPINE 150 MG Oral Tab  TAKE 1 TABLET BY MOUTH DAILY, Normal, Disp-90 tablet, R-1This prescription was filled on 9/21/2021. Any refills authorized will be placed on file. bethanechol chloride 10 MG Oral Tab  Take 1 tablet (10 mg total) by mouth daily. , Print Script, Disp-90 tablet, R-1    GLIPIZIDE 5 MG Oral Tab  TAKE 1/2 TABLET (2.5MG) BY MOUTH DAILY, Normal, Disp-45 tablet, R-1This prescription was filled on 7/27/2021. Any refills authorized will be placed on file. CLOPIDOGREL BISULFATE 75 MG Oral Tab  TAKE 1 TABLET BY MOUTH EVERY DAY, Normal, Disp-90 tablet, R-1    Meclizine HCl 25 MG Oral Tab  Take 1 tablet (25 mg total) by mouth daily as needed., Historical    acetaminophen 500 MG Oral Tab  Take 1 tablet (500 mg total) by mouth every 6 (six) hours as needed for Pain., Historical    Lido-Capsaicin-Men-Methyl Sal 0.5-0.035-5-20 % External Patch  Apply topically daily as needed (left Rib cage). , Historical              Discharge Diet: As tolerated    Discharge Activity: As tolerated       Discharge Medications        START taking these medications        Instructions Prescription details   amoxicillin clavulanate 875-125 MG Tabs  Commonly known as: Augmentin      Take 1 tablet (875 mg total) by mouth every 12 (twelve) hours for 5 days. Stop taking on: July 10, 2023  Quantity: 10 tablet  Refills: 0     furosemide 20 MG Tabs  Commonly known as: Lasix      Take 1 tablet (20 mg total) by mouth daily. Quantity: 90 tablet  Refills: 1            CHANGE how you take these medications        Instructions Prescription details   ezetimibe-simvastatin 10-10 MG Tabs  Commonly known as: Vytorin  What changed: Another medication with the same name was removed. Continue taking this medication, and follow the directions you see here. TAKE 1 TABLET BY MOUTH DAILY AT BEDTIME   Quantity: 90 tablet  Refills: 0            CONTINUE taking these medications        Instructions Prescription details   acetaminophen 500 MG Tabs  Commonly known as: Tylenol Extra Strength      Take 1 tablet (500 mg total) by mouth every 6 (six) hours as needed for Pain. Refills: 0     albuterol 108 (90 Base) MCG/ACT Aers  Commonly known as: Ventolin HFA      Inhale 2 puffs into the lungs every 6 (six) hours as needed for Wheezing or Shortness of Breath. Refills: 0     Allergy Relief 10 MG Tabs  Generic drug: loratadine      TAKE 1 TABLET BY MOUTH DAILY   Quantity: 90 tablet  Refills: 1     aspirin 325 MG Tbec      Take 1 tablet (325 mg total) by mouth daily. Quantity: 90 tablet  Refills: 1     benzonatate 100 MG Caps  Commonly known as: Tessalon      Take 1 capsule (100 mg total) by mouth 3 (three) times daily as needed for cough. Refills: 0     bethanechol 10 MG Tabs  Commonly known as: Urecholine      Take 1 tablet (10 mg total) by mouth daily.    Quantity: 90 tablet  Refills: 1     carvedilol 3.125 MG Tabs  Commonly known as: Coreg      TAKE 1 TABLET BY MOUTH DAILY hold FOR sbp less THEN 100 OR HR LESS THEN 60   Quantity: 90 tablet  Refills: 1     clopidogrel 75 MG Tabs  Commonly known as: Plavix      TAKE 1 TABLET BY MOUTH EVERY DAY   Quantity: 90 tablet  Refills: 1     FLUoxetine 10 MG Caps  Commonly known as: PROzac      TAKE 1 CAPSULE BY MOUTH EVERY DAY   Quantity: 90 capsule  Refills: 1     glipiZIDE 5 MG Tabs  Commonly known as: Glucotrol      TAKE 1/2 TABLET (2.5MG) BY MOUTH DAILY   Quantity: 45 tablet  Refills: 1     Glucocard Vital Test Strp      1 strip by Finger stick route daily. Use as directed   Quantity: 100 strip  Refills: 3     Lido-Capsaicin-Men-Methyl Sal 0.5-0.035-5-20 % Ptch      Apply topically daily as needed (left Rib cage). Refills: 0     losartan 25 MG Tabs  Commonly known as: Cozaar      Take 1 tablet (25 mg total) by mouth daily. Hold for blood pressure  less than 110   Quantity: 90 tablet  Refills: 1     Magnesium Oxide -Mg Supplement 400 (240 Mg) MG Tabs      TAKE 1 TABLET BY MOUTH EVERY DAY   Quantity: 90 tablet  Refills: 1     meclizine 25 MG Tabs  Commonly known as: Antivert      Take 1 tablet (25 mg total) by mouth daily as needed. Refills: 0     OXcarbazepine 150 MG Tabs  Commonly known as: Trileptal      TAKE 1 TABLET BY MOUTH DAILY   Quantity: 90 tablet  Refills: 1     pantoprazole 40 MG Tbec  Commonly known as: Protonix      Take 1 tablet (40 mg total) by mouth daily. Quantity: 90 tablet  Refills: 0     sennosides 8.6 MG Tabs  Commonly known as: Senokot      Take 1 tablet (8.6 mg total) by mouth daily as needed (constipation). Refills: 0            STOP taking these medications      cephalexin 250 MG Caps  Commonly known as: Keflex        cholecalciferol 125 MCG (5000 UT) Caps  Commonly known as: Vitamin D3        fluticasone propionate 110 MCG/ACT Aero  Commonly known as: Flovent HFA        Nystatin 027647 UNIT/GM Powd        sulfamethoxazole-trimethoprim -160 MG Tabs per tablet  Commonly known as:  Bactrim DS                  Where to Get Your Medications        Please  your prescriptions at the location directed by your doctor or nurse    Bring a paper prescription for each of these medications  amoxicillin clavulanate 875-125 MG Tabs         Follow up: Follow-up Information       Jerry Gaytan MD Follow up in 1 week(s). Specialty: Internal Medicine  Contact information:  [address]  [phone]                             Follow up Labs and imaging: Other Discharge Instructions:         Sometimes managing your health at home requires assistance. The [facility] team has recognized your preference to use [facility]. They can be reached by phone at [phone]. The fax number for your reference is [phone]. A representative from the home health agency will contact you or your family to schedule your first visit.            Time spent:  > 30 minutes    Jesús Barragan MD  7/10/2023

## 2023-07-11 RX ORDER — CEPHALEXIN 250 MG/1
250 CAPSULE ORAL DAILY
Qty: 90 CAPSULE | Refills: 1 | Status: SHIPPED | OUTPATIENT
Start: 2023-07-11

## 2023-07-11 NOTE — TELEPHONE ENCOUNTER
Spoke with Sarah NEFF at [facility] living,   verified. She wants to f/u on yesterday message. Sarah NEFF was informed of PCP recommendations, she req new rx for keflex 250 mg po nightly to be send to [facility]. Rx pended, pls verify qty. pls advise, thanks in advance. Requested Prescriptions     Pending Prescriptions Disp Refills    cephalexin (KEFLEX) 250 MG Oral Cap  0     Sig: Take 1 capsule (250 mg total) by mouth daily.        Last Office Visit with PCP: 2022

## 2023-07-19 ENCOUNTER — TELEPHONE (OUTPATIENT)
Dept: INTERNAL MEDICINE CLINIC | Facility: CLINIC | Age: 88
End: 2023-07-19

## 2023-07-19 NOTE — TELEPHONE ENCOUNTER
Order number [id number] received from [facility], placed on Dr José Luis Gibbons desk for review and signature.

## 2023-07-19 NOTE — TELEPHONE ENCOUNTER
Order number [id number] and [id number] received from [facility], placed on Dr David haas for review and signature.

## 2023-07-19 NOTE — TELEPHONE ENCOUNTER
Order number [id number], [id number], and [id number] signed and faxed to [facility] at [phone], confirmations received.

## 2023-07-20 NOTE — TELEPHONE ENCOUNTER
Order number [id number] received from [facility], placed on Dr Rohith Cruz desk for review and signature.

## 2023-07-24 NOTE — TELEPHONE ENCOUNTER
Order# [id number] from [facility] placed on Dr. Nat Crump desrose for signature and review.

## 2023-08-01 RX ORDER — LOSARTAN POTASSIUM 25 MG/1
25 TABLET ORAL DAILY
Qty: 90 TABLET | Refills: 0 | Status: SHIPPED | OUTPATIENT
Start: 2023-08-01

## 2023-08-01 NOTE — TELEPHONE ENCOUNTER
Please review. Protocol failed / Has no protocol. Asking for refill to get to appointment date:  Future Appointments   Date Time Provider Shabnam Tiffanie   9/21/2023  1:00 PM Johan Adams MD KRISTI ELLISON       Requested Prescriptions   Pending Prescriptions Disp Refills    LOSARTAN 25 MG Oral Tab [Pharmacy Med Name: losartan 25 mg tablet] 90 tablet 1     Sig: Take 1 tablet (25 mg total) by mouth daily.  Hold for blood pressure  less than 110       Hypertensive Medications Protocol Failed - 7/31/2023  9:03 AM        Failed - Last BP reading less than 140/90     BP Readings from Last 1 Encounters:  07/05/23 : 148/84              Failed - In person appointment or virtual visit in the past 6 months     Recent Outpatient Visits              8 months ago Recurrent UTI    [facility], [address], [place], [address], APRN    Office Visit    8 months ago Recurrent UTI    [address], Dinesh Mcclelland MD    Office Visit    10 months ago Recurrent UTI    [address], Jaylen Adams MD    Telemedicine    11 months ago Oropharyngeal dysphagia    [address], Dinesh Mcclelland MD    Telemedicine    1 year ago Medicare annual wellness visit, subsequent    [address], Dinesh Mcclelland MD    Office Visit          Future Appointments         Provider Department Appt Notes    In 1 month Johan Adams MD [address], [address], [place] [facility]               Passed - In person appointment in the past 12 or next 3 months     Recent Outpatient Visits              8 months ago Recurrent UTI    [facility], [address], [place], [address], APRN    Office Visit    8 months ago Recurrent UTI    [address], [address], Jaylen Adams, MD    Office Visit    10 months ago Recurrent UTI    [facility], [facility], Jaylen Anton MD    Telemedicine    11 months ago Oropharyngeal dysphagia    [facility], [facility], Jaylen Anton MD    Telemedicine    1 year ago Medicare annual wellness visit, subsequent    [address], [facility], Anita Mckeon MD    Office Visit          Future Appointments         Provider Department Appt Notes    In 1 month MD Cong Ghosh Addison [facility]               Passed - CMP or BMP in past 6 months     Recent Results (from the past 4392 hour(s))   Basic Metabolic Panel (8)    Collection Time: 07/05/23  6:16 AM   Result Value Ref Range    Glucose 142 (H) 70 - 99 mg/dL    Sodium 137 136 - 145 mmol/L    Potassium 4.4 3.5 - 5.1 mmol/L    Chloride 104 98 - 112 mmol/L    CO2 26.0 21.0 - 32.0 mmol/L    Anion Gap 7 0 - 18 mmol/L    BUN 19 (H) 7 - 18 mg/dL    Creatinine 0.69 0.55 - 1.02 mg/dL    BUN/CREA Ratio 27.5 (H) 10.0 - 20.0    Calcium, Total 8.7 8.5 - 10.1 mg/dL    Calculated Osmolality 289 275 - 295 mOsm/kg    eGFR-Cr 83 >=60 mL/min/1.73m2     *Note: Due to a large number of results and/or encounters for the requested time period, some results have not been displayed. A complete set of results can be found in Results Review.                Passed - EGFRCR or GFRNAA > 50     GFR Evaluation  EGFRCR: 83 , resulted on 7/5/2023             Future Appointments         Provider Department Appt Notes    In 1 month MD Cong Ghosh Addison [facility]           Recent Outpatient Visits              8 months ago Recurrent UTI    [facility], [address], [place] [employer], [address], APRN    Office Visit    8 months ago Recurrent UTI    Anita Vaughan MD Office Visit    10 months ago Recurrent UTI    [facility], [address], Jaylen Lundy MD    Telemedicine    11 months ago Oropharyngeal dysphagia    [address], [address], Jacob Franklin MD    Telemedicine    1 year ago Medicare annual wellness visit, subsequent    Grady Garcia, Jacob Franklin MD    Office Visit

## 2023-08-10 RX ORDER — BETHANECHOL CHLORIDE 10 MG/1
10 TABLET ORAL DAILY
Qty: 90 TABLET | Refills: 3 | Status: SHIPPED | OUTPATIENT
Start: 2023-08-10

## 2023-08-10 RX ORDER — PANTOPRAZOLE SODIUM 40 MG/1
40 TABLET, DELAYED RELEASE ORAL DAILY
Qty: 90 TABLET | Refills: 3 | Status: SHIPPED | OUTPATIENT
Start: 2023-08-10

## 2023-08-10 RX ORDER — OXCARBAZEPINE 150 MG/1
150 TABLET, FILM COATED ORAL DAILY
Qty: 90 TABLET | Refills: 3 | Status: SHIPPED | OUTPATIENT
Start: 2023-08-10

## 2023-08-10 RX ORDER — GLIPIZIDE 5 MG/1
2.5 TABLET ORAL DAILY
Qty: 45 TABLET | Refills: 3 | Status: SHIPPED | OUTPATIENT
Start: 2023-08-10

## 2023-08-10 NOTE — TELEPHONE ENCOUNTER
Please review. Protocol failed / Has no protocol. Vitamin D is marked     The original prescription was discontinued on 7/5/2023 by Ruffin Gaucher, MD for the following reason: Stop Taking at Discharge. Renewing this prescription may not be appropriate. Requested Prescriptions   Pending Prescriptions Disp Refills    clopidogrel 75 MG Oral Tab [Pharmacy Med Name: clopidogrel 75 mg tablet] 90 tablet PRN     Sig: Take 1 tablet (75 mg total) by mouth daily. There is no refill protocol information for this order       cholecalciferol (VITAMIN D3) 125 MCG (5000 UT) Oral Cap [Pharmacy Med Name: cholecalciferol (vitamin D3) 125 mcg (5,000 unit) capsule] 90 capsule 0     Sig: Take 1 capsule (5,000 Units total) by mouth daily. There is no refill protocol information for this order       Magnesium Oxide -Mg Supplement 400 (240 Mg) MG Oral Tab [Pharmacy Med Name: magnesium oxide 400 mg (241.3 mg magnesium) tablet] 90 tablet PRN     Sig: Take 1 tablet (400 mg total) by mouth daily. There is no refill protocol information for this order      Signed Prescriptions Disp Refills    pantoprazole 40 MG Oral Tab EC 90 tablet 3     Sig: Take 1 tablet (40 mg total) by mouth daily.        Gastrointestional Medication Protocol Passed - 8/9/2023  8:59 PM        Passed - In person appointment or virtual visit in the past 12 mos or appointment in next 3 mos     Recent Outpatient Visits              8 months ago Recurrent UTI    [facility], [employer], [address], APRN    Office Visit    9 months ago Recurrent UTI    [facility], Quoc De La Garza MD    Office Visit    10 months ago Recurrent UTI    [address], [address], Quoc De La Garza MD    Telemedicine    1 year ago Oropharyngeal dysphagia    [address], [address], Quoc De La Garza MD    Telemedicine    1 year ago Medicare annual wellness visit, subsequent    Rosmery Durbin MD    Office Visit          Future Appointments         Provider Department Appt Notes    In 1 month MD Juliann Casas Addison [facility]                 glipiZIDE 5 MG Oral Tab 45 tablet 3     Sig: Take 0.5 tablets (2.5 mg total) by mouth daily. Diabetes Medication Protocol Passed - 8/9/2023  8:59 PM        Passed - Last A1C < 7.5 and within past 6 months     Lab Results   Component Value Date    A1C 6.2 (H) 07/01/2023             Passed - In person appointment or virtual visit in the past 6 mos or appointment in next 3 mos     Recent Outpatient Visits              8 months ago Recurrent UTI    [facility], [address], [employer], [address], APRN    Office Visit    9 months ago Recurrent UTI    Rosmery Durbin MD    Office Visit    10 months ago Recurrent UTI    Rosmery Durbin MD    Telemedicine    1 year ago Oropharyngeal dysphagia    [address], [address], Rosmery Lucero MD    Telemedicine    1 year ago Medicare annual wellness visit, subsequent    Rosmery Durbin MD    Office Visit          Future Appointments         Provider Department Appt Notes    In 1 month MD Juliann Casas Addison [facility]               Passed - EGFRCR or GFRNAA > 50     GFR Evaluation  EGFRCR: 83 , resulted on 7/5/2023          Passed - GFR in the past 12 months          OXcarbazepine 150 MG Oral Tab 90 tablet 3     Sig: Take 1 tablet (150 mg total) by mouth daily.        Neurology Medications Passed - 8/9/2023  8:59 PM        Passed - In person appointment or virtual visit in the past 6 mos or appointment in next 3 mos     Recent Outpatient Visits              8 months ago Recurrent UTI    [facility], [address], [employer], [address], APRN    Office Visit    9 months ago Recurrent UTI    [address], Christiana Oliveira MD    Office Visit    10 months ago Recurrent UTI    [address], Jaylen Moreno MD    Telemedicine    1 year ago Oropharyngeal dysphagia    [facility], [facility], Christiana Oliveira MD    Telemedicine    1 year ago Medicare annual wellness visit, subsequent    [address], Christiana Oliveira MD    Office Visit          Future Appointments         Provider Department Appt Notes    In 1 month Joanne Moreno MD [address], Jaylen [facility]                  Future Appointments         Provider Department Appt Notes    In 1 month Joanne Moreno MD [address], [facility], [place] [facility]           Recent Outpatient Visits              8 months ago Recurrent UTI    [facility], [address], [employer], [address], APRN    Office Visit    9 months ago Recurrent UTI    [address], Christiana Oliveira MD    Office Visit    10 months ago Recurrent UTI    [address], Jaylen Moreno MD    Telemedicine    1 year ago Oropharyngeal dysphagia    [address], [facility] [address], Christiana Oliveira MD    Telemedicine    1 year ago Medicare annual wellness visit, subsequent    [address], Christiana Oliveira MD    Office Visit

## 2023-08-10 NOTE — TELEPHONE ENCOUNTER
Refill passed per [facility], [facility] protocol.     Requested Prescriptions   Pending Prescriptions Disp Refills    PANTOPRAZOLE 40 MG Oral Tab EC [Pharmacy Med Name: pantoprazole 40 mg tablet,delayed release] 90 tablet PRN     Sig: TAKE 1 TABLET BY MOUTH DAILY       Gastrointestional Medication Protocol Passed - 8/9/2023  8:59 PM        Passed - In person appointment or virtual visit in the past 12 mos or appointment in next 3 mos     Recent Outpatient Visits              8 months ago Recurrent UTI    [address], [address], [place], [address], APRN    Office Visit    9 months ago Recurrent UTI    [address], Tracy Munson MD    Office Visit    10 months ago Recurrent UTI    [address], Tracy Munson MD    Telemedicine    1 year ago Oropharyngeal dysphagia    [facility] [address], Tracy Munson MD    Telemedicine    1 year ago Medicare annual wellness visit, subsequent    [address], [facility], Tracy Munson MD    Office Visit          Future Appointments         Provider Department Appt Notes    In 1 month MD Nat Hurley [place], [place] MAPX                 GLIPIZIDE 5 MG Oral Tab [Pharmacy Med Name: glipizide 5 mg tablet] 45 tablet PRN     Sig: TAKE 1/2 TABLET BY MOUTH DAILY       Diabetes Medication Protocol Passed - 8/9/2023  8:59 PM        Passed - Last A1C < 7.5 and within past 6 months     Lab Results   Component Value Date    A1C 6.2 (H) 07/01/2023             Passed - In person appointment or virtual visit in the past 6 mos or appointment in next 3 mos     Recent Outpatient Visits              8 months ago Recurrent UTI    [facility], [address], [place], [address], APRN    Office Visit    9 months ago Recurrent UTI    [facility], [facility], [place] Hector Monroy MD    Office Visit    10 months ago Recurrent UTI    [facility], [facility], Ben Ocampo MD    Telemedicine    1 year ago Oropharyngeal dysphagia    [facility], [facility], Ben Ocampo MD    Telemedicine    1 year ago Medicare annual wellness visit, subsequent    [facility], Ben Ocampo MD    Office Visit          Future Appointments         Provider Department Appt Notes    In 1 month MD Tina Miller, [facility], [place] [facility]               Passed - [facility] or GFRNAA > 50     GFR Evaluation  EGFRCR: 83 , resulted on 7/5/2023          Passed - GFR in the past 12 months          OXCARBAZEPINE 150 MG Oral Tab [Pharmacy Med Name: oxcarbazepine 150 mg tablet] 90 tablet PRN     Sig: TAKE 1 9480 Counts include 234 beds at the [facility]       Neurology Medications Passed - 8/9/2023  8:59 PM        Passed - In person appointment or virtual visit in the past 6 mos or appointment in next 3 mos     Recent Outpatient Visits              8 months ago Recurrent UTI    Tina Serrano, [address], [place], [address], APRN    Office Visit    9 months ago Recurrent UTI    [address] [place] [address], Ben Ocampo MD    Office Visit    10 months ago Recurrent UTI    Tina Serrano, [facility], Ben Ocampo MD    Telemedicine    1 year ago Oropharyngeal dysphagia    Tina Serrano, [facility], Ben Ocampo MD    Telemedicine    1 year ago Medicare annual wellness visit, subsequent    Tina Serrano, [facility], Ben Ocampo MD    Office Visit          Future Appointments         Provider Department Appt Notes    In 1 month Hector Monroy MD [address] [place] [address] [place] Merino SUSAN                 CLOPIDOGREL 75 MG Oral Tab [Pharmacy Med Name: clopidogrel 75 mg tablet] 90 tablet PRN     Sig: TAKE 1 TABLET BY MOUTH EVERY DAY       There is no refill protocol information for this order       VITAMIN D3 125 MCG (5000 UT) Oral Cap [Pharmacy Med Name: cholecalciferol (vitamin D3) 125 mcg (5,000 unit) capsule] 90 capsule 0     Sig: TAKE 1 CAPSULE BY MOUTH DAILY       There is no refill protocol information for this order       MAGNESIUM OXIDE -MG SUPPLEMENT 400 (240 Mg) MG Oral Tab [Pharmacy Med Name: magnesium oxide 400 mg (241.3 mg magnesium) tablet] 90 tablet PRN     Sig: TAKE 1 TABLET BY MOUTH EVERY DAY       There is no refill protocol information for this order        Future Appointments         Provider Department Appt Notes    In 1 month Uzma Lopez MD [facility][note 2] [facility], [place] DAVIS          Recent Outpatient Visits              8 months ago Recurrent UTI    [facility], [address], [place], [address], APRN    Office Visit    9 months ago Recurrent UTI    [employer] [facility], Noris Urban MD    Office Visit    10 months ago Recurrent UTI    [facility], Noris Urban MD    Telemedicine    1 year ago [facility] dysphagia    [facility]    Telemedicine    1 year ago Medicare annual wellness visit, subsequent    [address] [facility]    Office Visit

## 2023-08-10 NOTE — TELEPHONE ENCOUNTER
Refill passed per [place] [facility], [facility] protocol.   Requested Prescriptions   Pending Prescriptions Disp Refills    BETHANECHOL 10 MG Oral Tab [Pharmacy Med Name: bethanechol chloride 10 mg tablet] 90 tablet PRN     Sig: TAKE 1 TABLET BY MOUTH DAILY       Genitourinary Medications Passed - 8/9/2023  8:59 PM        Passed - Patient does not have pulmonary hypertension on problem list        Passed - In person appointment or virtual visit in the past 12 mos or appointment in next 3 mos     Recent Outpatient Visits              8 months ago Recurrent UTI    [facility], [address], [employer], Daphene Primas, APRN    Office Visit    9 months ago Recurrent UTI    [facility], Lonnie Elias MD    Office Visit    10 months ago Recurrent UTI    [facility], Lonnie Elias MD    Telemedicine    1 year ago Oropharyngeal dysphagia    [place] Wenceslao See, MD    Telemedicine    1 year ago Medicare annual wellness visit, subsequent    Ailyn Aguirre, Lonnie Elias MD    Office Visit          Future Appointments         Provider Department Appt Notes    In 1 month MD Muriel Harris [address], [place] [facility]                  Recent Outpatient Visits              8 months ago Recurrent UTI    Muriel Chou, [address], [employer], Daphene Primas, APRN    Office Visit    9 months ago Recurrent UTI    [facility] Timothy, Lonnie Elias MD    Office Visit    10 months ago Recurrent UTI    [place] Head, Lonnie Elias MD    Telemedicine    1 year ago Oropharyngeal dysphagia    [place], Lonnie Elias MD    Telemedicine    1 year ago Medicare annual wellness visit, subsequent    [place], [address], Ericka Verdin MD    Office Visit          Future Appointments         Provider Department Appt Notes    In 1 month Gabriele Sellers MD [address] [facility]

## 2023-08-12 RX ORDER — MELATONIN
1 DAILY
Qty: 90 TABLET | Refills: 1 | Status: SHIPPED | OUTPATIENT
Start: 2023-08-12

## 2023-08-12 RX ORDER — CLOPIDOGREL BISULFATE 75 MG/1
75 TABLET ORAL DAILY
Qty: 90 TABLET | Refills: 1 | Status: SHIPPED | OUTPATIENT
Start: 2023-08-12

## 2023-08-24 ENCOUNTER — TELEPHONE (OUTPATIENT)
Dept: INTERNAL MEDICINE CLINIC | Facility: CLINIC | Age: 88
End: 2023-08-24

## 2023-08-24 NOTE — TELEPHONE ENCOUNTER
Judith calling from [facility] to notify doctor, patient will discharged the week of September 3rd if all goals will have been met.

## 2023-09-02 ENCOUNTER — MED REC SCAN ONLY (OUTPATIENT)
Dept: INTERNAL MEDICINE CLINIC | Facility: CLINIC | Age: 88
End: 2023-09-02

## 2023-09-11 ENCOUNTER — HOSPITAL ENCOUNTER (EMERGENCY)
Facility: HOSPITAL | Age: 88
Discharge: HOME | End: 2023-09-11
Attending: EMERGENCY MEDICINE
Payer: MEDICARE

## 2023-09-11 ENCOUNTER — TELEPHONE (OUTPATIENT)
Dept: INTERNAL MEDICINE CLINIC | Facility: CLINIC | Age: 88
End: 2023-09-11

## 2023-09-11 ENCOUNTER — APPOINTMENT (OUTPATIENT)
Dept: CT IMAGING | Facility: HOSPITAL | Age: 88
End: 2023-09-11
Attending: EMERGENCY MEDICINE
Payer: MEDICARE

## 2023-09-11 VITALS
HEART RATE: 61 BPM | WEIGHT: 141 LBS | DIASTOLIC BLOOD PRESSURE: 70 MMHG | RESPIRATION RATE: 27 BRPM | SYSTOLIC BLOOD PRESSURE: 139 MMHG | OXYGEN SATURATION: 91 % | BODY MASS INDEX: 24 KG/M2 | TEMPERATURE: 97 F

## 2023-09-11 DIAGNOSIS — S09.90XA INJURY OF HEAD, INITIAL ENCOUNTER: Primary | ICD-10-CM

## 2023-09-11 DIAGNOSIS — S00.03XA CONTUSION OF SCALP, INITIAL ENCOUNTER: ICD-10-CM

## 2023-09-11 DIAGNOSIS — S39.012A BACK STRAIN, INITIAL ENCOUNTER: ICD-10-CM

## 2023-09-11 PROCEDURE — 72131 CT LUMBAR SPINE W/O DYE: CPT | Performed by: EMERGENCY MEDICINE

## 2023-09-11 PROCEDURE — 70450 CT HEAD/BRAIN W/O DYE: CPT | Performed by: EMERGENCY MEDICINE

## 2023-09-11 PROCEDURE — 99285 EMERGENCY DEPT VISIT HI MDM: CPT

## 2023-09-11 PROCEDURE — 99284 EMERGENCY DEPT VISIT MOD MDM: CPT

## 2023-09-11 RX ORDER — ACETAMINOPHEN 500 MG
1000 TABLET ORAL ONCE
Status: COMPLETED | OUTPATIENT
Start: 2023-09-11 | End: 2023-09-11

## 2023-09-11 NOTE — ED INITIAL ASSESSMENT (HPI)
Pt arrived per EMS from [address] [place] [address]. Pt had a witnessed fall with no LOC. Pt is on Plavix. Pt is a/o x 1 at baseline. Pt has wound to posterior head with mild swelling.

## 2023-09-11 NOTE — TELEPHONE ENCOUNTER
On-call      Paged from [facility]- patient is sent out of the hospital  Due to head injury.   Called back , no answer  Left message

## 2023-09-11 NOTE — DISCHARGE INSTRUCTIONS
Take Tylenol as needed for pain. Follow-up with your primary physician as needed. Return to the emergency department severe headache, repeated vomiting, focal weakness, or other new symptoms develop.

## 2023-09-14 ENCOUNTER — TELEPHONE (OUTPATIENT)
Dept: INTERNAL MEDICINE CLINIC | Facility: CLINIC | Age: 88
End: 2023-09-14

## 2023-09-15 ENCOUNTER — MOBILE ENCOUNTER (OUTPATIENT)
Dept: INTERNAL MEDICINE CLINIC | Facility: CLINIC | Age: 88
End: 2023-09-15

## 2023-09-15 DIAGNOSIS — R82.90 ABNORMAL URINALYSIS: Primary | ICD-10-CM

## 2023-09-15 RX ORDER — SULFAMETHOXAZOLE AND TRIMETHOPRIM 800; 160 MG/1; MG/1
1 TABLET ORAL 2 TIMES DAILY
Qty: 14 TABLET | Refills: 0 | Status: SHIPPED | OUTPATIENT
Start: 2023-09-15 | End: 2023-09-22

## 2023-09-18 ENCOUNTER — TELEPHONE (OUTPATIENT)
Dept: INTERNAL MEDICINE CLINIC | Facility: CLINIC | Age: 88
End: 2023-09-18

## 2023-09-18 NOTE — TELEPHONE ENCOUNTER
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 Patient started on Bactrim DS on 9/15/2023 by on call provider. Requesting any additional orders and if patient should be on contact precautions? Please advise.

## 2023-09-18 NOTE — TELEPHONE ENCOUNTER
Yes as far as I know, when pt has ESBL Infection they should be isolated. I dont have the final culture and sensitivity result and need them to see if we need to adjust/change abx or not. Pls have them fax to office.

## 2023-09-18 NOTE — TELEPHONE ENCOUNTER
Demarco, spoke to [place],     Dr. Debora Bright:   Verbal orders given for isolation precautions. Esha Hua is asking for written orders fax to [phone]. I pended a letter for your review. It is set up to be faxed to them. Facility will fax the urine culture and sensitivity now to [facility].

## 2023-09-19 ENCOUNTER — TELEPHONE (OUTPATIENT)
Dept: INTERNAL MEDICINE CLINIC | Facility: CLINIC | Age: 88
End: 2023-09-19

## 2023-09-19 RX ORDER — NITROFURANTOIN 25; 75 MG/1; MG/1
100 CAPSULE ORAL 2 TIMES DAILY
Qty: 14 CAPSULE | Refills: 0 | Status: SHIPPED | OUTPATIENT
Start: 2023-09-19

## 2023-09-19 NOTE — TELEPHONE ENCOUNTER
I spoke with Vazquez Fung LPN with [facility] and made her aware of recommendation by Dr. Ariane Mancini for patient to remain in isolation until antibiotics are completed. She states that they received a faxed orders for change in antibiotics. She also asked if patient is to follow-up with urologist -- I made her aware Dr. Ariane Mancini previously stated:  \". ..would recommend ffup with her urologist also after abx is done since they can do cattheter collection of her urine and rehceck if infection has been eradicated. \" She verbalized understanding.

## 2023-09-19 NOTE — TELEPHONE ENCOUNTER
Received call from Vickey from [facility] regarding this patient. Call was dropped after taking general information. Attempted to call number back, phone rang then line was answered but was silent and no one was on the line. Will try again at a later time.

## 2023-09-19 NOTE — TELEPHONE ENCOUNTER
Based on urine culture, the bacteria is resistant to bactrim so not even going to help. May switch to macrobid 100mg po bid for one week;  would recommend ffup with her urologist also after abx is done since they can do cattheter collection of her urine and rehceck if infection has been eradicated.

## 2023-09-19 NOTE — TELEPHONE ENCOUNTER
I just got the urinalysis and urine culture result from [facility]    Her urinalysis showed lots of epithelial cells which  indidate that urne sample was contaminated and not a good sample . So her urine cullture result is also not reliable due to contaminated urine sample. Needs to repeat urinalysis and urine culture, need to get a midstream clean catch urine sample so we get reliable ua and culture result. Stop bactrim for now and await the result of new ua and cullture. Call us back and fax result as soon as they have it.

## 2023-09-19 NOTE — TELEPHONE ENCOUNTER
Faxed orders below to [facility], confirmation received. Daughter Herlinda Rodriguez provided with update. Pharmacy location confirmed and medication sent. Dr. Phillip Myers: [facility] is asking if patient is to remain on isolation at this time? (See alternative encounter dated 9/19/23, faxed order was received).

## 2023-09-19 NOTE — TELEPHONE ENCOUNTER
Orders for contact isolation precautions for ESBL received from [facility], placed on Dr José Bishop desrose for review and signature.

## 2023-09-19 NOTE — TELEPHONE ENCOUNTER
Daughter Prakash Schaeffer (GIBRAN) called, patient's name and  verified, informed DR Debora Bright 's note below. Per  daughter, the  patient lives in an assisted living      The patient is continent at night time only . It will be hard for the [facility] staff to take midstream sample of the urine. Daughter is not sure if [facility] will be able to do the catheterization. Daughter is requesting to continue the antibiotic. Patient has scheduled an appointment to see DR Debora Bright on 23, hoping that antibiotics will kick in before the appointment so that the patient will be able to get up and walk. Requesting to fax the order to [facility] at [phone]. Also requesting to call Yony Martinez on [phone] to give the orders . Daughter is also requesting to be called for Dr Melanie Esparza recommendation regarding the above issues [phone], can leave a complete and detailed message to her voice mail. Would also like to clarify if the  patient really needs isolation due to ESBL.        Future Appointments   Date Time Provider Shabnam Moreno   2023  1:00 PM MD KRISTI Campbell

## 2023-09-21 ENCOUNTER — OFFICE VISIT (OUTPATIENT)
Dept: INTERNAL MEDICINE CLINIC | Facility: CLINIC | Age: 88
End: 2023-09-21

## 2023-09-21 VITALS
HEART RATE: 75 BPM | WEIGHT: 130.69 LBS | HEIGHT: 64 IN | SYSTOLIC BLOOD PRESSURE: 137 MMHG | DIASTOLIC BLOOD PRESSURE: 83 MMHG | OXYGEN SATURATION: 96 % | TEMPERATURE: 98 F | BODY MASS INDEX: 22.31 KG/M2

## 2023-09-21 DIAGNOSIS — I10 ESSENTIAL HYPERTENSION: ICD-10-CM

## 2023-09-21 DIAGNOSIS — G30.1 LATE ONSET ALZHEIMER'S DISEASE WITHOUT BEHAVIORAL DISTURBANCE (HCC): ICD-10-CM

## 2023-09-21 DIAGNOSIS — I49.5 SSS (SICK SINUS SYNDROME) (HCC): ICD-10-CM

## 2023-09-21 DIAGNOSIS — H91.93 BILATERAL HEARING LOSS, UNSPECIFIED HEARING LOSS TYPE: ICD-10-CM

## 2023-09-21 DIAGNOSIS — N18.31 STAGE 3A CHRONIC KIDNEY DISEASE (HCC): ICD-10-CM

## 2023-09-21 DIAGNOSIS — Z91.81 AT RISK FOR FALLS: ICD-10-CM

## 2023-09-21 DIAGNOSIS — E11.9 CONTROLLED TYPE 2 DIABETES MELLITUS WITHOUT COMPLICATION, WITHOUT LONG-TERM CURRENT USE OF INSULIN (HCC): ICD-10-CM

## 2023-09-21 DIAGNOSIS — R29.6 RECURRENT FALLS: ICD-10-CM

## 2023-09-21 DIAGNOSIS — I48.20 CHRONIC A-FIB (HCC): ICD-10-CM

## 2023-09-21 DIAGNOSIS — E55.9 VITAMIN D DEFICIENCY: ICD-10-CM

## 2023-09-21 DIAGNOSIS — N31.9 NEUROMUSCULAR DYSFUNCTION OF BLADDER: ICD-10-CM

## 2023-09-21 DIAGNOSIS — Z87.440 HISTORY OF RECURRENT UTI (URINARY TRACT INFECTION): ICD-10-CM

## 2023-09-21 DIAGNOSIS — F02.80 LATE ONSET ALZHEIMER'S DISEASE WITHOUT BEHAVIORAL DISTURBANCE (HCC): ICD-10-CM

## 2023-09-21 DIAGNOSIS — I50.32 CHRONIC DIASTOLIC HEART FAILURE (HCC): ICD-10-CM

## 2023-09-21 DIAGNOSIS — R48.2 GAIT APRAXIA OF ELDERLY: ICD-10-CM

## 2023-09-21 DIAGNOSIS — Z86.73 HISTORY OF STROKE: ICD-10-CM

## 2023-09-21 DIAGNOSIS — Z00.00 MEDICARE ANNUAL WELLNESS VISIT, SUBSEQUENT: Primary | ICD-10-CM

## 2023-09-21 DIAGNOSIS — Z95.0 S/P PLACEMENT OF CARDIAC PACEMAKER: ICD-10-CM

## 2023-09-21 DIAGNOSIS — E78.2 MIXED HYPERLIPIDEMIA: ICD-10-CM

## 2023-09-21 PROCEDURE — G0439 PPPS, SUBSEQ VISIT: HCPCS | Performed by: INTERNAL MEDICINE

## 2023-09-21 PROCEDURE — 1126F AMNT PAIN NOTED NONE PRSNT: CPT | Performed by: INTERNAL MEDICINE

## 2023-09-24 PROBLEM — G93.40 ENCEPHALOPATHY: Status: RESOLVED | Noted: 2023-07-01 | Resolved: 2023-09-24

## 2023-09-24 PROBLEM — E78.00 HYPERCHOLESTEREMIA: Status: RESOLVED | Noted: 2018-07-18 | Resolved: 2023-09-24

## 2023-09-24 PROBLEM — R09.02 HYPOXIA: Status: RESOLVED | Noted: 2023-07-01 | Resolved: 2023-09-24

## 2023-09-24 PROBLEM — I51.9 CHRONIC LEFT VENTRICULAR SYSTOLIC DYSFUNCTION: Status: RESOLVED | Noted: 2021-04-22 | Resolved: 2023-09-24

## 2023-09-24 PROBLEM — N39.0 URINARY TRACT INFECTION WITHOUT HEMATURIA, SITE UNSPECIFIED: Status: RESOLVED | Noted: 2023-07-01 | Resolved: 2023-09-24

## 2023-09-24 PROBLEM — I50.9 HEART FAILURE, UNSPECIFIED HEART FAILURE CHRONICITY, UNSPECIFIED HEART FAILURE TYPE: Status: RESOLVED | Noted: 2017-02-25 | Resolved: 2023-09-24

## 2023-09-24 PROBLEM — I50.9 HEART FAILURE (HCC): Status: RESOLVED | Noted: 2017-02-24 | Resolved: 2023-09-24

## 2023-09-24 PROBLEM — N17.9 AKI (ACUTE KIDNEY INJURY): Status: RESOLVED | Noted: 2022-09-20 | Resolved: 2023-09-24

## 2023-09-24 PROBLEM — K04.7 DENTAL ABSCESS: Status: RESOLVED | Noted: 2023-07-01 | Resolved: 2023-09-24

## 2023-09-24 PROBLEM — N17.9 AKI (ACUTE KIDNEY INJURY) (HCC): Status: RESOLVED | Noted: 2022-09-20 | Resolved: 2023-09-24

## 2023-09-27 NOTE — TELEPHONE ENCOUNTER
Order for contact isolation precautions for ESBL from [facility] faxed to [phone] confirmation received.

## 2023-09-27 NOTE — TELEPHONE ENCOUNTER
Sunshine Carpenter, [address] [facility]) called, verified patient's Name and . Requesting to clarify order from PCP regarding recheck of urine testing. Patient completed the Jeralyn Cheeks yesterday. Reviewed Dr. Rohith Cruz message below. Verbalized understanding and had no further questions at this time. Aleks Cabello MD Physician Signed2023       Based on urine culture, the bacteria is resistant to bactrim so not even going to help. May switch to macrobid 100mg po bid for one week;  would recommend ffup with her urologist also after abx is done since they can do cattheter collection of her urine and rehceck if infection has been eradicated.

## 2023-10-02 ENCOUNTER — TELEPHONE (OUTPATIENT)
Dept: INTERNAL MEDICINE CLINIC | Facility: CLINIC | Age: 88
End: 2023-10-02

## 2023-10-02 NOTE — TELEPHONE ENCOUNTER
Spoke to Girish,  (on GIBRAN, verified patient's name and ). Relayed message below. Audiology department closed. Triage, please contact department tomorrow to try and get patient in as soon as possible with Dr. Dameon Mcqueen. [phone].

## 2023-10-02 NOTE — TELEPHONE ENCOUNTER
Pt's daughter is asking for 's best recommendation for an audiologist.  She states that her mother is 90% deaf with hearing aids and completely deaf without them. Last Wednesday, her hearing aids . Pt has been completely unable to hear and very frightened/anxious because of this. Salome Perales, the daughter wants to take the pt ASAP to get new hearing aids. Do you recommend Dr. Mila Potter at [facility]? And if so, we will attempt to expedite that appt.

## 2023-10-02 NOTE — TELEPHONE ENCOUNTER
Rafael faxed UA results for Dr. Card Led to review, placed on Dr. Card Led desk for signature and review.

## 2023-10-05 ENCOUNTER — OFFICE VISIT (OUTPATIENT)
Dept: OTOLARYNGOLOGY | Facility: CLINIC | Age: 88
End: 2023-10-05

## 2023-10-05 ENCOUNTER — OFFICE VISIT (OUTPATIENT)
Dept: AUDIOLOGY | Facility: CLINIC | Age: 88
End: 2023-10-05

## 2023-10-05 DIAGNOSIS — H90.3 SENSORINEURAL HEARING LOSS, BILATERAL: Primary | ICD-10-CM

## 2023-10-05 DIAGNOSIS — H90.0 CONDUCTIVE HEARING LOSS, BILATERAL: Primary | ICD-10-CM

## 2023-10-05 PROCEDURE — 92557 COMPREHENSIVE HEARING TEST: CPT | Performed by: AUDIOLOGIST

## 2023-10-05 PROCEDURE — 99202 OFFICE O/P NEW SF 15 MIN: CPT | Performed by: OTOLARYNGOLOGY

## 2023-10-05 NOTE — PROGRESS NOTES
Warren Erickson is a 80year old female. Patient presents with:  Ear Problem: Patient here for hearing exam. Patient needs ear cleaning. HISTORY OF PRESENT ILLNESS    Patient presents for cerumen removal. No other complaints or concerns at this time    Social History    Socioeconomic History      Marital status:     Tobacco Use      Smoking status: Former        Types: Cigarettes        Quit date: 6/15/1969        Years since quittin.3      Smokeless tobacco: Never    Vaping Use      Vaping Use: Never used    Substance and Sexual Activity      Alcohol use: No        Alcohol/week: 2.0 standard drinks of alcohol        Types: 2 Glasses of wine per week      Drug use: No    Other Topics      Concerns:        Caffeine Concern: Yes          Coffee 2 cups daily        Exercise: No      Family History   Problem Relation Age of Onset    Diabetes Mother     Macular degeneration Sister     Diabetes Sister     Glaucoma Neg        Past Medical History:   Diagnosis Date    Acute, but ill-defined, cerebrovascular disease         Anxiety state     Atrial fibrillation ([address]     Cataract, bilateral     Chronic a-fib ([address]     Congestive heart disease ([address]     LV ej fraction of 30%    Conjunctival cyst of left eye     Cup to disc asymmetry     OU    Depression     Diabetes ([address]     diet alone    Esophageal reflux     Essential hypertension     Hearing impairment     Hiatal hernia     Hyperlipidemia     Meibomian gland dysfunction (MGD), bilateral, both upper and lower lids 2009    Osteoarthritis     TIA (transient ischemic attack)     Unspecified atrial fibrillation ([address]        History reviewed. No pertinent surgical history. REVIEW OF SYSTEMS    System Neg/Pos Details   Constitutional Negative Fatigue, fever and weight loss. ENMT Negative Drooling. Eyes Negative Blurred vision and vision changes. Respiratory Negative Dyspnea and wheezing.    Cardio Negative Chest pain, irregular heartbeat/palpitations and syncope. GI Negative Abdominal pain and diarrhea. Endocrine Negative Cold intolerance and heat intolerance. Neuro Negative Tremors. Psych Negative Anxiety and depression. Integumentary Negative Frequent skin infections, pigment change and rash. Hema/Lymph Negative Easy bleeding and easy bruising. PHYSICAL EXAM    There were no vitals taken for this visit. Constitutional Normal Overall appearance - Normal.        Neck Exam Normal Inspection - Normal. Palpation - Normal. Parotid gland - Normal. Thyroid gland - Normal.             Head/Face Normal Facial features - Normal. Eyebrows - Normal. Skull - Normal.             Ears Normal Inspection - Right: Normal, Left: Normal. Canal - Right: Normal, Left: Normal. TM - Right: Normal, Left: Normal.   Skin Normal Inspection - Normal.                              Canals:  Right: Canal reveals cerumen impaction,   Left: Canal reveals cerumen impaction,     Tympanic Membranes:  Right: Normal tympanic membrane. Left: Normal tympanic membrane. TM Visualized Method:   Right TM examined via otomicroscopy. Left TM examined via otomicroscopy. PROCEDURE:    Removal of cerumen impaction   The cerumen impaction was completely removed using microscopy. Removal was completed by using acurette and/or suction. Comments: Return to clinic as needed. Avoid q-tips, water precautions and use over the counter wax remedies as needed. Current Outpatient Medications:     cholecalciferol 125 MCG (5000 UT) Oral Cap, Cholecalciferol (VITAMIN D3) 5000 units capsule, [RxNorm: 439674], Disp: , Rfl:     nitrofurantoin monohydrate macro (MACROBID) 100 MG Oral Cap, Take 1 capsule (100 mg total) by mouth 2 (two) times daily. , Disp: 14 capsule, Rfl: 0    clopidogrel 75 MG Oral Tab, Take 1 tablet (75 mg total) by mouth daily. , Disp: 90 tablet, Rfl: 1    Magnesium Oxide -Mg Supplement 400 (240 Mg) MG Oral Tab, Take 1 tablet (400 mg total) by mouth daily. , Disp: 90 tablet, Rfl: 1    pantoprazole 40 MG Oral Tab EC, Take 1 tablet (40 mg total) by mouth daily. , Disp: 90 tablet, Rfl: 3    glipiZIDE 5 MG Oral Tab, Take 0.5 tablets (2.5 mg total) by mouth daily. , Disp: 45 tablet, Rfl: 3    OXcarbazepine 150 MG Oral Tab, Take 1 tablet (150 mg total) by mouth daily. , Disp: 90 tablet, Rfl: 3    bethanechol 10 MG Oral Tab, Take 1 tablet (10 mg total) by mouth daily. , Disp: 90 tablet, Rfl: 3    losartan 25 MG Oral Tab, Take 1 tablet (25 mg total) by mouth daily. Hold for blood pressure  less than 110, Disp: 90 tablet, Rfl: 0    cephalexin (KEFLEX) 250 MG Oral Cap, Take 1 capsule (250 mg total) by mouth daily. , Disp: 90 capsule, Rfl: 1    EZETIMIBE-SIMVASTATIN 10-10 MG Oral Tab, TAKE 1 TABLET BY MOUTH DAILY AT BEDTIME, Disp: 90 tablet, Rfl: 0    furosemide 20 MG Oral Tab, Take 1 tablet (20 mg total) by mouth daily. , Disp: 90 tablet, Rfl: 1    aspirin 325 MG Oral Tab EC, Take 1 tablet (325 mg total) by mouth daily. , Disp: 90 tablet, Rfl: 1    FLUOXETINE 10 MG Oral Cap, TAKE 1 CAPSULE BY MOUTH EVERY DAY, Disp: 90 capsule, Rfl: 1    ALLERGY RELIEF 10 MG Oral Tab, TAKE 1 TABLET BY MOUTH DAILY, Disp: 90 tablet, Rfl: 1    carvedilol 3.125 MG Oral Tab, TAKE 1 TABLET BY MOUTH DAILY hold FOR sbp less THEN 100 OR HR LESS THEN 60, Disp: 90 tablet, Rfl: 1    GLUCOCARD VITAL TEST In Vitro Strip, 1 strip by Finger stick route daily. Use as directed, Disp: 100 strip, Rfl: 3    albuterol 108 (90 Base) MCG/ACT Inhalation Aero Soln, Inhale 2 puffs into the lungs every 6 (six) hours as needed for Wheezing or Shortness of Breath., Disp: , Rfl:     sennosides 8.6 MG Oral Tab, Take 1 tablet (8.6 mg total) by mouth daily as needed (constipation). , Disp: , Rfl:     Meclizine HCl 25 MG Oral Tab, Take 1 tablet (25 mg total) by mouth daily as needed. , Disp: , Rfl:     acetaminophen 500 MG Oral Tab, Take 1 tablet (500 mg total) by mouth every 6 (six) hours as needed for Pain., Disp: , Rfl: Lido-Capsaicin-Men-Methyl Sal 0.5-0.035-5-20 % External Patch, Apply topically daily as needed (left Rib cage). , Disp: , Rfl:   ASSESSMENT AND PLAN    1. Conductive hearing loss, bilateral  She will be meeting with the audiologist to look into new's. All cerumen was removed using microscopy. I have asked the patient to return to see me as needed for repeat cerumen removal in the future. Olga Fitzpatrick.  Jumana Nava MD    10/5/2023    3:46 PM

## 2023-10-10 ENCOUNTER — TELEPHONE (OUTPATIENT)
Dept: INTERNAL MEDICINE CLINIC | Facility: CLINIC | Age: 88
End: 2023-10-10

## 2023-10-10 DIAGNOSIS — N31.9 NEUROMUSCULAR DYSFUNCTION OF BLADDER: ICD-10-CM

## 2023-10-10 DIAGNOSIS — N18.31 STAGE 3A CHRONIC KIDNEY DISEASE (HCC): ICD-10-CM

## 2023-10-10 DIAGNOSIS — Z87.440 HISTORY OF RECURRENT UTI (URINARY TRACT INFECTION): ICD-10-CM

## 2023-10-10 DIAGNOSIS — Z91.81 AT RISK FOR FALLS: ICD-10-CM

## 2023-10-10 DIAGNOSIS — I10 ESSENTIAL HYPERTENSION: ICD-10-CM

## 2023-10-10 DIAGNOSIS — R53.83 LOW ENERGY: ICD-10-CM

## 2023-10-10 DIAGNOSIS — I49.5 SSS (SICK SINUS SYNDROME) (HCC): ICD-10-CM

## 2023-10-10 DIAGNOSIS — I50.22 CHRONIC SYSTOLIC HEART FAILURE (HCC): ICD-10-CM

## 2023-10-10 DIAGNOSIS — R48.2 GAIT APRAXIA OF ELDERLY: ICD-10-CM

## 2023-10-10 DIAGNOSIS — F02.80 LATE ONSET ALZHEIMER'S DISEASE WITHOUT BEHAVIORAL DISTURBANCE (HCC): ICD-10-CM

## 2023-10-10 DIAGNOSIS — H91.93 BILATERAL HEARING LOSS, UNSPECIFIED HEARING LOSS TYPE: ICD-10-CM

## 2023-10-10 DIAGNOSIS — G30.1 LATE ONSET ALZHEIMER'S DISEASE WITHOUT BEHAVIORAL DISTURBANCE (HCC): ICD-10-CM

## 2023-10-10 DIAGNOSIS — I48.20 CHRONIC A-FIB (HCC): Primary | ICD-10-CM

## 2023-10-10 NOTE — TELEPHONE ENCOUNTER
Patient's daughter Xavi Hernandes called back (on GIBRAN), verified patient's Name and . She states that it is okay to fax the most recent patient's OV note with PCP to [facility], Attn: Gina Coburn. OV  note successfully faxed to [phone] via [employer] at 11:52.

## 2023-10-10 NOTE — TELEPHONE ENCOUNTER
Condition update:  Sourav Ranks from [facility] [employer] [facility]. -  [facility] calling update:  Patient lives in assisted living. Sometime between 3 am and 5 am she had unwitnessed fall. Denied head injury. Denies pain. Body assessment revealed some discoloration on right hip. Patient able to stand up. Energy level low. Looks pale but has had this appearance. [facility] concerned about previous sodium low sodium. Asking if Dr Jeny Langford will order physical therapy for her? This RN recommended she be evaluated before PT orders issued. [facility] ER also asked if we could fax last office note from 9/21/2023? This RN then called and patient's daughter Jaison Valerio, on GIBRAN. Does not want us to fax office note to [facility]. Wants me to fax to her private home office and she will take a copy to the nursing staff. Sent via [employer]. States she has cameras in the patient's room at [facility] and she saw the patient slide off her bed on to the floor around 1 am.  States no one checked patient at 3 am.  Milton Mcleod states the patient has severe hearing problem and is having difficulty getting her new hearing aids. Milton Mcleod thinks the hearing problem is more likely to be the issue rather than low sodium, as she herself has low sodium. Milton Mcleod is open to having [facility] provide Physical Therapy services. Dr Duran Session, please advise, daughter states you just saw her so she doesn't need to bring her in? Will you order [facility] Physical Therapy? Other orders? Labs? Office visit? RN to follow, communicate with both [facility] at facility and with Jaison Valerio. 9/15/2023 note by Dr Saint Layman, MD (Physician)  Internal Medicine  Received call from [facility] that pt has been very fatigued. Labs showed mildly elevated WBC, Na 134, and abnormal UA with leuks, rbcs and many bacteria. Will treat for UTI. Final culture is still pending.

## 2023-10-10 NOTE — TELEPHONE ENCOUNTER
Patient was left a message to call back. Transfer to triage dept [id number]  I pended PT order, but would suggest RN skilled to evaluate also and add labs to [facility] order. Dr Beth Valdez recommends cbc, and BMP. Can patient go to outpatient lab? That way we can place lab orders, or otherwise, I may need to add orders to [facility] orders.

## 2023-10-11 NOTE — TELEPHONE ENCOUNTER
Triage support, please assist with physical therapy order for home health as pended by previous RN and thank you. Verified name and  of patient. Daughter of patient (on GIBRAN) calling back - she states that she can bring patient to the outpatient lab facility to have blood tests completed. Orders placed per Dr. Kunal Carranza written order below.

## 2023-10-14 RX ORDER — NYSTATIN 100000 [USP'U]/G
1 POWDER TOPICAL 4 TIMES DAILY
Qty: 30 G | Refills: 3 | Status: SHIPPED | OUTPATIENT
Start: 2023-10-14

## 2023-10-14 NOTE — TELEPHONE ENCOUNTER
Please review refill protocol failed/ no protocol  Requested Prescriptions   Pending Prescriptions Disp Refills    [address] [id number] UNIT/GM External Powder [Pharmacy Med Name: [facility] 100,000 unit/gram topical powder] 30 g 0     Sig: Apply 1 Application topically 4 (four) times daily.        There is no refill protocol information for this order

## 2023-11-20 RX ORDER — FLUOXETINE 10 MG/1
10 CAPSULE ORAL DAILY
Qty: 90 CAPSULE | Refills: 3 | Status: SHIPPED | OUTPATIENT
Start: 2023-11-20

## 2023-11-20 RX ORDER — LORATADINE 10 MG/1
10 TABLET ORAL DAILY
Qty: 90 TABLET | Refills: 3 | Status: SHIPPED | OUTPATIENT
Start: 2023-11-20

## 2023-11-20 RX ORDER — ASPIRIN 325 MG
325 TABLET, DELAYED RELEASE (ENTERIC COATED) ORAL DAILY
Qty: 90 TABLET | Refills: 3 | Status: SHIPPED | OUTPATIENT
Start: 2023-11-20

## 2023-11-20 RX ORDER — CEPHALEXIN 250 MG/1
250 CAPSULE ORAL EVERY EVENING
Qty: 90 CAPSULE | Refills: 1 | OUTPATIENT
Start: 2023-11-20

## 2023-11-20 RX ORDER — FUROSEMIDE 20 MG/1
20 TABLET ORAL DAILY
Qty: 90 TABLET | Refills: 3 | Status: SHIPPED | OUTPATIENT
Start: 2023-11-20

## 2023-11-20 NOTE — TELEPHONE ENCOUNTER
Refill passed per [facility], [facility] protocol.   Requested Prescriptions   Pending Prescriptions Disp Refills    ALLERGY RELIEF 10 MG Oral Tab [Pharmacy Med Name: Allergy Relief (loratadine) 10 mg tablet] 90 tablet 1     Sig: TAKE 1 TABLET BY MOUTH DAILY       Allergy Medication Protocol Passed - 11/17/2023  4:03 PM        Passed - In person appointment or virtual visit in the past 12 mos or appointment in next 3 mos     Recent Outpatient Visits              1 month ago Sensorineural hearing loss, bilateral    [address] [place],[address], [address], SeymourMando Solis    Office Visit    1 month ago Conductive hearing loss, bilateral    [facility], [address], Jolie Sanchez MD    Office Visit    2 months ago [employer] annual wellness visit, subsequent    [address] [facility], Ashley Skinner MD    Office Visit    11 months ago Recurrent UTI    [facility], [address], VoltFredy APRN    Office Visit    1 year ago Recurrent UTI    [address] [facility], Jaylen Flores MD    Office Visit                        FUROSEMIDE 20 MG Oral Tab [Pharmacy Med Name: furosemide 20 mg tablet] 90 tablet 1     Sig: TAKE 1 TABLET BY MOUTH DAILY       Hypertensive Medications Protocol Passed - 11/17/2023  4:03 PM        Passed - In person appointment in the past 12 or next 3 months     Recent Outpatient Visits              1 month ago Sensorineural hearing loss, bilateral    [address], [address], SeymourMando Solis    Office Visit    1 month ago Conductive hearing loss, bilateral    [address], [address], [place], Jolie Salmeron MD    Office Visit    2 months ago [employer] annual wellness visit, subsequent    [address] [facility], Ashley Skinner MD    Office Visit    11 months ago Recurrent UTI    [facility], [address], [place], Vestal A, APRN    Office Visit    1 year ago Recurrent UTI    [facility], [address], Jaylen Milligan MD    Office Visit                      Passed - Last BP reading less than 140/90     BP Readings from Last 1 Encounters:   09/21/23 137/83               Passed - CMP or BMP in past 6 months     Recent Results (from the past 4392 hour(s))   Basic Metabolic Panel (8)    Collection Time: 07/05/23  6:16 AM   Result Value Ref Range    Glucose 142 (H) 70 - 99 mg/dL    Sodium 137 136 - 145 mmol/L    Potassium 4.4 3.5 - 5.1 mmol/L    Chloride 104 98 - 112 mmol/L    CO2 26.0 21.0 - 32.0 mmol/L    Anion Gap 7 0 - 18 mmol/L    BUN 19 (H) 7 - 18 mg/dL    Creatinine 0.69 0.55 - 1.02 mg/dL    BUN/CREA Ratio 27.5 (H) 10.0 - 20.0    Calcium, Total 8.7 8.5 - 10.1 mg/dL    Calculated Osmolality 289 275 - 295 mOsm/kg    eGFR-Cr 83 >=60 mL/min/1.73m2     *Note: Due to a large number of results and/or encounters for the requested time period, some results have not been displayed. A complete set of results can be found in Results Review.                Passed - In person appointment or virtual visit in the past 6 months     Recent Outpatient Visits              1 month ago Sensorineural hearing loss, bilateral    [facility], [address], [place] Mando Lin    Office Visit    1 month ago Conductive hearing loss, bilateral    [facility] Co[facility], [address], [place]anisha Wright MD    Office Visit    2 months ago [employer] annual wellness visit, subsequent    Blanca Riddle MD    Office Visit    11 months ago Recurrent UTI    [facility], [address], [place], Yury Alexander, APRN    Office Visit    1 year ago Recurrent UTI    Blanca Riddle MD Office Visit                      Passed - EGFRCR or GFRNAA > 50     GFR Evaluation  EGFRCR: 83 , resulted on 7/5/2023            EZETIMIBE-SIMVASTATIN 10-10 MG Oral Tab [Pharmacy Med Name: ezetimibe 10 mg-simvastatin 10 mg tablet] 90 tablet 1     Sig: TAKE 1 TABLET BY MOUTH DAILY AT BEDTIME       Cholesterol Medication Protocol Failed - 11/17/2023  4:03 PM        Failed - ALT in past 12 months        Failed - LDL in past 12 months        Failed - Last ALT < 80     Lab Results   Component Value Date    ALT 13 09/20/2022             Failed - Last LDL < 130     Lab Results   Component Value Date    LDL 46 11/29/2019             Passed - In person appointment or virtual visit in the past 12 mos or appointment in next 3 mos     Recent Outpatient Visits              1 month ago Sensorineural hearing loss, bilateral    [address], [address], [place] Kam Cerda Au.ANIKET    Office Visit    1 month ago Conductive hearing loss, bilateral    [address], [address], [place], Holly Jones MD    Office Visit    2 months ago [employer] annual wellness visit, subsequent    [address], [address], Alicia Sal MD    Office Visit    11 months ago Recurrent UTI    [address], [address], Richard TournEase, [address], APRN    Office Visit    1 year ago Recurrent UTI    [address] [facility], Alicia Sal MD    Office Visit                        CEPHALEXIN 250 MG Oral Cap [place] Med Name: cephalexin 250 mg capsule] 90 capsule 1     Sig: TAKE 1 CAPSULE BY MOUTH EVERY EVENING       There is no refill protocol information for this order       ASPIRIN 325 MG Oral Tab EC [Pharmacy Med Name: aspirin 325 mg tablet,delayed release] 90 tablet 1     Sig: TAKE 1 TABLET BY MOUTH EVERY DAY       Aspirin Protocol Passed - 11/17/2023  4:03 PM        Passed - In person appointment or virtual visit in the past 6 mos or appointment in next 3 mos     Recent Outpatient Visits              1 month ago Sensorineural hearing loss, bilateral    [place], Jeanette Pino, Au.D    Office Visit    1 month ago Conductive hearing loss, bilateral    [facility], [place] Walker Deliah Plump, MD    Office Visit    2 months ago [employer] annual wellness visit, subsequent    Mikael Sanchez MD    Office Visit    11 months ago Recurrent UTI    [facility], [employer], Marcel Reyes, APRNORMA    Office Visit    1 year ago Recurrent UTI    Mikael Sanchez MD    Office Visit                        FLUOXETINE 10 MG Oral Cap [place] Med Name: fluoxetine 10 mg capsule] 90 capsule 1     Sig: TAKE 1 CAPSULE BY MOUTH EVERY DAY       Psychiatric Non-Scheduled (Anti-Anxiety) Passed - 11/17/2023  4:03 PM        Passed - In person appointment or virtual visit in the past 6 mos or appointment in next 3 mos     Recent Outpatient Visits              1 month ago Sensorineural hearing loss, bilateral    [address], Jeanette Walker Au.ANIKET    Office Visit    1 month ago Conductive hearing loss, bilateral    [address], [place] Deliah Plump, MD    Office Visit    2 months ago [employer] annual wellness visit, subsequent    Mikael Sanchez MD    Office Visit    11 months ago Recurrent UTI    [address], Denise [employer], NICOLASA Cuadra    Office Visit    1 year ago Recurrent UTI    Mikael Sanchez MD    Office Visit                         Recent Outpatient Visits              1 month ago Sensorineural hearing loss, bilateral    [facility], [address], [place] Mando Villanueva    Office Visit    1 month ago Conductive hearing loss, bilateral    [facility], [address], Dillon Sanchez MD    Office Visit    2 months ago [employer] annual wellness visit, subsequent    David Hermosillo MD    Office Visit    11 months ago Recurrent UTI    [facility], [address], Richard CRAVEJuan Ramon APRN    Office Visit    1 year ago Recurrent UTI    David Hermosillo MD    Office Visit

## 2023-11-20 NOTE — TELEPHONE ENCOUNTER
Please review; protocol failed. No active /future labs noted     Requested Prescriptions   Pending Prescriptions Disp Refills    ezetimibe-simvastatin 10-10 MG Oral Tab [Pharmacy Med Name: ezetimibe 10 mg-simvastatin 10 mg tablet] 90 tablet 1     Sig: Take 1 tablet by mouth nightly. Cholesterol Medication Protocol Failed - 11/17/2023  4:03 PM        Failed - ALT in past 12 months        Failed - LDL in past 12 months        Failed - Last ALT < 80     Lab Results   Component Value Date    ALT 13 09/20/2022             Failed - Last LDL < 130     Lab Results   Component Value Date    LDL 46 11/29/2019             Passed - In person appointment or virtual visit in the past 12 mos or appointment in next 3 mos     Recent Outpatient Visits              1 month ago Sensorineural hearing loss, bilateral    [address], [address], Mando Gay    Office Visit    1 month ago Conductive hearing loss, bilateral    [address], [address], Adama Sanchez MD    Office Visit    2 months ago [employer] annual wellness visit, subsequent    Oleg Rosales MD    Office Visit    11 months ago Recurrent UTI    Jonatan Guerra, [employer], NICOLASA Mejia    Office Visit    1 year ago Recurrent UTI    Oleg Rosales MD    Office Visit                       Signed Prescriptions Disp Refills    loratadine (ALLERGY RELIEF) 10 MG Oral Tab 90 tablet 3     Sig: Take 1 tablet (10 mg total) by mouth daily.        Allergy Medication Protocol Passed - 11/17/2023  4:03 PM        Passed - In person appointment or virtual visit in the past 12 mos or appointment in next 3 mos     Recent Outpatient Visits              1 month ago Sensorineural hearing loss, bilateral    [address] [place],[address], [address], Charlie Reddy Mando Osei    Office Visit    1 month ago Conductive hearing loss, bilateral    [facility], [address], Marquise Sanchez MD    Office Visit    2 months ago [employer] annual wellness visit, subsequent    Nathaniel Manuel MD    Office Visit    11 months ago Recurrent UTI    [facility], [address], [employer], NICOLASA Bowles    Office Visit    1 year ago Recurrent UTI    Nathaniel Manuel MD    Office Visit                        furosemide 20 MG Oral Tab 90 tablet 3     Sig: Take 1 tablet (20 mg total) by mouth daily.        Hypertensive Medications Protocol Passed - 11/17/2023  4:03 PM        Passed - In person appointment in the past 12 or next 3 months     Recent Outpatient Visits              1 month ago Sensorineural hearing loss, bilateral    [address] [place],[address], [address], [place] Mando Osei    Office Visit    1 month ago Conductive hearing loss, bilateral    [facility], [address], Marquise Sanchez MD    Office Visit    2 months ago [employer] annual wellness visit, subsequent    Nathaniel Manuel MD    Office Visit    11 months ago Recurrent UTI    [facility], [address], [employer], NICOLASA Bowles    Office Visit    1 year ago Recurrent UTI    Nathaniel Manuel MD    Office Visit                      Passed - Last BP reading less than 140/90     BP Readings from Last 1 Encounters:   09/21/23 137/83               Passed - CMP or BMP in past 6 months     Recent Results (from the past 4392 hour(s))   Basic Metabolic Panel (8)    Collection Time: 07/05/23  6:16 AM   Result Value Ref Range    Glucose 142 (H) 70 - 99 mg/dL    Sodium 137 136 - 145 mmol/L    Potassium 4.4 3.5 - 5.1 mmol/L    Chloride 104 98 - 112 mmol/L    CO2 26.0 21.0 - 32.0 mmol/L    Anion Gap 7 0 - 18 mmol/L    BUN 19 (H) 7 - 18 mg/dL    Creatinine 0.69 0.55 - 1.02 mg/dL    BUN/CREA Ratio 27.5 (H) 10.0 - 20.0    Calcium, Total 8.7 8.5 - 10.1 mg/dL    Calculated Osmolality 289 275 - 295 mOsm/kg    eGFR-Cr 83 >=60 mL/min/1.73m2     *Note: Due to a large number of results and/or encounters for the requested time period, some results have not been displayed. A complete set of results can be found in Results Review. Passed - In person appointment or virtual visit in the past 6 months     Recent Outpatient Visits              1 month ago Sensorineural [facility] loss, bilateral    [facility]    Office Visit    1 month ago Conductive hearing loss, bilateral    [facility], [address] [facility],[address], Danisha Sanchez MD    Office Visit    2 months ago [employer] annual wellness visit, subsequent    [facility]    Office Visit    11 months ago Recurrent UTI    [facility], [address] [facility], [employer] TechnologiesYury APRN    Office Visit    1 year ago Recurrent UTI    [address] [place], [place] [address]Blanca MD    Office Visit                      Passed - EGFRCR or GFRNAA > 50     GFR Evaluation  EGFRCR: 83 , resulted on 7/5/2023            aspirin 325 MG Oral Tab EC 90 tablet 3     Sig: Take 1 tablet (325 mg total) by mouth daily.        Aspirin Protocol Passed - 11/17/2023  4:03 PM        Passed - In person appointment or virtual visit in the past 6 mos or appointment in next 3 mos     Recent Outpatient Visits              1 month ago Sensorineural hearing loss, bilateral    [address], [address], Mando Ojeda    Office Visit    1 month ago Conductive hearing loss, bilateral    [facility], [address], Marquise Sanchez MD    Office Visit    2 months ago [employer] annual wellness visit, subsequent    Nathaniel Manuel MD    Office Visit    11 months ago Recurrent UTI    [facility], [address], [place], Deidre Garcia, APRNORMA    Office Visit    1 year ago Recurrent UTI    Nathaniel Manuel MD    Office Visit                        FLUoxetine 10 MG Oral Cap 90 capsule 3     Sig: Take 1 capsule (10 mg total) by mouth daily. Psychiatric Non-Scheduled (Anti-Anxiety) Passed - 11/17/2023  4:03 PM        Passed - In person appointment or virtual visit in the past 6 mos or appointment in next 3 mos     Recent Outpatient Visits              1 month ago Sensorineural hearing loss, bilateral    Rob Hou, [address], Mando Gomez    Office Visit    1 month ago Conductive hearing loss, bilateral    [facility], [address], Marquise Sanchez MD    Office Visit    2 months ago [employer] annual wellness visit, subsequent    Nathaniel Manuel MD    Office Visit    11 months ago Recurrent UTI    [facility], [address], [place], Deidre Garcia, APRNORMA    Office Visit    1 year ago Recurrent UTI    Nathaniel Manuel MD    Office Visit                       Refused Prescriptions Disp Refills    cephalexin 250 MG Oral Cap [Pharmacy Med Name: cephalexin 250 mg capsule] 90 capsule 1     Sig: Take 1 capsule (250 mg total) by mouth every evening.        There is no refill protocol information for this order        Recent Outpatient Visits              1 month ago Sensorineural hearing loss, bilateral    [facility], [place] Mando Chow    Office Visit    1 month ago Conductive hearing loss, bilateral    [facility] [employer], [address], Holly Sanchez MD    Office Visit    2 months ago [employer] annual wellness visit, subsequent    Alicia Brasher MD    Office Visit    11 months ago Recurrent UTI    [facility], [address], ????, NICOLASA Low    Office Visit    1 year ago Recurrent UTI    Alicia Brasher MD    Office Visit

## 2023-11-22 RX ORDER — EZETIMIBE AND SIMVASTATIN 10; 10 MG/1; MG/1
1 TABLET ORAL NIGHTLY
Qty: 90 TABLET | Refills: 1 | Status: SHIPPED | OUTPATIENT
Start: 2023-11-22

## 2023-11-25 DIAGNOSIS — I10 ESSENTIAL HYPERTENSION: Primary | ICD-10-CM

## 2023-11-25 RX ORDER — CARVEDILOL 3.12 MG/1
TABLET ORAL
Qty: 90 TABLET | Refills: 3 | Status: SHIPPED | OUTPATIENT
Start: 2023-11-25

## 2023-11-25 NOTE — TELEPHONE ENCOUNTER
Refill passed per [place] [employer], [facility] protocol    Requested Prescriptions   Pending Prescriptions Disp Refills    CARVEDILOL 3.125 MG Oral Tab [Pharmacy Med Name: carvedilol 3.125 mg tablet] 90 tablet 1     Sig: TAKE 1 TABLET BY MOUTH DAILY hold FOR sbp less THEN 100 OR HR LESS THEN 60       Hypertensive Medications Protocol Passed - 11/25/2023 10:18 AM        Passed - In person appointment in the past 12 or next 3 months     Recent Outpatient Visits              1 month ago Sensorineural hearing loss, bilateral    [facility], Denise, [place] Christianson Lung, Au.D    Office Visit    1 month ago Conductive hearing loss, bilateral    [facility], Denise, [place], Holland Carlson MD    Office Visit    2 months ago [employer] annual wellness visit, subsequent    [address], [address], Mikael Reese MD    Office Visit    12 months ago Recurrent UTI    [facility], [employer], Marcel Reyes, APRN    Office Visit    1 year ago Recurrent UTI    [address] [place], Jaylen Blackman MD    Office Visit                      Passed - Last BP reading less than 140/90     BP Readings from Last 1 Encounters:   09/21/23 137/83               Passed - CMP or BMP in past 6 months     Recent Results (from the past 4392 hour(s))   Basic Metabolic Panel (8)    Collection Time: 07/05/23  6:16 AM   Result Value Ref Range    Glucose 142 (H) 70 - 99 mg/dL    Sodium 137 136 - 145 mmol/L    Potassium 4.4 3.5 - 5.1 mmol/L    Chloride 104 98 - 112 mmol/L    CO2 26.0 21.0 - 32.0 mmol/L    Anion Gap 7 0 - 18 mmol/L    BUN 19 (H) 7 - 18 mg/dL    Creatinine 0.69 0.55 - 1.02 mg/dL    BUN/CREA Ratio 27.5 (H) 10.0 - 20.0    Calcium, Total 8.7 8.5 - 10.1 mg/dL    Calculated Osmolality 289 275 - 295 mOsm/kg    eGFR-Cr 83 >=60 mL/min/1.73m2     *Note: Due to a large number of results and/or encounters for the requested time period, some results have not been displayed. A complete set of results can be found in Results Review.                Passed - In person appointment or virtual visit in the past 6 months     Recent Outpatient Visits              1 month ago Sensorineural hearing loss, bilateral    [address] [place],[address], [address], Jeanette Dumont, Au.D    Office Visit    1 month ago Conductive hearing loss, bilateral    [facility], [address], Bella Sanchez MD    Office Visit    2 months ago [employer] annual wellness visit, subsequent    [address] [facility], Christiana Oliveira MD    Office Visit    12 months ago Recurrent UTI    [facility], [address], [employer], Kip Man, APRN    Office Visit    1 year ago Recurrent UTI    [address] [facility], Christiana Oliveira MD    Office Visit                      Passed - EGFRCR or GFRNAA > 50     GFR Evaluation  EGFRCR: 83 , resulted on 7/5/2023                   Recent Outpatient Visits              1 month ago Sensorineural hearing loss, bilateral    [address] [place],[address], [address], Jeanette Dumont, Au.D    Office Visit    1 month ago Conductive hearing loss, bilateral    [address] [place],[address], [address], Bella Sanchez MD    Office Visit    2 months ago [employer] annual wellness visit, subsequent    [address] [facility], Christiana Oliveira MD    Office Visit    12 months ago Recurrent UTI    [address] [place],[address], [address], [employer], Kip Man, APRN    Office Visit    1 year ago Recurrent UTI    [address] [facility], Christiana Oliveira MD    Office Visit

## 2023-12-28 NOTE — TELEPHONE ENCOUNTER
That's fine with me so keep her apptment wth Dr Meir Jesus for Monday next week as scheduled. . I just wanted to make sure she is evaluated so much better with her cardioloigist. Thanks. [FreeTextEntry1] : IMPRESSION: # History of Erosive Esophagitis - on omeprazole 40 mg once a day - EGD by Dr. Shaffer on 2022: Mild erosive esophagitis s/p bx (neg for IM). Small hiatal hernia. Gastric sleeve anatomy with nodularity s/p bx (neg for IM and HP). Normal duodenum. - EGD 2021: Esophagitis with two erosions in distal esophagus s/p bx (ulcer exudate). Antral nodule 1 cm s/p bx (gastritis with foveolar hyperplasia). Sleeve gastrectomy. Normal duodenum s/p bx (neg for celiac). Gastric bx negative for H. Pylori and IM. - EGD 2021 Linear erythema in distal esophagus s/p bx (neg for IM). Antral nodule s/p bx (hyperplastic polyp negative for H. pylori). - EGD 2020: Mild erosive esophagitis (neg for IM), small hiatal hernia, gastrectomy with antral nodule s/p bx (suggestive of hamartomatous polyp). Biopsies negative for celiac disease, IM, and HP.  # History of Iron def anemia requiring iron infusions - Capsule endoscopy on 2022: Nml visualized small bowel mucosa. Cecum reached. - Capsule endoscopy in 3/2021: small esophageal ulcer, cecum not reached.   # History of colon tubular adenoma - Colonoscopy in 2021: Mild internal hemorrhoids, no polyps discovered. Repeat colonoscopy in 5 years. - Colonoscopy 2020: 8 mm sessile polyp in ascending colon s/p cold snare polypectomy (tubular adenoma). Quality of bowel prep adequate for polyps greater than 6 mm, washing and suctioning performed.  # Multiple abdominal surgeries (s/p gastric sleeve (2016), lap hansa, ) # Maternal grandmother had stomach cancer      PLAN:  Continue PPI therapy.  She is considering revision for Gastric sleeve - contact information given.  Regarding reflux, life style changes discussed - GERD diet. 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 Avoid dairy - follow up in the office.  Colonoscopy surveillance 5 years from .

## 2024-01-17 RX ORDER — SENNOSIDES A AND B 8.6 MG/1
1 TABLET, FILM COATED ORAL AS NEEDED
Qty: 30 TABLET | Refills: 0 | OUTPATIENT
Start: 2024-01-17

## 2024-01-24 NOTE — TELEPHONE ENCOUNTER
Please review; protocol failed/ has no protocol    Requested Prescriptions   Pending Prescriptions Disp Refills    CEPHALEXIN 250 MG Oral Cap [Pharmacy Med Name: cephalexin 250 mg capsule] 90 capsule 1     Sig: TAKE 1 CAPSULE BY MOUTH EVERY EVENING       There is no refill protocol information for this order        Recent Outpatient Visits              3 months ago Sensorineural hearing loss, bilateral    [place], NevisSilvia Garcia Au.D    Office Visit    3 months ago Conductive hearing loss, bilateral    [place], NevisRicki Hadley MD    Office Visit    4 months ago Medicare annual wellness visit, subsequent    [facility], Lyndon Sen MD    Office Visit    1 year ago Recurrent UTI    [facility]    Office Visit    1 year ago Recurrent UTI    [facility], Lyndon Sen MD    Office Visit

## 2024-01-25 RX ORDER — CEPHALEXIN 250 MG/1
250 CAPSULE ORAL EVERY EVENING
Qty: 90 CAPSULE | Refills: 1 | Status: SHIPPED | OUTPATIENT
Start: 2024-01-25

## 2024-02-03 NOTE — TELEPHONE ENCOUNTER
Please review; protocol failed/no protocol.     Requested Prescriptions   Pending Prescriptions Disp Refills    LOSARTAN 25 MG Oral Tab [Pharmacy Med Name: losartan 25 mg tablet] 90 tablet 0     Sig: Take 1 tablet (25 mg total) by mouth daily. Hold for blood pressure  less than 110       Hypertensive Medications Protocol Failed - 2/2/2024  7:45 AM        Failed - CMP or BMP in past 6 months     No results found for this or any previous visit (from the past 4392 hour(s)).            Passed - In person appointment in the past 12 or next 3 months     Recent Outpatient Visits              4 months ago [facility]ensorineural hearing loss, bilateral    [facility]    [facility] Visit    4 months ago Conductive hearing loss, bilateral    [facility]    [facility]    4 months ago Medicare annual wellness visit, subsequent    [facility]    [facility]    1 year ago Recurrent UTI    [facility]    Office Visit    1 year ago Recurrent UTI    [facility], Lyndon Sen MD    Office Visit                      Passed - Last BP reading less than 140/90     BP Readings from Last 1 Encounters:   09/21/23 137/83               Passed - In person appointment or virtual visit in the past 6 months     Recent Outpatient Visits              4 months ago Sensorineural hearing loss, bilateral    [facility]    Office Visit    4 months ago Conductive hearing loss, bilateral    [facility]    [facility] Visit    4 months ago Medicare annual wellness visit, subsequent    [facility] Jaylen Talbot  MD Lyndon    Office Visit    1 year ago [facility]TI    [facility]    [facility] Visit    1 year ago Recurrent UTI    [facility], Lyndon Sen MD    Office Visit                      Passed - EGFRCR or GFRNAA > 50     GFR Evaluation  EGFRCR: 83 , resulted on 7/5/2023                Recent Outpatient Visits              4 months ago Sensorineural hearing loss, bilateral    [facility] [place]    Office Visit    4 months ago Conductive hearing loss, bilateral    [facility] [place]    Office Visit    4 months ago Medicare annual wellness visit, subsequent    [facility] [place]    Office Visit    1 year ago Recurrent UTI    [facility] [place]    Office Visit    1 year ago Recurrent UTI    [facility], Lyndon Sen MD    Office Visit

## 2024-02-04 RX ORDER — LOSARTAN POTASSIUM 25 MG/1
25 TABLET ORAL DAILY
Qty: 90 TABLET | Refills: 3 | Status: SHIPPED | OUTPATIENT
Start: 2024-02-04

## 2024-02-22 RX ORDER — CEPHALEXIN 250 MG/1
250 CAPSULE ORAL EVERY EVENING
Qty: 90 CAPSULE | Refills: 0 | OUTPATIENT
Start: 2024-02-22

## 2024-02-22 NOTE — TELEPHONE ENCOUNTER
Please review; protocol failed/ has no protocol    No active labs noted   Requested Prescriptions   Pending Prescriptions Disp Refills    CLOPIDOGREL 75 MG Oral Tab [Pharmacy Med Name: clopidogrel 75 mg tablet] 90 tablet 1     Sig: TAKE 1 TABLET BY MOUTH EVERY DAY       There is no refill protocol information for this order       MAGNESIUM OXIDE -MG SUPPLEMENT 400 (240 Mg) MG Oral Tab [Pharmacy Med Name: magnesium oxide 400 mg (241.3 mg magnesium) tablet] 90 tablet 1     Sig: TAKE 1 TABLET BY MOUTH EVERY DAY       There is no refill protocol information for this order       CEPHALEXIN 250 MG Oral Cap [Pharmacy Med Name: cephalexin 250 mg capsule] 90 capsule 1     Sig: TAKE 1 CAPSULE BY MOUTH EVERY EVENING       There is no refill protocol information for this order       EZETIMIBE-SIMVASTATIN 10-10 MG Oral Tab [Pharmacy Med Name: ezetimibe 10 mg-simvastatin 10 mg tablet] 90 tablet 1     Sig: TAKE 1 TABLET BY MOUTH DAILY AT BEDTIME       Cholesterol Medication Protocol Failed - 2/19/2024  9:56 PM        Failed - ALT < 80     Lab Results   Component Value Date    ALT 13 09/20/2022             Failed - ALT resulted within past year        Failed - Lipid panel within past 12 months     Lab Results   Component Value Date    CHOLEST 157 11/29/2019    TRIG 187 (H) 11/29/2019    HDL 74 (H) 11/29/2019    LDL 46 11/29/2019    VLDL 37 (H) 11/29/2019    NONHDLC 83 11/29/2019             Passed - In person appointment or virtual visit in the past 12 mos or appointment in next 3 mos     Recent Outpatient Visits              4 months ago Sensorineural hearing loss, bilateral    [facility] [place] [facility]    Office Visit    4 months ago Conductive hearing loss, bilateral    [facility]    [facility] Visit    5 months ago Medicare annual wellness visit, subsequent    [facility] [place]  Lyndon Talbot MD    Office Visit    1 year ago Recurrent UTI    [facility] [place]    Office Visit    1 year ago Recurrent UTI    [facility], Lyndon Sen MD    Office Visit                         Recent Outpatient Visits              4 months ago Sensorineural hearing loss, bilateral    [facility] [place] [facility]    Office Visit    4 months ago Conductive hearing loss, bilateral    [facility], Ricki Le MD    Office Visit    5 months ago Medicare annual wellness visit, subsequent    [facility], Lyndon Sen MD    Office Visit    1 year ago Recurrent UTI    [facility], Coby Salmeron APRN    Office Visit    1 year ago Recurrent UTI    [facility], Lyndon Sen MD    Office Visit

## 2024-02-23 RX ORDER — MELATONIN
1 DAILY
Qty: 90 TABLET | Refills: 0 | Status: SHIPPED | OUTPATIENT
Start: 2024-02-23

## 2024-02-23 RX ORDER — EZETIMIBE AND SIMVASTATIN 10; 10 MG/1; MG/1
1 TABLET ORAL NIGHTLY
Qty: 90 TABLET | Refills: 0 | Status: SHIPPED | OUTPATIENT
Start: 2024-02-23

## 2024-02-23 RX ORDER — CLOPIDOGREL BISULFATE 75 MG/1
75 TABLET ORAL DAILY
Qty: 90 TABLET | Refills: 0 | Status: SHIPPED | OUTPATIENT
Start: 2024-02-23

## 2024-02-28 RX ORDER — BLOOD-GLUCOSE METER
1 KIT MISCELLANEOUS DAILY
Qty: 100 STRIP | Refills: 3 | Status: SHIPPED | OUTPATIENT
Start: 2024-02-28

## 2024-02-28 NOTE — TELEPHONE ENCOUNTER
Refill passed per [facility] protocol.  Requested Prescriptions   Pending Prescriptions Disp Refills    GLUCOCARD VITAL TEST In Vitro Strip [Pharmacy Med Name: Glucocard Vital Test Strips] 100 strip 3     Sig: USE AS DIRECTED       Diabetic Supplies Protocol Passed - 2/27/2024  1:44 PM        Passed - In person appointment or virtual visit in the past 12 mos or appointment in next 3 mos     Recent Outpatient Visits              4 months ago Sensorineural hearing loss, bilateral    [facility] [place] [facility]    Office Visit    4 months ago Conductive hearing loss, bilateral    [facility]    [facility]    5 months ago Medicare annual wellness visit, subsequent    [facility] [place] [place]    Office Visit    1 year ago Recurrent UTI    [facility]    Office Visit    1 year ago Recurrent UTI    [facility] [place] [place]    Office Visit                         Recent Outpatient Visits              4 months ago Sensorineural hearing loss, bilateral    [facility] [place] [facility]    Office Visit    4 months ago Conductive hearing loss, bilateral    [facility], Ricki Le MD    Office Visit    5 months ago Medicare annual wellness visit, subsequent    [facility] [place] [facility] Lyndon Sen MD    Office Visit    1 year ago Recurrent UTI    [facility] [place] [facility]    Office Visit    1 year ago Recurrent UTI    [facility], Lyndon Sen MD    Office Visit

## 2024-03-28 ENCOUNTER — TELEPHONE (OUTPATIENT)
Dept: INTERNAL MEDICINE CLINIC | Facility: CLINIC | Age: 89
End: 2024-03-28

## 2024-03-28 ENCOUNTER — TELEPHONE (OUTPATIENT)
Dept: SURGERY | Facility: CLINIC | Age: 89
End: 2024-03-28

## 2024-03-28 ENCOUNTER — MED REC SCAN ONLY (OUTPATIENT)
Dept: INTERNAL MEDICINE CLINIC | Facility: CLINIC | Age: 89
End: 2024-03-28

## 2024-03-28 NOTE — TELEPHONE ENCOUNTER
Amanda from [facility] calling regarding below. Advised that fax was received and placed on Dr. Talbot's desk for review.

## 2024-03-28 NOTE — TELEPHONE ENCOUNTER
Fax received from [facility] stating that pt is positive for ESBL and antibiotics are being requested. Placed on Dr Talbot's desk for review.

## 2024-03-28 NOTE — TELEPHONE ENCOUNTER
Per Dr Talbot pt had a poor urine sample and needs a repeat. Called [facility] at [phone], spoke with Daniela, informed that pt needs to repeat urine sample, Daniela verbalized understanding and requested order to be sent for repeat urine. Order faxed to [facility] at [phone], confirmation received.

## 2024-03-28 NOTE — TELEPHONE ENCOUNTER
We received a [facility] from [facility] nurses with a Urine culture positive for ESBL. Resistant to multiple abx, responsive to Flouroquinolones, nayely, zosyn, macrobid. Nursing is asking us for a antibiotic orders. I spoke with the patient's nurse. Advised that we have not seen the patient since 12/7/2022. Urine cultures not placed by us. Ask that she reach out to the ordering provider to determine appropriate abx, based on symptoms, renal function etc.   Nursing agreed and said she would reach out to the physician seeing the patient in the nursing home.     Mac Yeboah PA-C   March 28, 2024

## 2024-04-01 NOTE — TELEPHONE ENCOUNTER
Physician telephone order received from [facility] placed on Dr. REYNALDO haas for signature and review.

## 2024-04-16 ENCOUNTER — MED REC SCAN ONLY (OUTPATIENT)
Dept: INTERNAL MEDICINE CLINIC | Facility: CLINIC | Age: 89
End: 2024-04-16

## 2024-05-13 ENCOUNTER — APPOINTMENT (OUTPATIENT)
Dept: CT IMAGING | Age: 89
End: 2024-05-13
Attending: EMERGENCY MEDICINE

## 2024-05-13 ENCOUNTER — APPOINTMENT (OUTPATIENT)
Dept: GENERAL RADIOLOGY | Age: 89
End: 2024-05-13
Attending: EMERGENCY MEDICINE

## 2024-05-13 ENCOUNTER — HOSPITAL ENCOUNTER (OUTPATIENT)
Age: 89
Setting detail: OBSERVATION
LOS: 1 days | Discharge: NURSING, REHAB OR LONG-TERM CARE | End: 2024-05-14
Attending: EMERGENCY MEDICINE | Admitting: INTERNAL MEDICINE

## 2024-05-13 DIAGNOSIS — N39.0 ACUTE UTI (URINARY TRACT INFECTION): ICD-10-CM

## 2024-05-13 DIAGNOSIS — I63.9 OCCIPITAL INFARCTION  (CMD): ICD-10-CM

## 2024-05-13 DIAGNOSIS — G93.40 ACUTE ENCEPHALOPATHY: Primary | ICD-10-CM

## 2024-05-13 DIAGNOSIS — I48.21 PERMANENT ATRIAL FIBRILLATION  (CMD): ICD-10-CM

## 2024-05-13 DIAGNOSIS — E13.9 DIABETES 1.5, MANAGED AS TYPE 2  (CMD): ICD-10-CM

## 2024-05-13 LAB
ALBUMIN SERPL-MCNC: 4.2 G/DL (ref 3.6–5.1)
ALBUMIN/GLOB SERPL: 1.1 {RATIO} (ref 1–2.4)
ALP SERPL-CCNC: 87 UNITS/L (ref 45–117)
ALT SERPL-CCNC: 22 UNITS/L
ANION GAP SERPL CALC-SCNC: 11 MMOL/L (ref 7–19)
APPEARANCE UR: ABNORMAL
AST SERPL-CCNC: 22 UNITS/L
BACTERIA #/AREA URNS HPF: ABNORMAL /HPF
BASOPHILS # BLD: 0.1 K/MCL (ref 0–0.3)
BASOPHILS NFR BLD: 1 %
BILIRUB SERPL-MCNC: 0.7 MG/DL (ref 0.2–1)
BILIRUB UR QL STRIP: NEGATIVE
BUN SERPL-MCNC: 17 MG/DL (ref 6–20)
BUN/CREAT SERPL: 24 (ref 7–25)
CALCIUM SERPL-MCNC: 9.4 MG/DL (ref 8.4–10.2)
CHLORIDE SERPL-SCNC: 95 MMOL/L (ref 97–110)
CO2 SERPL-SCNC: 27 MMOL/L (ref 21–32)
COLOR UR: ABNORMAL
CREAT SERPL-MCNC: 0.7 MG/DL (ref 0.51–0.95)
DEPRECATED RDW RBC: 45.4 FL (ref 39–50)
EGFRCR SERPLBLD CKD-EPI 2021: 83 ML/MIN/{1.73_M2}
EOSINOPHIL # BLD: 0.2 K/MCL (ref 0–0.5)
EOSINOPHIL NFR BLD: 1 %
ERYTHROCYTE [DISTWIDTH] IN BLOOD: 12.9 % (ref 11–15)
FASTING DURATION TIME PATIENT: ABNORMAL H
GLOBULIN SER-MCNC: 3.8 G/DL (ref 2–4)
GLUCOSE BLDC GLUCOMTR-MCNC: 170 MG/DL (ref 70–99)
GLUCOSE SERPL-MCNC: 196 MG/DL (ref 70–99)
GLUCOSE UR STRIP-MCNC: NEGATIVE MG/DL
HCT VFR BLD CALC: 41.8 % (ref 36–46.5)
HGB BLD-MCNC: 13.5 G/DL (ref 12–15.5)
HGB UR QL STRIP: NEGATIVE
HYALINE CASTS #/AREA URNS LPF: ABNORMAL /LPF
IMM GRANULOCYTES # BLD AUTO: 0.1 K/MCL (ref 0–0.2)
IMM GRANULOCYTES # BLD: 1 %
KETONES UR STRIP-MCNC: NEGATIVE MG/DL
LACTATE BLDV-SCNC: 2.1 MMOL/L (ref 0–2)
LACTATE BLDV-SCNC: 2.1 MMOL/L (ref 0–2)
LACTATE BLDV-SCNC: 2.9 MMOL/L (ref 0–2)
LEUKOCYTE ESTERASE UR QL STRIP: ABNORMAL
LIPASE SERPL-CCNC: 21 UNITS/L (ref 15–77)
LYMPHOCYTES # BLD: 1.3 K/MCL (ref 1–4)
LYMPHOCYTES NFR BLD: 7 %
MAGNESIUM SERPL-MCNC: 2.2 MG/DL (ref 1.7–2.4)
MCH RBC QN AUTO: 30.7 PG (ref 26–34)
MCHC RBC AUTO-ENTMCNC: 32.3 G/DL (ref 32–36.5)
MCV RBC AUTO: 95 FL (ref 78–100)
MONOCYTES # BLD: 0.9 K/MCL (ref 0.3–0.9)
MONOCYTES NFR BLD: 5 %
NEUTROPHILS # BLD: 16.8 K/MCL (ref 1.8–7.7)
NEUTROPHILS NFR BLD: 85 %
NITRITE UR QL STRIP: NEGATIVE
NRBC BLD MANUAL-RTO: 0 /100 WBC
PH UR STRIP: 5.5 [PH] (ref 5–7)
PLATELET # BLD AUTO: 285 K/MCL (ref 140–450)
POTASSIUM SERPL-SCNC: 3.7 MMOL/L (ref 3.4–5.1)
PROCALCITONIN SERPL IA-MCNC: <0.05 NG/ML
PROT SERPL-MCNC: 8 G/DL (ref 6.4–8.2)
PROT UR STRIP-MCNC: NEGATIVE MG/DL
RAINBOW EXTRA TUBES HOLD SPECIMEN: NORMAL
RBC # BLD: 4.4 MIL/MCL (ref 4–5.2)
RBC #/AREA URNS HPF: ABNORMAL /HPF
SODIUM SERPL-SCNC: 129 MMOL/L (ref 135–145)
SP GR UR STRIP: 1.01 (ref 1–1.03)
SQUAMOUS #/AREA URNS HPF: ABNORMAL /HPF
UROBILINOGEN UR STRIP-MCNC: 0.2 MG/DL
WBC # BLD: 19.4 K/MCL (ref 4.2–11)
WBC #/AREA URNS HPF: ABNORMAL /HPF

## 2024-05-13 PROCEDURE — 10002800 HB RX 250 W HCPCS: Performed by: EMERGENCY MEDICINE

## 2024-05-13 PROCEDURE — 80053 COMPREHEN METABOLIC PANEL: CPT | Performed by: EMERGENCY MEDICINE

## 2024-05-13 PROCEDURE — 84145 PROCALCITONIN (PCT): CPT | Performed by: EMERGENCY MEDICINE

## 2024-05-13 PROCEDURE — 96365 THER/PROPH/DIAG IV INF INIT: CPT

## 2024-05-13 PROCEDURE — 10002801 HB RX 250 W/O HCPCS: Performed by: EMERGENCY MEDICINE

## 2024-05-13 PROCEDURE — 83735 ASSAY OF MAGNESIUM: CPT | Performed by: EMERGENCY MEDICINE

## 2024-05-13 PROCEDURE — 36415 COLL VENOUS BLD VENIPUNCTURE: CPT | Performed by: EMERGENCY MEDICINE

## 2024-05-13 PROCEDURE — 81001 URINALYSIS AUTO W/SCOPE: CPT | Performed by: EMERGENCY MEDICINE

## 2024-05-13 PROCEDURE — 71045 X-RAY EXAM CHEST 1 VIEW: CPT

## 2024-05-13 PROCEDURE — 70450 CT HEAD/BRAIN W/O DYE: CPT

## 2024-05-13 PROCEDURE — 87040 BLOOD CULTURE FOR BACTERIA: CPT | Performed by: EMERGENCY MEDICINE

## 2024-05-13 PROCEDURE — 93010 ELECTROCARDIOGRAM REPORT: CPT | Performed by: INTERNAL MEDICINE

## 2024-05-13 PROCEDURE — 83605 ASSAY OF LACTIC ACID: CPT | Performed by: EMERGENCY MEDICINE

## 2024-05-13 PROCEDURE — 85025 COMPLETE CBC W/AUTO DIFF WBC: CPT | Performed by: EMERGENCY MEDICINE

## 2024-05-13 PROCEDURE — 83690 ASSAY OF LIPASE: CPT | Performed by: EMERGENCY MEDICINE

## 2024-05-13 PROCEDURE — 10002800 HB RX 250 W HCPCS: Performed by: INTERNAL MEDICINE

## 2024-05-13 PROCEDURE — 87088 URINE BACTERIA CULTURE: CPT | Performed by: EMERGENCY MEDICINE

## 2024-05-13 PROCEDURE — 99285 EMERGENCY DEPT VISIT HI MDM: CPT

## 2024-05-13 PROCEDURE — 99291 CRITICAL CARE FIRST HOUR: CPT

## 2024-05-13 PROCEDURE — 10002807 HB RX 258: Performed by: EMERGENCY MEDICINE

## 2024-05-13 PROCEDURE — 10004651 HB RX, NO CHARGE ITEM: Performed by: INTERNAL MEDICINE

## 2024-05-13 PROCEDURE — 93005 ELECTROCARDIOGRAM TRACING: CPT | Performed by: EMERGENCY MEDICINE

## 2024-05-13 PROCEDURE — 10006031 HB ROOM CHARGE TELEMETRY

## 2024-05-13 RX ORDER — ACETAMINOPHEN 325 MG/1
650 TABLET ORAL EVERY 4 HOURS PRN
Status: DISCONTINUED | OUTPATIENT
Start: 2024-05-13 | End: 2024-05-14 | Stop reason: HOSPADM

## 2024-05-13 RX ORDER — 0.9 % SODIUM CHLORIDE 0.9 %
2 VIAL (ML) INJECTION EVERY 12 HOURS SCHEDULED
Status: DISCONTINUED | OUTPATIENT
Start: 2024-05-13 | End: 2024-05-14 | Stop reason: HOSPADM

## 2024-05-13 RX ORDER — ONDANSETRON 4 MG/1
4 TABLET, ORALLY DISINTEGRATING ORAL EVERY 6 HOURS PRN
Status: DISCONTINUED | OUTPATIENT
Start: 2024-05-13 | End: 2024-05-14 | Stop reason: HOSPADM

## 2024-05-13 RX ORDER — NICOTINE POLACRILEX 4 MG
15 LOZENGE BUCCAL PRN
Status: DISCONTINUED | OUTPATIENT
Start: 2024-05-13 | End: 2024-05-14 | Stop reason: HOSPADM

## 2024-05-13 RX ORDER — LANOLIN ALCOHOL/MO/W.PET/CERES
6 CREAM (GRAM) TOPICAL NIGHTLY PRN
Status: DISCONTINUED | OUTPATIENT
Start: 2024-05-13 | End: 2024-05-14 | Stop reason: HOSPADM

## 2024-05-13 RX ORDER — FAMOTIDINE 20 MG/1
20 TABLET, FILM COATED ORAL NIGHTLY
Status: DISCONTINUED | OUTPATIENT
Start: 2024-05-13 | End: 2024-05-14 | Stop reason: HOSPADM

## 2024-05-13 RX ORDER — POLYETHYLENE GLYCOL 3350 17 G/17G
17 POWDER, FOR SOLUTION ORAL DAILY PRN
Status: DISCONTINUED | OUTPATIENT
Start: 2024-05-13 | End: 2024-05-14 | Stop reason: HOSPADM

## 2024-05-13 RX ORDER — DEXTROSE MONOHYDRATE 25 G/50ML
25 INJECTION, SOLUTION INTRAVENOUS PRN
Status: DISCONTINUED | OUTPATIENT
Start: 2024-05-13 | End: 2024-05-14 | Stop reason: HOSPADM

## 2024-05-13 RX ORDER — OXCARBAZEPINE 150 MG/1
150 TABLET, FILM COATED ORAL DAILY
Status: DISCONTINUED | OUTPATIENT
Start: 2024-05-14 | End: 2024-05-14 | Stop reason: HOSPADM

## 2024-05-13 RX ORDER — PANTOPRAZOLE SODIUM 40 MG/1
40 TABLET, DELAYED RELEASE ORAL DAILY
Status: DISCONTINUED | OUTPATIENT
Start: 2024-05-14 | End: 2024-05-14 | Stop reason: HOSPADM

## 2024-05-13 RX ORDER — CYCLOBENZAPRINE HCL 10 MG
5 TABLET ORAL 3 TIMES DAILY PRN
Status: DISCONTINUED | OUTPATIENT
Start: 2024-05-13 | End: 2024-05-14 | Stop reason: HOSPADM

## 2024-05-13 RX ORDER — ONDANSETRON 2 MG/ML
4 INJECTION INTRAMUSCULAR; INTRAVENOUS EVERY 12 HOURS PRN
Status: DISCONTINUED | OUTPATIENT
Start: 2024-05-13 | End: 2024-05-14 | Stop reason: HOSPADM

## 2024-05-13 RX ORDER — CALCIUM CARBONATE 500 MG/1
500 TABLET, CHEWABLE ORAL EVERY 4 HOURS PRN
Status: DISCONTINUED | OUTPATIENT
Start: 2024-05-13 | End: 2024-05-14 | Stop reason: HOSPADM

## 2024-05-13 RX ORDER — ENOXAPARIN SODIUM 100 MG/ML
40 INJECTION SUBCUTANEOUS DAILY
Status: DISCONTINUED | OUTPATIENT
Start: 2024-05-14 | End: 2024-05-14 | Stop reason: HOSPADM

## 2024-05-13 RX ORDER — CARVEDILOL 3.12 MG/1
3.12 TABLET ORAL DAILY
Status: DISCONTINUED | OUTPATIENT
Start: 2024-05-14 | End: 2024-05-14 | Stop reason: HOSPADM

## 2024-05-13 RX ORDER — NICOTINE POLACRILEX 4 MG
30 LOZENGE BUCCAL PRN
Status: DISCONTINUED | OUTPATIENT
Start: 2024-05-13 | End: 2024-05-14 | Stop reason: HOSPADM

## 2024-05-13 RX ORDER — ACETAMINOPHEN 650 MG/1
650 SUPPOSITORY RECTAL EVERY 4 HOURS PRN
Status: DISCONTINUED | OUTPATIENT
Start: 2024-05-13 | End: 2024-05-14 | Stop reason: HOSPADM

## 2024-05-13 RX ORDER — CLOPIDOGREL BISULFATE 75 MG/1
75 TABLET ORAL DAILY
Status: DISCONTINUED | OUTPATIENT
Start: 2024-05-14 | End: 2024-05-14 | Stop reason: HOSPADM

## 2024-05-13 RX ORDER — SENNOSIDES A AND B 8.6 MG/1
2 TABLET, FILM COATED ORAL DAILY PRN
Status: DISCONTINUED | OUTPATIENT
Start: 2024-05-13 | End: 2024-05-14 | Stop reason: HOSPADM

## 2024-05-13 RX ORDER — LOSARTAN POTASSIUM 25 MG/1
25 TABLET ORAL DAILY
Status: DISCONTINUED | OUTPATIENT
Start: 2024-05-14 | End: 2024-05-14 | Stop reason: HOSPADM

## 2024-05-13 RX ORDER — DEXTROSE MONOHYDRATE 25 G/50ML
12.5 INJECTION, SOLUTION INTRAVENOUS PRN
Status: DISCONTINUED | OUTPATIENT
Start: 2024-05-13 | End: 2024-05-14 | Stop reason: HOSPADM

## 2024-05-13 RX ORDER — FUROSEMIDE 20 MG/1
20 TABLET ORAL DAILY
Status: DISCONTINUED | OUTPATIENT
Start: 2024-05-14 | End: 2024-05-14 | Stop reason: HOSPADM

## 2024-05-13 RX ADMIN — PIPERACILLIN SODIUM AND TAZOBACTAM SODIUM 4.5 G: 4; .5 INJECTION, POWDER, LYOPHILIZED, FOR SOLUTION INTRAVENOUS at 15:08

## 2024-05-13 RX ADMIN — SODIUM CHLORIDE, PRESERVATIVE FREE 2 ML: 5 INJECTION INTRAVENOUS at 22:27

## 2024-05-13 RX ADMIN — SODIUM CHLORIDE, POTASSIUM CHLORIDE, SODIUM LACTATE AND CALCIUM CHLORIDE 1710 ML: 600; 310; 30; 20 INJECTION, SOLUTION INTRAVENOUS at 15:08

## 2024-05-13 RX ADMIN — PIPERACILLIN SODIUM AND TAZOBACTAM SODIUM 3.38 G: 3; .375 INJECTION, SOLUTION INTRAVENOUS at 22:35

## 2024-05-13 RX ADMIN — DOXYCYCLINE 100 MG: 100 INJECTION, POWDER, LYOPHILIZED, FOR SOLUTION INTRAVENOUS at 16:40

## 2024-05-13 ASSESSMENT — ABNORMAL INVOLUNTARY MOVEMENTS - GENERAL NEURO: AIM_ALL_EXTREMITIES: INTERMITTENT

## 2024-05-13 ASSESSMENT — MOVEMENT AND STRENGTH ASSESSMENTS: ALL_EXTREMITIES: FALLS WITH LIMB EXTENSION;WITHDRAWS TO PAIN/NOXIOUS STIMULI, DOES NOT FOLLOW COMMANDS

## 2024-05-13 ASSESSMENT — PAIN SCALES - GENERAL
PAINLEVEL_OUTOF10: 0
PAINLEVEL_OUTOF10: 0

## 2024-05-13 ASSESSMENT — PATIENT HEALTH QUESTIONNAIRE - PHQ9: IS PATIENT ABLE TO COMPLETE PHQ2 OR PHQ9: NO, DEFER TO LATER TIME

## 2024-05-14 VITALS
WEIGHT: 130.73 LBS | HEART RATE: 61 BPM | DIASTOLIC BLOOD PRESSURE: 64 MMHG | HEIGHT: 63 IN | TEMPERATURE: 98.1 F | SYSTOLIC BLOOD PRESSURE: 105 MMHG | BODY MASS INDEX: 23.16 KG/M2 | OXYGEN SATURATION: 90 % | RESPIRATION RATE: 18 BRPM

## 2024-05-14 LAB
ALBUMIN SERPL-MCNC: 3.2 G/DL (ref 3.6–5.1)
ALBUMIN/GLOB SERPL: 1 {RATIO} (ref 1–2.4)
ALP SERPL-CCNC: 66 UNITS/L (ref 45–117)
ALT SERPL-CCNC: 17 UNITS/L
ANION GAP SERPL CALC-SCNC: 8 MMOL/L (ref 7–19)
AST SERPL-CCNC: 17 UNITS/L
BASOPHILS # BLD: 0 K/MCL (ref 0–0.3)
BASOPHILS NFR BLD: 0 %
BILIRUB SERPL-MCNC: 0.7 MG/DL (ref 0.2–1)
BUN SERPL-MCNC: 19 MG/DL (ref 6–20)
BUN/CREAT SERPL: 25 (ref 7–25)
CALCIUM SERPL-MCNC: 8.5 MG/DL (ref 8.4–10.2)
CHLORIDE SERPL-SCNC: 99 MMOL/L (ref 97–110)
CO2 SERPL-SCNC: 27 MMOL/L (ref 21–32)
CREAT SERPL-MCNC: 0.75 MG/DL (ref 0.51–0.95)
DEPRECATED RDW RBC: 44.4 FL (ref 39–50)
EGFRCR SERPLBLD CKD-EPI 2021: 76 ML/MIN/{1.73_M2}
EOSINOPHIL # BLD: 0.1 K/MCL (ref 0–0.5)
EOSINOPHIL NFR BLD: 1 %
ERYTHROCYTE [DISTWIDTH] IN BLOOD: 13.1 % (ref 11–15)
FASTING DURATION TIME PATIENT: ABNORMAL H
GLOBULIN SER-MCNC: 3.1 G/DL (ref 2–4)
GLUCOSE BLDC GLUCOMTR-MCNC: 108 MG/DL (ref 70–99)
GLUCOSE SERPL-MCNC: 120 MG/DL (ref 70–99)
HCT VFR BLD CALC: 35.3 % (ref 36–46.5)
HGB BLD-MCNC: 11.6 G/DL (ref 12–15.5)
IMM GRANULOCYTES # BLD AUTO: 0.1 K/MCL (ref 0–0.2)
IMM GRANULOCYTES # BLD: 1 %
LYMPHOCYTES # BLD: 0.9 K/MCL (ref 1–4)
LYMPHOCYTES NFR BLD: 7 %
MAGNESIUM SERPL-MCNC: 1.9 MG/DL (ref 1.7–2.4)
MCH RBC QN AUTO: 30.7 PG (ref 26–34)
MCHC RBC AUTO-ENTMCNC: 32.9 G/DL (ref 32–36.5)
MCV RBC AUTO: 93.4 FL (ref 78–100)
MONOCYTES # BLD: 1.1 K/MCL (ref 0.3–0.9)
MONOCYTES NFR BLD: 8 %
NEUTROPHILS # BLD: 11.1 K/MCL (ref 1.8–7.7)
NEUTROPHILS NFR BLD: 83 %
NRBC BLD MANUAL-RTO: 0 /100 WBC
PLATELET # BLD AUTO: 238 K/MCL (ref 140–450)
POTASSIUM SERPL-SCNC: 4.3 MMOL/L (ref 3.4–5.1)
PROT SERPL-MCNC: 6.3 G/DL (ref 6.4–8.2)
QRS-INTERVAL (MSEC): 92
QT-INTERVAL (MSEC): 358
QTC: 473
R AXIS (DEGREES): 9
RAINBOW EXTRA TUBES HOLD SPECIMEN: NORMAL
RBC # BLD: 3.78 MIL/MCL (ref 4–5.2)
REPORT TEXT: NORMAL
SODIUM SERPL-SCNC: 130 MMOL/L (ref 135–145)
T AXIS (DEGREES): -168
VENTRICULAR RATE EKG/MIN (BPM): 105
WBC # BLD: 13.3 K/MCL (ref 4.2–11)

## 2024-05-14 PROCEDURE — 10002800 HB RX 250 W HCPCS: Performed by: INTERNAL MEDICINE

## 2024-05-14 PROCEDURE — 80053 COMPREHEN METABOLIC PANEL: CPT | Performed by: INTERNAL MEDICINE

## 2024-05-14 PROCEDURE — G0378 HOSPITAL OBSERVATION PER HR: HCPCS

## 2024-05-14 PROCEDURE — 92610 EVALUATE SWALLOWING FUNCTION: CPT

## 2024-05-14 PROCEDURE — 97530 THERAPEUTIC ACTIVITIES: CPT

## 2024-05-14 PROCEDURE — 10004651 HB RX, NO CHARGE ITEM: Performed by: INTERNAL MEDICINE

## 2024-05-14 PROCEDURE — 96372 THER/PROPH/DIAG INJ SC/IM: CPT | Performed by: INTERNAL MEDICINE

## 2024-05-14 PROCEDURE — 85025 COMPLETE CBC W/AUTO DIFF WBC: CPT | Performed by: INTERNAL MEDICINE

## 2024-05-14 PROCEDURE — 97535 SELF CARE MNGMENT TRAINING: CPT

## 2024-05-14 PROCEDURE — 97166 OT EVAL MOD COMPLEX 45 MIN: CPT

## 2024-05-14 PROCEDURE — 83735 ASSAY OF MAGNESIUM: CPT | Performed by: INTERNAL MEDICINE

## 2024-05-14 PROCEDURE — 36415 COLL VENOUS BLD VENIPUNCTURE: CPT | Performed by: INTERNAL MEDICINE

## 2024-05-14 PROCEDURE — 10002803 HB RX 637: Performed by: INTERNAL MEDICINE

## 2024-05-14 PROCEDURE — 97161 PT EVAL LOW COMPLEX 20 MIN: CPT

## 2024-05-14 RX ORDER — CEPHALEXIN 250 MG/1
500 CAPSULE ORAL 4 TIMES DAILY
Qty: 56 CAPSULE | Refills: 0 | Status: SHIPPED | OUTPATIENT
Start: 2024-05-14 | End: 2024-05-21

## 2024-05-14 RX ORDER — CEPHALEXIN 500 MG/1
500 CAPSULE ORAL EVERY 6 HOURS SCHEDULED
Status: DISCONTINUED | OUTPATIENT
Start: 2024-05-14 | End: 2024-05-14 | Stop reason: HOSPADM

## 2024-05-14 RX ORDER — SULFAMETHOXAZOLE AND TRIMETHOPRIM 800; 160 MG/1; MG/1
1 TABLET ORAL 2 TIMES DAILY
Qty: 10 TABLET | Refills: 0 | Status: SHIPPED | OUTPATIENT
Start: 2024-05-14 | End: 2024-05-19

## 2024-05-14 RX ADMIN — CARVEDILOL 3.12 MG: 3.12 TABLET, FILM COATED ORAL at 10:07

## 2024-05-14 RX ADMIN — EZETIMIBE: 10 TABLET ORAL at 10:07

## 2024-05-14 RX ADMIN — CEPHALEXIN 500 MG: 500 CAPSULE ORAL at 13:05

## 2024-05-14 RX ADMIN — PIPERACILLIN SODIUM AND TAZOBACTAM SODIUM 3.38 G: 3; .375 INJECTION, SOLUTION INTRAVENOUS at 05:07

## 2024-05-14 RX ADMIN — LOSARTAN POTASSIUM 25 MG: 25 TABLET, FILM COATED ORAL at 10:06

## 2024-05-14 RX ADMIN — CLOPIDOGREL BISULFATE 75 MG: 75 TABLET, FILM COATED ORAL at 10:07

## 2024-05-14 RX ADMIN — PANTOPRAZOLE SODIUM 40 MG: 40 TABLET, DELAYED RELEASE ORAL at 10:07

## 2024-05-14 RX ADMIN — ENOXAPARIN SODIUM 40 MG: 100 INJECTION SUBCUTANEOUS at 10:07

## 2024-05-14 RX ADMIN — SODIUM CHLORIDE, PRESERVATIVE FREE 2 ML: 5 INJECTION INTRAVENOUS at 10:08

## 2024-05-14 RX ADMIN — OXCARBAZEPINE 150 MG: 150 TABLET, FILM COATED ORAL at 10:07

## 2024-05-14 RX ADMIN — FUROSEMIDE 20 MG: 20 TABLET ORAL at 10:07

## 2024-05-14 SDOH — ECONOMIC STABILITY: HOUSING INSECURITY: WHAT IS YOUR LIVING SITUATION TODAY?: I HAVE A STEADY PLACE TO LIVE

## 2024-05-14 ASSESSMENT — COGNITIVE AND FUNCTIONAL STATUS - GENERAL
DO YOU HAVE SERIOUS DIFFICULTY WALKING OR CLIMBING STAIRS: YES
BASIC_MOBILITY_CONVERTED_SCORE: 32.23
FOLLOWS FAMILIAR CONVERSATION: A LITTLE
BECAUSE OF A PHYSICAL, MENTAL, OR EMOTIONAL CONDITION, DO YOU HAVE DIFFICULTY DOING ERRANDS ALONE: YES
HELP NEEDED FOR TOILETING: TOTAL
UNDERSTANDING 10 TO 15 MIN SPEECH: UNABLE
BASIC_MOBILITY_RAW_SCORE: 12
HELP NEEDED DRESSING REGULAR LOWER BODY CLOTHING: TOTAL
TAKING CARE OF COMPLICATED TASKS: UNABLE
APPLIED_COGNITIVE_CONVERTED_SCORE: 19.32
HELP NEEDED FOR PERSONAL GROOMING: A LITTLE
REMEMBERING 5 ERRANDS WITH NO LIST: UNABLE
APPLIED_COGNITIVE_RAW_SCORE: 8
DAILY_ACTIVITY_CONVERTED_SCORE: 32.03
REMEMBERING TO TAKE MEDICATION: UNABLE
DAILY_ACTIVITY_RAW_SCORE: 13
DO YOU HAVE DIFFICULTY DRESSING OR BATHING: YES
BECAUSE OF A PHYSICAL, MENTAL, OR EMOTIONAL CONDITION, DO YOU HAVE SERIOUS DIFFICULTY CONCENTRATING, REMEMBERING OR MAKING DECISIONS: YES
HELP NEEDED DRESSING REGULAR UPPER BODY CLOTHING: A LOT
REMEMBERING WHERE THINGS ARE: UNABLE
HELP NEEDED FOR BATHING: A LOT

## 2024-05-14 ASSESSMENT — ENCOUNTER SYMPTOMS
PAIN SEVERITY NOW: 0
SHORTNESS OF BREATH: 0
ACTIVITY IMPAIRMENT: NORMAL
NO PATIENT REPORTED PAIN: 1
DIETARY ISSUES: ADEQUATE INTAKE
NAUSEA: 0

## 2024-05-14 ASSESSMENT — ACTIVITIES OF DAILY LIVING (ADL)
EATING: SET-UP
PRIOR_ADL_TOILETING: MAXIMAL ASSIST (MAX)
PRIOR_ADL: MAXIMAL ASSIST (MAX)
HOME_MANAGEMENT_TIME_ENTRY: 8
GROOMING: SET-UP
PRIOR_ADL_BATHING: MAXIMAL ASSIST (MAX)

## 2024-05-14 ASSESSMENT — PAIN SCALES - GENERAL: PAINLEVEL_OUTOF10: 0

## 2024-05-15 ENCOUNTER — MED REC SCAN ONLY (OUTPATIENT)
Dept: INTERNAL MEDICINE CLINIC | Facility: CLINIC | Age: 89
End: 2024-05-15

## 2024-05-15 LAB — BACTERIA UR CULT: ABNORMAL

## 2024-05-18 LAB
BACTERIA BLD CULT: NORMAL
BACTERIA BLD CULT: NORMAL

## 2024-05-22 RX ORDER — BETHANECHOL CHLORIDE 10 MG/1
10 TABLET ORAL DAILY
Qty: 90 TABLET | Refills: 3 | OUTPATIENT
Start: 2024-05-22

## 2024-05-23 RX ORDER — OXCARBAZEPINE 150 MG/1
150 TABLET, FILM COATED ORAL DAILY
Qty: 90 TABLET | Refills: 3 | OUTPATIENT
Start: 2024-05-23

## 2024-05-23 RX ORDER — GLIPIZIDE 5 MG/1
2.5 TABLET ORAL DAILY
Qty: 45 TABLET | Refills: 3 | OUTPATIENT
Start: 2024-05-23

## 2024-05-23 RX ORDER — PANTOPRAZOLE SODIUM 40 MG/1
40 TABLET, DELAYED RELEASE ORAL DAILY
Qty: 90 TABLET | Refills: 3 | Status: SHIPPED | OUTPATIENT
Start: 2024-05-23

## 2024-05-23 NOTE — TELEPHONE ENCOUNTER
Please review. Protocol Failed; No Protocol    Requested Prescriptions   Pending Prescriptions Disp Refills    MAGNESIUM OXIDE -MG SUPPLEMENT 400 (240 Mg) MG Oral Tab [Pharmacy Med Name: magnesium oxide 400 mg (241.3 mg magnesium) tablet] 90 tablet 0     Sig: TAKE 1 TABLET BY MOUTH EVERY DAY       There is no refill protocol information for this order       CLOPIDOGREL 75 MG Oral Tab [Pharmacy Med Name: clopidogrel 75 mg tablet] 90 tablet 0     Sig: TAKE 1 TABLET BY MOUTH EVERY DAY       There is no refill protocol information for this order      Signed Prescriptions Disp Refills    PANTOPRAZOLE 40 MG Oral Tab EC 90 tablet 3     Sig: TAKE 1 TABLET BY MOUTH DAILY       Gastrointestional Medication Protocol Passed - 5/20/2024  2:16 PM        Passed - In person appointment or virtual visit in the past 12 mos or appointment in next 3 mos     Recent Outpatient Visits              7 months ago Sensorineural hearing loss, bilateral    [facility] [place] Silvia Urias Au.D    Office Visit    7 months ago Conductive hearing loss, bilateral    [facility] [place]    Office Visit    8 months ago Medicare annual wellness visit, subsequent    [facility], Lyndon Sen MD    Office Visit    1 year ago Recurrent UTI    [facility] [place] Coby Arce APRN    Office Visit    1 year ago Recurrent UTI    [facility], Lyndon Sen MD    Office Visit                       Refused Prescriptions Disp Refills    GLIPIZIDE 5 MG Oral Tab [Pharmacy Med Name: glipizide 5 mg tablet] 45 tablet 3     Sig: TAKE 1/2 TABLET BY MOUTH DAILY       Diabetes Medication Protocol Failed - 5/20/2024  2:16 PM        Failed - Last A1C < 7.5 and within past 6 months     Lab Results   Component Value Date    A1C 6.2 (H) 07/01/2023              Failed - In person appointment or virtual visit in the past 6 mos or appointment in next 3 mos     Recent Outpatient Visits              7 months ago Sensorineural hearing loss, bilateral    [facility], Silvia Wiley Au.D    Office Visit    7 months ago Conductive hearing loss, bilateral    [facility], Ricki Le MD    Office Visit    8 months ago Medicare annual wellness visit, subsequent    [facility], Lyndon Sen MD    Office Visit    1 year ago [facility] UTI    [facility]    Office Visit    1 year ago Recurrent UTI    [facility], Lyndon Sen MD    Office Visit                      Passed - Microalbumin procedure in past 12 months or taking ACE/ARB        Passed - EGFRCR or GFRNAA > 50     GFR Evaluation  EGFRCR: 83 , resulted on 7/5/2023          Passed - GFR in the past 12 months          OXCARBAZEPINE 150 MG Oral Tab [Pharmacy Med Name: oxcarbazepine 150 mg tablet] 90 tablet 3     Sig: TAKE 1 TABLET BY MOUTH DAILY       Neurology Medications Failed - 5/20/2024  2:16 PM        Failed - In person appointment or virtual visit in the past 6 mos or appointment in next 3 mos     Recent Outpatient Visits              7 months ago Sensorineural hearing loss, bilateral    [facility] [place] [facility]    Office Visit    7 months ago Conductive hearing loss, bilateral    [facility], Ricki Le MD    Office Visit    8 months ago Medicare annual wellness visit, subsequent    [facility], Lyndon Sen MD    Office Visit    1 year ago Recurrent UTI    [facility], Jeanette Arce,  NICOLASA Navarrete    Office Visit    1 year ago Recurrent UTI    [facility], Lyndon Sen MD    Office Visit                               Recent Outpatient Visits              7 months ago Sensorineural hearing loss, bilateral    [facility], Silvia Wiley Au.D    Office Visit    7 months ago Conductive hearing loss, bilateral    [facility], Ricki Le MD    Office Visit    8 months ago Medicare annual wellness visit, subsequent    [facility], Lyndon Sen MD    Office Visit    1 year ago Recurrent UTI    [facility], Coby Salmeron APRN    Office Visit    1 year ago Recurrent UTI    [facility], Lyndon Sen MD    Office Visit

## 2024-05-24 RX ORDER — CLOPIDOGREL BISULFATE 75 MG/1
75 TABLET ORAL DAILY
Qty: 90 TABLET | Refills: 1 | Status: SHIPPED | OUTPATIENT
Start: 2024-05-24

## 2024-05-24 RX ORDER — MELATONIN
1 DAILY
Qty: 90 TABLET | Refills: 1 | Status: SHIPPED | OUTPATIENT
Start: 2024-05-24

## 2024-06-05 ENCOUNTER — TELEPHONE (OUTPATIENT)
Dept: INTERNAL MEDICINE CLINIC | Facility: CLINIC | Age: 89
End: 2024-06-05

## 2024-06-05 ENCOUNTER — MED REC SCAN ONLY (OUTPATIENT)
Dept: INTERNAL MEDICINE CLINIC | Facility: CLINIC | Age: 89
End: 2024-06-05

## 2024-06-05 NOTE — TELEPHONE ENCOUNTER
Physician Certification and medical survey received from [facility], placed on Dr Talbot's desk for review and signature.

## 2024-06-14 ENCOUNTER — TELEPHONE (OUTPATIENT)
Dept: INTERNAL MEDICINE CLINIC | Facility: CLINIC | Age: 89
End: 2024-06-14

## 2024-06-14 NOTE — TELEPHONE ENCOUNTER
Sarah at care facility called to report patient fell yesterday.  She was walking and her knees buckled and she fell on her bottom.  Was not dizzy or lightheaded. No loss of consciousness. Denies injuries.  Dr. Dahiana JUAREZ.

## 2024-06-15 ENCOUNTER — TELEPHONE (OUTPATIENT)
Dept: INTERNAL MEDICINE CLINIC | Facility: CLINIC | Age: 89
End: 2024-06-15

## 2024-06-15 NOTE — TELEPHONE ENCOUNTER
Spoke to , on call doctor. Orders received for urine culture and also for macrobid 100mg by mouth 2 x a day for 7 days.     Spoke to SuzanneRN at [facility] advised her that urine culture was ordered, order faxed to [facility] nurses station at [phone]. Advised that antibiotic, macrobid sent to [facility].

## 2024-06-15 NOTE — TELEPHONE ENCOUNTER
Name:SATURDAY TRIAGE RN                      6/15/2024 10:45:44 AM  ProfileId:    [id number]                   [facility]  Department:[place] ANSWERING SERVICE  ======================================================================  Text Messages    Message # [phone]         06/15/2024 10:44a   [[place]]  To:  SATURDAY TRIAGE RN  From:  Primary MD:  Phone#:  ----------------------------------------------------------------------  JORDAN AT [facility] [phone] RE PT DANIEL LEE  35 RE PT UPDATE        (Message Delivered)   D E L I ARLEY E R I E S :  06/15/2024 10:45a           [place]        Delivered to Office

## 2024-06-15 NOTE — TELEPHONE ENCOUNTER
Spoke to Suzanne RN asking if  would like to order antibiotic.     Patient had a fall yesterday (see 6/14/24 encounter) Suzanne,RN,collected urine and did a dip test. It shows high uti and moderate leukocytes.    Sent to on call doctor , Dr. Talbot not in the office.

## 2024-06-17 NOTE — TELEPHONE ENCOUNTER
Physician certification and medical survey received from [place] faxed to [phone] confirmation received.

## 2024-08-06 ENCOUNTER — TELEPHONE (OUTPATIENT)
Dept: INTERNAL MEDICINE CLINIC | Facility: CLINIC | Age: 89
End: 2024-08-06

## 2024-08-06 NOTE — TELEPHONE ENCOUNTER
Physician's telephone order for U/A C&S received from [facility] placed on Dr. REYNALDO haas for signature and review.

## 2024-08-07 ENCOUNTER — TELEPHONE (OUTPATIENT)
Dept: INTERNAL MEDICINE CLINIC | Facility: CLINIC | Age: 89
End: 2024-08-07

## 2024-08-07 DIAGNOSIS — N39.0 URINARY TRACT INFECTION WITHOUT HEMATURIA, SITE UNSPECIFIED: Primary | ICD-10-CM

## 2024-08-07 RX ORDER — CEPHALEXIN 500 MG/1
500 CAPSULE ORAL 2 TIMES DAILY
Qty: 14 CAPSULE | Refills: 0 | Status: SHIPPED | OUTPATIENT
Start: 2024-08-07

## 2024-08-07 NOTE — TELEPHONE ENCOUNTER
I had put new order for repeat ua and culture  We can't really rely on this last urine culture  with urine sample not being good sample/contaminated so hard to diagnose if pt even has uti.   However if daughter pt is really symptomatic, then I will give her abx keflex but nurse in SNF should try to get new urine sample for repeat ua and culture before giving the abx.   Erx sent.

## 2024-08-07 NOTE — TELEPHONE ENCOUNTER
Spoke with patient daughter Yessenia  (  Name and date of birth verified ) informed of Dr. Talbot's  instructions below      Yessenia verbalizes understanding and agrees with plan.     Yessenia is asking for us to call the SNF  facility and let the patient 's nurse know about the urine testing   (   [phone]  3rd  floor nursing station  at [place] )     This RN called [facility] 3rd floor nurses station and left detailed message to do Ua and culture before starting antibiotics    Left Voicemail to call back our office. If needed  Office phone number provided with office telephone hours.

## 2024-08-07 NOTE — TELEPHONE ENCOUNTER
Patient's daughter Yessenia wants Dr. Talbot  to know that she has made an appointment with the Urologist (for straight cath) but the appointment isn't until 08/13/24.  Daughter is concerned about waiting that long to treat her mother.  Is asking for an antibiotic so she doesn't get worse.  Please advise.

## 2024-08-07 NOTE — TELEPHONE ENCOUNTER
Spoke to Leona- RN. She said that they do not do straight cath's at their facility. They don't have the supplies. She said it will be difficult to obtain the clean catch midstream urinalysis but she will do her best.     RN asked if she could take a verbal but she asked that orders be faxed to [phone].    Dr. Talbot, please advise on orders- triage can fax to [facility].

## 2024-08-07 NOTE — TELEPHONE ENCOUNTER
Reviewed urinalysis result and urine sample was NOT a good sample and was contaminated. It had moderate amount of squamous epithelial cells so even if urine culture grew bacteria, it is not reliable or accurate.   Need to repeat ua and culture but needs to be clean midstream catch urine sample. I know it is difficult to do due to patient's dementia but I can't treat pt based on this inaccurate urine test result.   Can we get a straight cath sample if daughter allows it ?

## 2024-08-07 NOTE — TELEPHONE ENCOUNTER
Attempted twice to call [facility] in [place], phone rings several times then goes to a busy signal.     Urinalysis and Urine Culture orders faxed to number provided.

## 2024-08-07 NOTE — TELEPHONE ENCOUNTER
Nurse Marielena -Stated a she Faxed UA /CS report Today , was it received ?- positive E Coli  Asking will RX be sent to [place] [facility]

## 2024-08-08 NOTE — TELEPHONE ENCOUNTER
Spoke to TAWANDA Mcdaniels - [facility] [place], verified patient's Name and . States they did not receive the order.     Urinalysis and urine culture order successfully faxed to [phone] via [employer] at 11:04.

## 2024-08-12 NOTE — TELEPHONE ENCOUNTER
I need the result of her ua and culture which should be NEW one and not the one they did back in 8/4/23 that showed E coli reported on 8/7/24 (this was a contaminated urine sample so I had it repeated, but did start pt then on keflex then since daughter states pt symptomatic; but  with instruction of repeating the ua and culture before pt starts on keflex)

## 2024-08-12 NOTE — TELEPHONE ENCOUNTER
Left message with  [place] as no one was answering on the floor to please have TAWANDA Mcdaniels call our office in regards to Dr. Talbot's message.

## 2024-08-12 NOTE — TELEPHONE ENCOUNTER
We got the most recent urine culture and did grow E coli , and should be sensitive to keflex given last wee so pt to finish this abx.   Would keep apptment with her urologist Dr Solomon for her recurrent uti.

## 2024-08-12 NOTE — TELEPHONE ENCOUNTER
Verified name and  of patient.    Enedelia RN at [facility] [place], calling to state that the most recent urine culture showed E.coli in the urine. She states that patient is on a broad spectrum antibiotic. She states she has faxed the results over the Dr. Talbot's office.      She provided the following fax number in case of any orders :Fax# [phone]

## 2024-08-12 NOTE — TELEPHONE ENCOUNTER
Verified name and  of patient.    TAWANDA Mcdaniels from [place], states that the most recent results of the urinalysis and urine culture that she faxed over to the office yesterday were from the specimen collected on 24 which were the NEW results (resulted yesterday) that were taken prior to starting on Keflex.      She states that patient is currently taking Keflex and she is asking if any changes to this antibiotic will be made.    Please advise and thank you.

## 2024-09-19 RX ORDER — BETHANECHOL CHLORIDE 10 MG/1
10 TABLET ORAL DAILY
Qty: 90 TABLET | Refills: 0 | Status: SHIPPED | OUTPATIENT
Start: 2024-09-19

## 2024-09-19 NOTE — TELEPHONE ENCOUNTER
Refill passed per [facility] protocol.    Last office visit 9/21/23      Requested Prescriptions   Pending Prescriptions Disp Refills    BETHANECHOL 10 MG Oral Tab [Pharmacy Med Name: BETHANECHOL 10 MG TABLET] 90 tablet 0     Sig: TAKE 1 TABLET BY MOUTH DAILY       Genitourinary Medications Passed - 9/14/2024  5:15 PM        Passed - Patient does not have pulmonary hypertension on problem list        Passed - In person appointment or virtual visit in the past 12 mos or appointment in next 3 mos     Recent Outpatient Visits              11 months ago Sensorineural hearing loss, bilateral    [facility], Silvia Wiley Au.D    Office Visit    11 months ago Conductive hearing loss, bilateral    [facility], Ricki Le MD    Office Visit    12 months ago Medicare annual wellness visit, subsequent    [facility], [address], Lyndon Sen MD    Office Visit    1 year ago Recurrent UTI    [facility], Coby Salmeron APRN    Office Visit    1 year ago Recurrent UTI    [facility] [address], Lyndon Sen MD    Office Visit                           Recent Outpatient Visits              11 months ago Sensorineural hearing loss, bilateral    [facility], Silvia Wiley Au.D    Office Visit    11 months ago Conductive hearing loss, bilateral    [facility], Ricki Le MD    Office Visit    12 months ago Medicare annual wellness visit, subsequent    [facility] Lyndon Sen MD    Office Visit    1 year ago Recurrent UTI    [facility]    Office Visit    1 year ago Recurrent UTI    [facility] Lyndon Talbot MD    Office Visit

## 2024-09-19 NOTE — TELEPHONE ENCOUNTER
Please review; protocol failed/ has no protocol      No active /future labs noted   Message sent for Call Center to call  patient to make an appointment.     Requested Prescriptions   Pending Prescriptions Disp Refills    EZETIMIBE-SIMVASTATIN 10-10 MG Oral Tab [Pharmacy Med Name: EZETIMIBE-SIMVASTATIN 10-10 MG] 90 tablet 0     Sig: TAKE 1 TABLET BY MOUTH DAILY AT BEDTIME       Cholesterol Medication Protocol Failed - 9/14/2024  5:15 PM        Failed - ALT < 80     Lab Results   Component Value Date    ALT 13 09/20/2022             Failed - ALT resulted within past year        Failed - Lipid panel within past 12 months     Lab Results   Component Value Date    CHOLEST 157 11/29/2019    TRIG 187 (H) 11/29/2019    HDL 74 (H) 11/29/2019    LDL 46 11/29/2019    VLDL 37 (H) 11/29/2019    NONHDLC 83 11/29/2019             Passed - In person appointment or virtual visit in the past 12 mos or appointment in next 3 mos     Recent Outpatient Visits              11 months ago Sensorineural hearing loss, bilateral    [facility] [place]    Office Visit    11 months ago Conductive hearing loss, bilateral    [facility], Ricki Le MD    Office Visit    12 months ago Medicare annual wellness visit, subsequent    [facility], Lyndon Sen MD    Office Visit    1 year ago Recurrent UTI    [facility], [place]    Office Visit    1 year ago Recurrent UTI    [facility], Lyndon Sen MD    Office Visit                        GLIPIZIDE 5 MG Oral Tab [Pharmacy Med Name: GLIPIZIDE 5 MG TABLET] 45 tablet 0     Sig: TAKE 1/2 TABLET BY MOUTH DAILY       Diabetes Medication Protocol Failed - 9/14/2024  5:15 PM        Failed - Last A1C < 7.5 and within past 6 months     Lab Results    Component Value Date    A1C 6.2 (H) 07/01/2023             Failed - In person appointment or virtual visit in the past 6 mos or appointment in next 3 mos     Recent Outpatient Visits              11 months ago Sensorineural hearing loss, bilateral    [facility], Silvia Wiley Au.D    Office Visit    11 months ago Conductive hearing loss, bilateral    [facility], Ricki Le MD    Office Visit    12 months ago Medicare annual wellness visit, subsequent    [facility], Lyndon Sen MD    Office Visit    1 year ago Recurrent UTI    [facility], [place] APRN    Office Visit    1 year ago Recurrent UTI    [facility], Lyndon Sen MD    Office Visit                      Failed - EGFRCR or GFRNAA > 50     GFR Evaluation            Failed - GFR in the past 12 months        Passed - Microalbumin procedure in past 12 months or taking ACE/ARB          OXCARBAZEPINE 150 MG Oral Tab [Pharmacy Med Name: OXCARBAZEPINE 150 MG TABLET] 90 tablet 0     Sig: TAKE 1 TABLET BY MOUTH DAILY       Neurology Medications Failed - 9/14/2024  5:15 PM        Failed - In person appointment or virtual visit in the past 6 mos or appointment in next 3 mos     Recent Outpatient Visits              11 months ago Sensorineural hearing loss, bilateral    [facility], Silvia Wiley Au.D    Office Visit    11 months ago Conductive hearing loss, bilateral    [facility], Ricki Le MD    Office Visit    12 months ago Medicare annual wellness visit, subsequent    [facility], Lyndon Sen MD    Office Visit    1 year ago Recurrent UTI    [facility],  Coby Salmeron APRN    Office Visit    1 year ago Recurrent UTI    [facility], Lyndon Sen MD    Office Visit                         Recent Outpatient Visits              11 months ago Sensorineural hearing loss, bilateral    [facility], Silvia Wiley Au.D    Office Visit    11 months ago Conductive hearing loss, bilateral    [facility], Ricki Le MD    Office Visit    12 months ago Medicare annual wellness visit, subsequent    [facility], Lyndon Sen MD    Office Visit    1 year ago Recurrent UTI    [facility], Coby Salmeron APRN    Office Visit    1 year ago Recurrent UTI    [facility], Lyndon Sen MD    Office Visit

## 2024-09-20 RX ORDER — OXCARBAZEPINE 150 MG/1
150 TABLET, FILM COATED ORAL DAILY
Qty: 90 TABLET | Refills: 0 | Status: SHIPPED | OUTPATIENT
Start: 2024-09-20

## 2024-09-20 RX ORDER — EZETIMIBE AND SIMVASTATIN 10; 10 MG/1; MG/1
1 TABLET ORAL NIGHTLY
Qty: 90 TABLET | Refills: 0 | Status: SHIPPED | OUTPATIENT
Start: 2024-09-20

## 2024-09-20 RX ORDER — GLIPIZIDE 5 MG/1
2.5 TABLET ORAL DAILY
Qty: 45 TABLET | Refills: 0 | Status: SHIPPED | OUTPATIENT
Start: 2024-09-20

## 2024-10-24 ENCOUNTER — OFFICE VISIT (OUTPATIENT)
Dept: INTERNAL MEDICINE CLINIC | Facility: CLINIC | Age: 89
End: 2024-10-24

## 2024-10-24 VITALS
HEART RATE: 56 BPM | TEMPERATURE: 98 F | SYSTOLIC BLOOD PRESSURE: 116 MMHG | WEIGHT: 122.5 LBS | DIASTOLIC BLOOD PRESSURE: 64 MMHG | HEIGHT: 64 IN | BODY MASS INDEX: 20.92 KG/M2

## 2024-10-24 DIAGNOSIS — I10 ESSENTIAL HYPERTENSION: ICD-10-CM

## 2024-10-24 DIAGNOSIS — I48.20 CHRONIC A-FIB (HCC): ICD-10-CM

## 2024-10-24 DIAGNOSIS — Z00.00 MEDICARE ANNUAL WELLNESS VISIT, SUBSEQUENT: Primary | ICD-10-CM

## 2024-10-24 DIAGNOSIS — I50.22 CHRONIC SYSTOLIC HEART FAILURE (HCC): ICD-10-CM

## 2024-10-24 DIAGNOSIS — F02.80 LATE ONSET ALZHEIMER'S DISEASE WITHOUT BEHAVIORAL DISTURBANCE (HCC): ICD-10-CM

## 2024-10-24 DIAGNOSIS — N18.31 STAGE 3A CHRONIC KIDNEY DISEASE (HCC): ICD-10-CM

## 2024-10-24 DIAGNOSIS — I49.5 SSS (SICK SINUS SYNDROME) (HCC): ICD-10-CM

## 2024-10-24 DIAGNOSIS — E78.2 MIXED HYPERLIPIDEMIA: ICD-10-CM

## 2024-10-24 DIAGNOSIS — E55.9 VITAMIN D DEFICIENCY: ICD-10-CM

## 2024-10-24 DIAGNOSIS — Z95.0 S/P PLACEMENT OF CARDIAC PACEMAKER: ICD-10-CM

## 2024-10-24 DIAGNOSIS — R82.90 MALODOROUS URINE: ICD-10-CM

## 2024-10-24 DIAGNOSIS — R13.12 OROPHARYNGEAL DYSPHAGIA: ICD-10-CM

## 2024-10-24 DIAGNOSIS — G30.1 LATE ONSET ALZHEIMER'S DISEASE WITHOUT BEHAVIORAL DISTURBANCE (HCC): ICD-10-CM

## 2024-10-24 DIAGNOSIS — R48.2 GAIT APRAXIA OF ELDERLY: ICD-10-CM

## 2024-10-24 DIAGNOSIS — E11.9 CONTROLLED TYPE 2 DIABETES MELLITUS WITHOUT COMPLICATION, WITHOUT LONG-TERM CURRENT USE OF INSULIN (HCC): ICD-10-CM

## 2024-10-24 DIAGNOSIS — R29.6 RECURRENT FALLS: ICD-10-CM

## 2024-10-24 DIAGNOSIS — Z86.73 HISTORY OF STROKE: ICD-10-CM

## 2024-10-24 LAB — HEMOGLOBIN A1C: 5.6 % (ref 4.3–5.6)

## 2024-10-24 RX ORDER — KETOCONAZOLE 20 MG/G
1 CREAM TOPICAL 2 TIMES DAILY
Qty: 60 G | Refills: 0 | Status: SHIPPED | OUTPATIENT
Start: 2024-10-24

## 2024-10-24 NOTE — PROGRESS NOTES
Subjective:   Yin Casas is a 89 year old female who presents for a Subsequent Annual Wellness visit (Pt already had Initial Annual Wellness) and scheduled follow up of multiple significant but stable problems.       History/Other:   Fall Risk Assessment:   She has been screened for Falls and is High Risk. Fall Prevention information provided to patient in After Visit Summary.    Do you feel unsteady when standing or walking?: Yes  Do you worry about falling?: Yes  Have you fallen in the past year?: Yes  How many times have you fallen?: 1  Were you injured?: No     Cognitive Assessment:   Abnormal  What day of the week is this?: Correct  What month is it?: Incorrect  What year is it?: Incorrect  Recall \"Ball\": Incorrect  Recall \"Flag\": Incorrect  Recall \"Tree\": Incorrect      Functional Ability/Status:   Yin Casas has some abnormal functions as listed below:  She has Dressing and/or Bathing issues based on screening of functional status.  Difficulty dressing or bathing?: Yes  Bathing or Showering: Cannot do without help  Dressing: Need some help  She has Toileting difficulties based on screening of functional status.She has Eating difficulties based on screening of functional status.She has Driving difficulties based on screening of functional status. She has Meal Preparation difficulties based on screening of functional status.She has difficulties Managing Money/Bills based on screening of functional status.She has difficulties Shopping for Groceries based on screening of functional status. She has difficulties Taking Meds as Rx'd based on screening of functional status. She has Walking problems based on screening of functional status. She has problems with Daily Activities based on screening of functional status. She has problems with Memory based on screening of functional status.       Depression Screening (PHQ):  PHQ-2 SCORE: 0  , done 10/24/2024            Advanced Directives:   She does  have a Living Will but we do NOT have it on file in Epic.    She does have a POA but we do NOT have it on file in [facility].    Patient has Advance Care Planning documents present in EMR. Reviewed documents with patient (and family/surrogate if present).      Patient Active Problem List   Diagnosis    Chronic a-fib ([facility])    Controlled type 2 diabetes mellitus without complication, without long-term current use of insulin ([facility])    CKD (chronic kidney disease) stage 3, GFR 30-59 ml/min ([facility])    Gait apraxia of elderly    Essential hypertension    Vitamin D deficiency    History of stroke    Mixed hyperlipidemia    History of recurrent UTI (urinary tract infection)    Neuromuscular dysfunction of bladder    Late onset Alzheimer's disease without behavioral disturbance ([facility])    Medicare annual wellness visit, subsequent    SSS (sick sinus syndrome) ([facility])    S/P placement of cardiac pacemaker    Normocytic anemia    At risk for falls    Bilateral hearing loss    Chronic systolic heart failure ([facility])    Recurrent falls    Malodorous urine    Oropharyngeal dysphagia     Allergies:  She is allergic to levaquin [levofloxacin].    Current Medications:  Outpatient Medications Marked as Taking for the 10/24/24 encounter (Office Visit) with Lyndon Talbot MD   Medication Sig    ketoconazole 2 % External Cream Apply 1 Application topically 2 (two) times daily.    ezetimibe-simvastatin 10-10 MG Oral Tab Take 1 tablet by mouth nightly.    OXcarbazepine 150 MG Oral Tab Take 1 tablet (150 mg total) by mouth daily.    bethanechol 10 MG Oral Tab Take 1 tablet (10 mg total) by mouth daily.    cephalexin 500 MG Oral Cap Take 1 capsule (500 mg total) by mouth 2 (two) times daily.    Magnesium Oxide -Mg Supplement 400 (240 Mg) MG Oral Tab Take 1 tablet (400 mg total) by mouth daily.    clopidogrel 75 MG Oral Tab Take 1 tablet (75 mg total) by mouth daily.    PANTOPRAZOLE 40 MG Oral Tab EC TAKE 1 TABLET BY MOUTH DAILY    GLUCOCARD VITAL  TEST In Vitro Strip 1 strip by In Vitro route daily.    losartan 25 MG Oral Tab Take 1 tablet (25 mg total) by mouth daily. Hold for blood pressure  less than 110    carvedilol 3.125 MG Oral Tab TAKE 1 TABLET BY MOUTH DAILY hold FOR sbp less THEN 100 OR HR LESS THEN 60    loratadine (ALLERGY RELIEF) 10 MG Oral Tab Take 1 tablet (10 mg total) by mouth daily.    furosemide 20 MG Oral Tab Take 1 tablet (20 mg total) by mouth daily.    aspirin 325 MG Oral Tab EC Take 1 tablet (325 mg total) by mouth daily.    FLUoxetine 10 MG Oral Cap Take 1 capsule (10 mg total) by mouth daily.    Nystatin (NYAMYC) 942681 UNIT/GM External Powder Apply 1 Application topically 4 (four) times daily.    cholecalciferol 125 MCG (5000 UT) Oral Cap Cholecalciferol (VITAMIN D3) 5000 units capsule, [RxNorm: 659764]    albuterol 108 (90 Base) MCG/ACT Inhalation Aero Soln Inhale 2 puffs into the lungs every 6 (six) hours as needed for Wheezing or Shortness of Breath.    sennosides 8.6 MG Oral Tab Take 1 tablet (8.6 mg total) by mouth daily as needed (constipation).    Meclizine HCl 25 MG Oral Tab Take 1 tablet (25 mg total) by mouth daily as needed.    acetaminophen 500 MG Oral Tab Take 1 tablet (500 mg total) by mouth every 6 (six) hours as needed for Pain.    Lido-Capsaicin-Men-Methyl Sal 0.5-0.035-5-20 % External Patch Apply topically daily as needed (left Rib cage).       Medical History:  She  has a past medical history of Acute, but ill-defined, cerebrovascular disease, Anxiety state, Atrial fibrillation ([facility]), Cataract, bilateral (2005), Chronic a-fib ([facility]), Congestive heart disease ([facility]), Conjunctival cyst of left eye, Cup to disc asymmetry (2006), Depression, Diabetes (HCC), Esophageal reflux, Essential hypertension, Hearing impairment, Hiatal hernia, Hyperlipidemia, Meibomian gland dysfunction (MGD), bilateral, both upper and lower lids (2009), Osteoarthritis, TIA (transient ischemic attack), and Unspecified atrial fibrillation  ([facility]).  Surgical History:  She  has no past surgical history on file.   Family History:  Her family history includes Diabetes in her mother and sister; Macular degeneration in her sister.  Social History:  She  reports that she quit smoking about 55 years ago. She has never used smokeless tobacco. She reports that she does not drink alcohol and does not use drugs.    Tobacco:  She smoked tobacco in the past but quit greater than 12 months ago.  Social History     Tobacco Use   Smoking Status Former    Current packs/day: 0.00    Types: Cigarettes    Quit date: 6/15/1969    Years since quittin.4   Smokeless Tobacco Never        CAGE Alcohol Screen:   CAGE screening score of 0 on 10/24/2024, showing low risk of alcohol abuse.      Patient Care Team:  Lyndon Talbot MD as PCP - General (Internal Medicine)  Walter Sol MD as Consulting Physician (NEUROLOGY)  Avelino Perry MD as Consulting Physician (NEUROLOGY)    Review of Systems  GENERAL: feels well otherwise  SKIN: denies any unusual skin lesions  EYES: denies blurred vision or double vision  HEENT: denies nasal congestion, sinus pain or ST  LUNGS: denies shortness of breath with exertion  CARDIOVASCULAR: denies chest pain on exertion  GI: denies abdominal pain, denies heartburn  : denies dysuria, vaginal discharge or itching, no complaint of urinary incontinence   MUSCULOSKELETAL: denies back pain  NEURO: denies headaches  PSYCHE: denies depression or anxiety  HEMATOLOGIC: denies hx of anemia  ENDOCRINE: denies thyroid history  ALL/ASTHMA: denies hx of allergy or asthma    Objective:   Physical Exam  General Appearance:  Alert, cooperative, no distress, appears stated age; on wheel chair   Head:  Normocephalic, without obvious abnormality, atraumatic   Eyes:  PERRL, conjunctiva/corneas clear, EOM's intact both eyes   Ears:  Normal TM's and external ear canals, both ears   Nose: Nares normal, septum midline,mucosa normal, no drainage or  sinus tenderness   Throat: Lips, mucosa, and tongue normal; teeth and gums normal   Neck: Supple, symmetrical, trachea midline, no adenopathy;  thyroid: not enlarged, symmetric, no tenderness/mass/nodules; no carotid bruit or JVD   Back:   Symmetric, no curvature, ROM normal, no CVA tenderness   Lungs:   Clear to auscultation bilaterally, respirations unlabored   Heart:  Regular rate and rhythm, S1 and S2 normal, no murmur, rub, or gallop   Abdomen:   Soft, non-tender, bowel sounds active all four quadrants,  no masses, no organomegaly   Pelvic: Deferred   Extremities: Extremities normal, atraumatic, no cyanosis or edema   Pulses: 2+ and symmetric; left ankle medially deviated; sensory intact    Skin: Skin color, texture, turgor normal, no rashes or lesions   Lymph nodes: Cervical, supraclavicular,  nodes normal   Neurologic:  Awake and alert, O x1       /64   Pulse 56   Temp 97.6 °F (36.4 °C) (Tympanic)   Ht 5' 4\" (1.626 m)   Wt 122 lb 8 oz (55.6 kg)   BMI 21.03 kg/m²  Estimated body mass index is 21.03 kg/m² as calculated from the following:    Height as of this encounter: 5' 4\" (1.626 m).    Weight as of this encounter: 122 lb 8 oz (55.6 kg).    Medicare Hearing Assessment:   Hearing Screening    Time taken: 10/24/2024  4:24 PM  Screening Method: Questionnaire  I have a problem hearing over the telephone: Yes I have trouble following the conversations when two or more people are talking at the same time: Yes   I have trouble understanding things on the TV: Yes I have to strain to understand conversations: Yes   I have to worry about missing the telephone ring or doorbell: Yes I have trouble hearing conversations in a noisy background such as a crowded room or restaurant: Yes   I get confused about where sounds come from: Yes I misunderstand some words in a sentence and need to ask people to repeat themselves: Yes   I especially have trouble understanding the speech of women and children: Yes I have  trouble understanding the speaker in a large room such as at a meeting or place of Congregation: Yes   Many people I talk to seem to mumble (or don't speak clearly): Yes People get annoyed because I misunderstand what they say: Yes   I misunderstand what others are saying and make inappropriate responses: Yes I avoid social activities because I cannot hear well and fear I will reply improperly: Yes   Family members and friends have told me they think I may have hearing loss: Yes             Visual Acuity:   Right Eye Visual Acuity: Uncorrected Right Eye Chart Acuity: 20/100   Left Eye Visual Acuity: Uncorrected Left Eye Chart Acuity: 20/100   Both Eyes Visual Acuity: Uncorrected Both Eyes Chart Acuity: 20/100   Able To Tolerate Visual Acuity: Yes        Assessment & Plan:   Yin Casas is a 89 year old female who presents for a Medicare Assessment.     (Z00.00) Medicare annual wellness visit, subsequent  (primary encounter diagnosis)  Plan: CBC With Differential With Platelet, Comp         Metabolic Panel (14), Lipid Panel, TSH W Reflex        To Free T4        Routine labs been ordered.  Patient already had her flu shot from assisted living where she is staying.  Daughter will check if the patient had really received the latest COVID booster.    (I10) Essential hypertension  Plan: BP controlled with current blood pressure med.  CPM.    (E11.9) Controlled type 2 diabetes mellitus without complication, without long-term current use of insulin (HCC)  Plan: POC Glycohemoglobin [64633], Microalb/Creat         Ratio, Random Urine        Her A1c was already 5.6 on normal now.  Told the daughter will stop her glipizide to avoid risk of hypoglycemia.    (N18.31) Stage 3a chronic kidney disease (HCC)  Plan: Check a renal panel.  Continued avoidance of NSAIDs.    (I50.22) Chronic systolic heart failure (HCC)  Plan: She appears to be compensated.  She will continue with her cardiac meds and diuretics.    (I48.20) Chronic  a-fib ([facility])  Plan: Patient unable to use blood thinners because of recurrent falls before.  She is on antiplatelet therapy.    (I49.5) SSS (sick sinus syndrome) ([facility])  Plan: Patient status post permanent cardiac pacemaker.  She is followed by her cardiologist regularly.    (Z95.0) S/P placement of cardiac pacemaker  Plan: See above.    (R48.2) Gait apraxia of elderly  Plan: I been diagnosed with this condition before by her neurologist causing her recurrent falls.    (G30.1,  F02.80) Late onset Alzheimer's disease without behavioral disturbance ([facility])  Plan: The patient had been followed by neurology before.  Daughter states that patient's cognitive function had been somewhat progressing.    (R29.6) Recurrent falls  Plan: Patient with history of recurrent falls.  She has been using more of her wheelchair now instead of using her cane or walker and please reduce her falling.  She is ready taking vitamin D supplements she has had several physical therapy done before.    (E78.2) Mixed hyperlipidemia  Plan: Comp Metabolic Panel (14), Lipid Panel        Will check a lipid panel.  Continue low-fat low-cholesterol diet.  Continue statin therapy.    (Z86.73) History of stroke  Plan: Remote history of stroke.  She has been on statin therapy as well as antiplatelet therapy.    (E55.9) Vitamin D deficiency  Plan: Vitamin D [E]        Continue vitamin D supplement.    (R82.90) Malodorous urine  Plan: Urinalysis, Routine [E], Urine Culture, Routine        [E]        Daughter complaining of some amount of the risk of urine from the patient.  Did order urinalysis and urine culture.    (R13.12) Oropharyngeal dysphagia  Plan: Per daughter, the patient has been noted to be coughing specially when she is eating.  I told her daughter will order speech evaluation where she is staying.     The patient indicates understanding of these issues and agrees to the plan.  Reinforced healthy diet, lifestyle, and exercise.      No follow-ups on  file.     Lyndon Talbot MD, 10/24/2024     Supplementary Documentation:   General Health:  In the past six months, have you lost more than 10 pounds without trying?: 3 - Don't know  Has your appetite been poor?: Yes  Type of Diet: Balanced  How does the patient maintain a good energy level?: Other  How would you describe your daily physical activity?: None  How would you describe your current health state?: Fair  How do you maintain positive mental well-being?: Visiting Family  On a scale of 0 to 10, with 0 being no pain and 10 being severe pain, what is your pain level?: 0 - (None)  In the past six months, have you experienced urine leakage?: 1-Yes  At any time do you feel concerned for the safety/well-being of yourself and/or your children, in your home or elsewhere?: No  Have you had any immunizations at another office such as Influenza, Hepatitis B, Tetanus, or Pneumococcal?: No    Health Maintenance   Topic Date Due    Diabetes Care: Microalb/Creat Ratio  Never done    Diabetes Care Dilated Eye Exam  06/26/2016    Diabetes Care Foot Exam  08/14/2018    Zoster Vaccines (2 of 2) 12/20/2023    Annual Depression Screening  01/01/2024    Fall Risk Screening (Annual)  01/01/2024    Diabetes Care: GFR  07/05/2024    Annual Physical  09/21/2024    COVID-19 Vaccine (7 - 2023-24 season) 02/17/2025    Diabetes Care A1C  04/24/2025    Influenza Vaccine  Completed    Pneumococcal Vaccine: 65+ Years  Completed        [Time Spent: ___ minutes] : I have spent [unfilled] minutes of time on the encounter.

## 2024-10-26 ENCOUNTER — TELEPHONE (OUTPATIENT)
Dept: INTERNAL MEDICINE CLINIC | Facility: CLINIC | Age: 89
End: 2024-10-26

## 2024-10-26 DIAGNOSIS — R13.12 OROPHARYNGEAL DYSPHAGIA: ICD-10-CM

## 2024-10-26 DIAGNOSIS — R13.10 SWALLOWING IMPAIRED: ICD-10-CM

## 2024-10-26 DIAGNOSIS — F02.80 LATE ONSET ALZHEIMER'S DISEASE WITHOUT BEHAVIORAL DISTURBANCE (HCC): ICD-10-CM

## 2024-10-26 DIAGNOSIS — G30.1 LATE ONSET ALZHEIMER'S DISEASE WITHOUT BEHAVIORAL DISTURBANCE (HCC): ICD-10-CM

## 2024-10-26 DIAGNOSIS — Z86.73 HISTORY OF STROKE: ICD-10-CM

## 2024-10-26 DIAGNOSIS — E11.9 CONTROLLED TYPE 2 DIABETES MELLITUS WITHOUT COMPLICATION, WITHOUT LONG-TERM CURRENT USE OF INSULIN (HCC): Primary | ICD-10-CM

## 2024-10-26 DIAGNOSIS — I48.20 CHRONIC A-FIB (HCC): ICD-10-CM

## 2024-10-26 DIAGNOSIS — D64.9 NORMOCYTIC ANEMIA: ICD-10-CM

## 2024-10-26 PROBLEM — R29.6 RECURRENT FALLS: Status: ACTIVE | Noted: 2024-10-26

## 2024-10-26 PROBLEM — R82.90 MALODOROUS URINE: Status: ACTIVE | Noted: 2024-01-01

## 2024-10-26 PROBLEM — I50.22 CHRONIC SYSTOLIC HEART FAILURE (HCC): Status: ACTIVE | Noted: 2024-01-01

## 2024-10-26 PROBLEM — I50.22 CHRONIC SYSTOLIC HEART FAILURE (HCC): Status: ACTIVE | Noted: 2024-10-26

## 2024-10-26 PROBLEM — R82.90 MALODOROUS URINE: Status: ACTIVE | Noted: 2024-10-26

## 2024-10-26 PROBLEM — R29.6 RECURRENT FALLS: Status: ACTIVE | Noted: 2024-01-01

## 2024-10-26 NOTE — TELEPHONE ENCOUNTER
I called pt's assisted living to give verbal order to nurse of pt; nobody avaialble but able to leave message in their answering machine,  I had ordered to stop her glipzide 2.5mg daily since pt A1c in the clinic this week was 5.6 so already normal.   I also ordered speech therapy eval since daughter complained when I saw pt this week that pt has been coughing when she is eating.

## 2024-10-28 NOTE — TELEPHONE ENCOUNTER
Enedelia NEFF, of [facility] called back.     Discussed orders per Dr Talbot below: stop glipzide 2.5mg and request speech therapy.     Enedelia NEFF would need order for speech therapy faxed to facility. Also requested orders for urine culture and UA. (Placed in chart 10/24/24)     Order placed for speech therapy; also faxed all orders to [phone]

## 2024-10-29 ENCOUNTER — MED REC SCAN ONLY (OUTPATIENT)
Dept: INTERNAL MEDICINE CLINIC | Facility: CLINIC | Age: 89
End: 2024-10-29

## 2024-11-02 ENCOUNTER — TELEPHONE (OUTPATIENT)
Dept: INTERNAL MEDICINE CLINIC | Facility: CLINIC | Age: 89
End: 2024-11-02

## 2024-11-02 NOTE — TELEPHONE ENCOUNTER
I spoke with the nurse at the nursing home.  Patient has tested positive for UTI.  Culture grew out Enterobacter.  We went over the sensitivities one by one.  It is sensitive to trimethoprim sulfa.  Placed patient on Bactrim DS 1 pill twice a day for 7 days.

## 2024-11-03 NOTE — TELEPHONE ENCOUNTER
I spoke with the nurse at the nursing home.  We again went over the test results of the urinalysis.  Positive for Enterobacter.  She states that she has not received any of the Bactrim because they do not get the Bactrim until Monday morning.  They do have Cipro and Levaquin on hand.  Patient has listed allergy of tendinitis of the heel with the Levaquin.  We will give low-dose Cipro 250 twice a day today and then stop tomorrow when the Bactrim comes in and then complete the course of antibiotics with Bactrim.  Since it is a relatively brief course of Cipro, I would not expected to cause issues with the tendinitis.  I am concerned about delaying care and treatment as that may cause a worsened infection.  I will forward this to the primary care physician.

## 2024-11-06 ENCOUNTER — TELEPHONE (OUTPATIENT)
Dept: INTERNAL MEDICINE CLINIC | Facility: CLINIC | Age: 89
End: 2024-11-06

## 2024-11-06 NOTE — TELEPHONE ENCOUNTER
Received positive UA results from [facility]. Form has been signed and faxed back to [facility] to [phone]. Confirmation fax has been received that fax went through successfully.

## 2024-11-07 ENCOUNTER — TELEPHONE (OUTPATIENT)
Dept: INTERNAL MEDICINE CLINIC | Facility: CLINIC | Age: 89
End: 2024-11-07

## 2024-11-07 NOTE — TELEPHONE ENCOUNTER
Pt's daughter stated Pt lives at assisted living -[facility], was called by a Rep-stated they can perform a video swallow exam at the facility-not sure why they called , she told them No but wanted Dr Morris to know what happen and that when it's needed again pt will have at [facility] as before

## 2024-11-08 NOTE — TELEPHONE ENCOUNTER
I had ordered speech therapy eval for pt after I saw pt for her medicare physical since daughter told me pt has been coughing while eating. Has pt been seen by speech therapist in the assisted living where she lives? I had not received any note from speech therapy yet.  Check with daughter.

## 2024-11-08 NOTE — TELEPHONE ENCOUNTER
Advised patient's daughter Yessenia of Dr Talbot's note. She states that she does not want anyone at [facility] to see her because she would rather have it outpatient through [facility] or [facility].

## 2024-11-11 NOTE — TELEPHONE ENCOUNTER
Left a detailed message to her daughter's cell (ok per Release) regarding note below. Advised to call back and speak with RN regarding notes below.

## 2024-11-11 NOTE — TELEPHONE ENCOUNTER
[facility] so if not then pt may have to see speech therapist in [facility] as out pt.

## 2024-11-12 NOTE — TELEPHONE ENCOUNTER
Spoke to daughter Yessenia (on GIBRAN) and informed her of new Speech Therapy order sent to [facility] today by Dr. Talbot.     Yessenia thanked us for the call back.

## 2024-11-12 NOTE — TELEPHONE ENCOUNTER
Spoke to patient's daughter Yessenia (on Release of Information), verified Name and . Relayed Dr. Talbot's message below. States she is irritated with [facility] in [place] \"because they just jumped right in when they got the order and gave it to the in-house therapist.\" Daughter prefers patient to be evaluated by [facility].     Dr. Talbot please.

## 2024-11-29 RX ORDER — FLUOXETINE 10 MG/1
10 CAPSULE ORAL DAILY
Qty: 90 CAPSULE | Refills: 3 | Status: SHIPPED | OUTPATIENT
Start: 2024-11-29

## 2024-11-29 RX ORDER — ASPIRIN 325 MG
325 TABLET, DELAYED RELEASE (ENTERIC COATED) ORAL DAILY
Qty: 90 TABLET | Refills: 1 | Status: SHIPPED | OUTPATIENT
Start: 2024-11-29

## 2024-11-29 RX ORDER — CLOPIDOGREL BISULFATE 75 MG/1
75 TABLET ORAL DAILY
Qty: 90 TABLET | Refills: 1 | Status: SHIPPED | OUTPATIENT
Start: 2024-11-29

## 2024-11-29 RX ORDER — FUROSEMIDE 20 MG/1
20 TABLET ORAL DAILY
Qty: 90 TABLET | Refills: 1 | Status: SHIPPED | OUTPATIENT
Start: 2024-11-29

## 2024-11-29 RX ORDER — LORATADINE 10 MG/1
10 TABLET ORAL DAILY
Qty: 90 TABLET | Refills: 3 | Status: SHIPPED | OUTPATIENT
Start: 2024-11-29

## 2024-11-29 RX ORDER — MELATONIN
1 DAILY
Qty: 90 TABLET | Refills: 1 | Status: SHIPPED | OUTPATIENT
Start: 2024-11-29

## 2024-11-29 NOTE — TELEPHONE ENCOUNTER
Refill passed per [facility] protocol.  Requested Prescriptions   Pending Prescriptions Disp Refills    ALLERGY RELIEF 10 MG Oral Tab [Pharmacy Med Name: ALLERGY RELIEF 10 MG TABLET] 90 tablet 0     Sig: TAKE 1 TABLET BY MOUTH DAILY       Allergy Medication Protocol Passed - 11/29/2024 11:43 AM        Passed - In person appointment or virtual visit in the past 12 mos or appointment in next 3 mos     Recent Outpatient Visits              1 month ago Medicare annual wellness visit, subsequent    [facility], Lyndon Sen MD    Office Visit    1 year ago Sensorineural hearing loss, bilateral    [facility], [place]    Office Visit    1 year ago Conductive hearing loss, bilateral    [facility], [place]    Office Visit    1 year ago Medicare annual wellness visit, subsequent    [facility]    [facility] Visit    2 years ago Recurrent UTI    [facility] Coby Arce APRN    Office Visit                        ASPIRIN 325 MG Oral Tab EC [Pharmacy Med Name: ASPIRIN  MG TABLET] 90 tablet 0     Sig: TAKE 1 TABLET BY MOUTH EVERY DAY       Aspirin Protocol Passed - 11/29/2024 11:43 AM        Passed - In person appointment or virtual visit in the past 6 mos or appointment in next 3 mos     Recent Outpatient Visits              1 month ago Medicare annual wellness visit, subsequent    [facility], Lyndon Sen MD    Office Visit    1 year ago Sensorineural hearing loss, bilateral    [facility], [place] [facility]    Office Visit    1 year ago Conductive hearing loss, bilateral    [facility]    [facility]  Visit    1 year ago Medicare annual wellness visit, subsequent    [facility], [address], Lyndon Sen MD    Office Visit    2 years ago Recurrent UTI    [facility]    Office Visit                        CLOPIDOGREL 75 MG Oral Tab [Pharmacy Med Name: CLOPIDOGREL 75 MG TABLET] 90 tablet 0     Sig: TAKE 1 TABLET BY MOUTH EVERY DAY       There is no refill protocol information for this order       FLUOXETINE 10 MG Oral Cap [Pharmacy Med Name: FLUOXETINE HCL 10 MG CAPSULE]  0     Sig: TAKE 1 CAPSULE BY MOUTH EVERY DAY       Psychiatric Non-Scheduled (Anti-Anxiety) Passed - 11/29/2024 11:43 AM        Passed - In person appointment or virtual visit in the past 6 mos or appointment in next 3 mos     Recent Outpatient Visits              1 month ago Medicare annual wellness visit, subsequent    [facility] [address], Lyndon Sen MD    Office Visit    1 year ago Sensorineural hearing loss, bilateral    [facility], Silvia Wiley Au.D    Office Visit    1 year ago Conductive hearing loss, bilateral    [facility], Ricki Le MD    Office Visit    1 year ago Medicare annual wellness visit, subsequent    [facility], Lyndon Sen MD    Office Visit    2 years ago Recurrent UTI    [facility], Coby Salmeron APRN    Office Visit                      Passed - Depression Screening completed within the past 12 months          FUROSEMIDE 20 MG Oral Tab [Pharmacy Med Name: FUROSEMIDE 20 MG TABLET] 90 tablet 0     Sig: TAKE 1 TABLET BY MOUTH DAILY       Hypertension Medications Protocol Failed - 11/29/2024 11:43 AM        Failed - CMP or BMP in past 12 months        Failed - EGFRCR or GFRNAA > 50     GFR  Evaluation            Passed - Last BP reading less than 140/90     BP Readings from Last 1 Encounters:   10/24/24 116/64               Passed - In person appointment or virtual visit in the past 12 mos or appointment in next 3 mos     Recent Outpatient Visits              1 month ago Medicare annual wellness visit, subsequent    [facility], [address], Lyndon Sen MD    Office Visit    1 year ago Sensorineural hearing loss, bilateral    [facility], [place]    Office Visit    1 year ago Conductive hearing loss, bilateral    [facility], Ricki Le MD    Office Visit    1 year ago Medicare annual wellness visit, subsequent    [facility], Lyndon Sen MD    Office Visit    2 years ago Recurrent UTI    [facility], [place] Montilla APRN    Office Visit                        MAGNESIUM OXIDE -MG SUPPLEMENT 400 (240 Mg) MG Oral Tab [Pharmacy Med Name: MAGNESIUM OXIDE 400 MG TABLET] 90 tablet 0     Sig: TAKE 1 TABLET BY MOUTH EVERY DAY       There is no refill protocol information for this order          Recent Outpatient Visits              1 month ago Medicare annual wellness visit, subsequent    [facility], Lyndon Sen MD    Office Visit    1 year ago Sensorineural hearing loss, bilateral    [facility], Silvia Wiley Au.D    Office Visit    1 year ago Conductive hearing loss, bilateral    [facility], Ricki Le MD    Office Visit    1 year ago Medicare annual wellness visit, subsequent    [facility], Lyndon Sen MD    Office Visit    2 years ago Recurrent UTI    [facility]  [address], [place], NICOLASA Navarrete    Office Visit

## 2024-11-29 NOTE — TELEPHONE ENCOUNTER
Please review; protocol failed/No Protocol    Last Office Visit: 10/24/2024    Requested Prescriptions   Pending Prescriptions Disp Refills    clopidogrel 75 MG Oral Tab [Pharmacy Med Name: CLOPIDOGREL 75 MG TABLET] 90 tablet 0     Sig: Take 1 tablet (75 mg total) by mouth daily.       There is no refill protocol information for this order       furosemide 20 MG Oral Tab [Pharmacy Med Name: FUROSEMIDE 20 MG TABLET] 90 tablet 0     Sig: Take 1 tablet (20 mg total) by mouth daily.       Hypertension Medications Protocol Failed - 11/29/2024 11:45 AM        Failed - CMP or BMP in past 12 months        Failed - EGFRCR or GFRNAA > 50     GFR Evaluation            Passed - Last BP reading less than 140/90     BP Readings from Last 1 Encounters:   10/24/24 116/64               Passed - In person appointment or virtual visit in the past 12 mos or appointment in next 3 mos     Recent Outpatient Visits              1 month ago Medicare annual wellness visit, subsequent    [facility], [address], Lyndon Sen MD    Office Visit    1 year ago Sensorineural hearing loss, bilateral    [facility], Silvia Wiley Au.D    Office Visit    1 year ago Conductive hearing loss, bilateral    [facility], Ricki Le MD    Office Visit    1 year ago Medicare annual wellness visit, subsequent    [facility], Lyndon Sen MD    Office Visit    2 years ago Recurrent UTI    [facility], Coby Salmeron APRN    Office Visit                        Magnesium Oxide -Mg Supplement 400 (240 Mg) MG Oral Tab [Pharmacy Med Name: MAGNESIUM OXIDE 400 MG TABLET] 90 tablet 0     Sig: Take 1 tablet (400 mg total) by mouth daily.       There is no refill protocol information for this order      Signed Prescriptions Disp Refills    loratadine  (ALLERGY RELIEF) 10 MG Oral Tab 90 tablet 3     Sig: Take 1 tablet (10 mg total) by mouth daily.       Allergy Medication Protocol Passed - 11/29/2024 11:45 AM        Passed - In person appointment or virtual visit in the past 12 mos or appointment in next 3 mos     Recent Outpatient Visits              1 month ago Medicare annual wellness visit, subsequent    [facility], Lyndon Sne MD    Office Visit    1 year ago Sensorineural hearing loss, bilateral    [facility]    Office Visit    1 year ago Conductive hearing loss, bilateral    [facility]    Office Visit    1 year ago Medicare annual wellness visit, subsequent    [facility] Lyndon Sen MD    Office Visit    2 years ago Recurrent UTI    [facility], [place]    Office Visit                        aspirin 325 MG Oral Tab EC 90 tablet 1     Sig: Take 1 tablet (325 mg total) by mouth daily.       Aspirin Protocol Passed - 11/29/2024 11:45 AM        Passed - In person appointment or virtual visit in the past 6 mos or appointment in next 3 mos     Recent Outpatient Visits              1 month ago Medicare annual wellness visit, subsequent    [facility], Lyndon Sen MD    Office Visit    1 year ago Sensorineural hearing loss, bilateral    [facility] [place]    Office Visit    1 year ago Conductive hearing loss, bilateral    [facility]    Office Visit    1 year ago Medicare annual wellness visit, subsequent    [facility] Lyndon Sen MD    Office Visit    2 years ago  Recurrent UTI    [facility]    [facility]                        FLUoxetine 10 MG Oral Cap 90 capsule 3     Sig: Take 1 capsule (10 mg total) by mouth daily.       Psychiatric Non-Scheduled (Anti-Anxiety) Passed - 11/29/2024 11:45 AM        Passed - In person appointment or virtual visit in the past 6 mos or appointment in next 3 mos     Recent Outpatient Visits              1 month ago Medicare annual wellness visit, subsequent    [facility]    [facility] Visit    1 year ago Sensorineural hearing loss, bilateral    [facility], Silvia Wiley Au.D    Office Visit    1 year ago Conductive hearing loss, bilateral    [facility]    [facility] Visit    1 year ago Medicare annual wellness visit, subsequent    [facility], Lyndon Sen MD    Office Visit    2 years ago Recurrent UTI    [facility]    Office Visit                      Passed - Depression Screening completed within the past 12 months             Recent Outpatient Visits              1 month ago Medicare annual wellness visit, subsequent    [facility], Lyndon Sen MD    Office Visit    1 year ago Sensorineural hearing loss, bilateral    [facility] [place] Silvia Wiley Au.D    Office Visit    1 year ago Conductive hearing loss, bilateral    [facility]    [facility] Visit    1 year ago Medicare annual wellness visit, subsequent    [facility], Lyndon Sen MD    Office Visit    2 years ago Recurrent UTI    [facility]  [facility], [facility] [place], NIOCLASA Navarrete    Office Visit

## 2024-12-10 ENCOUNTER — MED REC SCAN ONLY (OUTPATIENT)
Dept: INTERNAL MEDICINE CLINIC | Facility: CLINIC | Age: 89
End: 2024-12-10

## 2024-12-10 ENCOUNTER — TELEPHONE (OUTPATIENT)
Dept: INTERNAL MEDICINE CLINIC | Facility: CLINIC | Age: 89
End: 2024-12-10

## 2024-12-10 RX ORDER — BETHANECHOL CHLORIDE 10 MG/1
10 TABLET ORAL DAILY
Qty: 90 TABLET | Refills: 3 | Status: SHIPPED | OUTPATIENT
Start: 2024-12-10

## 2024-12-10 NOTE — TELEPHONE ENCOUNTER
Order request for UA received from [facility]. Order signed and faxed to [phone], confirmation received.

## 2024-12-10 NOTE — TELEPHONE ENCOUNTER
Refill passed per [facility] protocols.    Requested Prescriptions   Pending Prescriptions Disp Refills    BETHANECHOL 10 MG Oral Tab [Pharmacy Med Name: BETHANECHOL 10 MG TABLET] 90 tablet 3     Sig: Take 1 tablet (10 mg total) by mouth daily.       Genitourinary Medications Passed - 12/10/2024 11:47 AM        Passed - Patient does not have pulmonary hypertension on problem list        Passed - In person appointment or virtual visit in the past 12 mos or appointment in next 3 mos     Recent Outpatient Visits              1 month ago Medicare annual wellness visit, subsequent    [facility], Lyndon Sen MD    Office Visit    1 year ago Sensorineural hearing loss, bilateral    [facility], Silvia Wiley Au.D    Office Visit    1 year ago Conductive hearing loss, bilateral    [facility], Ricki Le MD    Office Visit    1 year ago Medicare annual wellness visit, subsequent    [facility], Lyndon Sen MD    Office Visit    2 years ago Recurrent UTI    [facility], Coby Salmeron APRN    Office Visit

## 2024-12-11 ENCOUNTER — TELEPHONE (OUTPATIENT)
Dept: INTERNAL MEDICINE CLINIC | Facility: CLINIC | Age: 89
End: 2024-12-11

## 2024-12-11 RX ORDER — EZETIMIBE AND SIMVASTATIN 10; 10 MG/1; MG/1
1 TABLET ORAL NIGHTLY
Qty: 90 TABLET | Refills: 0 | Status: SHIPPED | OUTPATIENT
Start: 2024-12-11

## 2024-12-11 RX ORDER — OXCARBAZEPINE 150 MG/1
150 TABLET, FILM COATED ORAL DAILY
Qty: 90 TABLET | Refills: 3 | Status: SHIPPED | OUTPATIENT
Start: 2024-12-11

## 2024-12-11 RX ORDER — SULFAMETHOXAZOLE AND TRIMETHOPRIM 800; 160 MG/1; MG/1
1 TABLET ORAL 2 TIMES DAILY
Qty: 14 TABLET | Refills: 0 | Status: SHIPPED | OUTPATIENT
Start: 2024-12-11

## 2024-12-11 NOTE — TELEPHONE ENCOUNTER
Patient daughter contacted and notified of provider's recommendation.  She verbalized understanding.

## 2024-12-11 NOTE — TELEPHONE ENCOUNTER
Onsite Staff please give Dr. Talbot the urine culture result for this patient. Per [facility] they have faxed it to Dr. Talbot office.     I received a call from patient daughter that  [facility] has faxed you the urine culture result  and it was positive.  Please advise

## 2024-12-11 NOTE — TELEPHONE ENCOUNTER
Orders for antibiotics have been faxed to [facility] to fax number [phone]. Confirmation has been received  that fax went through successfully.

## 2024-12-11 NOTE — TELEPHONE ENCOUNTER
Please review; protocol failed/No Protocol    Labs pended     Requested Prescriptions   Pending Prescriptions Disp Refills    ezetimibe-simvastatin 10-10 MG Oral Tab [Pharmacy Med Name: EZETIMIBE-SIMVASTATIN 10-10 MG] 90 tablet 0     Sig: Take 1 tablet by mouth nightly.       Cholesterol Medication Protocol Failed - 12/11/2024  7:52 AM        Failed - ALT < 80     Lab Results   Component Value Date    ALT 13 09/20/2022             Failed - ALT resulted within past year        Failed - Lipid panel within past 12 months     Lab Results   Component Value Date    CHOLEST 157 11/29/2019    TRIG 187 (H) 11/29/2019    HDL 74 (H) 11/29/2019    LDL 46 11/29/2019    VLDL 37 (H) 11/29/2019    NONHDLC 83 11/29/2019             Passed - In person appointment or virtual visit in the past 12 mos or appointment in next 3 mos     Recent Outpatient Visits              1 month ago Medicare annual wellness visit, subsequent    [facility], Lyndon Sen MD    Office Visit    1 year ago Sensorineural hearing loss, bilateral    [facility], [place] Au.[place]    Office Visit    1 year ago Conductive hearing loss, bilateral    [facility], [place] Had[place]    Office Visit    1 year ago Medicare annual wellness visit, subsequent    [facility], Lyndon Sen MD    Office Visit    2 years ago Recurrent UTI    [facility], [place] Mont[place] APRN    Office Visit                       Signed Prescriptions Disp Refills    OXcarbazepine 150 MG Oral Tab 90 tablet 3     Sig: Take 1 tablet (150 mg total) by mouth daily.       Neurology Medications Passed - 12/11/2024  7:52 AM        Passed - In person appointment or virtual visit in the past 6 mos or appointment in next 3 mos     Recent Outpatient Visits              1 month  ago Medicare annual wellness visit, subsequent    [facility], [address], Lyndon Sen MD    Office Visit    1 year ago Sensorineural hearing loss, bilateral    [facility], [facility], Silvia Wiley Au.D    Office Visit    1 year ago Conductive hearing loss, bilateral    [facility], [facility], Ricki Le MD    Office Visit    1 year ago Medicare annual wellness visit, subsequent    [facility], Lyndon Sen MD    Office Visit    2 years ago Recurrent UTI    [facility], [facility], [place]    Office Visit                           Recent Outpatient Visits              1 month ago Medicare annual wellness visit, subsequent    [facility], Lyndon Sen MD    Office Visit    1 year ago Sensorineural hearing loss, bilateral    [facility], [facility], Silvia Wiley Au.D    Office Visit    1 year ago Conductive hearing loss, bilateral    [facility], [facility], Ricki Le MD    Office Visit    1 year ago Medicare annual wellness visit, subsequent    [facility], Lyndon Sen MD    Office Visit    2 years ago Recurrent UTI    [facility], [facility], Coby Salmeron APRN    Office Visit

## 2024-12-11 NOTE — TELEPHONE ENCOUNTER
Her urine culture positive again for same bacteria she had on last uti a month ago.   Start pt on bactrim DS 1 tab po bid for one week.  Erx sent to [place] [facility]  She keeps getting utis so I would recommend a ffup again with her urologist Dr Solomon .

## 2024-12-11 NOTE — TELEPHONE ENCOUNTER
Refill passed per [facility] protocol.  Requested Prescriptions   Pending Prescriptions Disp Refills    EZETIMIBE-SIMVASTATIN 10-10 MG Oral Tab [Pharmacy Med Name: EZETIMIBE-SIMVASTATIN 10-10 MG] 90 tablet 0     Sig: Take 1 tablet by mouth nightly.       Cholesterol Medication Protocol Failed - 12/11/2024  7:51 AM        Failed - ALT < 80     Lab Results   Component Value Date    ALT 13 09/20/2022             Failed - ALT resulted within past year        Failed - Lipid panel within past 12 months     Lab Results   Component Value Date    CHOLEST 157 11/29/2019    TRIG 187 (H) 11/29/2019    HDL 74 (H) 11/29/2019    LDL 46 11/29/2019    VLDL 37 (H) 11/29/2019    NONHDLC 83 11/29/2019             Passed - In person appointment or virtual visit in the past 12 mos or appointment in next 3 mos     Recent Outpatient Visits              1 month ago Medicare annual wellness visit, subsequent    [facility] [place]    Office Visit    1 year ago Sensorineural hearing loss, bilateral    [facility], [place] [facility]    Office Visit    1 year ago Conductive hearing loss, bilateral    [facility], [place]    Office Visit    1 year ago Medicare annual wellness visit, subsequent    [facility], Lyndon Sen MD    Office Visit    2 years ago Recurrent UTI    [facility], [place] Coby Arce APRN    Office Visit                        OXCARBAZEPINE 150 MG Oral Tab [Pharmacy Med Name: OXCARBAZEPINE 150 MG TABLET] 90 tablet 0     Sig: Take 1 tablet (150 mg total) by mouth daily.       Neurology Medications Passed - 12/11/2024  7:51 AM        Passed - In person appointment or virtual visit in the past 6 mos or appointment in next 3 mos     Recent Outpatient Visits              1 month ago  Medicare annual wellness visit, subsequent    [facility], [address], Lyndon Sen MD    Office Visit    1 year ago Sensorineural hearing loss, bilateral    [facility], [facility], Silvia Wiley Au.D    Office Visit    1 year ago Conductive hearing loss, bilateral    [facility], [facility], Ricki Le MD    Office Visit    1 year ago Medicare annual wellness visit, subsequent    [facility], [facility]    Office Visit    2 years ago Recurrent UTI    [facility], [place]    Office Visit                           Recent Outpatient Visits              1 month ago Medicare annual wellness visit, subsequent    [facility], [facility] Sen [facility]    Office Visit    1 year ago Sensorineural hearing loss, bilateral    [facility], Silvia Wiley Au.D    Office Visit    1 year ago Conductive hearing loss, bilateral    [facility], Ricki Le MD    Office Visit    1 year ago Medicare annual wellness visit, subsequent    [facility], Lyn[facility] Sen MD    Office Visit    2 years ago Recurrent UTI    [facility], Coby Salmeron APRN    Office Visit

## 2025-01-01 ENCOUNTER — APPOINTMENT (OUTPATIENT)
Dept: CT IMAGING | Facility: HOSPITAL | Age: OVER 89
End: 2025-01-01
Attending: INTERNAL MEDICINE
Payer: MEDICARE

## 2025-01-01 ENCOUNTER — APPOINTMENT (OUTPATIENT)
Dept: GENERAL RADIOLOGY | Facility: HOSPITAL | Age: OVER 89
End: 2025-01-01
Attending: HOSPITALIST
Payer: MEDICARE

## 2025-01-01 ENCOUNTER — APPOINTMENT (OUTPATIENT)
Dept: CV DIAGNOSTICS | Facility: HOSPITAL | Age: OVER 89
End: 2025-01-01
Attending: NURSE PRACTITIONER
Payer: MEDICARE

## 2025-01-01 ENCOUNTER — APPOINTMENT (OUTPATIENT)
Dept: GENERAL RADIOLOGY | Facility: HOSPITAL | Age: OVER 89
End: 2025-01-01
Payer: MEDICARE

## 2025-01-01 ENCOUNTER — APPOINTMENT (OUTPATIENT)
Dept: CV DIAGNOSTICS | Facility: HOSPITAL | Age: OVER 89
End: 2025-01-01
Attending: HOSPITALIST
Payer: MEDICARE

## 2025-01-01 ENCOUNTER — HOSPITAL ENCOUNTER (INPATIENT)
Facility: HOSPITAL | Age: OVER 89
LOS: 1 days | End: 2025-01-01
Attending: HOSPITALIST | Admitting: HOSPITALIST
Payer: OTHER MISCELLANEOUS

## 2025-01-01 ENCOUNTER — APPOINTMENT (OUTPATIENT)
Dept: CT IMAGING | Facility: HOSPITAL | Age: OVER 89
End: 2025-01-01
Attending: HOSPITALIST
Payer: MEDICARE

## 2025-01-01 ENCOUNTER — HOSPITAL ENCOUNTER (INPATIENT)
Facility: HOSPITAL | Age: OVER 89
LOS: 13 days | Discharge: HOSPICE | End: 2025-01-01
Attending: EMERGENCY MEDICINE | Admitting: HOSPITALIST
Payer: MEDICARE

## 2025-01-01 VITALS
SYSTOLIC BLOOD PRESSURE: 176 MMHG | RESPIRATION RATE: 15 BRPM | TEMPERATURE: 97 F | HEART RATE: 166 BPM | HEIGHT: 64 IN | DIASTOLIC BLOOD PRESSURE: 89 MMHG | WEIGHT: 119.38 LBS | BODY MASS INDEX: 20.38 KG/M2 | OXYGEN SATURATION: 90 %

## 2025-01-01 VITALS
SYSTOLIC BLOOD PRESSURE: 104 MMHG | RESPIRATION RATE: 6 BRPM | DIASTOLIC BLOOD PRESSURE: 62 MMHG | TEMPERATURE: 97 F | OXYGEN SATURATION: 95 % | HEART RATE: 79 BPM

## 2025-01-01 DIAGNOSIS — R09.02 HYPOXIA: ICD-10-CM

## 2025-01-01 DIAGNOSIS — J18.9 COMMUNITY ACQUIRED PNEUMONIA OF LEFT LOWER LOBE OF LUNG: Primary | ICD-10-CM

## 2025-01-01 LAB
ALBUMIN SERPL-MCNC: 3.6 G/DL (ref 3.2–4.8)
ALBUMIN SERPL-MCNC: 3.8 G/DL (ref 3.2–4.8)
ALBUMIN SERPL-MCNC: 4.5 G/DL (ref 3.2–4.8)
ALBUMIN/GLOB SERPL: 1.4 {RATIO} (ref 1–2)
ALBUMIN/GLOB SERPL: 1.6 {RATIO} (ref 1–2)
ALBUMIN/GLOB SERPL: 1.6 {RATIO} (ref 1–2)
ALP LIVER SERPL-CCNC: 60 U/L
ALP LIVER SERPL-CCNC: 65 U/L
ALP LIVER SERPL-CCNC: 75 U/L
ALT SERPL-CCNC: 36 U/L
ALT SERPL-CCNC: <7 U/L
ALT SERPL-CCNC: <7 U/L
ANION GAP SERPL CALC-SCNC: 10 MMOL/L (ref 0–18)
ANION GAP SERPL CALC-SCNC: 11 MMOL/L (ref 0–18)
ANION GAP SERPL CALC-SCNC: 12 MMOL/L (ref 0–18)
ANION GAP SERPL CALC-SCNC: 12 MMOL/L (ref 0–18)
ANION GAP SERPL CALC-SCNC: 13 MMOL/L (ref 0–18)
ANION GAP SERPL CALC-SCNC: 7 MMOL/L (ref 0–18)
ANION GAP SERPL CALC-SCNC: 8 MMOL/L (ref 0–18)
ANION GAP SERPL CALC-SCNC: 9 MMOL/L (ref 0–18)
AST SERPL-CCNC: 12 U/L (ref ?–34)
AST SERPL-CCNC: 14 U/L (ref ?–34)
AST SERPL-CCNC: 37 U/L (ref ?–34)
BASE EXCESS BLD CALC-SCNC: -3.5 MMOL/L (ref ?–2)
BASE EXCESS BLD CALC-SCNC: 5.5 MMOL/L (ref ?–2)
BASOPHILS # BLD AUTO: 0.02 X10(3) UL (ref 0–0.2)
BASOPHILS # BLD AUTO: 0.03 X10(3) UL (ref 0–0.2)
BASOPHILS # BLD AUTO: 0.03 X10(3) UL (ref 0–0.2)
BASOPHILS # BLD AUTO: 0.05 X10(3) UL (ref 0–0.2)
BASOPHILS # BLD AUTO: 0.06 X10(3) UL (ref 0–0.2)
BASOPHILS # BLD AUTO: 0.07 X10(3) UL (ref 0–0.2)
BASOPHILS # BLD AUTO: 0.07 X10(3) UL (ref 0–0.2)
BASOPHILS # BLD AUTO: 0.08 X10(3) UL (ref 0–0.2)
BASOPHILS # BLD AUTO: 0.11 X10(3) UL (ref 0–0.2)
BASOPHILS NFR BLD AUTO: 0.1 %
BASOPHILS NFR BLD AUTO: 0.2 %
BASOPHILS NFR BLD AUTO: 0.2 %
BASOPHILS NFR BLD AUTO: 0.4 %
BASOPHILS NFR BLD AUTO: 0.5 %
BASOPHILS NFR BLD AUTO: 0.6 %
BASOPHILS NFR BLD AUTO: 0.8 %
BILIRUB SERPL-MCNC: 0.5 MG/DL (ref 0.2–0.9)
BILIRUB SERPL-MCNC: 0.6 MG/DL (ref 0.2–0.9)
BILIRUB SERPL-MCNC: 0.6 MG/DL (ref 0.2–0.9)
BILIRUB UR QL: NEGATIVE
BNP SERPL-MCNC: 2155 PG/ML (ref ?–100)
BUN BLD-MCNC: 16 MG/DL (ref 9–23)
BUN BLD-MCNC: 16 MG/DL (ref 9–23)
BUN BLD-MCNC: 17 MG/DL (ref 9–23)
BUN BLD-MCNC: 17 MG/DL (ref 9–23)
BUN BLD-MCNC: 22 MG/DL (ref 9–23)
BUN BLD-MCNC: 22 MG/DL (ref 9–23)
BUN BLD-MCNC: 24 MG/DL (ref 9–23)
BUN BLD-MCNC: 24 MG/DL (ref 9–23)
BUN BLD-MCNC: 25 MG/DL (ref 9–23)
BUN BLD-MCNC: 27 MG/DL (ref 9–23)
BUN/CREAT SERPL: 20.3 (ref 10–20)
BUN/CREAT SERPL: 23.6 (ref 10–20)
BUN/CREAT SERPL: 23.9 (ref 10–20)
BUN/CREAT SERPL: 23.9 (ref 10–20)
BUN/CREAT SERPL: 25 (ref 10–20)
BUN/CREAT SERPL: 27.2 (ref 10–20)
BUN/CREAT SERPL: 27.3 (ref 10–20)
BUN/CREAT SERPL: 30 (ref 10–20)
BUN/CREAT SERPL: 30.1 (ref 10–20)
BUN/CREAT SERPL: 30.9 (ref 10–20)
BUN/CREAT SERPL: 31.3 (ref 10–20)
BUN/CREAT SERPL: 31.3 (ref 10–20)
BUN/CREAT SERPL: 32.1 (ref 10–20)
CALCIUM BLD-MCNC: 8.3 MG/DL (ref 8.7–10.4)
CALCIUM BLD-MCNC: 8.3 MG/DL (ref 8.7–10.4)
CALCIUM BLD-MCNC: 8.4 MG/DL (ref 8.7–10.4)
CALCIUM BLD-MCNC: 8.5 MG/DL (ref 8.7–10.4)
CALCIUM BLD-MCNC: 8.5 MG/DL (ref 8.7–10.4)
CALCIUM BLD-MCNC: 8.6 MG/DL (ref 8.7–10.4)
CALCIUM BLD-MCNC: 8.6 MG/DL (ref 8.7–10.4)
CALCIUM BLD-MCNC: 8.7 MG/DL (ref 8.7–10.4)
CALCIUM BLD-MCNC: 8.8 MG/DL (ref 8.7–10.4)
CALCIUM BLD-MCNC: 9 MG/DL (ref 8.7–10.4)
CALCIUM BLD-MCNC: 9.3 MG/DL (ref 8.7–10.4)
CHLORIDE SERPL-SCNC: 100 MMOL/L (ref 98–112)
CHLORIDE SERPL-SCNC: 103 MMOL/L (ref 98–112)
CHLORIDE SERPL-SCNC: 104 MMOL/L (ref 98–112)
CHLORIDE SERPL-SCNC: 106 MMOL/L (ref 98–112)
CHLORIDE SERPL-SCNC: 107 MMOL/L (ref 98–112)
CHLORIDE SERPL-SCNC: 109 MMOL/L (ref 98–112)
CHLORIDE SERPL-SCNC: 109 MMOL/L (ref 98–112)
CHLORIDE SERPL-SCNC: 110 MMOL/L (ref 98–112)
CHLORIDE SERPL-SCNC: 111 MMOL/L (ref 98–112)
CHLORIDE SERPL-SCNC: 112 MMOL/L (ref 98–112)
CHLORIDE SERPL-SCNC: 113 MMOL/L (ref 98–112)
CO2 SERPL-SCNC: 23 MMOL/L (ref 21–32)
CO2 SERPL-SCNC: 23 MMOL/L (ref 21–32)
CO2 SERPL-SCNC: 25 MMOL/L (ref 21–32)
CO2 SERPL-SCNC: 26 MMOL/L (ref 21–32)
CO2 SERPL-SCNC: 27 MMOL/L (ref 21–32)
CO2 SERPL-SCNC: 27 MMOL/L (ref 21–32)
CO2 SERPL-SCNC: 29 MMOL/L (ref 21–32)
CO2 SERPL-SCNC: 29 MMOL/L (ref 21–32)
CO2 SERPL-SCNC: 30 MMOL/L (ref 21–32)
CO2 SERPL-SCNC: 30 MMOL/L (ref 21–32)
CO2 SERPL-SCNC: 31 MMOL/L (ref 21–32)
CO2 SERPL-SCNC: 31 MMOL/L (ref 21–32)
CO2 SERPL-SCNC: 32 MMOL/L (ref 21–32)
COLOR UR: YELLOW
CREAT BLD-MCNC: 0.67 MG/DL
CREAT BLD-MCNC: 0.68 MG/DL
CREAT BLD-MCNC: 0.71 MG/DL
CREAT BLD-MCNC: 0.73 MG/DL
CREAT BLD-MCNC: 0.79 MG/DL
CREAT BLD-MCNC: 0.8 MG/DL
CREAT BLD-MCNC: 0.81 MG/DL
CREAT BLD-MCNC: 0.81 MG/DL
CREAT BLD-MCNC: 0.84 MG/DL
CREAT BLD-MCNC: 0.88 MG/DL
CREAT BLD-MCNC: 1.06 MG/DL
DEPRECATED RDW RBC AUTO: 40.5 FL (ref 35.1–46.3)
DEPRECATED RDW RBC AUTO: 41 FL (ref 35.1–46.3)
DEPRECATED RDW RBC AUTO: 41.1 FL (ref 35.1–46.3)
DEPRECATED RDW RBC AUTO: 42 FL (ref 35.1–46.3)
DEPRECATED RDW RBC AUTO: 42.8 FL (ref 35.1–46.3)
DEPRECATED RDW RBC AUTO: 43.7 FL (ref 35.1–46.3)
DEPRECATED RDW RBC AUTO: 44.1 FL (ref 35.1–46.3)
DEPRECATED RDW RBC AUTO: 44.8 FL (ref 35.1–46.3)
DEPRECATED RDW RBC AUTO: 45.2 FL (ref 35.1–46.3)
DEPRECATED RDW RBC AUTO: 45.2 FL (ref 35.1–46.3)
DEPRECATED RDW RBC AUTO: 46.1 FL (ref 35.1–46.3)
DEPRECATED RDW RBC AUTO: 46.1 FL (ref 35.1–46.3)
DEPRECATED RDW RBC AUTO: 47.2 FL (ref 35.1–46.3)
EGFRCR SERPLBLD CKD-EPI 2021: 50 ML/MIN/1.73M2 (ref 60–?)
EGFRCR SERPLBLD CKD-EPI 2021: 62 ML/MIN/1.73M2 (ref 60–?)
EGFRCR SERPLBLD CKD-EPI 2021: 66 ML/MIN/1.73M2 (ref 60–?)
EGFRCR SERPLBLD CKD-EPI 2021: 69 ML/MIN/1.73M2 (ref 60–?)
EGFRCR SERPLBLD CKD-EPI 2021: 69 ML/MIN/1.73M2 (ref 60–?)
EGFRCR SERPLBLD CKD-EPI 2021: 70 ML/MIN/1.73M2 (ref 60–?)
EGFRCR SERPLBLD CKD-EPI 2021: 71 ML/MIN/1.73M2 (ref 60–?)
EGFRCR SERPLBLD CKD-EPI 2021: 78 ML/MIN/1.73M2 (ref 60–?)
EGFRCR SERPLBLD CKD-EPI 2021: 81 ML/MIN/1.73M2 (ref 60–?)
EGFRCR SERPLBLD CKD-EPI 2021: 83 ML/MIN/1.73M2 (ref 60–?)
EGFRCR SERPLBLD CKD-EPI 2021: 83 ML/MIN/1.73M2 (ref 60–?)
EOSINOPHIL # BLD AUTO: 0 X10(3) UL (ref 0–0.7)
EOSINOPHIL # BLD AUTO: 0.01 X10(3) UL (ref 0–0.7)
EOSINOPHIL # BLD AUTO: 0.04 X10(3) UL (ref 0–0.7)
EOSINOPHIL # BLD AUTO: 0.04 X10(3) UL (ref 0–0.7)
EOSINOPHIL # BLD AUTO: 0.09 X10(3) UL (ref 0–0.7)
EOSINOPHIL # BLD AUTO: 0.12 X10(3) UL (ref 0–0.7)
EOSINOPHIL # BLD AUTO: 0.12 X10(3) UL (ref 0–0.7)
EOSINOPHIL # BLD AUTO: 0.16 X10(3) UL (ref 0–0.7)
EOSINOPHIL # BLD AUTO: 0.2 X10(3) UL (ref 0–0.7)
EOSINOPHIL # BLD AUTO: 0.26 X10(3) UL (ref 0–0.7)
EOSINOPHIL # BLD AUTO: 0.3 X10(3) UL (ref 0–0.7)
EOSINOPHIL # BLD AUTO: 0.35 X10(3) UL (ref 0–0.7)
EOSINOPHIL NFR BLD AUTO: 0 %
EOSINOPHIL NFR BLD AUTO: 0.1 %
EOSINOPHIL NFR BLD AUTO: 0.2 %
EOSINOPHIL NFR BLD AUTO: 0.3 %
EOSINOPHIL NFR BLD AUTO: 0.6 %
EOSINOPHIL NFR BLD AUTO: 0.8 %
EOSINOPHIL NFR BLD AUTO: 0.9 %
EOSINOPHIL NFR BLD AUTO: 1.3 %
EOSINOPHIL NFR BLD AUTO: 2 %
EOSINOPHIL NFR BLD AUTO: 2.1 %
EOSINOPHIL NFR BLD AUTO: 2.1 %
EOSINOPHIL NFR BLD AUTO: 2.4 %
ERYTHROCYTE [DISTWIDTH] IN BLOOD BY AUTOMATED COUNT: 11.7 % (ref 11–15)
ERYTHROCYTE [DISTWIDTH] IN BLOOD BY AUTOMATED COUNT: 11.7 % (ref 11–15)
ERYTHROCYTE [DISTWIDTH] IN BLOOD BY AUTOMATED COUNT: 11.9 % (ref 11–15)
ERYTHROCYTE [DISTWIDTH] IN BLOOD BY AUTOMATED COUNT: 11.9 % (ref 11–15)
ERYTHROCYTE [DISTWIDTH] IN BLOOD BY AUTOMATED COUNT: 12.2 % (ref 11–15)
ERYTHROCYTE [DISTWIDTH] IN BLOOD BY AUTOMATED COUNT: 12.3 % (ref 11–15)
ERYTHROCYTE [DISTWIDTH] IN BLOOD BY AUTOMATED COUNT: 12.5 % (ref 11–15)
ERYTHROCYTE [DISTWIDTH] IN BLOOD BY AUTOMATED COUNT: 12.7 % (ref 11–15)
ERYTHROCYTE [DISTWIDTH] IN BLOOD BY AUTOMATED COUNT: 12.9 % (ref 11–15)
ERYTHROCYTE [DISTWIDTH] IN BLOOD BY AUTOMATED COUNT: 12.9 % (ref 11–15)
ERYTHROCYTE [DISTWIDTH] IN BLOOD BY AUTOMATED COUNT: 13 % (ref 11–15)
ERYTHROCYTE [DISTWIDTH] IN BLOOD BY AUTOMATED COUNT: 13.1 % (ref 11–15)
ERYTHROCYTE [DISTWIDTH] IN BLOOD BY AUTOMATED COUNT: 13.3 % (ref 11–15)
EST. AVERAGE GLUCOSE BLD GHB EST-MCNC: 143 MG/DL (ref 68–126)
FLUAV + FLUBV RNA SPEC NAA+PROBE: NEGATIVE
FLUAV + FLUBV RNA SPEC NAA+PROBE: NEGATIVE
GLOBULIN PLAS-MCNC: 2.4 G/DL (ref 2–3.5)
GLOBULIN PLAS-MCNC: 2.6 G/DL (ref 2–3.5)
GLOBULIN PLAS-MCNC: 2.8 G/DL (ref 2–3.5)
GLUCOSE BLD-MCNC: 116 MG/DL (ref 70–99)
GLUCOSE BLD-MCNC: 122 MG/DL (ref 70–99)
GLUCOSE BLD-MCNC: 142 MG/DL (ref 70–99)
GLUCOSE BLD-MCNC: 153 MG/DL (ref 70–99)
GLUCOSE BLD-MCNC: 158 MG/DL (ref 70–99)
GLUCOSE BLD-MCNC: 160 MG/DL (ref 70–99)
GLUCOSE BLD-MCNC: 163 MG/DL (ref 70–99)
GLUCOSE BLD-MCNC: 173 MG/DL (ref 70–99)
GLUCOSE BLD-MCNC: 180 MG/DL (ref 70–99)
GLUCOSE BLD-MCNC: 190 MG/DL (ref 70–99)
GLUCOSE BLD-MCNC: 200 MG/DL (ref 70–99)
GLUCOSE BLD-MCNC: 212 MG/DL (ref 70–99)
GLUCOSE BLD-MCNC: 295 MG/DL (ref 70–99)
GLUCOSE BLDC GLUCOMTR-MCNC: 109 MG/DL (ref 70–99)
GLUCOSE BLDC GLUCOMTR-MCNC: 115 MG/DL (ref 70–99)
GLUCOSE BLDC GLUCOMTR-MCNC: 117 MG/DL (ref 70–99)
GLUCOSE BLDC GLUCOMTR-MCNC: 121 MG/DL (ref 70–99)
GLUCOSE BLDC GLUCOMTR-MCNC: 122 MG/DL (ref 70–99)
GLUCOSE BLDC GLUCOMTR-MCNC: 129 MG/DL (ref 70–99)
GLUCOSE BLDC GLUCOMTR-MCNC: 131 MG/DL (ref 70–99)
GLUCOSE BLDC GLUCOMTR-MCNC: 132 MG/DL (ref 70–99)
GLUCOSE BLDC GLUCOMTR-MCNC: 134 MG/DL (ref 70–99)
GLUCOSE BLDC GLUCOMTR-MCNC: 137 MG/DL (ref 70–99)
GLUCOSE BLDC GLUCOMTR-MCNC: 138 MG/DL (ref 70–99)
GLUCOSE BLDC GLUCOMTR-MCNC: 140 MG/DL (ref 70–99)
GLUCOSE BLDC GLUCOMTR-MCNC: 140 MG/DL (ref 70–99)
GLUCOSE BLDC GLUCOMTR-MCNC: 147 MG/DL (ref 70–99)
GLUCOSE BLDC GLUCOMTR-MCNC: 155 MG/DL (ref 70–99)
GLUCOSE BLDC GLUCOMTR-MCNC: 155 MG/DL (ref 70–99)
GLUCOSE BLDC GLUCOMTR-MCNC: 159 MG/DL (ref 70–99)
GLUCOSE BLDC GLUCOMTR-MCNC: 160 MG/DL (ref 70–99)
GLUCOSE BLDC GLUCOMTR-MCNC: 161 MG/DL (ref 70–99)
GLUCOSE BLDC GLUCOMTR-MCNC: 162 MG/DL (ref 70–99)
GLUCOSE BLDC GLUCOMTR-MCNC: 165 MG/DL (ref 70–99)
GLUCOSE BLDC GLUCOMTR-MCNC: 166 MG/DL (ref 70–99)
GLUCOSE BLDC GLUCOMTR-MCNC: 173 MG/DL (ref 70–99)
GLUCOSE BLDC GLUCOMTR-MCNC: 175 MG/DL (ref 70–99)
GLUCOSE BLDC GLUCOMTR-MCNC: 180 MG/DL (ref 70–99)
GLUCOSE BLDC GLUCOMTR-MCNC: 185 MG/DL (ref 70–99)
GLUCOSE BLDC GLUCOMTR-MCNC: 187 MG/DL (ref 70–99)
GLUCOSE BLDC GLUCOMTR-MCNC: 192 MG/DL (ref 70–99)
GLUCOSE BLDC GLUCOMTR-MCNC: 195 MG/DL (ref 70–99)
GLUCOSE BLDC GLUCOMTR-MCNC: 197 MG/DL (ref 70–99)
GLUCOSE BLDC GLUCOMTR-MCNC: 202 MG/DL (ref 70–99)
GLUCOSE BLDC GLUCOMTR-MCNC: 204 MG/DL (ref 70–99)
GLUCOSE BLDC GLUCOMTR-MCNC: 208 MG/DL (ref 70–99)
GLUCOSE BLDC GLUCOMTR-MCNC: 208 MG/DL (ref 70–99)
GLUCOSE BLDC GLUCOMTR-MCNC: 209 MG/DL (ref 70–99)
GLUCOSE BLDC GLUCOMTR-MCNC: 215 MG/DL (ref 70–99)
GLUCOSE BLDC GLUCOMTR-MCNC: 217 MG/DL (ref 70–99)
GLUCOSE BLDC GLUCOMTR-MCNC: 218 MG/DL (ref 70–99)
GLUCOSE BLDC GLUCOMTR-MCNC: 219 MG/DL (ref 70–99)
GLUCOSE BLDC GLUCOMTR-MCNC: 220 MG/DL (ref 70–99)
GLUCOSE BLDC GLUCOMTR-MCNC: 224 MG/DL (ref 70–99)
GLUCOSE BLDC GLUCOMTR-MCNC: 226 MG/DL (ref 70–99)
GLUCOSE BLDC GLUCOMTR-MCNC: 236 MG/DL (ref 70–99)
GLUCOSE BLDC GLUCOMTR-MCNC: 239 MG/DL (ref 70–99)
GLUCOSE BLDC GLUCOMTR-MCNC: 240 MG/DL (ref 70–99)
GLUCOSE BLDC GLUCOMTR-MCNC: 240 MG/DL (ref 70–99)
GLUCOSE BLDC GLUCOMTR-MCNC: 242 MG/DL (ref 70–99)
GLUCOSE BLDC GLUCOMTR-MCNC: 245 MG/DL (ref 70–99)
GLUCOSE BLDC GLUCOMTR-MCNC: 246 MG/DL (ref 70–99)
GLUCOSE BLDC GLUCOMTR-MCNC: 286 MG/DL (ref 70–99)
GLUCOSE BLDC GLUCOMTR-MCNC: 289 MG/DL (ref 70–99)
GLUCOSE BLDC GLUCOMTR-MCNC: 316 MG/DL (ref 70–99)
GLUCOSE BLDC GLUCOMTR-MCNC: 322 MG/DL (ref 70–99)
GLUCOSE BLDC GLUCOMTR-MCNC: 336 MG/DL (ref 70–99)
GLUCOSE BLDC GLUCOMTR-MCNC: 70 MG/DL (ref 70–99)
GLUCOSE UR-MCNC: 50 MG/DL
HBA1C MFR BLD: 6.6 % (ref ?–5.7)
HCO3 BLDA-SCNC: 22.1 MEQ/L (ref 21–27)
HCO3 BLDA-SCNC: 29.1 MEQ/L (ref 21–27)
HCT VFR BLD AUTO: 32.8 %
HCT VFR BLD AUTO: 33.2 %
HCT VFR BLD AUTO: 33.5 %
HCT VFR BLD AUTO: 33.7 %
HCT VFR BLD AUTO: 34.3 %
HCT VFR BLD AUTO: 34.7 %
HCT VFR BLD AUTO: 34.8 %
HCT VFR BLD AUTO: 34.9 %
HCT VFR BLD AUTO: 34.9 %
HCT VFR BLD AUTO: 35.1 %
HCT VFR BLD AUTO: 35.2 %
HCT VFR BLD AUTO: 35.3 %
HCT VFR BLD AUTO: 38.3 %
HGB BLD-MCNC: 10.1 G/DL
HGB BLD-MCNC: 10.5 G/DL
HGB BLD-MCNC: 10.7 G/DL
HGB BLD-MCNC: 10.7 G/DL
HGB BLD-MCNC: 10.9 G/DL
HGB BLD-MCNC: 11 G/DL
HGB BLD-MCNC: 11 G/DL
HGB BLD-MCNC: 11.2 G/DL
HGB BLD-MCNC: 11.2 G/DL
HGB BLD-MCNC: 12.8 G/DL
IMM GRANULOCYTES # BLD AUTO: 0.05 X10(3) UL (ref 0–1)
IMM GRANULOCYTES # BLD AUTO: 0.05 X10(3) UL (ref 0–1)
IMM GRANULOCYTES # BLD AUTO: 0.08 X10(3) UL (ref 0–1)
IMM GRANULOCYTES # BLD AUTO: 0.1 X10(3) UL (ref 0–1)
IMM GRANULOCYTES # BLD AUTO: 0.13 X10(3) UL (ref 0–1)
IMM GRANULOCYTES # BLD AUTO: 0.15 X10(3) UL (ref 0–1)
IMM GRANULOCYTES # BLD AUTO: 0.17 X10(3) UL (ref 0–1)
IMM GRANULOCYTES # BLD AUTO: 0.18 X10(3) UL (ref 0–1)
IMM GRANULOCYTES # BLD AUTO: 0.2 X10(3) UL (ref 0–1)
IMM GRANULOCYTES # BLD AUTO: 0.22 X10(3) UL (ref 0–1)
IMM GRANULOCYTES # BLD AUTO: 0.29 X10(3) UL (ref 0–1)
IMM GRANULOCYTES # BLD AUTO: 0.3 X10(3) UL (ref 0–1)
IMM GRANULOCYTES NFR BLD: 0.3 %
IMM GRANULOCYTES NFR BLD: 0.5 %
IMM GRANULOCYTES NFR BLD: 0.6 %
IMM GRANULOCYTES NFR BLD: 0.7 %
IMM GRANULOCYTES NFR BLD: 0.9 %
IMM GRANULOCYTES NFR BLD: 1 %
IMM GRANULOCYTES NFR BLD: 1.2 %
IMM GRANULOCYTES NFR BLD: 1.3 %
IMM GRANULOCYTES NFR BLD: 1.3 %
IMM GRANULOCYTES NFR BLD: 1.8 %
IMM GRANULOCYTES NFR BLD: 2.1 %
IMM GRANULOCYTES NFR BLD: 2.2 %
LEUKOCYTE ESTERASE UR QL STRIP.AUTO: 500
LYMPHOCYTES # BLD AUTO: 0.48 X10(3) UL (ref 1–4)
LYMPHOCYTES # BLD AUTO: 0.72 X10(3) UL (ref 1–4)
LYMPHOCYTES # BLD AUTO: 0.78 X10(3) UL (ref 1–4)
LYMPHOCYTES # BLD AUTO: 0.8 X10(3) UL (ref 1–4)
LYMPHOCYTES # BLD AUTO: 0.9 X10(3) UL (ref 1–4)
LYMPHOCYTES # BLD AUTO: 0.91 X10(3) UL (ref 1–4)
LYMPHOCYTES # BLD AUTO: 0.95 X10(3) UL (ref 1–4)
LYMPHOCYTES # BLD AUTO: 0.97 X10(3) UL (ref 1–4)
LYMPHOCYTES # BLD AUTO: 1.06 X10(3) UL (ref 1–4)
LYMPHOCYTES # BLD AUTO: 1.07 X10(3) UL (ref 1–4)
LYMPHOCYTES # BLD AUTO: 1.13 X10(3) UL (ref 1–4)
LYMPHOCYTES # BLD AUTO: 1.22 X10(3) UL (ref 1–4)
LYMPHOCYTES NFR BLD AUTO: 11.9 %
LYMPHOCYTES NFR BLD AUTO: 3.6 %
LYMPHOCYTES NFR BLD AUTO: 4.9 %
LYMPHOCYTES NFR BLD AUTO: 5.5 %
LYMPHOCYTES NFR BLD AUTO: 5.6 %
LYMPHOCYTES NFR BLD AUTO: 6.1 %
LYMPHOCYTES NFR BLD AUTO: 6.5 %
LYMPHOCYTES NFR BLD AUTO: 7.1 %
LYMPHOCYTES NFR BLD AUTO: 7.1 %
LYMPHOCYTES NFR BLD AUTO: 7.5 %
LYMPHOCYTES NFR BLD AUTO: 8.3 %
LYMPHOCYTES NFR BLD AUTO: 8.3 %
MAGNESIUM SERPL-MCNC: 1.7 MG/DL (ref 1.6–2.6)
MAGNESIUM SERPL-MCNC: 1.8 MG/DL (ref 1.6–2.6)
MAGNESIUM SERPL-MCNC: 1.9 MG/DL (ref 1.6–2.6)
MAGNESIUM SERPL-MCNC: 2 MG/DL (ref 1.6–2.6)
MAGNESIUM SERPL-MCNC: 2 MG/DL (ref 1.6–2.6)
MAGNESIUM SERPL-MCNC: 2.1 MG/DL (ref 1.6–2.6)
MCH RBC QN AUTO: 29.6 PG (ref 26–34)
MCH RBC QN AUTO: 30.1 PG (ref 26–34)
MCH RBC QN AUTO: 30.3 PG (ref 26–34)
MCH RBC QN AUTO: 30.4 PG (ref 26–34)
MCH RBC QN AUTO: 30.4 PG (ref 26–34)
MCH RBC QN AUTO: 30.7 PG (ref 26–34)
MCH RBC QN AUTO: 30.7 PG (ref 26–34)
MCH RBC QN AUTO: 31.2 PG (ref 26–34)
MCH RBC QN AUTO: 31.4 PG (ref 26–34)
MCH RBC QN AUTO: 31.4 PG (ref 26–34)
MCH RBC QN AUTO: 31.7 PG (ref 26–34)
MCHC RBC AUTO-ENTMCNC: 30.3 G/DL (ref 31–37)
MCHC RBC AUTO-ENTMCNC: 30.8 G/DL (ref 31–37)
MCHC RBC AUTO-ENTMCNC: 31.2 G/DL (ref 31–37)
MCHC RBC AUTO-ENTMCNC: 31.2 G/DL (ref 31–37)
MCHC RBC AUTO-ENTMCNC: 31.3 G/DL (ref 31–37)
MCHC RBC AUTO-ENTMCNC: 31.3 G/DL (ref 31–37)
MCHC RBC AUTO-ENTMCNC: 31.5 G/DL (ref 31–37)
MCHC RBC AUTO-ENTMCNC: 31.7 G/DL (ref 31–37)
MCHC RBC AUTO-ENTMCNC: 31.8 G/DL (ref 31–37)
MCHC RBC AUTO-ENTMCNC: 31.9 G/DL (ref 31–37)
MCHC RBC AUTO-ENTMCNC: 32.3 G/DL (ref 31–37)
MCHC RBC AUTO-ENTMCNC: 32.8 G/DL (ref 31–37)
MCHC RBC AUTO-ENTMCNC: 33.4 G/DL (ref 31–37)
MCV RBC AUTO: 93.9 FL
MCV RBC AUTO: 94.8 FL
MCV RBC AUTO: 95.1 FL
MCV RBC AUTO: 96.2 FL
MCV RBC AUTO: 96.3 FL
MCV RBC AUTO: 96.4 FL
MCV RBC AUTO: 96.4 FL
MCV RBC AUTO: 96.5 FL
MCV RBC AUTO: 96.5 FL
MCV RBC AUTO: 97.1 FL
MCV RBC AUTO: 98.3 FL
MCV RBC AUTO: 99.2 FL
MCV RBC AUTO: 99.7 FL
MODIFIED ALLEN TEST: POSITIVE
MODIFIED ALLEN TEST: POSITIVE
MONOCYTES # BLD AUTO: 0.75 X10(3) UL (ref 0.1–1)
MONOCYTES # BLD AUTO: 0.86 X10(3) UL (ref 0.1–1)
MONOCYTES # BLD AUTO: 0.92 X10(3) UL (ref 0.1–1)
MONOCYTES # BLD AUTO: 0.99 X10(3) UL (ref 0.1–1)
MONOCYTES # BLD AUTO: 1 X10(3) UL (ref 0.1–1)
MONOCYTES # BLD AUTO: 1.01 X10(3) UL (ref 0.1–1)
MONOCYTES # BLD AUTO: 1.09 X10(3) UL (ref 0.1–1)
MONOCYTES # BLD AUTO: 1.11 X10(3) UL (ref 0.1–1)
MONOCYTES # BLD AUTO: 1.21 X10(3) UL (ref 0.1–1)
MONOCYTES # BLD AUTO: 1.23 X10(3) UL (ref 0.1–1)
MONOCYTES # BLD AUTO: 1.34 X10(3) UL (ref 0.1–1)
MONOCYTES # BLD AUTO: 1.64 X10(3) UL (ref 0.1–1)
MONOCYTES NFR BLD AUTO: 10 %
MONOCYTES NFR BLD AUTO: 5.6 %
MONOCYTES NFR BLD AUTO: 6.8 %
MONOCYTES NFR BLD AUTO: 7 %
MONOCYTES NFR BLD AUTO: 7.2 %
MONOCYTES NFR BLD AUTO: 7.3 %
MONOCYTES NFR BLD AUTO: 7.5 %
MONOCYTES NFR BLD AUTO: 8.3 %
MONOCYTES NFR BLD AUTO: 8.5 %
MONOCYTES NFR BLD AUTO: 9.2 %
MONOCYTES NFR BLD AUTO: 9.4 %
MONOCYTES NFR BLD AUTO: 9.7 %
MRSA DNA SPEC QL NAA+PROBE: POSITIVE
NEUTROPHILS # BLD AUTO: 10.21 X10 (3) UL (ref 1.5–7.7)
NEUTROPHILS # BLD AUTO: 10.21 X10(3) UL (ref 1.5–7.7)
NEUTROPHILS # BLD AUTO: 10.74 X10 (3) UL (ref 1.5–7.7)
NEUTROPHILS # BLD AUTO: 10.74 X10(3) UL (ref 1.5–7.7)
NEUTROPHILS # BLD AUTO: 11.33 X10 (3) UL (ref 1.5–7.7)
NEUTROPHILS # BLD AUTO: 11.33 X10(3) UL (ref 1.5–7.7)
NEUTROPHILS # BLD AUTO: 11.55 X10 (3) UL (ref 1.5–7.7)
NEUTROPHILS # BLD AUTO: 11.55 X10(3) UL (ref 1.5–7.7)
NEUTROPHILS # BLD AUTO: 11.74 X10 (3) UL (ref 1.5–7.7)
NEUTROPHILS # BLD AUTO: 11.74 X10(3) UL (ref 1.5–7.7)
NEUTROPHILS # BLD AUTO: 12.04 X10 (3) UL (ref 1.5–7.7)
NEUTROPHILS # BLD AUTO: 12.04 X10(3) UL (ref 1.5–7.7)
NEUTROPHILS # BLD AUTO: 12.08 X10 (3) UL (ref 1.5–7.7)
NEUTROPHILS # BLD AUTO: 12.08 X10(3) UL (ref 1.5–7.7)
NEUTROPHILS # BLD AUTO: 12.46 X10 (3) UL (ref 1.5–7.7)
NEUTROPHILS # BLD AUTO: 12.46 X10(3) UL (ref 1.5–7.7)
NEUTROPHILS # BLD AUTO: 12.66 X10 (3) UL (ref 1.5–7.7)
NEUTROPHILS # BLD AUTO: 12.66 X10(3) UL (ref 1.5–7.7)
NEUTROPHILS # BLD AUTO: 13.61 X10 (3) UL (ref 1.5–7.7)
NEUTROPHILS # BLD AUTO: 13.61 X10(3) UL (ref 1.5–7.7)
NEUTROPHILS # BLD AUTO: 7.12 X10 (3) UL (ref 1.5–7.7)
NEUTROPHILS # BLD AUTO: 7.12 X10(3) UL (ref 1.5–7.7)
NEUTROPHILS # BLD AUTO: 9.87 X10 (3) UL (ref 1.5–7.7)
NEUTROPHILS # BLD AUTO: 9.87 X10(3) UL (ref 1.5–7.7)
NEUTROPHILS NFR BLD AUTO: 75.2 %
NEUTROPHILS NFR BLD AUTO: 79.6 %
NEUTROPHILS NFR BLD AUTO: 79.8 %
NEUTROPHILS NFR BLD AUTO: 79.9 %
NEUTROPHILS NFR BLD AUTO: 80.3 %
NEUTROPHILS NFR BLD AUTO: 82.7 %
NEUTROPHILS NFR BLD AUTO: 83.1 %
NEUTROPHILS NFR BLD AUTO: 83.8 %
NEUTROPHILS NFR BLD AUTO: 84 %
NEUTROPHILS NFR BLD AUTO: 84.5 %
NEUTROPHILS NFR BLD AUTO: 86.3 %
NEUTROPHILS NFR BLD AUTO: 90.1 %
NITRITE UR QL STRIP.AUTO: NEGATIVE
O2 CT BLD-SCNC: 14.7 VOL% (ref 15–23)
O2 CT BLD-SCNC: 14.8 VOL% (ref 15–23)
OSMOLALITY SERPL CALC.SUM OF ELEC: 293 MOSM/KG (ref 275–295)
OSMOLALITY SERPL CALC.SUM OF ELEC: 296 MOSM/KG (ref 275–295)
OSMOLALITY SERPL CALC.SUM OF ELEC: 297 MOSM/KG (ref 275–295)
OSMOLALITY SERPL CALC.SUM OF ELEC: 300 MOSM/KG (ref 275–295)
OSMOLALITY SERPL CALC.SUM OF ELEC: 305 MOSM/KG (ref 275–295)
OSMOLALITY SERPL CALC.SUM OF ELEC: 306 MOSM/KG (ref 275–295)
OSMOLALITY SERPL CALC.SUM OF ELEC: 307 MOSM/KG (ref 275–295)
OSMOLALITY SERPL CALC.SUM OF ELEC: 310 MOSM/KG (ref 275–295)
OSMOLALITY SERPL CALC.SUM OF ELEC: 312 MOSM/KG (ref 275–295)
OSMOLALITY SERPL CALC.SUM OF ELEC: 315 MOSM/KG (ref 275–295)
OSMOLALITY SERPL CALC.SUM OF ELEC: 321 MOSM/KG (ref 275–295)
OSMOLALITY SERPL CALC.SUM OF ELEC: 322 MOSM/KG (ref 275–295)
OSMOLALITY SERPL CALC.SUM OF ELEC: 323 MOSM/KG (ref 275–295)
OXYGEN LITERS/MINUTE: 2 L/MIN
PCO2 BLDA: 37 MM HG (ref 35–45)
PCO2 BLDA: 42 MM HG (ref 35–45)
PH BLDA: 7.37 [PH] (ref 7.35–7.45)
PH BLDA: 7.46 [PH] (ref 7.35–7.45)
PH UR: 5.5 [PH] (ref 5–8)
PHOSPHATE SERPL-MCNC: 2.4 MG/DL (ref 2.4–5.1)
PHOSPHATE SERPL-MCNC: 2.7 MG/DL (ref 2.4–5.1)
PHOSPHATE SERPL-MCNC: 2.9 MG/DL (ref 2.4–5.1)
PHOSPHATE SERPL-MCNC: 3 MG/DL (ref 2.4–5.1)
PHOSPHATE SERPL-MCNC: 3.3 MG/DL (ref 2.4–5.1)
PHOSPHATE SERPL-MCNC: 3.4 MG/DL (ref 2.4–5.1)
PHOSPHATE SERPL-MCNC: 3.6 MG/DL (ref 2.4–5.1)
PHOSPHATE SERPL-MCNC: 3.8 MG/DL (ref 2.4–5.1)
PHOSPHATE SERPL-MCNC: 4.1 MG/DL (ref 2.4–5.1)
PLATELET # BLD AUTO: 208 10(3)UL (ref 150–450)
PLATELET # BLD AUTO: 212 10(3)UL (ref 150–450)
PLATELET # BLD AUTO: 224 10(3)UL (ref 150–450)
PLATELET # BLD AUTO: 237 10(3)UL (ref 150–450)
PLATELET # BLD AUTO: 239 10(3)UL (ref 150–450)
PLATELET # BLD AUTO: 251 10(3)UL (ref 150–450)
PLATELET # BLD AUTO: 252 10(3)UL (ref 150–450)
PLATELET # BLD AUTO: 257 10(3)UL (ref 150–450)
PLATELET # BLD AUTO: 260 10(3)UL (ref 150–450)
PLATELET # BLD AUTO: 269 10(3)UL (ref 150–450)
PLATELET # BLD AUTO: 269 10(3)UL (ref 150–450)
PLATELET # BLD AUTO: 273 10(3)UL (ref 150–450)
PLATELET # BLD AUTO: 284 10(3)UL (ref 150–450)
PLATELETS.RETICULATED NFR BLD AUTO: 7.5 % (ref 0–7)
PO2 BLDA: 61 MM HG (ref 80–100)
PO2 BLDA: 63 MM HG (ref 80–100)
POTASSIUM SERPL-SCNC: 3.3 MMOL/L (ref 3.5–5.1)
POTASSIUM SERPL-SCNC: 3.4 MMOL/L (ref 3.5–5.1)
POTASSIUM SERPL-SCNC: 3.4 MMOL/L (ref 3.5–5.1)
POTASSIUM SERPL-SCNC: 3.5 MMOL/L (ref 3.5–5.1)
POTASSIUM SERPL-SCNC: 3.6 MMOL/L (ref 3.5–5.1)
POTASSIUM SERPL-SCNC: 3.6 MMOL/L (ref 3.5–5.1)
POTASSIUM SERPL-SCNC: 3.8 MMOL/L (ref 3.5–5.1)
POTASSIUM SERPL-SCNC: 3.8 MMOL/L (ref 3.5–5.1)
POTASSIUM SERPL-SCNC: 3.9 MMOL/L (ref 3.5–5.1)
POTASSIUM SERPL-SCNC: 4 MMOL/L (ref 3.5–5.1)
POTASSIUM SERPL-SCNC: 4 MMOL/L (ref 3.5–5.1)
POTASSIUM SERPL-SCNC: 4.4 MMOL/L (ref 3.5–5.1)
POTASSIUM SERPL-SCNC: 4.8 MMOL/L (ref 3.5–5.1)
PROT SERPL-MCNC: 6.2 G/DL (ref 5.7–8.2)
PROT SERPL-MCNC: 6.2 G/DL (ref 5.7–8.2)
PROT SERPL-MCNC: 7.3 G/DL (ref 5.7–8.2)
PROT UR-MCNC: 100 MG/DL
PUNCTURE CHARGE: YES
PUNCTURE CHARGE: YES
Q-T INTERVAL: 340 MS
Q-T INTERVAL: 340 MS
QRS DURATION: 74 MS
QRS DURATION: 82 MS
QTC CALCULATION (BEZET): 460 MS
QTC CALCULATION (BEZET): 492 MS
R AXIS: 21 DEGREES
R AXIS: 5 DEGREES
RBC # BLD AUTO: 3.41 X10(6)UL
RBC # BLD AUTO: 3.44 X10(6)UL
RBC # BLD AUTO: 3.47 X10(6)UL
RBC # BLD AUTO: 3.47 X10(6)UL
RBC # BLD AUTO: 3.49 X10(6)UL
RBC # BLD AUTO: 3.55 X10(6)UL
RBC # BLD AUTO: 3.56 X10(6)UL
RBC # BLD AUTO: 3.56 X10(6)UL
RBC # BLD AUTO: 3.62 X10(6)UL
RBC # BLD AUTO: 3.65 X10(6)UL
RBC # BLD AUTO: 3.66 X10(6)UL
RBC # BLD AUTO: 3.69 X10(6)UL
RBC # BLD AUTO: 4.08 X10(6)UL
RBC #/AREA URNS AUTO: >10 /HPF
RSV RNA SPEC NAA+PROBE: NEGATIVE
SAO2 % BLDA: 93.4 % (ref 94–100)
SAO2 % BLDA: 93.4 % (ref 94–100)
SARS-COV-2 RNA RESP QL NAA+PROBE: NOT DETECTED
SODIUM SERPL-SCNC: 138 MMOL/L (ref 136–145)
SODIUM SERPL-SCNC: 141 MMOL/L (ref 136–145)
SODIUM SERPL-SCNC: 142 MMOL/L (ref 136–145)
SODIUM SERPL-SCNC: 143 MMOL/L (ref 136–145)
SODIUM SERPL-SCNC: 144 MMOL/L (ref 136–145)
SODIUM SERPL-SCNC: 145 MMOL/L (ref 136–145)
SODIUM SERPL-SCNC: 147 MMOL/L (ref 136–145)
SODIUM SERPL-SCNC: 148 MMOL/L (ref 136–145)
SODIUM SERPL-SCNC: 149 MMOL/L (ref 136–145)
SODIUM SERPL-SCNC: 151 MMOL/L (ref 136–145)
SODIUM SERPL-SCNC: 152 MMOL/L (ref 136–145)
SP GR UR STRIP: 1.03 (ref 1–1.03)
T AXIS: 183 DEGREES
T AXIS: 218 DEGREES
T3FREE SERPL-MCNC: 0.89 PG/ML (ref 2.4–4.2)
T4 FREE SERPL-MCNC: 0.9 NG/DL (ref 0.8–1.7)
TROPONIN I SERPL HS-MCNC: 23 NG/L
TSI SER-ACNC: 0.35 UIU/ML (ref 0.55–4.78)
UROBILINOGEN UR STRIP-ACNC: NORMAL
VENTRICULAR RATE: 110 BPM
VENTRICULAR RATE: 126 BPM
WBC # BLD AUTO: 12 X10(3) UL (ref 4–11)
WBC # BLD AUTO: 12.4 X10(3) UL (ref 4–11)
WBC # BLD AUTO: 12.8 X10(3) UL (ref 4–11)
WBC # BLD AUTO: 13.4 X10(3) UL (ref 4–11)
WBC # BLD AUTO: 13.4 X10(3) UL (ref 4–11)
WBC # BLD AUTO: 13.8 X10(3) UL (ref 4–11)
WBC # BLD AUTO: 14.2 X10(3) UL (ref 4–11)
WBC # BLD AUTO: 14.3 X10(3) UL (ref 4–11)
WBC # BLD AUTO: 14.6 X10(3) UL (ref 4–11)
WBC # BLD AUTO: 14.7 X10(3) UL (ref 4–11)
WBC # BLD AUTO: 14.9 X10(3) UL (ref 4–11)
WBC # BLD AUTO: 16.4 X10(3) UL (ref 4–11)
WBC # BLD AUTO: 9.5 X10(3) UL (ref 4–11)
WBC #/AREA URNS AUTO: >50 /HPF
WBC CLUMPS UR QL AUTO: PRESENT /HPF

## 2025-01-01 PROCEDURE — 71045 X-RAY EXAM CHEST 1 VIEW: CPT | Performed by: HOSPITALIST

## 2025-01-01 PROCEDURE — 93325 DOPPLER ECHO COLOR FLOW MAPG: CPT | Performed by: HOSPITALIST

## 2025-01-01 PROCEDURE — 99233 SBSQ HOSP IP/OBS HIGH 50: CPT | Performed by: HOSPITALIST

## 2025-01-01 PROCEDURE — 93306 TTE W/DOPPLER COMPLETE: CPT | Performed by: NURSE PRACTITIONER

## 2025-01-01 PROCEDURE — 93321 DOPPLER ECHO F-UP/LMTD STD: CPT | Performed by: HOSPITALIST

## 2025-01-01 PROCEDURE — 93321 DOPPLER ECHO F-UP/LMTD STD: CPT | Performed by: NURSE PRACTITIONER

## 2025-01-01 PROCEDURE — 99497 ADVNCD CARE PLAN 30 MIN: CPT | Performed by: HOSPITALIST

## 2025-01-01 PROCEDURE — 93325 DOPPLER ECHO COLOR FLOW MAPG: CPT | Performed by: NURSE PRACTITIONER

## 2025-01-01 PROCEDURE — 93308 TTE F-UP OR LMTD: CPT | Performed by: NURSE PRACTITIONER

## 2025-01-01 PROCEDURE — 71250 CT THORAX DX C-: CPT | Performed by: INTERNAL MEDICINE

## 2025-01-01 PROCEDURE — 99239 HOSP IP/OBS DSCHRG MGMT >30: CPT | Performed by: HOSPITALIST

## 2025-01-01 PROCEDURE — 99232 SBSQ HOSP IP/OBS MODERATE 35: CPT | Performed by: INTERNAL MEDICINE

## 2025-01-01 PROCEDURE — 93308 TTE F-UP OR LMTD: CPT | Performed by: HOSPITALIST

## 2025-01-01 PROCEDURE — 74230 X-RAY XM SWLNG FUNCJ C+: CPT | Performed by: HOSPITALIST

## 2025-01-01 PROCEDURE — 99222 1ST HOSP IP/OBS MODERATE 55: CPT | Performed by: HOSPITALIST

## 2025-01-01 PROCEDURE — 71260 CT THORAX DX C+: CPT | Performed by: HOSPITALIST

## 2025-01-01 PROCEDURE — 99223 1ST HOSP IP/OBS HIGH 75: CPT | Performed by: INTERNAL MEDICINE

## 2025-01-01 PROCEDURE — 71045 X-RAY EXAM CHEST 1 VIEW: CPT

## 2025-01-01 PROCEDURE — 99233 SBSQ HOSP IP/OBS HIGH 50: CPT | Performed by: INTERNAL MEDICINE

## 2025-01-01 PROCEDURE — 71046 X-RAY EXAM CHEST 2 VIEWS: CPT | Performed by: HOSPITALIST

## 2025-01-01 PROCEDURE — 5A09357 ASSISTANCE WITH RESPIRATORY VENTILATION, LESS THAN 24 CONSECUTIVE HOURS, CONTINUOUS POSITIVE AIRWAY PRESSURE: ICD-10-PCS | Performed by: HOSPITALIST

## 2025-01-01 PROCEDURE — 99291 CRITICAL CARE FIRST HOUR: CPT | Performed by: HOSPITALIST

## 2025-01-01 RX ORDER — POTASSIUM CHLORIDE 1500 MG/1
40 TABLET, EXTENDED RELEASE ORAL EVERY 4 HOURS
Status: DISPENSED | OUTPATIENT
Start: 2025-01-01 | End: 2025-01-01

## 2025-01-01 RX ORDER — GLYCOPYRROLATE 0.2 MG/ML
0.4 INJECTION, SOLUTION INTRAMUSCULAR; INTRAVENOUS EVERY 4 HOURS
Status: DISCONTINUED | OUTPATIENT
Start: 2025-01-01 | End: 2025-01-01

## 2025-01-01 RX ORDER — POTASSIUM CHLORIDE 14.9 MG/ML
20 INJECTION INTRAVENOUS ONCE
Status: DISCONTINUED | OUTPATIENT
Start: 2025-01-01 | End: 2025-01-01

## 2025-01-01 RX ORDER — LORAZEPAM 2 MG/ML
1 INJECTION INTRAMUSCULAR
Status: DISCONTINUED | OUTPATIENT
Start: 2025-01-01 | End: 2025-01-01

## 2025-01-01 RX ORDER — ALBUTEROL SULFATE 90 UG/1
2 INHALANT RESPIRATORY (INHALATION) EVERY 6 HOURS
Status: DISCONTINUED | OUTPATIENT
Start: 2025-01-01 | End: 2025-01-01

## 2025-01-01 RX ORDER — METOPROLOL SUCCINATE 25 MG/1
25 TABLET, EXTENDED RELEASE ORAL ONCE
Status: COMPLETED | OUTPATIENT
Start: 2025-01-01 | End: 2025-01-01

## 2025-01-01 RX ORDER — LORAZEPAM 2 MG/ML
1 INJECTION INTRAMUSCULAR EVERY 4 HOURS PRN
Status: DISCONTINUED | OUTPATIENT
Start: 2025-01-01 | End: 2025-01-01

## 2025-01-01 RX ORDER — CARVEDILOL 6.25 MG/1
6.25 TABLET ORAL 2 TIMES DAILY WITH MEALS
Status: DISCONTINUED | OUTPATIENT
Start: 2025-01-01 | End: 2025-01-01

## 2025-01-01 RX ORDER — MORPHINE SULFATE 2 MG/ML
1 INJECTION, SOLUTION INTRAMUSCULAR; INTRAVENOUS
Status: DISCONTINUED | OUTPATIENT
Start: 2025-01-01 | End: 2025-01-01

## 2025-01-01 RX ORDER — HYDROCODONE BITARTRATE AND ACETAMINOPHEN 5; 325 MG/1; MG/1
2 TABLET ORAL EVERY 4 HOURS PRN
Status: DISCONTINUED | OUTPATIENT
Start: 2025-01-01 | End: 2025-01-01

## 2025-01-01 RX ORDER — ALBUTEROL SULFATE 5 MG/ML
10 SOLUTION RESPIRATORY (INHALATION) ONCE
Status: COMPLETED | OUTPATIENT
Start: 2025-01-01 | End: 2025-01-01

## 2025-01-01 RX ORDER — ALBUMIN (HUMAN) 12.5 G/50ML
25 SOLUTION INTRAVENOUS ONCE
Status: COMPLETED | OUTPATIENT
Start: 2025-01-01 | End: 2025-01-01

## 2025-01-01 RX ORDER — MORPHINE SULFATE 2 MG/ML
1 INJECTION, SOLUTION INTRAMUSCULAR; INTRAVENOUS EVERY 2 HOUR PRN
Status: DISCONTINUED | OUTPATIENT
Start: 2025-01-01 | End: 2025-01-01

## 2025-01-01 RX ORDER — HALOPERIDOL 5 MG/ML
2 INJECTION INTRAMUSCULAR
Status: DISCONTINUED | OUTPATIENT
Start: 2025-01-01 | End: 2025-01-01

## 2025-01-01 RX ORDER — METOPROLOL TARTRATE 1 MG/ML
5 INJECTION, SOLUTION INTRAVENOUS ONCE
Status: COMPLETED | OUTPATIENT
Start: 2025-01-01 | End: 2025-01-01

## 2025-01-01 RX ORDER — MECLIZINE HCL 12.5 MG 12.5 MG/1
25 TABLET ORAL 2 TIMES DAILY PRN
Status: DISCONTINUED | OUTPATIENT
Start: 2025-01-01 | End: 2025-01-01

## 2025-01-01 RX ORDER — OXCARBAZEPINE 150 MG/1
150 TABLET, FILM COATED ORAL DAILY
Status: DISCONTINUED | OUTPATIENT
Start: 2025-01-01 | End: 2025-01-01

## 2025-01-01 RX ORDER — ACETAMINOPHEN 500 MG
500 TABLET ORAL EVERY 4 HOURS PRN
Status: DISCONTINUED | OUTPATIENT
Start: 2025-01-01 | End: 2025-01-01

## 2025-01-01 RX ORDER — METOPROLOL SUCCINATE 50 MG/1
50 TABLET, EXTENDED RELEASE ORAL
Status: DISCONTINUED | OUTPATIENT
Start: 2025-01-01 | End: 2025-01-01

## 2025-01-01 RX ORDER — FUROSEMIDE 10 MG/ML
40 INJECTION INTRAMUSCULAR; INTRAVENOUS ONCE
Status: DISCONTINUED | OUTPATIENT
Start: 2025-01-01 | End: 2025-01-01 | Stop reason: ALTCHOICE

## 2025-01-01 RX ORDER — CLOPIDOGREL BISULFATE 75 MG/1
75 TABLET ORAL DAILY
Status: DISCONTINUED | OUTPATIENT
Start: 2025-01-01 | End: 2025-01-01

## 2025-01-01 RX ORDER — CARVEDILOL 3.12 MG/1
3.12 TABLET ORAL 2 TIMES DAILY WITH MEALS
Status: DISCONTINUED | OUTPATIENT
Start: 2025-01-01 | End: 2025-01-01

## 2025-01-01 RX ORDER — LORAZEPAM 2 MG/ML
0.5 INJECTION INTRAMUSCULAR EVERY 4 HOURS PRN
Status: DISCONTINUED | OUTPATIENT
Start: 2025-01-01 | End: 2025-01-01

## 2025-01-01 RX ORDER — HALOPERIDOL 5 MG/ML
1 INJECTION INTRAMUSCULAR
Status: DISCONTINUED | OUTPATIENT
Start: 2025-01-01 | End: 2025-01-01

## 2025-01-01 RX ORDER — METOPROLOL SUCCINATE 100 MG/1
100 TABLET, EXTENDED RELEASE ORAL
Status: DISCONTINUED | OUTPATIENT
Start: 2025-01-01 | End: 2025-01-01

## 2025-01-01 RX ORDER — MORPHINE SULFATE 2 MG/ML
2 INJECTION, SOLUTION INTRAMUSCULAR; INTRAVENOUS
Status: DISCONTINUED | OUTPATIENT
Start: 2025-01-01 | End: 2025-01-01

## 2025-01-01 RX ORDER — SODIUM CHLORIDE 9 MG/ML
INJECTION, SOLUTION INTRAVENOUS CONTINUOUS
Status: DISCONTINUED | OUTPATIENT
Start: 2025-01-01 | End: 2025-01-01

## 2025-01-01 RX ORDER — DEXTROSE MONOHYDRATE 25 G/50ML
50 INJECTION, SOLUTION INTRAVENOUS
Status: DISCONTINUED | OUTPATIENT
Start: 2025-01-01 | End: 2025-01-01

## 2025-01-01 RX ORDER — GLYCOPYRROLATE 0.2 MG/ML
0.4 INJECTION, SOLUTION INTRAMUSCULAR; INTRAVENOUS
Status: DISCONTINUED | OUTPATIENT
Start: 2025-01-01 | End: 2025-01-01

## 2025-01-01 RX ORDER — LIDOCAINE 50 MG/G
1 PATCH TOPICAL
COMMUNITY

## 2025-01-01 RX ORDER — LORAZEPAM 2 MG/ML
2 INJECTION INTRAMUSCULAR EVERY 4 HOURS PRN
Status: DISCONTINUED | OUTPATIENT
Start: 2025-01-01 | End: 2025-01-01

## 2025-01-01 RX ORDER — SCOPOLAMINE 1 MG/3D
1 PATCH, EXTENDED RELEASE TRANSDERMAL
Status: CANCELLED | OUTPATIENT
Start: 2025-04-07

## 2025-01-01 RX ORDER — BENZONATATE 100 MG/1
100 CAPSULE ORAL 3 TIMES DAILY PRN
Status: DISCONTINUED | OUTPATIENT
Start: 2025-01-01 | End: 2025-01-01

## 2025-01-01 RX ORDER — HYDROCODONE BITARTRATE AND ACETAMINOPHEN 5; 325 MG/1; MG/1
1 TABLET ORAL EVERY 4 HOURS PRN
Status: DISCONTINUED | OUTPATIENT
Start: 2025-01-01 | End: 2025-01-01

## 2025-01-01 RX ORDER — SCOPOLAMINE 1 MG/3D
1 PATCH, EXTENDED RELEASE TRANSDERMAL
Status: DISCONTINUED | OUTPATIENT
Start: 2025-01-01 | End: 2025-01-01

## 2025-01-01 RX ORDER — BISACODYL 10 MG
10 SUPPOSITORY, RECTAL RECTAL
Status: DISCONTINUED | OUTPATIENT
Start: 2025-01-01 | End: 2025-01-01

## 2025-01-01 RX ORDER — DILTIAZEM HYDROCHLORIDE 30 MG/1
60 TABLET, FILM COATED ORAL EVERY 6 HOURS SCHEDULED
Status: DISCONTINUED | OUTPATIENT
Start: 2025-01-01 | End: 2025-01-01

## 2025-01-01 RX ORDER — DILTIAZEM HYDROCHLORIDE 30 MG/1
30 TABLET, FILM COATED ORAL EVERY 6 HOURS SCHEDULED
Status: DISCONTINUED | OUTPATIENT
Start: 2025-01-01 | End: 2025-01-01

## 2025-01-01 RX ORDER — POTASSIUM CHLORIDE 1.5 G/1.58G
40 POWDER, FOR SOLUTION ORAL ONCE
Status: DISCONTINUED | OUTPATIENT
Start: 2025-01-01 | End: 2025-01-01

## 2025-01-01 RX ORDER — ASPIRIN 325 MG
325 TABLET, DELAYED RELEASE (ENTERIC COATED) ORAL DAILY
Status: DISCONTINUED | OUTPATIENT
Start: 2025-01-01 | End: 2025-01-01

## 2025-01-01 RX ORDER — PANTOPRAZOLE SODIUM 40 MG/1
40 TABLET, DELAYED RELEASE ORAL DAILY
Status: DISCONTINUED | OUTPATIENT
Start: 2025-01-01 | End: 2025-01-01

## 2025-01-01 RX ORDER — SCOPOLAMINE 1 MG/3D
1 PATCH, EXTENDED RELEASE TRANSDERMAL
Status: DISCONTINUED | OUTPATIENT
Start: 2025-04-07 | End: 2025-01-01

## 2025-01-01 RX ORDER — FUROSEMIDE 10 MG/ML
40 INJECTION INTRAMUSCULAR; INTRAVENOUS EVERY 8 HOURS PRN
Status: DISCONTINUED | OUTPATIENT
Start: 2025-01-01 | End: 2025-01-01

## 2025-01-01 RX ORDER — METOPROLOL TARTRATE 1 MG/ML
2.5 INJECTION, SOLUTION INTRAVENOUS ONCE
Status: COMPLETED | OUTPATIENT
Start: 2025-01-01 | End: 2025-01-01

## 2025-01-01 RX ORDER — HEPARIN SODIUM 5000 [USP'U]/ML
5000 INJECTION, SOLUTION INTRAVENOUS; SUBCUTANEOUS EVERY 8 HOURS SCHEDULED
Status: DISCONTINUED | OUTPATIENT
Start: 2025-01-01 | End: 2025-01-01

## 2025-01-01 RX ORDER — ACETAMINOPHEN 650 MG/1
650 SUPPOSITORY RECTAL EVERY 4 HOURS PRN
Status: DISCONTINUED | OUTPATIENT
Start: 2025-01-01 | End: 2025-01-01

## 2025-01-01 RX ORDER — GLYCOPYRROLATE 0.2 MG/ML
0.2 INJECTION, SOLUTION INTRAMUSCULAR; INTRAVENOUS
Status: DISCONTINUED | OUTPATIENT
Start: 2025-01-01 | End: 2025-01-01

## 2025-01-01 RX ORDER — NICOTINE POLACRILEX 4 MG
30 LOZENGE BUCCAL
Status: DISCONTINUED | OUTPATIENT
Start: 2025-01-01 | End: 2025-01-01

## 2025-01-01 RX ORDER — CHLORHEXIDINE GLUCONATE ORAL RINSE 1.2 MG/ML
SOLUTION DENTAL 3 TIMES DAILY
COMMUNITY

## 2025-01-01 RX ORDER — ONDANSETRON 2 MG/ML
4 INJECTION INTRAMUSCULAR; INTRAVENOUS EVERY 6 HOURS PRN
Status: DISCONTINUED | OUTPATIENT
Start: 2025-01-01 | End: 2025-01-01

## 2025-01-01 RX ORDER — MORPHINE SULFATE 4 MG/ML
4 INJECTION, SOLUTION INTRAMUSCULAR; INTRAVENOUS
Status: DISCONTINUED | OUTPATIENT
Start: 2025-01-01 | End: 2025-01-01

## 2025-01-01 RX ORDER — POTASSIUM CHLORIDE 1500 MG/1
40 TABLET, EXTENDED RELEASE ORAL ONCE
Status: DISCONTINUED | OUTPATIENT
Start: 2025-01-01 | End: 2025-01-01

## 2025-01-01 RX ORDER — SODIUM CHLORIDE 0.9 % (FLUSH) 0.9 %
10 SYRINGE (ML) INJECTION AS NEEDED
Status: DISCONTINUED | OUTPATIENT
Start: 2025-01-01 | End: 2025-01-01

## 2025-01-01 RX ORDER — MORPHINE SULFATE 2 MG/ML
2 INJECTION, SOLUTION INTRAMUSCULAR; INTRAVENOUS EVERY 2 HOUR PRN
Status: DISCONTINUED | OUTPATIENT
Start: 2025-01-01 | End: 2025-01-01

## 2025-01-01 RX ORDER — DILTIAZEM HYDROCHLORIDE 5 MG/ML
10 INJECTION INTRAVENOUS ONCE
Status: COMPLETED | OUTPATIENT
Start: 2025-01-01 | End: 2025-01-01

## 2025-01-01 RX ORDER — METOPROLOL TARTRATE 25 MG/1
25 TABLET, FILM COATED ORAL ONCE
Status: COMPLETED | OUTPATIENT
Start: 2025-01-01 | End: 2025-01-01

## 2025-01-01 RX ORDER — DILTIAZEM HYDROCHLORIDE 30 MG/1
90 TABLET, FILM COATED ORAL EVERY 6 HOURS SCHEDULED
Status: DISCONTINUED | OUTPATIENT
Start: 2025-01-01 | End: 2025-01-01

## 2025-01-01 RX ORDER — ATROPINE SULFATE 10 MG/ML
2 SOLUTION/ DROPS OPHTHALMIC EVERY 2 HOUR PRN
Status: DISCONTINUED | OUTPATIENT
Start: 2025-01-01 | End: 2025-01-01

## 2025-01-01 RX ORDER — FUROSEMIDE 10 MG/ML
20 INJECTION INTRAMUSCULAR; INTRAVENOUS
Status: DISCONTINUED | OUTPATIENT
Start: 2025-01-01 | End: 2025-01-01

## 2025-01-01 RX ORDER — IPRATROPIUM BROMIDE AND ALBUTEROL SULFATE 2.5; .5 MG/3ML; MG/3ML
3 SOLUTION RESPIRATORY (INHALATION) EVERY 4 HOURS PRN
Status: DISCONTINUED | OUTPATIENT
Start: 2025-01-01 | End: 2025-01-01

## 2025-01-01 RX ORDER — BISACODYL 10 MG
10 SUPPOSITORY, RECTAL RECTAL
COMMUNITY

## 2025-01-01 RX ORDER — MORPHINE SULFATE 4 MG/ML
4 INJECTION, SOLUTION INTRAMUSCULAR; INTRAVENOUS EVERY 2 HOUR PRN
Status: DISCONTINUED | OUTPATIENT
Start: 2025-01-01 | End: 2025-01-01

## 2025-01-01 RX ORDER — MIRTAZAPINE 7.5 MG/1
3.73 TABLET, FILM COATED ORAL NIGHTLY
Status: DISCONTINUED | OUTPATIENT
Start: 2025-01-01 | End: 2025-01-01

## 2025-01-01 RX ORDER — DEXTROSE MONOHYDRATE 50 MG/ML
INJECTION, SOLUTION INTRAVENOUS CONTINUOUS
Status: DISCONTINUED | OUTPATIENT
Start: 2025-01-01 | End: 2025-01-01

## 2025-01-01 RX ORDER — FUROSEMIDE 10 MG/ML
20 INJECTION INTRAMUSCULAR; INTRAVENOUS ONCE
Status: COMPLETED | OUTPATIENT
Start: 2025-01-01 | End: 2025-01-01

## 2025-01-01 RX ORDER — ACETAMINOPHEN 325 MG/1
650 TABLET ORAL EVERY 4 HOURS PRN
Status: DISCONTINUED | OUTPATIENT
Start: 2025-01-01 | End: 2025-01-01

## 2025-01-01 RX ORDER — MAGNESIUM OXIDE 400 MG/1
400 TABLET ORAL ONCE
Status: DISCONTINUED | OUTPATIENT
Start: 2025-01-01 | End: 2025-01-01

## 2025-01-01 RX ORDER — IPRATROPIUM BROMIDE AND ALBUTEROL SULFATE 2.5; .5 MG/3ML; MG/3ML
3 SOLUTION RESPIRATORY (INHALATION) ONCE
Status: DISCONTINUED | OUTPATIENT
Start: 2025-01-01 | End: 2025-01-01

## 2025-01-01 RX ORDER — BENZONATATE 100 MG/1
100 CAPSULE ORAL 3 TIMES DAILY PRN
COMMUNITY

## 2025-01-01 RX ORDER — NICOTINE POLACRILEX 4 MG
15 LOZENGE BUCCAL
Status: DISCONTINUED | OUTPATIENT
Start: 2025-01-01 | End: 2025-01-01

## 2025-01-01 RX ORDER — FLUOXETINE 10 MG/1
10 TABLET, FILM COATED ORAL DAILY
Status: DISCONTINUED | OUTPATIENT
Start: 2025-01-01 | End: 2025-01-01

## 2025-01-01 RX ORDER — LORAZEPAM 2 MG/ML
2 INJECTION INTRAMUSCULAR
Status: DISCONTINUED | OUTPATIENT
Start: 2025-01-01 | End: 2025-01-01

## 2025-01-01 RX ORDER — ALBUTEROL SULFATE 90 UG/1
2 INHALANT RESPIRATORY (INHALATION) EVERY 6 HOURS PRN
Status: DISCONTINUED | OUTPATIENT
Start: 2025-01-01 | End: 2025-01-01

## 2025-01-01 RX ORDER — ONDANSETRON 2 MG/ML
4 INJECTION INTRAMUSCULAR; INTRAVENOUS EVERY 4 HOURS PRN
Status: DISCONTINUED | OUTPATIENT
Start: 2025-01-01 | End: 2025-01-01

## 2025-01-01 RX ORDER — MIRTAZAPINE 7.5 MG/1
7.5 TABLET, FILM COATED ORAL NIGHTLY
Status: DISCONTINUED | OUTPATIENT
Start: 2025-01-01 | End: 2025-01-01

## 2025-01-01 RX ORDER — POTASSIUM CHLORIDE 1500 MG/1
40 TABLET, EXTENDED RELEASE ORAL ONCE
Status: COMPLETED | OUTPATIENT
Start: 2025-01-01 | End: 2025-01-01

## 2025-01-01 RX ORDER — LOSARTAN POTASSIUM 25 MG/1
25 TABLET ORAL DAILY
Status: DISCONTINUED | OUTPATIENT
Start: 2025-01-01 | End: 2025-01-01

## 2025-01-21 ENCOUNTER — TELEPHONE (OUTPATIENT)
Dept: INTERNAL MEDICINE CLINIC | Facility: CLINIC | Age: OVER 89
End: 2025-01-21

## 2025-01-21 NOTE — TELEPHONE ENCOUNTER
Received \"Physicians Telephone Order\" from [facility]. Orders have been fax to [facility] to fax number [phone]. Confirmation has been received that fax went through successfully.

## 2025-03-13 RX ORDER — EZETIMIBE AND SIMVASTATIN 10; 10 MG/1; MG/1
1 TABLET ORAL NIGHTLY
Qty: 90 TABLET | Refills: 0 | Status: SHIPPED | OUTPATIENT
Start: 2025-03-13

## 2025-03-23 PROBLEM — J18.9 COMMUNITY ACQUIRED PNEUMONIA OF LEFT LOWER LOBE OF LUNG: Status: ACTIVE | Noted: 2025-01-01

## 2025-03-23 PROBLEM — J18.9 COMMUNITY ACQUIRED PNEUMONIA OF LEFT LOWER LOBE OF LUNG: Status: ACTIVE | Noted: 2025-03-23

## 2025-03-23 PROBLEM — R09.02 HYPOXIA: Status: ACTIVE | Noted: 2025-03-23

## 2025-03-23 PROBLEM — R09.02 HYPOXIA: Status: ACTIVE | Noted: 2025-01-01

## 2025-03-23 NOTE — ED PROVIDER NOTES
Patient Seen in: [facility] Emergency Department    History     Chief Complaint   Patient presents with    Breathing Problem       HPI    89-year-old female presents ER for shortness of breath and cough.  Patient with a past medical history of A-fib, diabetes, hypertension.  Patient's daughter bedside states that she has been coughing now for the past 3 days.  Patient arrives to the ER hypoxic.  Patient afebrile at this time    History reviewed.   Past Medical History:    Acute, but ill-defined, cerebrovascular disease    2019    Anxiety state    Atrial fibrillation (HCC)    Cataract, bilateral    Chronic a-fib (HCC)    Congestive heart disease (HCC)    LV ej fraction of 30%    Conjunctival cyst of left eye    Cup to disc asymmetry    OU    Depression    Diabetes (HCC)    diet alone    Esophageal reflux    Essential hypertension    Hearing impairment    Hiatal hernia    Hyperlipidemia    Meibomian gland dysfunction (MGD), bilateral, both upper and lower lids    Osteoarthritis    TIA (transient ischemic attack)    Unspecified atrial fibrillation (HCC)       History reviewed. History reviewed. No pertinent surgical history.      Medications :  Prescriptions Prior to Admission[1]     Family History   Problem Relation Age of Onset    Diabetes Mother     Macular degeneration Sister     Diabetes Sister     Glaucoma Neg        Smoking Status:   Social History     Socioeconomic History    Marital status:    Tobacco Use    Smoking status: Former     Current packs/day: 0.00     Types: Cigarettes     Quit date: 6/15/1969     Years since quittin.8    Smokeless tobacco: Never   Vaping Use    Vaping status: Never Used   Substance and Sexual Activity    Alcohol use: No     Alcohol/week: 2.0 standard drinks of alcohol     Types: 2 Glasses of wine per week    Drug use: No   Other Topics Concern    Caffeine Concern Yes     Comment: Coffee 2 cups daily    Exercise No       ROS  All pertinent positives for the review  of systems are mentioned in the HPI  All other organ systems are reviewed and are negative.    Constitutional and vital signs reviewed.      Social History and Family History elements reviewed from today, pertinent positives to the presenting problem noted.    Physical Exam     ED Triage Vitals   BP 03/23/25 1059 146/70   Pulse 03/23/25 1059 103   Resp 03/23/25 1059 26   Temp 03/23/25 1059 97.7 °F (36.5 °C)   Temp src 03/23/25 1059 Oral   SpO2 03/23/25 1113 93 %   O2 Device 03/23/25 1113 None (Room air)       All measures to prevent infection transmission during my interaction with the patient were taken. The patient was already wearing a droplet mask on my arrival to the room. Personal protective equipment including droplet mask, eye protection, and gloves were worn throughout the duration of the exam.  Handwashing was performed prior to and after the exam.  Stethoscope and any equipment used during my examination was cleaned with super sani-cloth germicidal wipes following the exam.     Physical Exam  Vitals and nursing note reviewed.   Constitutional:       Appearance: She is well-developed.   HENT:      Head: Normocephalic and atraumatic.      Right Ear: External ear normal.      Left Ear: External ear normal.      Nose: Nose normal.   Eyes:      Conjunctiva/sclera: Conjunctivae normal.      Pupils: Pupils are equal, round, and reactive to light.   Cardiovascular:      Rate and Rhythm: Normal rate and regular rhythm.      Heart sounds: Normal heart sounds.   Pulmonary:      Effort: Pulmonary effort is normal.      Breath sounds: Examination of the right-lower field reveals wheezing and rhonchi. Examination of the left-lower field reveals wheezing and rhonchi. Wheezing and rhonchi present.   Abdominal:      General: Bowel sounds are normal.      Palpations: Abdomen is soft.   Musculoskeletal:         General: Normal range of motion.      Cervical back: Normal range of motion and neck supple.   Skin:     General:  Skin is warm and dry.   Neurological:      Mental Status: She is alert and oriented to person, place, and time.      Deep Tendon Reflexes: Reflexes are normal and symmetric.   Psychiatric:         Behavior: Behavior normal.         Thought Content: Thought content normal.         Judgment: Judgment normal.         ED Course        Labs Reviewed   CBC WITH DIFFERENTIAL WITH PLATELET - Abnormal; Notable for the following components:       Result Value    WBC 14.6 (*)     Neutrophil Absolute Prelim 11.74 (*)     Neutrophil Absolute 11.74 (*)     Monocyte Absolute 1.21 (*)     All other components within normal limits   COMP METABOLIC PANEL (14) - Abnormal; Notable for the following components:    Glucose 153 (*)     BUN 25 (*)     Creatinine 1.06 (*)     BUN/CREA Ratio 23.6 (*)     eGFR-Cr 50 (*)     ALT <7 (*)     All other components within normal limits   TROPONIN I HIGH SENSITIVITY - Normal   SARS-COV-2/FLU A AND B/RSV BY PCR (GENESimpleSitePERT) - Normal    Narrative:     This test is intended for the qualitative detection and differentiation of SARS-CoV-2, influenza A, influenza B, and respiratory syncytial virus (RSV) viral RNA in nasopharyngeal or nares swabs from individuals suspected of respiratory viral infection consistent with COVID-19 by their healthcare provider. Signs and symptoms of respiratory viral infection due to SARS-CoV-2, influenza, and RSV can be similar.                                    Test performed using the Xpert Xpress SARS-CoV-2/FLU/RSV (real time RT-PCR)  assay on the GeneXpert instrument, [employer], [place] [address].                   This test is being used under the Food and Drug Administration's Emergency Use Authorization.                                    The authorized Fact Sheet for Healthcare Providers for this assay is available upon request from the laboratory.   RAINBOW DRAW LAVENDER   RAINBOW DRAW LIGHT GREEN   RAINBOW DRAW BLUE   ED/MRSA SCREEN BY PCR-CC     EKG    Rate,  intervals and axes as noted on EKG Report.  Rate: 110  Rhythm: Atrial Fibrillation  Reading: No ST deviation, normal axis             Imaging Results Available and Reviewed while in ED: XR CHEST AP PORTABLE  (CPT=71045)    Result Date: 3/23/2025  CONCLUSION:   Left perihilar opacity, which may reflect pneumonia, left lower lobe collapse/atelectasis, and/or be secondary to a pulmonary mass as there is asymmetric prominence of the left hilar region.  Recommend further evaluation with a CT chest with contrast.  Slight prominence of the interstitial markings, which may be secondary to low lung volumes or mild pulmonary edema.  Enlarged cardiomediastinal silhouette with unchanged positioning of a left chest wall single lead pacemaker device.  Mild chronic superior endplate compression fracture deformity at L1.     Dictated by (CST): Monster Zimmerman MD on 3/23/2025 at 11:59 AM     Finalized by (CST): Monster Zimmerman MD on 3/23/2025 at 12:06 PM         ED Medications Administered:   Medications   ampicillin-sulbactam (Unasyn) 3 g in sodium chloride 0.9% 100mL IVPB-ADD (3 g Intravenous New Bag 3/23/25 1239)   azithromycin (Zithromax) 500 mg in sodium chloride 0.9% 250mL IVPB premix (has no administration in time range)   albuterol (Ventolin) (5 MG/ML) 0.5% nebulizer solution 10 mg (10 mg Nebulization Given 3/23/25 1127)   ipratropium (Atrovent) 0.02 % nebulizer solution 0.5 mg (0.5 mg Nebulization Given 3/23/25 1128)         MDM     Vitals:    03/23/25 1113 03/23/25 1115 03/23/25 1145 03/23/25 1215   BP:  118/80 113/67 129/52   Pulse:  87 68 56   Resp:  17 20 20   Temp:       TempSrc:       SpO2: 93% 92% 100% 100%   Weight:         *I personally reviewed and interpreted all ED vitals.  I also personally reviewed all labs and imaging if ordered    Pulse Ox: 92%, Room air, Normal     Monitor Interpretation:   atrial fibrillation, with ventricular rate of 110    Differential Diagnosis/ Diagnostic Considerations: Pneumonia,  bronchitis, influenza, COVID-19    Medical Record Review: I personally reviewed available prior medical records for any recent pertinent discharge summaries, testing, and procedures and reviewed those reports.    Complicating Factors: The patient already has does not have any pertinent problems on file. to contribute to the complexity of this ED evaluation.    Medical Decision Making  89-year-old female presents ER with complaint of cough and congestion for the past 2 days.  Patient's chest x-ray shows left lower lobe infiltrate.  Patient with leukocytosis.  Patient will be admitted for pneumonia and hypoxemia.  Patient started on Unasyn and azithromycin.  Patient's daughter bedside made aware of the disposition admission.  Hospitalist notified of the admission.    Total Critical Care Time: 32 minutes including time spent examining and re-evaluating the patient, ordering and reviewing laboratory tests, documenting, reviewing previous records, obtaining information from the family, and speaking with consultants, admitting doctors, nurses and medics and excludes any time spent on procedures.      Problems Addressed:  Community acquired pneumonia of left lower lobe of lung: acute illness or injury  Hypoxia: acute illness or injury    Amount and/or Complexity of Data Reviewed  Independent Historian:      Details: Medical history obtained from the daughter who is bedside states she has been having cough and congestion for the past 3 days.  Labs: ordered. Decision-making details documented in ED Course.  Radiology: ordered and independent interpretation performed. Decision-making details documented in ED Course.     Details: Chest x-ray reviewed by myself shows left lower lobe infiltrate        Condition upon leaving the department: Stable    Disposition and Plan     Clinical Impression:  1. Community acquired pneumonia of left lower lobe of lung    2. Hypoxia        Disposition:  Admit    Follow-up:  No follow-up  provider specified.    Medications Prescribed:  Current Discharge Medication List          Hospital Problems       Present on Admission  Date Reviewed: 10/26/2024            ICD-10-CM Noted POA    * (Principal) Community acquired pneumonia of left lower lobe of lung J18.9 3/23/2025 Unknown             [1] (Not in a hospital admission)

## 2025-03-23 NOTE — ED QUICK NOTES
Orders for admission, patient is aware of plan and ready to go upstairs. Any questions, please call ED RN chip at extension [phone].     Patient Covid vaccination status: Fully vaccinated     COVID Test Ordered in ED: SARS-CoV-2/Flu A and B/RSV by PCR (GeneXpert)    COVID Suspicion at Admission: N/A    Running Infusions:      Mental Status/LOC at time of transport: a&ox1. Hearing aids.     Other pertinent information:   CIWA score: N/A   NIH score:  N/A

## 2025-03-23 NOTE — H&P
[facility]  part of [facility]  HISTORY AND PHYSICAL       Yin Casas Patient Status:  Emergency    1935  89 year old CSN [id number]   Location 15/15 Attending Walter Gomez DO     PCP Lyndon Talbot MD         DATE OF ADMISSION: 3/23/2025     CHIEF COMPLAINT: Hypoxia    HISTORY OF PRESENT ILLNESS  This is a 89 year oldfemale who was sent in from nursing facility after noting hypoxia.  Patient tired and sleepy at time of interview.  Limited subjective.  Denies feeling short of breath.  Agitated by O2 and nose.  Further history from daughter at bedside.  Daughter states patient began having a cough around Thursday.  Daughter states patient has been not acting herself since that time.  Daughter states she received a phone call from the nursing facility the morning of admission stating that her mother was found to have low oxygen levels and she was being sent to the ED for further evaluation.  Daughter states patient has been coughing with production of yellowish sputum.  Does not know if she has been having fevers or chills.  Of note, daughter states patient has had long-term difficulties with swallowing.  Daughter states patient does cough with eating at times.  Daughter states patient's been previously evaluated by speech and swallow.    PAST MEDICAL HISTORY  Past Medical History:    Acute, but ill-defined, cerebrovascular disease    2019    Anxiety state    Atrial fibrillation (HCC)    Cataract, bilateral    Chronic a-fib (HCC)    Congestive heart disease (HCC)    LV ej fraction of 30%    Conjunctival cyst of left eye    Cup to disc asymmetry    OU    Depression    Diabetes (HCC)    diet alone    Esophageal reflux    Essential hypertension    Hearing impairment    Hiatal hernia    Hyperlipidemia    Meibomian gland dysfunction (MGD), bilateral, both upper and lower lids    Osteoarthritis    TIA (transient ischemic attack)    Unspecified atrial fibrillation (HCC)         PAST SURGICAL HISTORY  History reviewed. No pertinent surgical history.    ALLERGIES   Levaquin [levofloxacin]    MEDICATIONS  Patient's Medications   New Prescriptions    No medications on file   Previous Medications    ACETAMINOPHEN 500 MG ORAL TAB    Take 1 tablet (500 mg total) by mouth every 6 (six) hours as needed for Pain.    ALBUTEROL 108 (90 BASE) MCG/ACT INHALATION AERO SOLN    Inhale 2 puffs into the lungs every 6 (six) hours.    ASPIRIN 325 MG ORAL TAB EC    Take 1 tablet (325 mg total) by mouth daily.    BENZONATATE 100 MG ORAL CAP    Take 1 capsule (100 mg total) by mouth 3 (three) times daily as needed for cough.    BETHANECHOL 10 MG ORAL TAB    Take 1 tablet (10 mg total) by mouth daily.    BISACODYL 10 MG RECTAL SUPPOS    Place 1 suppository (10 mg total) rectally daily as needed (Constipation).    CARVEDILOL 3.125 MG ORAL TAB    TAKE 1 TABLET BY MOUTH DAILY hold FOR sbp less THEN 100 OR HR LESS THEN 60    CHLORHEXIDINE GLUCONATE 0.12 % MOUTH/THROAT SOLUTION    Use as directed 5-10 mL in the mouth or throat in the morning, at noon, and at bedtime.    CLOPIDOGREL 75 MG ORAL TAB    Take 1 tablet (75 mg total) by mouth daily.    EZETIMIBE-SIMVASTATIN 10-10 MG ORAL TAB    Take 1 tablet by mouth nightly.    FLUOXETINE 10 MG ORAL CAP    Take 1 capsule (10 mg total) by mouth daily.    FUROSEMIDE 20 MG ORAL TAB    Take 1 tablet (20 mg total) by mouth daily.    GLUCOCARD VITAL TEST IN VITRO STRIP    1 strip by In Vitro route daily.    LIDOCAINE 5 % EXTERNAL PATCH    Place 1 patch onto the skin daily as needed (Left Side Ribs).    LORATADINE (ALLERGY RELIEF) 10 MG ORAL TAB    Take 1 tablet (10 mg total) by mouth daily.    LOSARTAN 25 MG ORAL TAB    Take 1 tablet (25 mg total) by mouth daily. Hold for blood pressure  less than 110    MAGNESIUM OXIDE -MG SUPPLEMENT 400 (240 MG) MG ORAL TAB    Take 1 tablet (400 mg total) by mouth daily.    MECLIZINE HCL 25 MG ORAL TAB    Take 1 tablet (25 mg total) by mouth  daily as needed.    OXCARBAZEPINE 150 MG ORAL TAB    Take 1 tablet (150 mg total) by mouth daily.    PANTOPRAZOLE 40 MG ORAL TAB EC    TAKE 1 TABLET BY MOUTH DAILY    SENNOSIDES 8.6 MG ORAL TAB    Take 1 tablet (8.6 mg total) by mouth as needed (constipation).   Modified Medications    No medications on file   Discontinued Medications    CEPHALEXIN 250 MG ORAL CAP    Take 1 capsule (250 mg total) by mouth every evening.    CEPHALEXIN 500 MG ORAL CAP    Take 1 capsule (500 mg total) by mouth 2 (two) times daily.    CHOLECALCIFEROL 125 MCG (5000 UT) ORAL CAP    Cholecalciferol (VITAMIN D3) 5000 units capsule, [RxNorm: 743180]    KETOCONAZOLE 2 % EXTERNAL CREAM    Apply 1 Application topically 2 (two) times daily.    LIDO-CAPSAICIN-MEN-METHYL SAL 0.5-0.035-5-20 % EXTERNAL PATCH    Apply topically daily as needed (left Rib cage).    NITROFURANTOIN MONOHYDRATE MACRO (MACROBID) 100 MG ORAL CAP    Take 1 capsule (100 mg total) by mouth 2 (two) times daily.    NYSTATIN (NYAMYC) 230203 UNIT/GM EXTERNAL POWDER    Apply 1 Application topically 4 (four) times daily.    SULFAMETHOXAZOLE-TRIMETHOPRIM -160 MG ORAL TAB PER TABLET    Take 1 tablet by mouth 2 (two) times daily.       SOCIAL HISTORY  Social History     Socioeconomic History    Marital status:    Tobacco Use    Smoking status: Former     Current packs/day: 0.00     Types: Cigarettes     Quit date: 6/15/1969     Years since quittin.8    Smokeless tobacco: Never   Vaping Use    Vaping status: Never Used   Substance and Sexual Activity    Alcohol use: No     Alcohol/week: 2.0 standard drinks of alcohol     Types: 2 Glasses of wine per week    Drug use: No   Other Topics Concern    Caffeine Concern Yes     Comment: Coffee 2 cups daily    Exercise No       FAMILY HISTORY  Family History   Problem Relation Age of Onset    Diabetes Mother     Macular degeneration Sister     Diabetes Sister     Glaucoma Neg        PHYSICAL EXAM  Vital signs:  /55    Pulse 88   Temp 97.7 °F (36.5 °C) (Oral)   Resp 18   Wt 121 lb 4.1 oz (55 kg)   SpO2 92%   BMI 20.81 kg/m²      GENERAL: Thin, frail female.  Sleepy, but awakens to voice, in no acute distress.  HEART:  Irregular rhythm.  Regular rate   LUNGS:  Air entry was decreased.  Mild coarse breath sounds.  No increased work of breathing or wheezes   ABDOMEN: Soft and non-tender.    PSYCHIATRIC: Flat mood      IMAGING    XR CHEST AP PORTABLE  (CPT=71045)    Result Date: 3/23/2025  CONCLUSION:   Left perihilar opacity, which may reflect pneumonia, left lower lobe collapse/atelectasis, and/or be secondary to a pulmonary mass as there is asymmetric prominence of the left hilar region.  Recommend further evaluation with a CT chest with contrast.  Slight prominence of the interstitial markings, which may be secondary to low lung volumes or mild pulmonary edema.  Enlarged cardiomediastinal silhouette with unchanged positioning of a left chest wall single lead pacemaker device.  Mild chronic superior endplate compression fracture deformity at L1.     Dictated by (CST): Monster Zimmerman MD on 3/23/2025 at 11:59 AM     Finalized by (CST): Monster Zimmerman MD on 3/23/2025 at 12:06 PM            Data:  Recent Labs   Lab 03/23/25  1113   RBC 4.08   HGB 12.8   HCT 38.3   MCV 93.9   MCH 31.4   MCHC 33.4   RDW 11.7   NEPRELIM 11.74*   WBC 14.6*   .0     Recent Labs   Lab 03/23/25  1113   *   BUN 25*   CREATSERUM 1.06*   CA 9.3   ALB 4.5      K 4.8      CO2 29.0   ALKPHO 75   AST 14   ALT <7*   BILT 0.6   TP 7.3       ASSESSMENT/PLAN      Pneumonia   Acute respiratory distress  -Patient sent in from nursing facility due to hypoxia.  -Chest x-ray reviewed.  Noted left perihilar opacity and left lower lobe lung collapse.  -Concern for possible community-acquired pneumonia.  -Daughter also states patient with history of difficulties with swallowing.  Some concern for possible aspiration.  -Patient started on broad  antibiotics with Unasyn and azithromycin.  -Check CT chest.  -SLP evaluation   -Continuing supplemental O2, weaning as able.  -Continue to monitor    Acute Kidney Injury   - Starting light IVF  - Avoiding Nephrotoxic agents  - Cont to monitor    Persistent Atrial Fibrillation  -Rates currently controlled  -Continue carvedilol for rate control  -Not on chronic anticoagulation   -Telemetry monitoring    HTN  -Currently normotensive/mildly hypotensive  -Restarting some home regimen.  - Monitor and adjust agents as needed    Hyperlipidemia  -Statin    History of CVA  -Restart home regimen with dual antiplatelets and statin    Chronic heart failure with preserved ejection fraction   -No current signs of acute exacerbation  -Holding diuresis  - Strict I&Os, Daily weights    History of sick sinus syndrome  -Status post pacemaker placement      Plan of care discussed with patient and daughter at bedside.  Discussed with ED physician and RN. Decision made that pt needs hospitalization for further management/monitoring.      Hernesto Aguilera MD    This note was prepared using Dragon Medical voice recognition dictation software. As a result errors may occur. When identified these errors have been corrected. While every attempt is made to correct errors during dictation discrepancies may still exist

## 2025-03-23 NOTE — ED INITIAL ASSESSMENT (HPI)
Arrived via ems from NH for hypoxia. 70% RA per NH and EMS. Placed on 10L NRB with relief.     Audible wheezing noted upon arrival.     Congested cough noted. NH reports cough x3days.     A&Ox1 at baseline.

## 2025-03-23 NOTE — PLAN OF CARE
Problem: Patient Centered Care  Goal: Patient preferences are identified and integrated in the patient's plan of care  Description: Interventions:- What would you like us to know as we care for you? - Provide timely, complete, and accurate information to patient/family- Incorporate patient and family knowledge, values, beliefs, and cultural backgrounds into the planning and delivery of care- Encourage patient/family to participate in care and decision-making at the level they choose- Honor patient and family perspectives and choices  Outcome: Progressing     Problem: Patient/Family Goals  Goal: Patient/Family Long Term Goal  Description: Patient's Long Term Goal: Interventions:- - See additional Care Plan goals for specific interventions  Outcome: Progressing  Goal: Patient/Family Short Term Goal  Description: Patient's Short Term Goal: Interventions: - - See additional Care Plan goals for specific interventions  Outcome: Progressing

## 2025-03-24 NOTE — PROGRESS NOTES
[facility]  part of [facility]    Progress Note    Yin Casas Patient Status:  Inpatient    1935 MRN [id number]   Location [facility]5W Attending Kari Hermosillo MD   Hosp Day # 1 PCP Lyndon Talbot MD     Chief Complaint: pneumonia    SUBJECTIVE:    No acute events overnight.  Patient denies chest pain, sob.  No fevers/chills.  No n/v/abd pain.  Resting in chair.     10 ROS completed and otherwise negative.    OBJECTIVE:  Vital signs in last 24 hours:  /70 (BP Location: Right arm)   Pulse 107   Temp 97.8 °F (36.6 °C) (Axillary)   Resp 18   Ht 5' 4\" (1.626 m)   Wt 109 lb 6.4 oz (49.6 kg)   SpO2 98%   BMI 18.78 kg/m²     Intake/Output:    Intake/Output Summary (Last 24 hours) at 3/24/2025 1259  Last data filed at 3/24/2025 1143  Gross per 24 hour   Intake 535 ml   Output 150 ml   Net 385 ml       Wt Readings from Last 3 Encounters:   25 109 lb 6.4 oz (49.6 kg)   10/24/24 122 lb 8 oz (55.6 kg)   23 130 lb 11.2 oz (59.3 kg)       Exam     Gen: No acute distress  Pulm: coarse bs  CV: Heart with regular rate and rhythm  Abd: Abdomen soft, nontender, bowel sounds present  Neuro: No acute focal deficits  MSK: moves extremities  Skin: Warm and dry  Psych: Normal affect  Ext: no c/c/e    Data Review:     Labs:   Lab Results   Component Value Date    WBC 9.5 2025    HGB 11.2 2025    HCT 35.1 2025    .0 2025    CREATSERUM 0.81 2025    BUN 22 2025     2025    K 4.4 2025     2025    CO2 27.0 2025     2025    CA 8.4 2025    ALB 3.8 2025    ALKPHO 65 2025    BILT 0.5 2025    TP 6.2 2025    AST 12 2025    ALT <7 2025    MG 1.9 2025       Imaging: Reviewed    CT CHEST (CPT=71250)    Result Date: 3/23/2025  CONCLUSION:   1. Multifocal areas of secretions/debris within the trachea and left greater than right  bronchial tree with associated diffuse peribronchial thickening and multifocal mucous plugging.  Overall, these findings may be secondary to aspiration and/or infectious/inflammatory bronchiolitis. 2. Mosaic attenuation involving the right greater left lungs, which may be secondary to air trapping or infectious/inflammatory bronchiolitis. 3. Minimal atelectasis/scarring involving the left lower lobe and lingula. 4. No mediastinal or hilar lymphadenopathy is identified within the limitations of this noncontrast exam.  5. Left atrial predominant cardiomegaly with moderate coronary artery calcifications and a left chest wall single lead pacemaker device. 6. Moderate hiatal hernia. 7. Mild-to-moderate compression fracture deformity at L1, which is age indeterminate but has progressed when compared to 09/11/2023. 8. Mild age-indeterminate inferior endplate compression fracture deformity at T9. 9. Lesser incidental findings described above.    Dictated by (CST): Monster Zimmerman MD on 3/23/2025 at 8:14 PM     Finalized by (CST): Monster Zimmerman MD on 3/23/2025 at 8:26 PM          XR CHEST AP PORTABLE  (CPT=71045)    Result Date: 3/23/2025  CONCLUSION:   Left perihilar opacity, which may reflect pneumonia, left lower lobe collapse/atelectasis, and/or be secondary to a pulmonary mass as there is asymmetric prominence of the left hilar region.  Recommend further evaluation with a CT chest with contrast.  Slight prominence of the interstitial markings, which may be secondary to low lung volumes or mild pulmonary edema.  Enlarged cardiomediastinal silhouette with unchanged positioning of a left chest wall single lead pacemaker device.  Mild chronic superior endplate compression fracture deformity at L1.     Dictated by (CST): Monster Zimmerman MD on 3/23/2025 at 11:59 AM     Finalized by (CST): Monster Zimmerman MD on 3/23/2025 at 12:06 PM           Meds:   Current Facility-Administered Medications   Medication Dose Route Frequency     ampicillin-sulbactam (Unasyn) 3 g in sodium chloride 0.9% 100mL IVPB-ADD  3 g Intravenous q6h    aspirin DR tab 325 mg  325 mg Oral Daily    benzonatate (Tessalon) cap 100 mg  100 mg Oral TID PRN    carvedilol (Coreg) tab 3.125 mg  3.125 mg Oral BID with meals    clopidogrel (Plavix) tab 75 mg  75 mg Oral Daily    FLUoxetine (PROzac) tab 10 mg  10 mg Oral Daily    losartan (Cozaar) tab 25 mg  25 mg Oral Daily    OXcarbazepine (Trileptal) tab 150 mg  150 mg Oral Daily    pantoprazole (Protonix) DR tab 40 mg  40 mg Oral Daily    meclizine (Antivert) tab 25 mg  25 mg Oral BID PRN    azithromycin (Zithromax) 500 mg in sodium chloride 0.9% 250mL IVPB premix  500 mg Intravenous Q24H    sodium chloride 0.9% infusion   Intravenous Continuous    heparin (Porcine) 5000 UNIT/ML injection 5,000 Units  5,000 Units Subcutaneous Q8H FARIDEH    acetaminophen (Tylenol Extra Strength) tab 500 mg  500 mg Oral Q4H PRN    acetaminophen (Tylenol) tab 650 mg  650 mg Oral Q4H PRN    Or    HYDROcodone-acetaminophen (Norco) 5-325 MG per tab 1 tablet  1 tablet Oral Q4H PRN    Or    HYDROcodone-acetaminophen (Norco) 5-325 MG per tab 2 tablet  2 tablet Oral Q4H PRN    ondansetron (Zofran) 4 MG/2ML injection 4 mg  4 mg Intravenous Q6H PRN    albuterol (Ventolin HFA) 108 (90 Base) MCG/ACT inhaler 2 puff  2 puff Inhalation Q6H PRN         Assessment  Patient Active Problem List   Diagnosis    Chronic a-fib ([facility])    Controlled type 2 diabetes mellitus without complication, without long-term current use of insulin ([facility])    CKD (chronic kidney disease) stage 3, GFR 30-59 ml/min ([facility])    Gait apraxia of elderly    Essential hypertension    Vitamin D deficiency    History of stroke    Mixed hyperlipidemia    History of recurrent UTI (urinary tract infection)    Neuromuscular dysfunction of bladder    Late onset Alzheimer's disease without behavioral disturbance ([facility])    Medicare annual wellness visit, subsequent    SSS (sick sinus syndrome) ([facility])    S/P  placement of cardiac pacemaker    Normocytic anemia    At risk for falls    Bilateral hearing loss    Chronic systolic heart failure (HCC)    Recurrent falls    Malodorous urine    Oropharyngeal dysphagia    Community acquired pneumonia of left lower lobe of lung    Hypoxia       Plan:     Possible Aspiration Pneumonia   Acute respiratory distress  Acute Hypoxemic respiratory failure  -per NH notes patient was 70% on room air  -ED noted to be 88% in ED  -Patient sent in from nursing facility due to hypoxia.  -Chest x-ray reviewed.  Noted left perihilar opacity and left lower lobe lung collapse.  -Daughter also states patient with history of difficulties with swallowing.  Some concern for possible aspiration.  -Patient started on broad antibiotics with Unasyn and azithromycin.  -Check CT chest. Reviewed  -SLP evaluation   -Continuing supplemental O2, weaning as able.  -Continue to monitor     Acute Kidney Injury   - Starting light IVF  - Avoiding Nephrotoxic agents  - Cont to monitor - resolved     Persistent Atrial Fibrillation  -Rates currently controlled  -Continue carvedilol for rate control  -Not on chronic anticoagulation   -Telemetry monitoring     HTN  -Currently normotensive/mildly hypotensive  -Restarting some home regimen.  - Monitor and adjust agents as needed     Hyperlipidemia  -Statin     History of CVA  -Restart home regimen with dual antiplatelets and statin     Chronic heart failure with preserved ejection fraction   -No current signs of acute exacerbation  -Holding diuresis  - Strict I&Os, Daily weights     History of sick sinus syndrome  -Status post pacemaker placement        Global A/P  - reviewed labs and vitals from today  - reviewed notes of the day  - cbc, bmp, mag ordered for tomorrow  - discussed need to stay in the hospital today due to reviewed  - cont IV abx  - discussed with patient/RN and care team  - dispo pending clinical course      Kari Hermosillo MD  3/24/2025  12:59  PM    Supplementary Documentation:   DVT Mechanical Prophylaxis:   SCDs,    DVT Pharmacologic Prophylaxis   Medication    heparin (Porcine) 5000 UNIT/ML injection 5,000 Units                Code Status: DNAR/Selective Treatment  Bello: External urinary catheter in place  Bello Duration (in days):   Central line:    OSMIN:         **Certification      PHYSICIAN Certification of Need for Inpatient Hospitalization - Initial Certification    Patient will require inpatient services that will reasonably be expected to span two midnight's based on the clinical documentation in H+P.   Based on patients current state of illness, I anticipate that, after discharge, patient will require TBD.                   MDM: High complexity- severe exacerbation of chronic illness posing a threat to life. IV meds requiring close inpatient monitoring. I personally spent time on chart/note review, review of labs/imaging, discussion with patient, physical exam, discussion with staff, writing progress note, and discussion of plan of care.

## 2025-03-24 NOTE — DISCHARGE INSTRUCTIONS
.In addition to seeing your pulmonary medicine doctor, it was requested that you also follow up at the [facility]. The [facility] care clinic provides ongoing care and support to help you live more fully with chronic obstructive pulmonary disease, asthma, or pneumonia. During clinic visits, they'll monitor your health, determine if you need changes to medication or other therapies, and identify any additional services that may be of benefit.    The clinic is located at [address] [place] [address] (across from the lab in the [facility]) in  [facility]  Clinic Phone Number: [phone].      Sometimes managing your health at home requires assistance.  The [facility] team has recognized your preference to use [facility].  They can be reached by phone at [phone].  The fax number for your reference is [phone].  A representative from the home health agency will contact you or your family to schedule your first visit.

## 2025-03-24 NOTE — PHYSICAL THERAPY NOTE
PHYSICAL THERAPY EVALUATION - INPATIENT     Room Number: 503/503-A  Evaluation Date: 3/24/2025  Type of Evaluation: Initial   Physician Order: PT Eval and Treat    Presenting Problem: PNA, hypoxia  Co-Morbidities : Afib, DM, HTN, congestive heart disease, OA, TIA  Reason for Therapy: Mobility Dysfunction and Discharge Planning    PHYSICAL THERAPY ASSESSMENT   Patient is a 89 year old female admitted 3/23/2025 for pneumonia, hypoxia.  Prior to admission, patient's baseline is assistx1 with transfers and limited ambulation distances using a walker, assist with ADLs at assisted living facility.  Patient is currently functioning near baseline with bed mobility and transfers.  Patient is requiring moderate assist and maximum assist as a result of the following impairments: decreased functional strength, impaired seated balance, cognitive deficits (impaired memory, poor historian), medical status, and decreased compliance/participation.  Physical Therapy will continue to follow for duration of hospitalization.    Patient will benefit from continued skilled PT Services at discharge to promote prior level of function and safety with additional support and return home with home health PT.    PLAN DURING HOSPITALIZATION  Nursing Mobility Recommendation : Lift Equipment  PT Device Recommendation: Wheelchair, Gait belt, Hospital bed  PT Treatment Plan: Bed mobility, Endurance, Energy conservation, Patient education, Strengthening, Transfer training, Balance training  Rehab Potential : Guarded  Frequency (Obs): 3-5x/week     PHYSICAL THERAPY MEDICAL/SOCIAL HISTORY     Problem List  Principal Problem:    Community acquired pneumonia of left lower lobe of lung  Active Problems:    Hypoxia      HOME SITUATION  Type of Home: Assisted living facility  Home Layout: One level  Stairs to Enter : 0   Railing: No    Stairs to Bedroom: 0    Railing: No    Lives With: Staff 24 hours    Drives: No   Patient Regularly Uses: Wheelchair,  Rolling walker (minimally ambulatory with a walker, assist x1 for transfers to/from wheelchair)     Prior Level of [place]: assist x1 with transfers and limited ambulation with a walker at assisted living facility     SUBJECTIVE  \"How did I get here?\"    PHYSICAL THERAPY EXAMINATION   OBJECTIVE  Precautions: Hard of hearing, Bed/chair alarm  Fall Risk: High fall risk    WEIGHT BEARING RESTRICTION  none    PAIN ASSESSMENT  Ratin  Location: denies       COGNITION  Overall Cognitive Status:  Impaired    RANGE OF MOTION AND STRENGTH ASSESSMENT  Upper extremity ROM and strength are within functional limits BUEs to shoulder height  Lower extremity ROM is within functional limits BLEs  Lower extremity strength is functionally limited requiring increased assist for transfers    BALANCE  Static Sitting: Poor -  Dynamic Sitting: Dependent  Static Standing: Dependent  Dynamic Standing: Not tested      ACTIVITY TOLERANCE  Pulse: 94  Heart Rate Source: Monitor     BP: 130/89  BP Location: Right arm  BP Method: Automatic  Patient Position: Sitting    O2 WALK  Oxygen Therapy  SPO2% on Oxygen at Rest: 96  At rest oxygen flow (liters per minute): 2    AM-PAC '6-Clicks' INPATIENT SHORT FORM - BASIC MOBILITY  How much difficulty does the patient currently have...  Patient Difficulty: Turning over in bed (including adjusting bedclothes, sheets and blankets)?: A Lot   Patient Difficulty: Sitting down on and standing up from a chair with arms (e.g., wheelchair, bedside commode, etc.): A Lot   Patient Difficulty: Moving from lying on back to sitting on the side of the bed?: A Lot   How much help from another person does the patient currently need...   Help from Another: Moving to and from a bed to a chair (including a wheelchair)?: A Lot   Help from Another: Need to walk in hospital room?: Total   Help from Another: Climbing 3-5 steps with a railing?: Total     AM-PAC Score:  Raw Score: 10   Approx Degree of Impairment: 76.75%    Standardized Score (AM-PAC Scale): 32.29   CMS Modifier (G-Code): CL    FUNCTIONAL ABILITY STATUS  Functional Mobility/Gait Assessment  Gait Assistance: Not tested  Distance (ft): deferred 2/2 impaired standing balance/tolerance  Rolling: moderate assist  Supine to Sit: maximum assist  Stand-pivot transfer bed>chair: maximum assist    Exercise/Education Provided:  Education Provided To: Patient, Family/Caregiver  Patient Education: Role of Physical Therapy, Plan of Care, Functional Transfer Techniques, Posture/Positioning, Proper Body Mechanics  Patient's Response to Education: Does Not Demonstrate Skills Needed for Learning  Family/Caregiver Education: Role of Physical Therapy, Plan of Care  Family/Caregiver's Response to Education: Verbalized Understanding    Skilled Therapy Provided: Patient hard of hearing, assisted in donning hearing aids with improved hearing on R>L. Patient requiring maxA for static seated balance, leaning posteriorly and unable to maintain static sitting unsupported, maxA for stand-pivot transfer to get to bedside chair. Patient's son arriving end of session to confirm prior level of function.     Patient seen for evaluation in coordination with occupational therapy to maximize patient safety and function given cognitive and functional deficits. Patient received semi-fowlers in bed, agreeable to physical therapy evaluation. Vital signs monitored as noted above, no adverse symptoms and patient stable during session. Next session anticipate to progress bed mobility and transfers.    Patient history and/or personal factors that may impact the plan of care include co-morbidities (Afib, DM, HTN, congestive heart disease, OA, TIA) affecting activity tolerance, endurance, medical status . Based on the physical therapy examination of the noted systems and functional activity/participation limitations, the patient presentation is evolving given the patient demonstrates worsening of previously  stable condition and demonstrates cognitive skills affecting safety.    The patient's Approx Degree of Impairment: 76.75% has been calculated based on documentation in the [facility] '6 clicks' Inpatient Basic Mobility Short Form.  Research supports that patients with this level of impairment may benefit from long-term care.  Final disposition will be made by interdisciplinary medical team.    Patient End of Session: Up in chair, Call light within reach, Needs met, RN aware of session/findings, All patient questions and concerns addressed, Hospital anti-slip socks, Alarm set, Family present    CURRENT GOALS  Goals to be met by: 4/14/25  Patient Goal Patient's self-stated goal is: maximize functional independence and safety   Goal #1 Patient is able to demonstrate supine - sit EOB @ level: minimum assistance     Goal #1   Current Status    Goal #2 Patient is able to demonstrate transfers EOB to/from BSC at assistance level: minimum assistance with walker - rolling     Goal #2  Current Status    Goal #3 Patient is able to ambulate 20 feet with assist device: walker - rolling at assistance level: minimum assistance   Goal #3   Current Status    Goal #4 Patient is able to demonstrate transfers sit to/from stand at assistance level: minimum assistance with rolling walker      Goal #4   Current Status    Goal #5 Patient to demonstrate independence with home activity/exercise instructions provided to patient in preparation for discharge.   Goal #5   Current Status    Goal #6    Goal #6  Current Status      Patient Evaluation Complexity Level:  History High - 3 or more personal factors and/or co-morbidities   Examination of body systems Moderate - addressing a total of 3 or more elements   Clinical Presentation  Moderate - Evolving   Clinical Decision Making  Moderate Complexity       Therapeutic Activity:  15 minutes      Sheila Plummer, PT, DPT  [facility]  Rehab Services - Physical Therapy  [id number]

## 2025-03-24 NOTE — SLP NOTE
ADULT SWALLOWING EVALUATION    ASSESSMENT    ASSESSMENT/OVERALL IMPRESSION:  PPE REQUIRED. THIS THERAPIST WORE GLOVES FOR DURATION OF EVALUATION. HANDS WASHED UPON ENTRANCE/EXIT.    Per MD H&P, \"This is a 89 year oldfemale who was sent in from nursing facility after noting hypoxia.  Patient tired and sleepy at time of interview.  Limited subjective.  Denies feeling short of breath.  Agitated by O2 and nose.  Further history from daughter at bedside.  Daughter states patient began having a cough around Thursday.  Daughter states patient has been not acting herself since that time.  Daughter states she received a phone call from the nursing facility the morning of admission stating that her mother was found to have low oxygen levels and she was being sent to the ED for further evaluation.  Daughter states patient has been coughing with production of yellowish sputum.  Does not know if she has been having fevers or chills.  Of note, daughter states patient has had long-term difficulties with swallowing.  Daughter states patient does cough with eating at times.  Daughter states patient's been previously evaluated by speech and swallow.\"    SLP BSSE orders received and acknowledged. A swallow evaluation warranted per \"eval/tx\". Pt afebrile with clear vocal quality, on 2L/Min, with oxygen saturation at 98%. Pt with hx of dysphagia at [facility], BSE 7/1/23 with recommendations for regular/thin liquids. Seen at [facility], BSE 5/14/24 with recommendations for soft easy to chew/thin liquids.   Pt positioned 90 degrees in bedside chair, alert/cooperative/son present. Pt with no complaints of pain. Oral motor examination revealed reduced strength, ROM, and rate of motion. Pt presented with trials of puree, mildly thick liquids via cup and thin liquids via straw/cup. Pt with adequate oral acceptance and bilabial seal across all trials. Pt with reduced bolus formation/propulsion. Pt's swallow response appears delayed with reduced  hyolaryngeal elevation/excursion. Immediate and delayed cough observed with thin liquids. No clinical signs of aspiration (e.g., immediate/delayed throat clear, immediate/delayed cough, wet vocal quality, increased O2 effort) observed across puree and mildly thick liquid trials. 3/23 CXR indicates \"Left perihilar opacity, which may reflect pneumonia, left lower lobe collapse/atelectasis, and/or be secondary to a pulmonary mass as there is asymmetric prominence of the left hilar region.  Recommend further evaluation with a CT chest with contrast. Slight prominence of the interstitial markings, which may be secondary to low lung volumes or mild pulmonary edema. Enlarged cardiomediastinal silhouette with unchanged positioning of a left chest wall single lead pacemaker device. Mild chronic superior endplate compression fracture deformity at L1\". Oxygen status remained stable t/o the entire evaluation.     At this time, pt presents with mild oral dysphagia and probable pharyngeal dysfunction. Recommend a puree diet and mildly thick liquids with strict adherence to safe swallowing compensatory strategies. Results and recommendations reviewed with RN, pt, and family. Pt/pt's family v/u to all results/recommendations. Recommendations remain written on whiteboard. SLP collaborated with RN for MD diet orders.     PLAN: SLP to f/u x2-3 meal assessments, monitor imaging, and VFSS if clinically indicated       RECOMMENDATIONS   Diet Recommendations - Solids: Puree  Diet Recommendations - Liquids: Nectar thick liquids/ Mildly thick                       Aspiration Precautions: Upright position, Slow rate, Small bites, Small sips, No straw  Medication Administration Recommendations: Crushed in puree  Treatment Plan/Recommendations: Aspiration precautions    HISTORY   MEDICAL HISTORY  Reason for Referral:  (eval/tx)    Problem List  Principal Problem:    Community acquired pneumonia of left lower lobe of lung  Active Problems:     Hypoxia      Past Medical History  Past Medical History:    Acute, but ill-defined, cerebrovascular disease    2019    Anxiety state    Atrial fibrillation (HCC)    Cataract, bilateral    Chronic a-fib (HCC)    Congestive heart disease (HCC)    LV ej fraction of 30%    Conjunctival cyst of left eye    Cup to disc asymmetry    OU    Depression    Diabetes (HCC)    diet alone    Esophageal reflux    Essential hypertension    Hearing impairment    Hiatal hernia    Hyperlipidemia    Meibomian gland dysfunction (MGD), bilateral, both upper and lower lids    Osteoarthritis    TIA (transient ischemic attack)    Unspecified atrial fibrillation (HCC)       Prior Living Situation: Skilled nursing facility  Diet Prior to Admission: Unknown  Precautions: Aspiration    Patient/Family Goals: did not state    SWALLOWING HISTORY  Current Diet Consistency: NPO  Dysphagia History: see above  Imaging Results: 3/23 CT CHEST  CONCLUSION:   1. Multifocal areas of secretions/debris within the trachea and left greater than right bronchial tree with associated diffuse peribronchial thickening and multifocal mucous plugging.  Overall, these findings may be secondary to aspiration and/or   infectious/inflammatory bronchiolitis.   2. Mosaic attenuation involving the right greater left lungs, which may be secondary to air trapping or infectious/inflammatory bronchiolitis.   3. Minimal atelectasis/scarring involving the left lower lobe and lingula.   4. No mediastinal or hilar lymphadenopathy is identified within the limitations of this noncontrast exam.    5. Left atrial predominant cardiomegaly with moderate coronary artery calcifications and a left chest wall single lead pacemaker device.   6. Moderate hiatal hernia.   7. Mild-to-moderate compression fracture deformity at L1, which is age indeterminate but has progressed when compared to 09/11/2023.   8. Mild age-indeterminate inferior endplate compression fracture deformity at T9.   9.  Lesser incidental findings described above.     SUBJECTIVE       OBJECTIVE   ORAL MOTOR EXAMINATION  Dentition: Natural (sparse)     Strength: Overall reduced     Range of Motion: Overall reduced  Rate of Motion: Reduced    Voice Quality: Clear  Respiratory Status: Supplemental O2, Nasal cannula  Consistencies Trialed: Thin liquids, Nectar thick liquids, Puree  Method of Presentation: Self presentation, Staff/Clinician assistance, Spoon, Cup, Straw  Patient Positioned: Upright, Midline, Bedside chair    Oral Phase of Swallow: Impaired  Bolus Retrieval: Intact  Bilabial Seal: Intact  Bolus Formation: Impaired  Bolus Propulsion: Impaired     Retention: Impaired    Pharyngeal Phase of Swallow: Appears Impaired  Laryngeal Elevation Timing: Appears impaired  Laryngeal Elevation Strength: Appears impaired  Laryngeal Elevation Coordination: Appears intact  (Please note: Silent aspiration cannot be evaluated clinically. Videofluoroscopic Swallow Study is required to rule-out silent aspiration.)    Esophageal Phase of Swallow: No complaints consistent with possible esophageal involvement  Comments: NA          GOALS  Goal #1 The patient will tolerate puree consistency and mildly thick liquids without overt signs or symptoms of aspiration with 100 % accuracy over 1-2 session(s).  In Progress   Goal #2 The patient/family/caregiver will demonstrate understanding and implementation of aspiration precautions and swallow strategies independently over 1-2 session(s).    In Progress   Goal #3 The patient will tolerate trial upgrade of soft solids consistency and thin liquids without overt signs or symptoms of aspiration with 100 % accuracy over 1-2 session(s).  In Progress   Goal #4 The patient will utilize compensatory strategies as outlined by  BSSE (clinical evaluation) including Slow rate, Small bites, Small sips, No straws, Upright 90 degrees, Eliminate distractions with PRN assistance 100 % of the time across 2 sessions.    In  Progress     FOLLOW UP  Treatment Plan/Recommendations: Aspiration precautions  Duration: 1 week  Follow Up Needed (Documentation Required): Yes  SLP Follow-up Date: 03/25/25    Thank you for your referral.   If you have any questions, please contact JAYDE Casas M.S. CCC-SLP  Speech Language Pathologist  Phone Number [phone]

## 2025-03-24 NOTE — PLAN OF CARE
Patient confused. Tele. Call light within reach. Frequent rounding by staff.  Problem: Patient Centered Care  Goal: Patient preferences are identified and integrated in the patient's plan of care  Description: Interventions:- What would you like us to know as we care for you? - Provide timely, complete, and accurate information to patient/family- Incorporate patient and family knowledge, values, beliefs, and cultural backgrounds into the planning and delivery of care- Encourage patient/family to participate in care and decision-making at the level they choose- Honor patient and family perspectives and choices  Outcome: Progressing     Problem: Patient/Family Goals  Goal: Patient/Family Long Term Goal  Description: Patient's Long Term Goal: discharge Interventions:- - Monitor vitals  - Monitor appropriate labs  - Pain management  - Follow MD order  - Diagnostics per order  - Update/Informing patient and family on plan of care  - Discharge planning- See additional Care Plan goals for specific interventions  Outcome: Progressing  Goal: Patient/Family Short Term Goal  Description: Patient's Short Term Goal: feel better  Interventions: - - Monitor vitals  - Monitor appropriate labs  - Pain management  - Follow MD order  - Diagnostics per order  - Update/Informing patient and family on plan of care  - Discharge planning- See additional Care Plan goals for specific interventions  Outcome: Progressing

## 2025-03-24 NOTE — CM/SW NOTE
03/24/25 0900   CM/SW Referral Data   Referral Source    Reason for Referral Discharge planning   Informant Son   Medical Hx   Does patient have an established PCP? Yes  ([facility]un[facility])   Patient Info   Patient's Current Mental Status at Time of Assessment Confused or unable to complete assessment   Patient's Home Environment Assisted Living   Post Acute Care Provider Upon Admission   ([facility])   Patient lives with Alone   Patient Status Prior to Admission   Independent with ADLs and Mobility No   Pt. requires assistance with Housework;Driving;Bathing;Meals;Dressing;Ambulating;Medications;Toileting;Finances   Services in place prior to admission DME/Supplies at home   Type of DME/Supplies Standard Walker;Wheelchair   Discharge Needs   Anticipated D/C needs Assisted/Supported living     Pt discussed during nursing rounds. Dx CAP on 2L (RA baseline). From [facility], mainly WC bound at baseline per son. Anticipated therapy need: Home with Home Healthcare. Pt's son notified CM that patient already has RN services and therapy services are also available when needed. Pt's son will discuss recommendation w/sister's who direct the majority of the patient health care needs. Pt will return to [facility] once when medically stable. CM anticipates pt will be weaned off O2 prior to dc.    Plan: [facility] pending O2 weaning and medical clearance.    / to remain available for support and/or discharge planning.     MARGY Koch    [phone]

## 2025-03-24 NOTE — OCCUPATIONAL THERAPY NOTE
OCCUPATIONAL THERAPY EVALUATION - INPATIENT     Room Number: 503/503-A  Evaluation Date: 3/24/2025  Type of Evaluation: Initial  Presenting Problem: Community acquired pneumonia of left lower lobe of lung  , hypoxia    Physician Order: IP Consult to Occupational Therapy  Reason for Therapy: ADL/IADL Dysfunction and Discharge Planning    OCCUPATIONAL THERAPY ASSESSMENT   Patient is a 89 year old female admitted 3/23/2025 for Community acquired pneumonia of left lower lobe of lung, Hypoxia.  Prior to admission, patient's baseline is assist for ADLs and functional mobility.  Patient is currently functioning near/below baseline with ADLs and functional mobility.  Patient is requiring maximum assist as a result of the following impairments: generalized weakness, decreased aerobic capacity, cognitive deficits. Occupational Therapy will continue to follow for duration of hospitalization.    Patient will benefit from continued skilled OT Services at discharge to promote prior level of function and safety with additional support and return home with home health OT.    PLAN DURING HOSPITALIZATION  OT Device Recommendations: TBD  OT Treatment Plan: Balance activities, Energy conservation/work simplification techniques, ADL training, Functional transfer training, Endurance training, Equipment eval/education, Compensatory technique education     OCCUPATIONAL THERAPY MEDICAL/SOCIAL HISTORY   Problem List  Principal Problem:    Community acquired pneumonia of left lower lobe of lung  Active Problems:    Hypoxia    HOME SITUATION  Type of Home: Assisted living facility  Home Layout: One level  Lives With: Staff 24 hours  Drives: No  Patient Regularly Uses: Wheelchair; Rolling walker (minimally ambulatory with a walker, assist x1 for transfers to/from wheelchair)    SUBJECTIVE  Agreeable to activity  \"Why am I here\"     OCCUPATIONAL THERAPY EXAMINATION      OBJECTIVE  Precautions: Hard of hearing; Bed/chair alarm  Fall Risk: High  fall risk      PAIN ASSESSMENT  Ratin      ACTIVITY TOLERANCE  Pulse: 94                      O2 SATURATIONS  Oxygen Therapy  SPO2% on Oxygen at Rest: 96  At rest oxygen flow (liters per minute): 2    COGNITION  Impaired     RANGE OF MOTION   Upper extremity ROM is within functional limits     STRENGTH ASSESSMENT  Upper extremity strength is within functional limits     ACTIVITIES OF DAILY LIVING ASSESSMENT  AM-PAC ‘6-Clicks’ Inpatient Daily Activity Short Form  How much help from another person does the patient currently need…  -   Putting on and taking off regular lower body clothing?: A Lot  -   Bathing (including washing, rinsing, drying)?: A Lot  -   Toileting, which includes using toilet, bedpan or urinal? : A Lot  -   Putting on and taking off regular upper body clothing?: A Little  -   Taking care of personal grooming such as brushing teeth?: A Little  -   Eating meals?: A Little    AM-PAC Score:  Score: 15  Approx Degree of Impairment: 56.46%  Standardized Score (AM-PAC Scale): 34.69  CMS Modifier (G-Code): CK    FUNCTIONAL TRANSFER ASSESSMENT  Sit to Stand: Edge of Bed  Edge of Bed: Maximum Assist    BED MOBILITY  Supine to Sit : Maximum Assist  Sit to Supine (OT): Maximum Assist    FUNCTIONAL ADL ASSESSMENT  Grooming Seated: Minimal Assist  LB Dressing Seated: Dependent    Skilled Therapy Provided: RN cleared pt for participation in occupational therapy session, which was completed in collaboration with PT. Upon arrival, pt was supine in bed and agreeable to activity. No family was  present during session. Pt benefited from additional time, verbal cues, RW to maximize participation. To assess pt's safe participation during daily tasks while also providing intermittent education to maximize safety and participation, occupational therapist facilitated the following: bed mobility with MAX A, eob sitting with overall MAX A, spt to bedside chair with Max A (in prep for commode t/fs). Pt's son present at  end of session and reported pt is one assist with RW to t/f to WC.     EDUCATION PROVIDED  Patient Education : Role of Occupational Therapy; Plan of Care  Patient's Response to Education: Verbalized Understanding; Returned Demonstration    The patient's Approx Degree of Impairment: 56.46% has been calculated based on documentation in the [facility] '6 clicks' Inpatient Daily Activity Short Form.  Research supports that patients with this level of impairment may benefit from rehab.  Final disposition will be made by interdisciplinary medical team.     Patient End of Session: Up in chair, Needs met, Call light within reach, RN aware of session/findings    OT Goals  Patients self stated goal is: did not state     Patient will complete functional transfer with Min A  Comment:      Comment:     Patient will tolerate standing for 3 minutes in prep for adls with Min A   Comment:     Comment:          Goals  on:  25  Frequency: 3x/w    Patient Evaluation Complexity Level:   Occupational Profile/Medical History LOW - Brief history including review of medical or therapy records    Specific performance deficits impacting engagement in ADL/IADL LOW  1 - 3 performance deficits    Client Assessment/Performance Deficits LOW - No comorbidities nor modifications of tasks    Clinical Decision Making LOW - Analysis of occupational profile, problem-focused assessments, limited treatment options    Overall Complexity LOW     OT Session Time: 15 minutes  Therapeutic Activity: 15 minutes

## 2025-03-24 NOTE — PLAN OF CARE
Problem: Patient Centered Care  Goal: Patient preferences are identified and integrated in the patient's plan of care  Description: Interventions:- What would you like us to know as we care for you? - Provide timely, complete, and accurate information to patient/family- Incorporate patient and family knowledge, values, beliefs, and cultural backgrounds into the planning and delivery of care- Encourage patient/family to participate in care and decision-making at the level they choose- Honor patient and family perspectives and choices  Outcome: Progressing     Problem: Patient/Family Goals  Goal: Patient/Family Long Term Goal  Description: Patient's Long Term Goal: discharge Interventions:- - Monitor vitals  - Monitor appropriate labs  - Pain management  - Follow MD order  - Diagnostics per order  - Update/Informing patient and family on plan of care  - Discharge planning- See additional Care Plan goals for specific interventions  Outcome: Progressing  Goal: Patient/Family Short Term Goal  Description: Patient's Short Term Goal: feel better  Interventions: - - Monitor vitals  - Monitor appropriate labs  - Pain management  - Follow MD order  - Diagnostics per order  - Update/Informing patient and family on plan of care  - Discharge planning- See additional Care Plan goals for specific interventions  Outcome: Progressing     Problem: RESPIRATORY - ADULT  Goal: Achieves optimal ventilation and oxygenation  Description: INTERVENTIONS:- Assess for changes in respiratory status- Assess for changes in mentation and behavior- Position to facilitate oxygenation and minimize respiratory effort- Oxygen supplementation based on oxygen saturation or ABGs- Provide Smoking Cessation handout, if applicable- Encourage broncho-pulmonary hygiene including cough, deep breathe, Incentive Spirometry- Assess the need for suctioning and perform as needed- Assess and instruct to report SOB or any respiratory difficulty- Respiratory Therapy  support as indicated- Manage/alleviate anxiety- Monitor for signs/symptoms of CO2 retention  Outcome: Progressing     Problem: GENITOURINARY - ADULT  Goal: Absence of urinary retention  Description: INTERVENTIONS:- Assess patient’s ability to void and empty bladder- Monitor intake/output and perform bladder scan as needed- Follow urinary retention protocol/standard of care- Consider collaborating with pharmacy to review patient's medication profile- Implement strategies to promote bladder emptying  Outcome: Progressing     Problem: SKIN/TISSUE INTEGRITY - ADULT  Goal: Skin integrity remains intact  Description: INTERVENTIONS- Assess and document risk factors for pressure ulcer development- Assess and document skin integrity- Monitor for areas of redness and/or skin breakdown- Initiate interventions, skin care algorithm/standards of care as needed  Outcome: Progressing     Problem: Impaired Swallowing  Goal: Minimize aspiration risk  Description: Interventions:- Patient should be alert and upright for all feedings (90 degrees preferred)- Offer food and liquids at a slow rate- No straws- Encourage small bites of food and small sips of liquid- Offer pills one at a time, crush or deliver with applesauce as needed- Discontinue feeding and notify MD (or speech pathologist) if coughing or persistent throat clearing or wet/gurgly vocal quality is noted  Outcome: Progressing

## 2025-03-25 NOTE — PLAN OF CARE
Problem: Patient Centered Care  Goal: Patient preferences are identified and integrated in the patient's plan of care  Description: Interventions: - What would you like us to know as we care for you?   - Provide timely, complete, and accurate information to patient/family  - Incorporate patient and family knowledge, values, beliefs, and cultural backgrounds into the planning and delivery of care  - Encourage patient/family to participate in care and decision-making at the level they choose  - Honor patient and family perspectives and choices  Outcome: Progressing     Problem: Patient/Family Goals  Goal: Patient/Family Long Term Goal  Description: Patient's Long Term Goal: discharge   Interventions:-   - Monitor vitals  - Monitor appropriate labs  - Pain management  - Follow MD order  - Diagnostics per order  - Update/Informing patient and family on plan of care  - Discharge planning  - See additional Care Plan goals for specific interventions  Outcome: Progressing  Goal: Patient/Family Short Term Goal  Description: Patient's Short Term Goal: feel better    Interventions: -   - Monitor vitals  - Monitor appropriate labs  - Pain management  - Follow MD order  - Diagnostics per order  - Update/Informing patient and family on plan of care  - Discharge planning  - See additional Care Plan goals for specific interventions  Outcome: Progressing     Problem: RESPIRATORY - ADULT  Goal: Achieves optimal ventilation and oxygenation  Description: INTERVENTIONS:  - Assess for changes in respiratory status  - Assess for changes in mentation and behavior  - Position to facilitate oxygenation and minimize respiratory effort  - Oxygen supplementation based on oxygen saturation or ABGs  - Provide Smoking Cessation handout, if applicable  - Encourage broncho-pulmonary hygiene including cough, deep breathe, Incentive Spirometry  - Assess the need for suctioning and perform as needed  - Assess and instruct to report SOB or any  respiratory difficulty  - Respiratory Therapy support as indicated  - Manage/alleviate anxiety  - Monitor for signs/symptoms of CO2 retention  Outcome: Progressing     Problem: GENITOURINARY - ADULT  Goal: Absence of urinary retention  Description: INTERVENTIONS:  - Assess patient’s ability to void and empty bladder  - Monitor intake/output and perform bladder scan as needed  - Follow urinary retention protocol/standard of care  - Consider collaborating with pharmacy to review patient's medication profile  - Implement strategies to promote bladder emptying  Outcome: Progressing     Problem: SKIN/TISSUE INTEGRITY - ADULT  Goal: Skin integrity remains intact  Description: INTERVENTIONS  - Assess and document risk factors for pressure ulcer development  - Assess and document skin integrity  - Monitor for areas of redness and/or skin breakdown  - Initiate interventions, skin care algorithm/standards of care as needed  Outcome: Progressing

## 2025-03-25 NOTE — DIETARY NOTE
ADULT NUTRITION INITIAL ASSESSMENT    Pt is at high nutrition risk.  Pt meets moderate malnutrition criteria.      RECOMMENDATIONS TO MD: See nutrition intervention for ONS (oral nutrition supplements)    ADMITTING DIAGNOSIS:  Hypoxia [R09.02]  Community acquired pneumonia of left lower lobe of lung [J18.9]  PERTINENT PAST MEDICAL HISTORY:   Past Medical History:    Acute, but ill-defined, cerebrovascular disease    2019    Anxiety state    Atrial fibrillation (HCC)    Cataract, bilateral    Chronic a-fib (HCC)    Congestive heart disease (HCC)    LV ej fraction of 30%    Conjunctival cyst of left eye    Cup to disc asymmetry    OU    Depression    Diabetes (HCC)    diet alone    Esophageal reflux    Essential hypertension    Hearing impairment    Hiatal hernia    Hyperlipidemia    Meibomian gland dysfunction (MGD), bilateral, both upper and lower lids    Osteoarthritis    TIA (transient ischemic attack)    Unspecified atrial fibrillation (HCC)       PATIENT STATUS: Initial 03/25/25: Pt assessed due to screening at risk for malnutrition, MST 3 for unintended weight loss and decreased appetite. Chart reviewed, SLP performed bedside swallow study, recommends pureed diet, nectar/ mildly thick liquids. Visited pt, daughter Yessenia at bedside. Pt and daughter stated appetite was ok, but prior to admit appetite was good. Pt typically would eat a bigger breakfast, and smaller lunch and dinner. Prefers softer foods, enjoys eating eggs. Pt intake per chart review is poor, daughter stated she is not happy with the diet here.   Pt's daughter confirmed pt did have weight loss. Pt has been worrying about her son since December, and he passed a month ago. Per chart review, last admit weight was 122 lbs 8 oz in 10/24, this admit pt/s weight is 109 lbs, 6 oz, significant weight loss of 10%. Ordered pt ONS Sugar Free chocolate BID for am and pm snack, and Magic cup 1x day at hs to maximize intake. Follow up per protocol.      FOOD/NUTRITION RELATED HISTORY:  Appetite: Poor  Intake: ~0 - 25% x5 meals documented since admit  Intake Meeting Needs: No, but oral nutrition supplements (ONS) to maximize  Percent Meals Eaten (last 6 days)       Date/Time Percent Meals Eaten (%)    03/23/25 1846 0 %    03/24/25 0856 0 %     Percent Meals Eaten (%): npo at 03/24/25 0856    03/24/25 1143 25 %    03/24/25 1548 25 %    03/25/25 0938 25 %           Food Allergies: No Known Food Allergies (NKFA)  Cultural/Ethnic/Confucianist Preferences: Not Obtained    GASTROINTESTINAL: +BM 3/25/25 formed, brown    MEDICATIONS: reviewed   mupirocin  1 Application Each Nare BID    ampicillin-sulbactam  3 g Intravenous q6h    insulin aspart  1-5 Units Subcutaneous TID CC    aspirin  325 mg Oral Daily    carvedilol  3.125 mg Oral BID with meals    clopidogrel  75 mg Oral Daily    FLUoxetine  10 mg Oral Daily    losartan  25 mg Oral Daily    OXcarbazepine  150 mg Oral Daily    pantoprazole  40 mg Oral Daily    azithromycin  500 mg Intravenous Q24H    heparin  5,000 Units Subcutaneous Q8H FARIDEH     LABS: reviewed  Recent Labs     03/23/25  1113 03/24/25  0509 03/25/25  0507   * 122* 116*   BUN 25* 22 16   CREATSERUM 1.06* 0.81 0.67   CA 9.3 8.4* 8.5*   MG  --  1.9 1.9    141 142   K 4.8 4.4 3.9    104 107   CO2 29.0 27.0 27.0   OSMOCALC 293 297* 296*       WEIGHT HISTORY:  Patient Weight(s) for the past 336 hrs:   Weight   03/23/25 1509 49.6 kg (109 lb 6.4 oz)   03/23/25 1453 49.6 kg (109 lb 6.4 oz)   03/23/25 1059 55 kg (121 lb 4.1 oz)     Wt Readings from Last 10 Encounters:   03/23/25 49.6 kg (109 lb 6.4 oz)   10/24/24 55.6 kg (122 lb 8 oz)   09/21/23 59.3 kg (130 lb 11.2 oz)   09/11/23 64 kg (141 lb)   07/05/23 60.1 kg (132 lb 6.4 oz)   11/09/22 65 kg (143 lb 6.4 oz)   10/29/22 62.6 kg (138 lb)   09/20/22 63.5 kg (140 lb)   09/02/22 65.8 kg (145 lb)   04/29/22 67.4 kg (148 lb 9.6 oz)       ANTHROPOMETRICS:  HT: 162.6 cm (5' 4\")  Wt Readings from Last  1 Encounters:   03/23/25 49.6 kg (109 lb 6.4 oz)     Last weight: Likely accurate  BMI: Body mass index is 18.78 kg/m².  Dosing Weight: 49.6 kg - admission weight, utilized for anthropometric calculations  BMI CLASSIFICATION: <22 considered underweight for advanced age (>65)  IBW/lbs (Calculated) Female: 120 lbs           88% IBW  Usual Body Wt: unsure, chart review inconsistent       NUTRITION RELATED PHYSICAL FINDINGS:  - Nutrition Focused Physical Exam (NFPE): mild depletion body fat orbital region and triceps region and mild depletion muscle mass temple region, clavicle region, and scapular region  - Fluid Accumulation: none . See RN documentation for details  - Skin Integrity: at risk, breakdown, and reddened. See RN documentation for details    CRITERIA FOR MALNUTRITION DIAGNOSIS:  Criteria for non-severe malnutrition diagnosis: chronic illness related to wt loss 10% in 6 months, body fat mild depletion, and muscle mass mild depletion.    NUTRITION DIAGNOSIS/PROBLEM:   Moderate Malnutrition related to Chronic - Physiological causes resulting in anorexia or diminished intake as evidenced by weight loss of 10 lbs in 6 month, and mild muscle and fat depletion.     NUTRITION INTERVENTION:     NUTRITION PRESCRIPTION:   Estimated Nutrition needs: --dosing wt of 49.6 kg - wt taken on 3/23/25  Calories: 1240 - 1488 calories/day (25 - 30 calories per kg Dosing wt)  Protein: 60 - 74 g protein/day (1.2 - 1.5 g protein/kg Dosing wt)    - Diet:       Procedures    Carbohydrate controlled diet 1800 kcal/60 grams; Fluid Consistency: Nectar Thick / Mildly Thick Liquids; Texture Consistency: Pureed; Is Patient on Accuchecks? Yes      - Nutrition Care Plan: Encouraged small frequent meals with emphasis on high calorie/high protein and Initiated ONS (oral nutritional supplements)  - ONS (Oral Nutrition Supplements)/Meals/Snacks: Magic Cup (290 calories/ 9 g protein each) Daily Vanilla and SF Shake (200 calories/ 7 g protein  each) BID Chocolate   - Vitamin and mineral supplements: none  - Feeding assistance: meal set up  - Nutrition education: not appropriate at this time    - Coordination of nutrition care: collaboration with other providers  - Discharge and transfer of nutrition care to new setting or provider: monitor plans    MONITOR AND EVALUATE/NUTRITION GOALS:  - Food and Nutrient Intake:      Monitor: adequacy of PO intake and adequacy of supplement intake  - Food and Nutrient Administration:      Monitor: N/A  - Anthropometric Measurement:    Monitor weight  - Nutrition Goals:      regain wt as able, PO and supplement greater than 75% of needs, labs within acceptable limits, and prevent skin breakdown    DIETITIAN FOLLOW UP: RD to follow and monitor nutrition status    Romina Jensen-[facility]  Dietetic Intern  Phone: [phone]

## 2025-03-25 NOTE — CM/SW NOTE
Anticipated therapy need: Home with Home Healthcare.    Daughter agreeable to HH.  Her preference  is [facility].  Referrals sent in [facility].  Will follow-up to see if they can accept.    PLAN:  Back to [facility] with  services, need choice.    Daly VALDESA BSN RN CRRN   RN Case Manager  [phone]

## 2025-03-25 NOTE — PROGRESS NOTES
[facility]  part of [facility]    Progress Note    Yin Casas Patient Status:  Inpatient    1935 MRN [id number]   Location [facility]5W Attending Deb Parsons,    Hosp Day # 2 PCP Lyndon Talbot MD     Chief complaint pna     Subjective:   Yin Casas is a(n) 89 year old female pt feels much better.     ROS:   No cp, sob   No c/d   No n/v     Objective:   Blood pressure 147/65, pulse 104, temperature 97.4 °F (36.3 °C), temperature source Axillary, resp. rate 16, height 5' 4\" (1.626 m), weight 109 lb 6.4 oz (49.6 kg), SpO2 94%, not currently breastfeeding.      Intake/Output Summary (Last 24 hours) at 3/25/2025 1224  Last data filed at 3/25/2025 0938  Gross per 24 hour   Intake 2407 ml   Output 400 ml   Net 2007 ml       Patient Weight(s) for the past 336 hrs:   Weight   25 1509 109 lb 6.4 oz (49.6 kg)   25 1453 109 lb 6.4 oz (49.6 kg)   25 1059 121 lb 4.1 oz (55 kg)           General appearance: alert, appears stated age and cooperative  Pulmonary:  rhonchi b/l   Cardiovascular: S1, S2 normal, no murmur, click, rub or gallop, regular rate and rhythm  Abdominal: soft, non-tender; bowel sounds normal; no masses,  no organomegaly  Extremities: extremities normal, atraumatic, no cyanosis or edema        Medicines:     Current Facility-Administered Medications   Medication Dose Route Frequency    mupirocin (Bactroban) 2% nasal ointment 1 Application  1 Application Each Nare BID    ampicillin-sulbactam (Unasyn) 3 g in sodium chloride 0.9% 100mL IVPB-ADD  3 g Intravenous q6h    insulin aspart (NovoLOG) 100 Units/mL FlexPen 1-5 Units  1-5 Units Subcutaneous TID CC    glucose (Dex4) 15 GM/59ML oral liquid 15 g  15 g Oral Q15 Min PRN    Or    glucose (Glutose) 40% oral gel 15 g  15 g Oral Q15 Min PRN    Or    glucose-vitamin C (Dex-4) chewable tab 4 tablet  4 tablet Oral Q15 Min PRN    Or    dextrose 50% injection 50 mL  50 mL Intravenous Q15  Min PRN    Or    glucose (Dex4) 15 GM/59ML oral liquid 30 g  30 g Oral Q15 Min PRN    Or    glucose (Glutose) 40% oral gel 30 g  30 g Oral Q15 Min PRN    Or    glucose-vitamin C (Dex-4) chewable tab 8 tablet  8 tablet Oral Q15 Min PRN    aspirin DR tab 325 mg  325 mg Oral Daily    benzonatate (Tessalon) cap 100 mg  100 mg Oral TID PRN    carvedilol (Coreg) tab 3.125 mg  3.125 mg Oral BID with meals    clopidogrel (Plavix) tab 75 mg  75 mg Oral Daily    FLUoxetine (PROzac) tab 10 mg  10 mg Oral Daily    losartan (Cozaar) tab 25 mg  25 mg Oral Daily    OXcarbazepine (Trileptal) tab 150 mg  150 mg Oral Daily    pantoprazole (Protonix) DR tab 40 mg  40 mg Oral Daily    meclizine (Antivert) tab 25 mg  25 mg Oral BID PRN    azithromycin (Zithromax) 500 mg in sodium chloride 0.9% 250mL IVPB premix  500 mg Intravenous Q24H    sodium chloride 0.9% infusion   Intravenous Continuous    heparin (Porcine) 5000 UNIT/ML injection 5,000 Units  5,000 Units Subcutaneous Q8H FARIDEH    acetaminophen (Tylenol Extra Strength) tab 500 mg  500 mg Oral Q4H PRN    acetaminophen (Tylenol) tab 650 mg  650 mg Oral Q4H PRN    Or    HYDROcodone-acetaminophen (Norco) 5-325 MG per tab 1 tablet  1 tablet Oral Q4H PRN    Or    HYDROcodone-acetaminophen (Norco) 5-325 MG per tab 2 tablet  2 tablet Oral Q4H PRN    ondansetron (Zofran) 4 MG/2ML injection 4 mg  4 mg Intravenous Q6H PRN    albuterol (Ventolin HFA) 108 (90 Base) MCG/ACT inhaler 2 puff  2 puff Inhalation Q6H PRN                   Blood Pressure and Cardiac Medications            losartan 25 MG Oral Tab    carvedilol 3.125 MG Oral Tab             sodium chloride 83 mL/hr at 03/25/25 1102           Lab Results   Component Value Date    WBC 12.4 (H) 03/25/2025    HGB 11.0 (L) 03/25/2025    HCT 34.7 (L) 03/25/2025    .0 03/25/2025    CREATSERUM 0.67 03/25/2025    BUN 16 03/25/2025     03/25/2025    K 3.9 03/25/2025     03/25/2025    CO2 27.0 03/25/2025     (H) 03/25/2025     CA 8.5 (L) 03/25/2025    ALB 3.8 03/24/2025    ALKPHO 65 03/24/2025    BILT 0.5 03/24/2025    TP 6.2 03/24/2025    AST 12 03/24/2025    ALT <7 (L) 03/24/2025    PTT 51.2 (H) 12/08/2016    INR 1.09 09/01/2019    PT 24.7 (H) 11/07/2013    TSH 1.350 07/01/2023    LIP <10 (L) 03/21/2018    DDIMER 0.81 07/01/2023    MG 1.9 03/25/2025    PHOS 4.0 07/02/2023    TROP <0.045 12/02/2019     (H) 12/08/2016    B12 493 03/03/2017       CT CHEST (ZXQ=50922)    Result Date: 3/23/2025  CONCLUSION:   1. Multifocal areas of secretions/debris within the trachea and left greater than right bronchial tree with associated diffuse peribronchial thickening and multifocal mucous plugging.  Overall, these findings may be secondary to aspiration and/or infectious/inflammatory bronchiolitis. 2. Mosaic attenuation involving the right greater left lungs, which may be secondary to air trapping or infectious/inflammatory bronchiolitis. 3. Minimal atelectasis/scarring involving the left lower lobe and lingula. 4. No mediastinal or hilar lymphadenopathy is identified within the limitations of this noncontrast exam.  5. Left atrial predominant cardiomegaly with moderate coronary artery calcifications and a left chest wall single lead pacemaker device. 6. Moderate hiatal hernia. 7. Mild-to-moderate compression fracture deformity at L1, which is age indeterminate but has progressed when compared to 09/11/2023. 8. Mild age-indeterminate inferior endplate compression fracture deformity at T9. 9. Lesser incidental findings described above.    Dictated by (CST): Monster Zimmerman MD on 3/23/2025 at 8:14 PM     Finalized by (CST): Monster Zimmerman MD on 3/23/2025 at 8:26 PM          XR CHEST AP PORTABLE  (CPT=71045)    Result Date: 3/23/2025  CONCLUSION:   Left perihilar opacity, which may reflect pneumonia, left lower lobe collapse/atelectasis, and/or be secondary to a pulmonary mass as there is asymmetric prominence of the left hilar region.   Recommend further evaluation with a CT chest with contrast.  Slight prominence of the interstitial markings, which may be secondary to low lung volumes or mild pulmonary edema.  Enlarged cardiomediastinal silhouette with unchanged positioning of a left chest wall single lead pacemaker device.  Mild chronic superior endplate compression fracture deformity at L1.     Dictated by (CST): Monster Zimmerman MD on 3/23/2025 at 11:59 AM     Finalized by (CST): Monster Zimmerman MD on 3/23/2025 at 12:06 PM               Results:     CBC:    Lab Results   Component Value Date    WBC 12.4 (H) 03/25/2025    WBC 9.5 03/24/2025    WBC 14.6 (H) 03/23/2025     Lab Results   Component Value Date    HGB 11.0 (L) 03/25/2025    HGB 11.2 (L) 03/24/2025    HGB 12.8 03/23/2025      Lab Results   Component Value Date    .0 03/25/2025    .0 03/24/2025    .0 03/23/2025       Recent Labs   Lab 03/23/25  1113 03/24/25  0509 03/25/25  0507   * 122* 116*   BUN 25* 22 16   CREATSERUM 1.06* 0.81 0.67   CA 9.3 8.4* 8.5*    141 142   K 4.8 4.4 3.9    104 107   CO2 29.0 27.0 27.0           Assessment and Plan:        Possible Aspiration Pneumonia   Acute respiratory distress  Acute Hypoxemic respiratory failure  -per NH notes patient was 70% on room air  -ED noted to be 88% in ED  -Patient sent in from nursing facility due to hypoxia.  -Chest x-ray reviewed.  Noted left perihilar opacity and left lower lobe lung collapse.  -Daughter also states patient with history of difficulties with swallowing.  Some concern for possible aspiration.   -sp swallow eval cont modified diet tolerating well   -Patient started on broad antibiotics with Unasyn and azithromycin cont day #3   -reviewed ct chest   -Continuing supplemental O2, weaning as able.  -Continue to monitor     Acute Kidney Injury   - improved with ivfs. Decrease today   - Avoiding Nephrotoxic agents  - Cont to monitor - resolved     Persistent Atrial  Fibrillation  -Rates currently controlled  -Continue carvedilol for rate control  -Not on chronic anticoagulation   -Telemetry monitoring     HTN  -Currently normotensive  -Restarting some home regimen.  - Monitor and adjust agents as needed     Hyperlipidemia  -Statin     History of CVA  -cont antiplatelets and statin     Chronic heart failure with preserved ejection fraction   -No current signs of acute exacerbation  -Holding diuresis; decrease ivfs today   - Strict I&Os, Daily weights     History of sick sinus syndrome  -Status post pacemaker placement                      Code status dnar/select      Deb Parsons DO         Chart reviewed, including current vitals, notes, labs and imaging  Labs ordered and medications adjusted as outlined above  Coordinate care with care team/consultants  Discussed with patient results of tests, management plan as outlined above, and the need for ongoing hospitalization  D/w RN     [facility]        3/25/2025             Supplementary Documentation:   DVT Mechanical Prophylaxis:   SCDs,    DVT Pharmacologic Prophylaxis   Medication    heparin (Porcine) 5000 UNIT/ML injection 5,000 Units                Code Status: DNAR/Selective Treatment  Bello: External urinary catheter in place  Bello Duration (in days):   Central line:    OSMIN:

## 2025-03-25 NOTE — SLP NOTE
SPEECH DAILY NOTE - INPATIENT    ASSESSMENT & PLAN   ASSESSMENT  PPE REQUIRED. THIS THERAPIST WORE GLOVES, DROPLET MASK, AND GOGGLES FOR DURATION OF EVALUATION. HANDS WASHED UPON ENTRANCE/EXIT.    SLP f/u for ongoing dysphagia tx/meal assessment per recommendations of puree/mildly thick liquids per BSE. RN reports pt tolerates diet and medication well with no overt clinical s/s aspiration, however, pt does not like current diet. Pt denies any swallowing challenges.     Pt positioned upright in bedside chair. Pt afebrile, tolerating 2L/Min O2NC with oxygen status 97 prior to the start of oral trials. SLP reviewed aspiration precautions and safe swallowing compensatory strategies with the patient. Safe swallow guidelines remain written on the white board in purple. Diet recommendations remain on the whiteboard in the room. Patient's RN v/u. Provided moderate assistance, pt tolerates puree and mildly thick liquids via open cup with no overt clinical signs/symptoms of aspiration. Pt trialed with advanced solids and prolonged mastication with significant oral retention observed. Pt trialed with thin liquids and anterior spillage with immediate coughing and throat clearing observed. Trials discontinued. Oral/buccal cavities clear of residue following all trials. Oxygen status remained >95 t/o the entire session.    PLAN: SLP to f/u x2 meal assessments, monitor CXR, and VFSS if clinically warranted.     Diet Recommendations - Solids: Puree  Diet Recommendations - Liquids: Nectar thick liquids/ Mildly thick       Aspiration Precautions: Upright position, Slow rate, Small bites, Small sips, No straw  Medication Administration Recommendations: Crushed in puree    Patient Experiencing Pain: No    Treatment Plan  Treatment Plan/Recommendations: Aspiration precautions    Interdisciplinary Communication: Discussed with RN  Recommendations posted at bedside            GOALS  Goal #1 The patient will tolerate puree consistency and  mildly thick liquids without overt signs or symptoms of aspiration with 100 % accuracy over 1-2 session(s).  In Progress   Goal #2 The patient/family/caregiver will demonstrate understanding and implementation of aspiration precautions and swallow strategies independently over 1-2 session(s).    In Progress   Goal #3 The patient will tolerate trial upgrade of soft solids consistency and thin liquids without overt signs or symptoms of aspiration with 100 % accuracy over 1-2 session(s).  In Progress   Goal #4 The patient will utilize compensatory strategies as outlined by  BSSE (clinical evaluation) including Slow rate, Small bites, Small sips, No straws, Upright 90 degrees, Eliminate distractions with PRN assistance 100 % of the time across 2 sessions.    In Progress     FOLLOW UP  Follow Up Needed (Documentation Required): Yes  SLP Follow-up Date: 03/26/25  Duration: 1 week    Session: 1    If you have any questions, please contact JAYDE So M.S. CCC-SLP  Speech Language Pathologist  Phone Number [phone]. [phone]

## 2025-03-26 ENCOUNTER — TELEPHONE (OUTPATIENT)
Dept: INTERNAL MEDICINE CLINIC | Facility: CLINIC | Age: OVER 89
End: 2025-03-26

## 2025-03-26 NOTE — HOME CARE LIAISON
Received referral via [facility] for Home Health services. Spoke w/ patient's dtr/Yessenia who is agreeable with [facility]. Contact information placed on AVS.

## 2025-03-26 NOTE — TELEPHONE ENCOUNTER
Elena with [facility] called to ask if Dr. JAOCBS will sign Home Health orders for patient.

## 2025-03-26 NOTE — CM/SW NOTE
CM spoke with daughter Yessenia and choice for HH is [facility].  [facility] in [facility].    Daly Dsouza MBA BSN RN CRRN   RN Case Manager  [phone]

## 2025-03-26 NOTE — PHYSICAL THERAPY NOTE
PHYSICAL THERAPY TREATMENT NOTE - INPATIENT     Room Number: 503/503-A       Presenting Problem: PNA, hypoxia  Co-Morbidities : Afib, DM, HTN, congestive heart disease, OA, TIA    Problem List  Principal Problem:    Community acquired pneumonia of left lower lobe of lung  Active Problems:    Hypoxia      PHYSICAL THERAPY ASSESSMENT   Patient demonstrates limited progress this session, goals  remain in progress.      Patient is requiring moderate assist as a result of the following impairments: decreased functional strength, decreased endurance/aerobic capacity, impaired sitting and standing balance, decreased muscular endurance, cognitive deficits (impaired memory), medical status, and increased O2 needs from baseline.     Patient continues to function below baseline with bed mobility, transfers, gait, maintaining seated position, and standing prolonged periods.  Next session anticipate patient to progress bed mobility, transfers, gait, and maintaining seated position.  Physical Therapy will continue to follow patient for duration of hospitalization.    Patient continues to benefit from continued skilled PT services: at discharge to promote prior level of function and safety with additional support and return home with home health PT (back to [facility]).    PLAN DURING HOSPITALIZATION  Nursing Mobility Recommendation : Lift Equipment  PT Device Recommendation: Wheelchair, Gait belt, Hospital bed  PT Treatment Plan: Bed mobility, Endurance, Energy conservation, Patient education, Strengthening, Transfer training, Balance training  Frequency (Obs): 3-5x/week     SUBJECTIVE  Agreeable to activity    OBJECTIVE  Precautions: Hard of hearing, Bed/chair alarm      PAIN ASSESSMENT   Ratin  Location: denies       BALANCE  Static Sitting: Not tested  Dynamic Sitting: Not tested  Static Standing: Not tested  Dynamic Standing: Not tested        AM-PAC '6-Clicks' INPATIENT SHORT FORM - BASIC MOBILITY  How much difficulty does the  patient currently have...  Patient Difficulty: Turning over in bed (including adjusting bedclothes, sheets and blankets)?: A Lot   Patient Difficulty: Sitting down on and standing up from a chair with arms (e.g., wheelchair, bedside commode, etc.): A Lot   Patient Difficulty: Moving from lying on back to sitting on the side of the bed?: A Lot   How much help from another person does the patient currently need...   Help from Another: Moving to and from a bed to a chair (including a wheelchair)?: A Lot   Help from Another: Need to walk in hospital room?: Total   Help from Another: Climbing 3-5 steps with a railing?: Total     AM-PAC Score:  Raw Score: 10   Approx Degree of Impairment: 76.75%   Standardized Score (AM-PAC Scale): 32.29   CMS Modifier (G-Code): CL    FUNCTIONAL ABILITY STATUS  Functional Mobility/Gait Assessment  Gait Assistance: Not tested  Distance (ft): deferred 2/2 impaired standing balance/tolerance      Skilled Therapy Provided: Pt rec'd in chair agreeable to activity. Pt benefited from increased time for task processing and only able to follow commands about 75% of time with frequent VC and tactile cues. Pt able to perform seated BLE and BUE therex for carry over to functional strength for ADLs. Pt in chair to conclude session, RN aware.    The patient's Approx Degree of Impairment: 76.75% has been calculated based on documentation in the [facility] '6 clicks' Inpatient Daily Activity Short Form.  Research supports that patients with this level of impairment may benefit from rehab.  Final disposition will be made by interdisciplinary medical team.    THERAPEUTIC EXERCISES  Lower Extremity Alternating marching  Ankle pumps  Hip AB/AD  LAQ  BUE reaching, shoulder flexion, elbow flexion     Position Sitting       Patient End of Session: Up in chair, Needs met, Call light within reach, RN aware of session/findings    CURRENT GOALS   Goals to be met by: 4/14/25  Patient Goal Patient's self-stated goal is:  maximize functional independence and safety   Goal #1 Patient is able to demonstrate supine - sit EOB @ level: minimum assistance      Goal #1   Current Status  in progress   Goal #2 Patient is able to demonstrate transfers EOB to/from BSC at assistance level: minimum assistance with walker - rolling      Goal #2  Current Status  in progress   Goal #3 Patient is able to ambulate 20 feet with assist device: walker - rolling at assistance level: minimum assistance   Goal #3   Current Status  in progress   Goal #4 Patient is able to demonstrate transfers sit to/from stand at assistance level: minimum assistance with rolling walker       Goal #4   Current Status  in progress   Goal #5 Patient to demonstrate independence with home activity/exercise instructions provided to patient in preparation for discharge.   Goal #5   Current Status  in progress   Goal #6     Goal #6  Current Status         Therapeutic Exercise: 23 minutes

## 2025-03-26 NOTE — PLAN OF CARE
Problem: Patient Centered Care  Goal: Patient preferences are identified and integrated in the patient's plan of care  Description: Interventions:  - What would you like us to know as we care for you?   - Provide timely, complete, and accurate information to patient/family  - Incorporate patient and family knowledge, values, beliefs, and cultural backgrounds into the planning and delivery of care  - Encourage patient/family to participate in care and decision-making at the level they choose  - Honor patient and family perspectives and choices  Outcome: Progressing     Problem: Patient/Family Goals  Goal: Patient/Family Long Term Goal  Description: Patient's Long Term Goal: discharge   Interventions:-   - Monitor vitals  - Monitor appropriate labs  - Pain management  - Follow MD order  - Diagnostics per order  - Update/Informing patient and family on plan of care  - Discharge planning  - See additional Care Plan goals for specific interventions  Outcome: Progressing  Goal: Patient/Family Short Term Goal  Description: Patient's Short Term Goal: feel better    Interventions:   - Monitor vitals  - Monitor appropriate labs  - Pain management  - Follow MD order  - Diagnostics per order  - Update/Informing patient and family on plan of care  - Discharge planning  -See additional Care Plan goals for specific interventions  Outcome: Progressing     Problem: RESPIRATORY - ADULT  Goal: Achieves optimal ventilation and oxygenation  Description: INTERVENTIONS:  - Assess for changes in respiratory status  - Assess for changes in mentation and behavior  - Position to facilitate oxygenation and minimize respiratory effort  - Oxygen supplementation based on oxygen saturation or ABGs  - Provide Smoking Cessation handout, if applicable  - Encourage broncho-pulmonary hygiene including cough, deep breathe, Incentive Spirometry  - Assess the need for suctioning and perform as needed  - Assess and instruct to report SOB or any  respiratory difficulty  - Respiratory Therapy support as indicated  - Manage/alleviate anxiety  - Monitor for signs/symptoms of CO2 retention  Outcome: Progressing     Problem: GENITOURINARY - ADULT  Goal: Absence of urinary retention  Description: INTERVENTIONS:  - Assess patient’s ability to void and empty bladder  - Monitor intake/output and perform bladder scan as needed  - Follow urinary retention protocol/standard of care  - Consider collaborating with pharmacy to review patient's medication profile  - Implement strategies to promote bladder emptying  Outcome: Progressing     Problem: SKIN/TISSUE INTEGRITY - ADULT  Goal: Skin integrity remains intact  Description: INTERVENTIONS  - Assess and document risk factors for pressure ulcer development  - Assess and document skin integrity  - Monitor for areas of redness and/or skin breakdown  - Initiate interventions, skin care algorithm/standards of care as needed  Outcome: Progressing     Problem: Impaired Swallowing  Goal: Minimize aspiration risk  Description: Interventions:  - Patient should be alert and upright for all feedings (90 degrees preferred)  - Offer food and liquids at a slow rate- No straws  - Encourage small bites of food and small sips of liquid- Offer pills one at a time, crush or deliver with applesauce as needed- Discontinue feeding and notify MD (or speech pathologist) if coughing or persistent throat clearing or wet/gurgly vocal quality is noted  Outcome: Progressing

## 2025-03-26 NOTE — PROGRESS NOTES
[facility]  part of [facility]    Progress Note    Yin Casas Patient Status:  Inpatient    1935 MRN [id number]   Location [facility]5W Attending Ayden Boston,*   Hosp Day # 3 PCP Lyndon Talbot MD     Chief Complaint: what?    Subjective:     Constitutional:  Positive for appetite change and fatigue.   HENT:  Positive for congestion.    Respiratory:  Negative for choking, chest tightness and shortness of breath.    Cardiovascular:  Negative for leg swelling.   Gastrointestinal:  Negative for abdominal pain.   Genitourinary:  Positive for dysuria and frequency.   Neurological:  Negative for weakness.   Psychiatric/Behavioral:  Positive for sleep disturbance. Negative for decreased concentration. The patient is not nervous/anxious.        Objective:   Blood pressure 145/75, pulse 108, temperature 97.3 °F (36.3 °C), temperature source Oral, resp. rate 20, height 5' 4\" (1.626 m), weight 118 lb 14.4 oz (53.9 kg), SpO2 96%, not currently breastfeeding.  Physical Exam  Vitals and nursing note reviewed.   Cardiovascular:      Rate and Rhythm: Tachycardia present.   Pulmonary:      Effort: Pulmonary effort is normal.      Breath sounds: Wheezing, rhonchi and rales present.   Abdominal:      General: Bowel sounds are normal.      Palpations: Abdomen is soft.   Skin:     General: Skin is warm and dry.      Capillary Refill: Capillary refill takes less than 2 seconds.   Neurological:      General: No focal deficit present.      Mental Status: She is alert. Mental status is at baseline.         Results:   Lab Results   Component Value Date    WBC 14.7 (H) 2025    HGB 10.7 (L) 2025    HCT 34.3 (L) 2025    .0 2025    CREATSERUM 0.79 2025    BUN 16 2025     2025    K 3.8 2025     2025    CO2 25.0 2025     (H) 2025    CA 8.8 2025    ALB 3.8 2025    ALKPHO 65  03/24/2025    BILT 0.5 03/24/2025    TP 6.2 03/24/2025    AST 12 03/24/2025    ALT <7 (L) 03/24/2025    PTT 51.2 (H) 12/08/2016    INR 1.09 09/01/2019    PT 24.7 (H) 11/07/2013    TSH 1.350 07/01/2023    LIP <10 (L) 03/21/2018    DDIMER 0.81 07/01/2023    MG 1.9 03/25/2025    PHOS 4.0 07/02/2023    TROP <0.045 12/02/2019    TROPHS 23 03/23/2025     (H) 12/08/2016    B12 493 03/03/2017       No results found.        Assessment & Plan:     Possible Aspiration Pneumonia   Acute respiratory distress  Acute Hypoxemic respiratory failure  -per NH notes patient was 70% on room air  -ED noted to be 88% in ED  -Patient sent in from nursing facility due to hypoxia.  -Chest x-ray reviewed.  Noted left perihilar opacity and left lower lobe lung collapse.  -Daughter also states patient with history of difficulties with swallowing.  Some concern for possible aspiration.   -sp swallow eval cont modified diet tolerating well   -Patient started on broad antibiotics with Unasyn and azithromycin cont day #3   -reviewed ct chest   -Continuing supplemental O2, weaning as able.desats at night  -Continue to monitor     Acute Kidney Injury   - improved with ivfs. Decrease today   - Avoiding Nephrotoxic agents  - Cont to monitor - resolved     Persistent Atrial Fibrillation  -Rates currently controlled  -Continue carvedilol for rate control  -Not on chronic anticoagulation   -Telemetry monitoring    Dysuria recheck ua; c and s     HTN  -Currently normotensive  -Restarting some home regimen.  - Monitor and adjust agents as needed     Hyperlipidemia  -Statin     History of CVA  -cont antiplatelets and statin     Chronic heart failure with preserved ejection fraction   -No current signs of acute exacerbation  -Holding diuresis; decrease ivfs today   - Strict I&Os, Daily weights     History of sick sinus syndrome  -Status post pacemaker placement                      Code status dnar/select        Complexmdm; coordinating care with nurse  and trying to  pt who kicked me out because she was about to have dinner          JOSE MORALES MD

## 2025-03-26 NOTE — TELEPHONE ENCOUNTER
Spoke with Elena with [facility] and informed of Dr Talbot's message below, Elena verbalized understanding.

## 2025-03-27 NOTE — PROGRESS NOTES
[facility]  part of [facility]    Progress Note    Yin Casas Patient Status:  Inpatient    1935 MRN [id number]   Location [facility]5W Attending Ayden Boston,*   Hosp Day # 4 PCP Lyndon Talbot MD     Chief Complaint: what?    Subjective:     Constitutional:  Positive for appetite change and fatigue.   HENT:  Positive for congestion.    Respiratory:  Positive for cough. Negative for choking, chest tightness and shortness of breath.    Cardiovascular:  Negative for leg swelling.   Gastrointestinal:  Negative for abdominal pain.   Genitourinary:  Positive for dysuria and frequency.   Neurological:  Negative for weakness.   Psychiatric/Behavioral:  Positive for sleep disturbance. Negative for decreased concentration. The patient is not nervous/anxious.        Objective:   Blood pressure 142/87, pulse 112, temperature 97.3 °F (36.3 °C), temperature source Axillary, resp. rate 20, height 5' 4\" (1.626 m), weight 128 lb 4.8 oz (58.2 kg), SpO2 93%, not currently breastfeeding.  Physical Exam  Vitals and nursing note reviewed.   Cardiovascular:      Rate and Rhythm: Tachycardia present.   Pulmonary:      Effort: Pulmonary effort is normal.      Breath sounds: Wheezing, rhonchi and rales present.   Abdominal:      General: Bowel sounds are normal.      Palpations: Abdomen is soft.   Skin:     General: Skin is warm and dry.      Capillary Refill: Capillary refill takes less than 2 seconds.   Neurological:      General: No focal deficit present.      Mental Status: She is alert. Mental status is at baseline.         Results:   Lab Results   Component Value Date    WBC 13.4 (H) 2025    HGB 10.5 (L) 2025    HCT 33.5 (L) 2025    .0 2025    CREATSERUM 0.73 2025    BUN 22 2025     2025    K 3.9 2025     2025    CO2 23.0 2025     (H) 2025    CA 8.6 (L) 2025    ALB 3.8  03/24/2025    ALKPHO 65 03/24/2025    BILT 0.5 03/24/2025    TP 6.2 03/24/2025    AST 12 03/24/2025    ALT <7 (L) 03/24/2025    PTT 51.2 (H) 12/08/2016    INR 1.09 09/01/2019    PT 24.7 (H) 11/07/2013    TSH 1.350 07/01/2023    LIP <10 (L) 03/21/2018    DDIMER 0.81 07/01/2023    MG 1.9 03/27/2025    PHOS 2.9 03/27/2025    TROP <0.045 12/02/2019    TROPHS 23 03/23/2025     (H) 12/08/2016    B12 493 03/03/2017       No results found.        Assessment & Plan:     Possible Aspiration Pneumonia   Acute respiratory distress  Acute Hypoxemic respiratory failure  -per NH notes patient was 70% on room air  -ED noted to be 88% in ED  -Patient sent in from nursing facility due to hypoxia.  -Chest x-ray reviewed.  Noted left perihilar opacity and left lower lobe lung collapse.  -Daughter also states patient with history of difficulties with swallowing.  Some concern for possible aspiration.   -sp swallow eval cont modified diet tolerating well   -Patient started on broad antibiotics with Unasyn and azithromycin cont day #3   -reviewed ct chest   -Continuing supplemental O2, weaning as able.desats at night  -Continue to monitor     Acute Kidney Injury   - improved with ivfs. Decrease today   - Avoiding Nephrotoxic agents  - Cont to monitor - resolved     Persistent Atrial Fibrillation  -Rates not currently controlled  -Continue carvedilol for rate control  -Not on chronic anticoagulation   -Telemetry monitoring hr inc and dec    Dysuria recheck ua; c and s; await culture ua abn     HTN  -Currently normotensive  -Restarting some home regimen.  - Monitor and adjust agents as needed     Hyperlipidemia  -Statin     History of CVA  -cont antiplatelets and statin    Leg brace right leg     Chronic heart failure with preserved ejection fraction   -No current signs of acute exacerbation  -Holding diuresis; decrease ivfs today   - Strict I&Os, Daily weights     History of sick sinus syndrome  -Status post pacemaker placement will  ask cards to recheck    Recent loss weight and death of son/picky eater and dislikes dietary restrictions; add remeron check tsh [place]                       Code status dnar/select    Dispo: back to assisted living when issues addressed        Complex mdm; coordinating care with nurse and trying to  pt and with her permission her daughter on phone about pneumonia          JOSE MORALES MD

## 2025-03-27 NOTE — CONSULTS
[facility]  Cardiology Consultation    Yin Casas Patient Status:  Inpatient    1935 MRN [id number]   Location [facility]5W Attending Ayden Boston,*   Hosp Day # 4 PCP Lyndon Talbot MD     Reason for Consultation:  Afib    History of Present Illness:  Yin Casas is a a(n) 89 year old female who presents with cough and SOB.  She has followed in the past with my colleague Dr. Farah and recent office note has been copied into current EMR chart for reference.    She has a past medical history of atrial fibrillation, sick sinus syndrome, heart failure preserved ejection fraction, hypertension, hyperlipidemia  Not on anticoagulation as per home regimen.    She presented on 3/23/25 with SOB and productive cough.   During hospitalization, noted to be in afib with RVR and cardiology consulted.    She is being treated for possible aspiration pneumonia.      In ER, trop negative  ECG: Afib with RVR and nonspecific ST changes    History:  Past Medical History:    Acute, but ill-defined, cerebrovascular disease    2019    Anxiety state    Atrial fibrillation (HCC)    Cataract, bilateral    Chronic a-fib (HCC)    Congestive heart disease (HCC)    LV ej fraction of 30%    Conjunctival cyst of left eye    Cup to disc asymmetry    OU    Depression    Diabetes (HCC)    diet alone    Esophageal reflux    Essential hypertension    Hearing impairment    Hiatal hernia    Hyperlipidemia    Meibomian gland dysfunction (MGD), bilateral, both upper and lower lids    Osteoarthritis    TIA (transient ischemic attack)    Unspecified atrial fibrillation (HCC)     History reviewed. No pertinent surgical history.  Family History   Problem Relation Age of Onset    Diabetes Mother     Macular degeneration Sister     Diabetes Sister     Glaucoma Neg       reports that she quit smoking about 55 years ago. She has never used smokeless tobacco. She reports that she does not drink  alcohol and does not use drugs.    Allergies:  Allergies[1]    Medications:    Current Facility-Administered Medications:     mirtazapine (Remeron) tab 7.5 mg, 7.5 mg, Oral, Nightly    mupirocin (Bactroban) 2% nasal ointment 1 Application, 1 Application, Each Nare, BID    ampicillin-sulbactam (Unasyn) 3 g in sodium chloride 0.9% 100mL IVPB-ADD, 3 g, Intravenous, q6h    insulin aspart (NovoLOG) 100 Units/mL FlexPen 1-5 Units, 1-5 Units, Subcutaneous, TID CC    glucose (Dex4) 15 GM/59ML oral liquid 15 g, 15 g, Oral, Q15 Min PRN **OR** glucose (Glutose) 40% oral gel 15 g, 15 g, Oral, Q15 Min PRN **OR** glucose-vitamin C (Dex-4) chewable tab 4 tablet, 4 tablet, Oral, Q15 Min PRN **OR** dextrose 50% injection 50 mL, 50 mL, Intravenous, Q15 Min PRN **OR** glucose (Dex4) 15 GM/59ML oral liquid 30 g, 30 g, Oral, Q15 Min PRN **OR** glucose (Glutose) 40% oral gel 30 g, 30 g, Oral, Q15 Min PRN **OR** glucose-vitamin C (Dex-4) chewable tab 8 tablet, 8 tablet, Oral, Q15 Min PRN    aspirin DR tab 325 mg, 325 mg, Oral, Daily    benzonatate (Tessalon) cap 100 mg, 100 mg, Oral, TID PRN    carvedilol (Coreg) tab 3.125 mg, 3.125 mg, Oral, BID with meals    clopidogrel (Plavix) tab 75 mg, 75 mg, Oral, Daily    losartan (Cozaar) tab 25 mg, 25 mg, Oral, Daily    OXcarbazepine (Trileptal) tab 150 mg, 150 mg, Oral, Daily    pantoprazole (Protonix) DR tab 40 mg, 40 mg, Oral, Daily    meclizine (Antivert) tab 25 mg, 25 mg, Oral, BID PRN    azithromycin (Zithromax) 500 mg in sodium chloride 0.9% 250mL IVPB premix, 500 mg, Intravenous, Q24H    sodium chloride 0.9% infusion, , Intravenous, Continuous    heparin (Porcine) 5000 UNIT/ML injection 5,000 Units, 5,000 Units, Subcutaneous, Q8H FARIDEH    acetaminophen (Tylenol Extra Strength) tab 500 mg, 500 mg, Oral, Q4H PRN    acetaminophen (Tylenol) tab 650 mg, 650 mg, Oral, Q4H PRN **OR** HYDROcodone-acetaminophen (Norco) 5-325 MG per tab 1 tablet, 1 tablet, Oral, Q4H PRN **OR**  HYDROcodone-acetaminophen (Norco) 5-325 MG per tab 2 tablet, 2 tablet, Oral, Q4H PRN    ondansetron (Zofran) 4 MG/2ML injection 4 mg, 4 mg, Intravenous, Q6H PRN    albuterol (Ventolin HFA) 108 (90 Base) MCG/ACT inhaler 2 puff, 2 puff, Inhalation, Q6H PRN    Review of Systems:  A comprehensive review of systems was negative if not otherwise mention in above HPI.    /87 (BP Location: Right arm)   Pulse 112   Temp 97.3 °F (36.3 °C) (Axillary)   Resp 20   Ht 162.6 cm (5' 4\")   Wt 128 lb 4.8 oz (58.2 kg)   SpO2 93%   BMI 22.02 kg/m²   Temp (24hrs), Av.4 °F (36.3 °C), Min:97.3 °F (36.3 °C), Max:97.5 °F (36.4 °C)       Intake/Output Summary (Last 24 hours) at 3/27/2025 1128  Last data filed at 3/27/2025 1020  Gross per 24 hour   Intake 2740 ml   Output 575 ml   Net 2165 ml     Wt Readings from Last 3 Encounters:   25 128 lb 4.8 oz (58.2 kg)   10/24/24 122 lb 8 oz (55.6 kg)   23 130 lb 11.2 oz (59.3 kg)       Physical Exam:   General: Alert and oriented x 3. No apparent distress. No respiratory or constitutional distress.  HEENT: Normocephalic, anicteric sclera, neck supple.  Neck: No JVD, carotids 2+, no bruits.  Cardiac: Irregularly irregular, tachycardic  Lungs: Clear without wheezes, rales, rhonchi or dullness.    Abdomen: Soft, non-tender.   Extremities: Without clubbing, cyanosis or edema.  Neurologic: Alert and oriented, normal affect.  Skin: Warm and dry.     Laboratory Data:  Lab Results   Component Value Date    WBC 13.4 2025    HGB 10.5 2025    HCT 33.5 2025    .0 2025    CREATSERUM 0.73 2025    BUN 22 2025     2025    K 3.9 2025     2025    CO2 23.0 2025     2025    CA 8.6 2025    MG 1.9 2025    PHOS 2.9 2025    PGLU 155 2025     Recent Labs   Lab 25  1113   TROPHS 23     History: A-fib with RVR with heart rates in  120s    Imaging:  Reviewed  Impression:  Principal Problem:    Community acquired pneumonia of left lower lobe of lung  Active Problems:    Hypoxia      Permanent afib  Dementia  Sick sinus syndrome s/p PPM (SJM)  Chronic HFpEF  History of CVA  Hypertension  Aspiration pneumonia  KENYA    Recommendations:    Patient undergoing treatment for aspiration pneumonia.  Given inadequate rate control, will increase carvedilol.  Increasing carvedilol to 6.25 mg twice daily    Continue with losartan and carvedilol for blood pressure.    Continue to monitor blood pressure.      Thank you for allowing me to participate in the care of your patient.    Taj Berry MD  3/27/2025  11:28 AM         [1]   Allergies  Allergen Reactions    Levaquin [Levofloxacin] OTHER (SEE COMMENTS)     tendonitis heel

## 2025-03-27 NOTE — HISTORICAL OFFICE NOTE
CHIEF COMPLAINT    CHIEF COMPLAINT  Reason for Visit/Chief Complaint   F/U   The patient is here for routine annual follow-up regarding pacemaker, permanent A-fib. Patient has dementia comes in with family.Has been stable last hospitalization May 2024 for change of mental status was at [facility]. Pacemaker check shows battery life until 2032. Patient so was 89 and she has a brother who is 97.  Last testing in July 2023 had an echo at that time EF 50%. Seen with her daughter she has dementia poor memory but no complaints. Has a lot of teeth falling out but still is able to eat soft food.Patient has a history of atrial fibrillation, diastolic heart failure, mitral regurgitation, stroke and pacemaker here for routine annual follow-up regarding the pacemaker. Lives at [facility].She generally has no complaints during the pandemic she got meals delivered to her room but now she is going to the cafeteria she is enjoyed this. She denies lightheadedness dizziness syncope chest pain or shortness of breath. Last echocardiogram May 2021 shows EF 53%.Pacemaker check today shows estimated ASHOK status and of 2033 with 20% ventricular pacingCARDIAC HISTORY:  Pacemaker  Atrial fibrillation, permanent  TIA  Cardiomyopathy history  Dyslipidemia  HIMANSHU  Diabetes     PROBLEMS  Reconcile with Patient's ChartPROBLEMS  Problem Effective Dates Date resolved Problem Status   Dementia, [SNOMED-CT: 19207607] 1/22/2025 - Active   Cardiac pacemaker (s/p PPM), [SNOMED-CT: 399842755] 4/20/2022 - Active   Nonrheumatic mitral (valve) insufficiency, [SNOMED-CT: 022030904] 12/21/2020 - Active   Heart failure, [SNOMED-CT: 87593396] 12/17/2020 - Active   Permanent atrial fibrillation, [SNOMED-CT: 131398874] 9/24/2019 - Active   Chronic systolic congestive heart failure, [SNOMED-CT: 055344232] 9/24/2019 - Active   Diabetes 1.5, managed as type 2, [SNOMED-CT: 559229129] 9/24/2019 - Active     ENCOUNTER    ENCOUNTER  Problem Effective Dates Date resolved  Problem Status   Dementia, [SNOMED-CT: 68261998] 1/22/2025 - Active   Cardiac pacemaker (s/p PPM), [SNOMED-CT: 491045719] 4/20/2022 - Active   Nonrheumatic mitral (valve) insufficiency, [SNOMED-CT: 425676729] 12/21/2020 - Active   Heart failure, [SNOMED-CT: 63895781] 12/17/2020 - Active   Permanent atrial fibrillation, [SNOMED-CT: 125327283] 9/24/2019 - Active   Diabetes 1.5, managed as type 2, [SNOMED-CT: 916472537] 9/24/2019 - Active     VITAL SIGNS    VITAL SIGNS  Date / Time: 1/22/2025   BP Systolic 110 mmHg   BP Diastolic 62 mmHg   Height 64 inches   Weight 120 lbs   Pulse Rate 89 bpm   BSA (Body Surface Area) 1.6 cc/m2   BMI (Body Mass Index) 20.6 cc/m2   Blood Pressure 110 / 62 mmHg     PHYSICAL EXAMINATION    PHYSICAL EXAMINATION  Header Details   Constitutional 02 sat 97%   Vitals Left Arm Sitting  / 62 mmHg, Pulse rate 89 bpm, Regular, Height in 5' 4\", BMI: 20.6, Weight in 119.05 lbs (or) 54 kgs, BSA : 1.57 cc/m²   General Appearance No Acute Distress, Appropriate   Head/Eyes/Ears/Nose/Mouth/Throat Conjunctiva pink, Sclera Clear, Mucous membranes Moist   Neck Normal carotid pulsations   Respiratory Unlabored, Equal bilaterally   Cardiovascular Regular rhythm. Normal and normal S1 and S2   Gastrointestinal Abdomen soft, Non-tender, Normoactive bowel sounds   Musculoskeletal Normal spine   Gait Normal gait   Strength and tone Normal muscle strength   Upper Extremities No clubbing, No cyanosis   Lower Extremities Pulses 2+ and equal bilaterally, No edema   Skin Warm and dry, No rashes or lesions   Neurologic / Psychiatric Alert and Oriented   Speech Normal speech     ALLERGIES, ADVERSE REACTIONS, ALERTS    ALLERGIES, ADVERSE REACTIONS, ALERTS  Type Substance Reaction Severity Status   Allergies Levofloxacin, [RxNorm: 0573689]   Mild Active     MEDICATIONS ADMINISTERED DURING VISIT    No data available    MEDICATIONS    MEDICATIONS  Medication Start Date Route/Frequency Status   Aspirin  Buf,CaCarb-MgCarb-MgO, (BUFFERED ASPIRIN) 325 MG tablet, [RxNorm: 392982] 4/20/2022 Take 325 mg by mouth daily. Active   bethanechol (URECHOLINE) 10 MG tablet, [RxNorm: 834458] 4/20/2022 Take 10 mg by mouth 2 times daily. Active   carvedilol (COREG) 3.125 MG tablet, [RxNorm: 887190] 4/20/2022 1 by mouth twice daily Active   cephalexin (KEFLEX) 250 MG capsule, [RxNorm: 933570] 4/20/2022 Take 250 mg by mouth nightly. Active   Cholecalciferol (VITAMIN D3) 5000 units capsule, [RxNorm: 213985] 4/20/2022 Take 5,000 Units by mouth daily. Active   clopidogrel (PLAVIX) 75 MG tablet, [RxNorm: 913531] 4/20/2022 daily. Active   ezetimibe-simvastatin (VYTORIN) 10-10 MG per tablet, [RxNorm: 9354742] 4/20/2022 1 tablet daily. Active   fluoxetine (PROZAC) 10 MG tablet, [RxNorm: 821336] 4/20/2022 1 daily Active   furosemide (LASIX) 20 MG tablet, [RxNorm: 238000] 4/20/2022 1 by mouth daily Active   glipiZIDE (GLUCOTROL) Tab, [RxNorm: 0] 4/20/2022 Take 1 tablet by mouth daily. Active   Glucocard Vital Test test strip, [RxNorm: 0] 4/20/2022 USE TO TEST TWICE WEEKLY ON MONDAY AND THURSDAY - CALL DR IF BS >180 Active   loratadine (CLARITIN) 10 MG tablet, [RxNorm: 135458] 4/20/2022 1 by mouth daily Active   losartan (COZAAR) 25 MG tablet, [RxNorm: 402715] 4/20/2022 Take 25 mg by mouth daily. Active   magnesium oxide (MAG-OX) 400 MG tablet, [RxNorm: 111090] 4/20/2022 1 by mouth daily Active   meclizine HCl (ANTIVERT) 25 MG tablet, [RxNorm: 748239] 4/20/2022 as needed Active   Mucinex 600 mg tablet, extended release, [RxNorm: 701478] 6/22/2022 Take 2 tablets orally once a day as needed. Active   OXcarbazepine (TRILEPTAL) 150 MG tablet, [RxNorm: 100940] 4/20/2022 daily. Active   pantoprazole (PROTONIX) 40 MG tablet, [RxNorm: 701269] 4/20/2022 Take 40 mg by mouth daily. Active   Sennosides (SENNA) 8.6 MG Tab, [RxNorm: 930504] 4/20/2022 Take 17.2 mg by mouth as needed. Active     ASSESSMENT    Sick sinus syndrome/A.Fib  s/p single chamber PPM-  SJM.  Cont  mg daily/Plavix 75 mg daily - cont  [facility]  [facility] DM II  Controlled on glipizide     HTN  cont Cozaar 25 mg daily and Coreg 3.125 mg daily  ControlledH/o TIA  - cont ASA/PlavixDyslipidemia  Vytorin 10 mg daily     Dementia. Telida  pleasantAfib, permanent  No AC d/t risk  Rate ok, pacerPlan:  Continue same medications  Routine annual follow-up in the pacer clinic  Routine follow myself annually during pacemaker visit     FAMILY HISTORY    No data available    GENERAL STATUS    No data available    PAST MEDICAL HISTORY    PAST MEDICAL HISTORY  Problem Date diagonsed Date resolved Status   Dementia, [SNOMED-CT: 93549175] 1/22/2025 - Active   Cardiac pacemaker (s/p PPM), [SNOMED-CT: 905713107] 4/20/2022 - Active   Nonrheumatic mitral (valve) insufficiency, [SNOMED-CT: 766817326] 12/21/2020 - Active   Heart failure, [SNOMED-CT: 19806862] 12/17/2020 - Active   Permanent atrial fibrillation, [SNOMED-CT: 686888754] 9/24/2019 - Active   Chronic systolic congestive heart failure, [SNOMED-CT: 104390194] 9/24/2019 - Active   Diabetes 1.5, managed as type 2, [SNOMED-CT: 139011254] 9/24/2019 - Active     HISTORY OF PRESENT ILLNESS    The patient is here for routine annual follow-up regarding pacemaker, permanent A-fib. Patient has dementia comes in with family.Has been stable last hospitalization May 2024 for change of mental status was [facility]. Pacemaker check shows battery life until 2032. Patient so was 89 and she has a brother who is 97.  Last testing in July 2023 had an echo at that time EF 50%. Seen with her daughter she has dementia poor memory but no complaints. Has a lot of teeth falling out but still is able to eat soft food.Patient has a history of atrial fibrillation, diastolic heart failure, mitral regurgitation, stroke and pacemaker here for routine annual follow-up regarding the pacemaker. Lives at  [facility].She generally has no complaints during the pandemic she got meals delivered to her room but now she is going to the cafeteria she is enjoyed this. She denies lightheadedness dizziness syncope chest pain or shortness of breath. Last echocardiogram May 2021 shows EF 53%.Pacemaker check today shows estimated ASHOK status and of 2033 with 20% ventricular pacingCARDIAC HISTORY:  Pacemaker  Atrial fibrillation, permanent  TIA  Cardiomyopathy history  Dyslipidemia  HIMANSHU  Diabetes     IMMUNIZATIONS    No data available    PLAN OF CARE    PLAN OF CARE  Planned Care Date   EKG (electrocardiogram) 1/1/1900   Referral Visit - Lyndon Talbot ([email or web address]) : 1/1/1900   Follow up visit - Gagan Evans MD 5/14/2025     PROCEDURES    No data available    LAB RESULTS    No data available    REVIEW OF SYSTEMS    REVIEW OF SYSTEMS  Header Details   Cardiovascular No history of Chest pain, LAL, Palpitations, Syncope, PND, Orthopnea, Edema, Claudication   Respiratory No history of SOB, Wheezing, Sputum   Hem/Lymphatic No history of Easy bruising, Blood clots, Hx of blood transfusion, Anemia, Bleeding problems     SOCIAL HISTORY    SOCIAL HISTORY  Social History Element Description Effective Dates   Smoking status Never smoked -     FUNCTIONAL STATUS    No data available    MEDICAL EQUIPMENT    No data available    Goals Sections    No data available    REASON FOR REFERRAL             Health Concerns Section    No data available    COGNITIVE/MENTAL STATUS    No data available    Patient Demographics    Patient Demographics  Patient Address Patient Name Communication   [address] [place] [address] Yin Casas [phone] (Mobile)     Patient Demographics  Language Race / Ethnicity Marital Status   English - Spoken (Preferred) White / Not  or       Document Information    Primary Care Provider Other Service Providers Document Coverage Dates   Gagan Evans  NPI:  [id number]  [phone] (Work)  [address], [address]  [place] [address]  [place] [address]  Interpreting Physicians  [facility]  [phone] (Work)  [address] [place] [address] Tash Hua  NPI: [id number]  [phone] (Work)  [address], [address]  [place] [address]  [place] [address]  Nurses     Alyssa Amaya  NPI: [id number]  [phone] (Work)  [address], [address]  [place] [address]  [place] [address]  Nurses     Bianca Aguiar  NPI: [id number]  [phone] (Work)  [address], [address]  [place] [address]  [place] [address]  Nurses Jan. 22, 2025January 22, 2025      Organization   [facility]  [phone] (Work)  [address] [place] [address], [address]  [place] [address]  [place] [address]     Encounter Providers Encounter Date    Jan. 22, 2025January 22, 2025     Legal Authenticator    Gagan Evans  NPI: [id number]  [phone] (Work)  [address], [address]  [place] [address]  [place] [address]

## 2025-03-28 NOTE — PROGRESS NOTES
Progress Note  Yin Casas Patient Status:  Inpatient    1935 MRN [id number]   Location [facility] Attending Ayden Boston,*   Hosp Day # 5 PCP Lyndon Talbot MD     Subjective   Sleepy. Poor historian. Tells me she feels ok.       Objective:   /75 (BP Location: Right arm)   Pulse 106   Temp 97.4 °F (36.3 °C) (Axillary)   Resp 18   Ht 5' 4\" (1.626 m)   Wt 128 lb 4.8 oz (58.2 kg)   SpO2 92%   BMI 22.02 kg/m²     Telemetry: afib     Intake/Output:    Intake/Output Summary (Last 24 hours) at 3/28/2025 1034  Last data filed at 3/28/2025 1013  Gross per 24 hour   Intake 670 ml   Output 200 ml   Net 470 ml       Wt Readings from Last 3 Encounters:   25 128 lb 4.8 oz (58.2 kg)   10/24/24 122 lb 8 oz (55.6 kg)   23 130 lb 11.2 oz (59.3 kg)         Labs:  Recent Labs   Lab 25  0537 25  0454 25  0416   * 158* 163*   BUN 16 22 25*   CREATSERUM 0.79 0.73 0.80   EGFRCR 71 78 70   CA 8.8 8.6* 8.7    145 147*   K 3.8 3.9 3.9    112 113*   CO2 25.0 23.0 23.0     Recent Labs   Lab 25  0537 25  0454 25  0416   RBC 3.56* 3.47* 3.49*   HGB 10.7* 10.5* 10.9*   HCT 34.3* 33.5* 34.8*   MCV 96.3 96.5 99.7   MCH 30.1 30.3 31.2   MCHC 31.2 31.3 31.3   RDW 11.9 12.2 12.3   NEPRELIM 12.66* 10.74* 12.08*   WBC 14.7* 13.4* 14.3*   .0 251.0 224.0         Recent Labs   Lab 25  1113   TROPHS 23       Review of Systems:     10 point review of systems completed and negative except as noted in HPI      Exam:     Physical Exam:  General: Sleepy. No apparent distress.   Neck: No JVD, carotids 2+, no bruits.  Cardiac: Irregular rhythm/ rate. No murmur, pericardial rub, S3, or extra cardiac sounds.  Lungs: Rhonchi bilaterally.  Normal excursions and effort.  Abdomen: Soft, non-tender. BS-present.  Extremities: Without clubbing or cyanosis. Trace edema.    Skin: Warm and dry.     Medications:     furosemide  20 mg  Intravenous BID (Diuretic)    mirtazapine  3.75 mg Oral Nightly    metoprolol succinate  50 mg Oral Daily Beta Blocker    metoprolol tartrate  25 mg Oral Once    mupirocin  1 Application Each Nare BID    ampicillin-sulbactam  3 g Intravenous q6h    insulin aspart  1-5 Units Subcutaneous TID CC    aspirin  325 mg Oral Daily    clopidogrel  75 mg Oral Daily    losartan  25 mg Oral Daily    OXcarbazepine  150 mg Oral Daily    pantoprazole  40 mg Oral Daily    azithromycin  500 mg Intravenous Q24H    heparin  5,000 Units Subcutaneous Q8H FARIDEH         Diagnostic Studies:     ECG: Afib with RVR and nonspecific ST changes     Assessment and Plan:     Assessment:  # Acute hypoxic respiratory failure   # possible aspiration pneumonia    Management per primary team   Wean oxygen as able   # HF with preserved LVEF   Does not appear decompensated though proBNP elevated at 2155   PO lasix 20 mg daily PTA   IV lasix ordered by primary team   Strict I/Os and daily weights   # persistent atrial fibrillation   # SSS s/p PPM   Rates borderline; will try toprol for better rates control (currently on coreg)  Not on chronic anticoagulation   # HTN - borderline   Would not be aggressive with strict BP control   Optimize rate control   # Hx of CVA - cont asa and statin   # KENYA > Cr stable; monitor with diuresis     Plan:  Change coreg to toprol for better rates control   Started on IV lasix 20 mg bid by primary; resume PO lasix 20 mg tomorrow   Strict I/Os and daily weight   Monitor rates on tele       Plan of care discussed with patient, RN.      Shea George, APRN  3/28/2025  Ph [phone] ([place])  Ph [phone] ([place])

## 2025-03-28 NOTE — RESPIRATORY THERAPY NOTE
ABG done in AM:      03/28/25 09:14   ABG PH 7.37   ABG PCO2 37   ABG PO2 63 (L)   ABG HCO3 22.1   ABG O2 SATURATION 93.4 (L)   Blood Gas Base Excess -3.5 (L)   Oxygen Delivery Device Nasal Canula

## 2025-03-28 NOTE — PLAN OF CARE
Problem: ALTERED NUTRIENT INTAKE - ADULT  Goal: Nutrient intake appropriate for improving, restoring or maintaining nutritional needs  Description: INTERVENTIONS:- Assess nutritional status and recommend course of action- Monitor oral intake, labs, and treatment plans- Recommend appropriate diets, oral nutritional supplements, and vitamin/mineral supplements- Recommend, monitor, and adjust tube feedings and TPN/PPN based on assessed needs- Provide specific nutrition education as appropriate  Outcome: Not Progressing

## 2025-03-28 NOTE — PROGRESS NOTES
Provider Clarification     Additional information on the patient's nutritional status.    Pt has moderate malnutrition    This note is part of the patient's medical record.

## 2025-03-28 NOTE — PROGRESS NOTES
[facility]  part of [facility]    Progress Note    Yin Casas Patient Status:  Inpatient    1935 MRN [id number]   Location [facility]5W Attending Ayden Boston,*   Hosp Day # 5 PCP Lyndon Talbot MD     Chief Complaint: what?    Subjective:     Constitutional:  Positive for appetite change and fatigue.   HENT:  Positive for congestion.    Respiratory:  Positive for cough. Negative for choking, chest tightness and shortness of breath.    Cardiovascular:  Negative for leg swelling.   Gastrointestinal:  Negative for abdominal pain.   Genitourinary:  Positive for dysuria and frequency.   Neurological:  Negative for weakness.   Psychiatric/Behavioral:  Positive for confusion, sleep disturbance and decreased concentration. The patient is not nervous/anxious.        Objective:   Blood pressure 139/76, pulse 108, temperature 97 °F (36.1 °C), temperature source Axillary, resp. rate 18, height 5' 4\" (1.626 m), weight 128 lb 4.8 oz (58.2 kg), SpO2 97%, not currently breastfeeding.  Physical Exam  Vitals and nursing note reviewed.   Constitutional:       General: She is not in acute distress.     Appearance: She is ill-appearing. She is not toxic-appearing or diaphoretic.   Cardiovascular:      Rate and Rhythm: Tachycardia present.   Pulmonary:      Effort: Pulmonary effort is normal.      Breath sounds: Wheezing, rhonchi and rales present.   Abdominal:      General: Bowel sounds are normal.      Palpations: Abdomen is soft.   Skin:     General: Skin is warm and dry.      Capillary Refill: Capillary refill takes less than 2 seconds.   Neurological:      General: No focal deficit present.      Mental Status: She is alert. Mental status is at baseline.         Results:   Lab Results   Component Value Date    WBC 14.3 (H) 2025    HGB 10.9 (L) 2025    HCT 34.8 (L) 2025    .0 2025    CREATSERUM 0.80 2025    BUN 25 (H) 2025    NA  147 (H) 03/28/2025    K 3.9 03/28/2025     (H) 03/28/2025    CO2 23.0 03/28/2025     (H) 03/28/2025    CA 8.7 03/28/2025    ALB 3.6 03/28/2025    ALKPHO 60 03/28/2025    BILT 0.6 03/28/2025    TP 6.2 03/28/2025    AST 37 (H) 03/28/2025    ALT 36 03/28/2025    PTT 51.2 (H) 12/08/2016    INR 1.09 09/01/2019    PT 24.7 (H) 11/07/2013    TSH 1.350 07/01/2023    LIP <10 (L) 03/21/2018    DDIMER 0.81 07/01/2023    MG 2.1 03/28/2025    PHOS 3.8 03/28/2025    TROP <0.045 12/02/2019    TROPHS 23 03/23/2025     (H) 12/08/2016    B12 493 03/03/2017       XR CHEST PA + LAT CHEST (CPT=71046)    Result Date: 3/27/2025  CONCLUSION:  1.  There are findings compatible with CHF and/or edema.  Findings are new/worse since 3/22/25. 2.  Bibasilar pulmonary opacities likely representing atelectasis.    Dictated by (CST): Yaya Fink MD on 3/27/2025 at 12:13 PM     Finalized by (CST): Yaya Fink MD on 3/27/2025 at 12:14 PM               Assessment & Plan:     Possible Aspiration Pneumonia   Acute respiratory distress  Acute Hypoxemic respiratory failure  -per NH notes patient was 70% on room air  -ED noted to be 88% in ED  -Patient sent in from nursing facility due to hypoxia.  -Chest x-ray reviewed.  Noted left perihilar opacity and left lower lobe lung collapse.  -Daughter also states patient with history of difficulties with swallowing.  Some concern for possible aspiration.   -sp swallow eval cont modified diet tolerating well   -Patient started on broad antibiotics with Unasyn and azithromycin cont day #3   -reviewed ct chest   -Continuing supplemental O2, weaning as able.desats at night  -Continue to monitor     Acute Kidney Injury   - improved with ivfs. Decrease today   - Avoiding Nephrotoxic agents  - Cont to monitor - resolved     Persistent Atrial Fibrillation  -Rates not currently controlled  -Continue carvedilol for rate control  -Not on chronic anticoagulation   -Telemetry monitoring hr inc and  dec    Dysuria recheck ua; c and s; await culture ua abn     HTN  -Currently normotensive  -Restarting some home regimen.  - Monitor and adjust agents as needed     Hyperlipidemia  -Statin     History of CVA  -cont antiplatelets and statin    Leg brace right leg     Chronic heart failure with preserved ejection fraction   -No current signs of acute exacerbation  -Holding diuresis; decrease ivfs today   - Strict I&Os, Daily weights     History of sick sinus syndrome  -Status post pacemaker placement will ask cards to recheck    Recent loss weight and death of son/picky eater and dislikes dietary restrictions; add remeron check tsh [place]                       Code status dnar/select    Dispo: back to assisted living when issues addressed        Complex mdm; coordinating care with nurse and trying to  pt and with her permission her daughter on phone about pneumonia/chf          JOSE MORALES MD

## 2025-03-28 NOTE — PROGRESS NOTES
[facility]  part of [facility]    Progress Note    Yin Casas Patient Status:  Inpatient    1935 MRN [id number]   Location [facility]5W Attending Ayden Boston,*   Hosp Day # 5 PCP Lyndon Talbot MD     Chief Complaint: sleepy    Subjective:     Constitutional:  Positive for appetite change and fatigue.   HENT:  Positive for congestion.    Respiratory:  Positive for cough. Negative for choking, chest tightness and shortness of breath.    Cardiovascular:  Negative for leg swelling.   Gastrointestinal:  Negative for abdominal pain.   Genitourinary:  Positive for dysuria and frequency.   Neurological:  Negative for weakness.   Psychiatric/Behavioral:  Positive for sleep disturbance. Negative for decreased concentration. The patient is not nervous/anxious.        Objective:   Blood pressure 139/76, pulse 108, temperature 97 °F (36.1 °C), temperature source Axillary, resp. rate 18, height 5' 4\" (1.626 m), weight 128 lb 4.8 oz (58.2 kg), SpO2 97%, not currently breastfeeding.  Physical Exam  Vitals and nursing note reviewed.   Cardiovascular:      Rate and Rhythm: Tachycardia present.   Pulmonary:      Effort: Pulmonary effort is normal.      Breath sounds: Wheezing, rhonchi and rales present.   Abdominal:      General: Bowel sounds are normal.      Palpations: Abdomen is soft.   Skin:     General: Skin is warm and dry.      Capillary Refill: Capillary refill takes less than 2 seconds.   Neurological:      General: No focal deficit present.      Mental Status: She is alert. Mental status is at baseline.         Results:   Lab Results   Component Value Date    WBC 14.3 (H) 2025    HGB 10.9 (L) 2025    HCT 34.8 (L) 2025    .0 2025    CREATSERUM 0.80 2025    BUN 25 (H) 2025     (H) 2025    K 3.9 2025     (H) 2025    CO2 23.0 2025     (H) 2025    CA 8.7 2025    ALB  3.6 03/28/2025    ALKPHO 60 03/28/2025    BILT 0.6 03/28/2025    TP 6.2 03/28/2025    AST 37 (H) 03/28/2025    ALT 36 03/28/2025    PTT 51.2 (H) 12/08/2016    INR 1.09 09/01/2019    PT 24.7 (H) 11/07/2013    TSH 1.350 07/01/2023    LIP <10 (L) 03/21/2018    DDIMER 0.81 07/01/2023    MG 2.1 03/28/2025    PHOS 3.8 03/28/2025    TROP <0.045 12/02/2019    TROPHS 23 03/23/2025     (H) 12/08/2016    B12 493 03/03/2017       XR CHEST PA + LAT CHEST (CPT=71046)    Result Date: 3/27/2025  CONCLUSION:  1.  There are findings compatible with CHF and/or edema.  Findings are new/worse since 3/22/25. 2.  Bibasilar pulmonary opacities likely representing atelectasis.    Dictated by (CST): Yaya Fink MD on 3/27/2025 at 12:13 PM     Finalized by (CST): Yaya Fink MD on 3/27/2025 at 12:14 PM               Assessment & Plan:     Possible Aspiration Pneumonia   Acute respiratory distress  Acute Hypoxemic respiratory failure  -per NH notes patient was 70% on room air  -ED noted to be 88% in ED  -Patient sent in from nursing facility due to hypoxia.  -Chest x-ray reviewed.  Noted left perihilar opacity and left lower lobe lung collapse.  -Daughter also states patient with history of difficulties with swallowing.  Some concern for possible aspiration.   -sp swallow eval cont modified diet tolerating well   -Patient started on broad antibiotics with Unasyn and azithromycin cont day #4  -reviewed ct chest   -Continuing supplemental O2, weaning as able.desats at night  -Continue to monitor    Acute on chronic chf poss diastolic add lasix; check echo     Acute Kidney Injury   - improved with ivfs. Decrease today   - Avoiding Nephrotoxic agents  - Cont to monitor - resolved     Persistent Atrial Fibrillation  -Rates not currently controlled because infection  -Continue carvedilol for rate control  -Not on chronic anticoagulation   -Telemetry monitoring hr inc and dec    Dysuria recheck ua; c and s; on abx ua abn cx neg      HTN  -Currently normotensive  -Restarting some home regimen.  - Monitor and adjust agents as needed     Hyperlipidemia  -Statin     History of CVA  -cont antiplatelets and statin    Leg brace right leg     Chronic heart failure with preserved ejection fraction   -No current signs of acute exacerbation  -Holding diuresis; decrease ivfs today   - Strict I&Os, Daily weights     History of sick sinus syndrome  -Status post pacemaker placement will ask cards to recheck; they confirmed ok    Recent loss weight and death of son/picky eater and dislikes dietary restrictions; add remeron check tsh [place]                       Code status dnar/select    Dispo: back to assisted living when issues addressed        Complex mdm; coordinating care with nurse and trying to  pt and with her permission her daughter on phone about pneumonia/chf          JOSE MORALES MD

## 2025-03-28 NOTE — DIETARY NOTE
ADULT NUTRITION REASSESSMENT    Pt is at high nutrition risk.  Pt meets moderate malnutrition criteria.      RECOMMENDATIONS TO MD: See nutrition intervention for ONS (oral nutrition supplements)    ADMITTING DIAGNOSIS:  Hypoxia [R09.02]  Community acquired pneumonia of left lower lobe of lung [J18.9]  PERTINENT PAST MEDICAL HISTORY:   Past Medical History:    Acute, but ill-defined, cerebrovascular disease    2019    Anxiety state    Atrial fibrillation (HCC)    Cataract, bilateral    Chronic a-fib (HCC)    Congestive heart disease (HCC)    LV ej fraction of 30%    Conjunctival cyst of left eye    Cup to disc asymmetry    OU    Depression    Diabetes (HCC)    diet alone    Esophageal reflux    Essential hypertension    Hearing impairment    Hiatal hernia    Hyperlipidemia    Meibomian gland dysfunction (MGD), bilateral, both upper and lower lids    Osteoarthritis    TIA (transient ischemic attack)    Unspecified atrial fibrillation (HCC)     PATIENT STATUS: Initial 03/25/25: Pt assessed due to screening at risk for malnutrition, MST 3 for unintended weight loss and decreased appetite. Chart reviewed, SLP performed bedside swallow study, recommends pureed diet, nectar/ mildly thick liquids. Visited pt, daughter Yesseina at bedside. Pt and daughter stated appetite was ok, but prior to admit appetite was good. Pt typically would eat a bigger breakfast, and smaller lunch and dinner. Prefers softer foods, enjoys eating eggs. Pt intake per chart review is poor, daughter stated she is not happy with the diet here.   Pt's daughter confirmed pt did have weight loss. Pt has been worrying about her son since December, and he passed a month ago. Per chart review, last admit weight was 122 lbs 8 oz in 10/24, this admit pt/s weight is 109 lbs, 6 oz, significant weight loss of 10%. Ordered pt ONS Sugar Free chocolate BID for am and pm snack, and Magic cup 1x day at hs to maximize intake. Follow up per protocol.     Update 03/28/25:  RA completed per protocol. Chart reviewed, undergoing treatment for aspiration pneumonia. Discussion with RN, not much appetite - refuses assistance with meals. Intakes reviewed, 0-25% x 8 meals since last visit (averaging 17% overall - poor). Pt visited, sleeping soundly. Current weight 128# 4.8 oz. Weight reviewed, pt noted weighing 118# 14 oz on 3/26 and 109# 6 oz on 3/23. Discussed weights with RN - requested reweigh for accuracy. Liberalized CHO restriction to promote increased po intake. A1c 6.6%. Current code status: DNAR/Select.    FOOD/NUTRITION RELATED HISTORY:  Appetite: Poor  Intake: ~0 - 25% x8 meals documented since last visit - averaging 17% overall + 100% x 3 ONS per flowsheet review  Intake Meeting Needs: No, but oral nutrition supplements (ONS) to maximize Increased ONS to QID  Percent Meals Eaten (last 6 days)       Date/Time Percent Meals Eaten (%)    03/23/25 1846 0 %    03/24/25 0856 0 %     Percent Meals Eaten (%): npo at 03/24/25 0856    03/24/25 1143 25 %    03/24/25 1548 25 %    03/25/25 0938 25 %    03/25/25 1500 25 %    03/26/25 0900 25 %    03/26/25 1423 25 %    03/26/25 1818 25 %    03/27/25 1020 0 %     Percent Meals Eaten (%): pt did not eat any of her food at 03/27/25 1020    03/27/25 1340 0 %     Percent Meals Eaten (%): patient did not want lunch at 03/27/25 1340    03/27/25 1659 20 %    03/28/25 1013 15 %           Food Allergies: No Known Food Allergies (NKFA)  Cultural/Ethnic/Spiritism Preferences: Not Obtained    GASTROINTESTINAL: +BM 3/28/25 - small;soft, Loss of appetite, and Swallow evaluation noted    MEDICATIONS: reviewed Electrolyte replacement per protocol (non-cardiac)  IV Lasix BID noted  Remeron started 3/27   furosemide  20 mg Intravenous BID (Diuretic)    mirtazapine  3.75 mg Oral Nightly    metoprolol succinate  50 mg Oral Daily Beta Blocker    metoprolol tartrate  25 mg Oral Once    mupirocin  1 Application Each Nare BID    ampicillin-sulbactam  3 g Intravenous  q6h    insulin aspart  1-5 Units Subcutaneous TID CC    aspirin  325 mg Oral Daily    clopidogrel  75 mg Oral Daily    losartan  25 mg Oral Daily    OXcarbazepine  150 mg Oral Daily    pantoprazole  40 mg Oral Daily    azithromycin  500 mg Intravenous Q24H    heparin  5,000 Units Subcutaneous Q8H FARIDEH     LABS: reviewed A1c 6.6% on 3/23/25  POC Glucose reviewed  Hypernatremia (147) noted  Recent Labs     03/25/25  0507 03/26/25  0537 03/27/25  0454 03/28/25  0416   * 173* 158* 163*   BUN 16 16 22 25*   CREATSERUM 0.67 0.79 0.73 0.80   CA 8.5* 8.8 8.6* 8.7   MG 1.9  --  1.9 2.1    145 145 147*   K 3.9 3.8 3.9 3.9    109 112 113*   CO2 27.0 25.0 23.0 23.0   PHOS  --   --  2.9 3.8   OSMOCALC 296* 305* 307* 312*     WEIGHT HISTORY:  Patient Weight(s) for the past 336 hrs:   Weight   03/27/25 0700 58.2 kg (128 lb 4.8 oz)   03/26/25 0422 53.9 kg (118 lb 14.4 oz)   03/23/25 1509 49.6 kg (109 lb 6.4 oz)   03/23/25 1453 49.6 kg (109 lb 6.4 oz)   03/23/25 1059 55 kg (121 lb 4.1 oz)     Wt Readings from Last 10 Encounters:   03/27/25 58.2 kg (128 lb 4.8 oz)   10/24/24 55.6 kg (122 lb 8 oz)   09/21/23 59.3 kg (130 lb 11.2 oz)   09/11/23 64 kg (141 lb)   07/05/23 60.1 kg (132 lb 6.4 oz)   11/09/22 65 kg (143 lb 6.4 oz)   10/29/22 62.6 kg (138 lb)   09/20/22 63.5 kg (140 lb)   09/02/22 65.8 kg (145 lb)   04/29/22 67.4 kg (148 lb 9.6 oz)     ANTHROPOMETRICS:  HT: 162.6 cm (5' 4\")  Wt Readings from Last 1 Encounters:   03/27/25 58.2 kg (128 lb 4.8 oz)     Last weight:  Monitor weight as current weight inconsistent with admit weight.  BMI: Body mass index is 22.02 kg/m².  Dosing Weight: 49.6 kg - admission weight, utilized for anthropometric calculations until accurate/consistent weight obtained  BMI CLASSIFICATION: 18.5-24.9 kg/m2 - WNL  IBW/lbs (Calculated) Female: 120 lbs           88% IBW compared to admit weight  Usual Body Wt: unsure, chart review inconsistent       NUTRITION RELATED PHYSICAL FINDINGS:  -  Nutrition Focused Physical Exam (NFPE): mild depletion body fat orbital region and triceps region and mild depletion muscle mass temple region, clavicle region, and scapular region  - Fluid Accumulation: none  per flowsheet review --> See RN documentation for details  - Skin Integrity: at risk per flowsheet review -->. See RN documentation for details    CRITERIA FOR MALNUTRITION DIAGNOSIS:  Criteria for non-severe malnutrition diagnosis: chronic illness related to wt loss 10% in 6 months, body fat mild depletion, and muscle mass mild depletion.    NUTRITION DIAGNOSIS/PROBLEM:   Moderate Malnutrition related to Chronic - Physiological causes resulting in anorexia or diminished intake as evidenced by weight loss of 10 lbs in 6 month, and mild muscle and fat depletion.     NUTRITION DIAGNOSIS PROGRESS:  No Improvement (continue) - poor po intake remains    NUTRITION INTERVENTION:   NUTRITION PRESCRIPTION:   Estimated Nutrition needs: --dosing wt of 49.6 kg - wt taken on 3/23/25 until new weight obtained  Calories: 7538-3319 calories/day (30-35 calories per kg Dosing wt)  Protein: 64 - 74 g protein/day (1.3 - 1.5 g protein/kg Dosing wt)    - Diet:       Procedures    Regular/General diet Fluid Consistency: Nectar Thick / Mildly Thick Liquids; Texture Consistency: Pureed; Is Patient on Accuchecks? Yes    ** Liberalized CHO restriction to promote increased po intake    - Nutrition Care Plan: Liberalized diet, Encouraged small frequent meals with emphasis on high calorie/high protein, and Initiated ONS (oral nutritional supplements)  - ONS (Oral Nutrition Supplements)/Meals/Snacks: Magic Cup (290 calories/ 9 g protein each) BID Vanilla or Chocolate and SF Shake (200 calories/ 7 g protein each) BID Chocolate   - Vitamin and mineral supplements: none  - Feeding assistance: meal set up  - Nutrition education: Discussed importance of adequate energy and protein intake    - Coordination of nutrition care: collaboration with  other providers  - Discharge and transfer of nutrition care to new setting or provider: monitor plans    MONITOR AND EVALUATE/NUTRITION GOALS:  - Food and Nutrient Intake:      Monitor: adequacy of PO intake and adequacy of supplement intake  - Food and Nutrient Administration:      Monitor: N/A  - Anthropometric Measurement:    Monitor weight  - Nutrition Goals:      halt wt loss, regain wt as able, PO and supplement greater than 75% of needs, good supplement intake, labs within acceptable limits, euglycemia, prevent skin breakdown, and improved GI status    DIETITIAN FOLLOW UP: RD to follow and monitor nutrition status    Aria Delgado MS, KAT, RDN, LDN  Clinical Dietitian  P: [phone]

## 2025-03-29 NOTE — PROGRESS NOTES
[facility]  part of [facility]    Progress Note    Yin Casas Patient Status:  Inpatient    1935 MRN [id number]   Location [facility]5W Attending Ayden Boston,*   Hosp Day # 6 PCP Lyndon Talbot MD     Chief Complaint: sleepy    Subjective:     Constitutional:  Positive for appetite change and fatigue.   HENT:  Positive for congestion.    Respiratory:  Positive for cough. Negative for choking, chest tightness and shortness of breath.    Cardiovascular:  Negative for leg swelling.   Gastrointestinal:  Negative for abdominal pain.   Genitourinary:  Positive for dysuria and frequency.   Neurological:  Negative for weakness.   Psychiatric/Behavioral:  Positive for sleep disturbance. Negative for decreased concentration. The patient is not nervous/anxious.        Objective:   Blood pressure 136/77, pulse 106, temperature 97.5 °F (36.4 °C), temperature source Axillary, resp. rate 18, height 5' 4\" (1.626 m), weight 125 lb (56.7 kg), SpO2 98%, not currently breastfeeding.  Physical Exam  Vitals and nursing note reviewed.   Cardiovascular:      Rate and Rhythm: Tachycardia present.   Pulmonary:      Effort: Pulmonary effort is normal.      Breath sounds: Wheezing, rhonchi and rales present.   Abdominal:      General: Bowel sounds are normal.      Palpations: Abdomen is soft.   Skin:     General: Skin is warm and dry.      Capillary Refill: Capillary refill takes less than 2 seconds.   Neurological:      General: No focal deficit present.      Mental Status: She is alert. Mental status is at baseline.         Results:   Lab Results   Component Value Date    WBC 14.9 (H) 2025    HGB 10.9 (L) 2025    HCT 33.2 (L) 2025    .0 2025    CREATSERUM 0.80 2025    BUN 24 (H) 2025     (H) 2025    K 3.5 2025     2025    CO2 26.0 2025     (H) 2025    CA 9.0 2025    ALB 3.6  03/28/2025    ALKPHO 60 03/28/2025    BILT 0.6 03/28/2025    TP 6.2 03/28/2025    AST 37 (H) 03/28/2025    ALT 36 03/28/2025    PTT 51.2 (H) 12/08/2016    INR 1.09 09/01/2019    PT 24.7 (H) 11/07/2013    T4F 0.9 03/28/2025    TSH 0.354 (L) 03/28/2025    LIP <10 (L) 03/21/2018    DDIMER 0.81 07/01/2023    MG 2.0 03/29/2025    PHOS 3.3 03/29/2025    TROP <0.045 12/02/2019    TROPHS 23 03/23/2025     (H) 12/08/2016    B12 493 03/03/2017       XR CHEST AP PORTABLE  (CPT=71045)    Result Date: 3/29/2025  CONCLUSION:   No new abnormality.  Persistent pulmonary edema, small left pleural effusion, and bibasilar atelectasis with or without superimposed pneumonia.      Dictated by (CST): Adrián Marrufo MD on 3/29/2025 at 7:20 AM     Finalized by (CST): Adrián Marrufo MD on 3/29/2025 at 7:21 AM          XR CHEST PA + LAT CHEST (CPT=71046)    Result Date: 3/27/2025  CONCLUSION:  1.  There are findings compatible with CHF and/or edema.  Findings are new/worse since 3/22/25. 2.  Bibasilar pulmonary opacities likely representing atelectasis.    Dictated by (CST): Yaya Fink MD on 3/27/2025 at 12:13 PM     Finalized by (CST): Yaya Fink MD on 3/27/2025 at 12:14 PM               Assessment & Plan:     Possible Aspiration Pneumonia   Acute respiratory distress  Acute Hypoxemic respiratory failure  -per NH notes patient was 70% on room air  -ED noted to be 88% in ED  -Patient sent in from nursing facility due to hypoxia.  -Chest x-ray reviewed.  Noted left perihilar opacity and left lower lobe lung collapse.  -Daughter also states patient with history of difficulties with swallowing.  Some concern for possible aspiration.   -sp swallow eval cont modified diet tolerating well   -Patient started on broad antibiotics with Unasyn and azithromycin cont day #4  -reviewed ct chest   -Continuing supplemental O2, weaning as able.desats at night  -Continue to monitor    Acute on chronic chf poss diastolic add lasix; check echo     Acute  Kidney Injury   -better  - Avoiding Nephrotoxic agents  - Cont to monitor - resolved     Persistent Atrial Fibrillation  -Rates not currently controlled because infection  -Continue carvedilol for rate control  -Not on chronic anticoagulation   -Telemetry monitoring hr inc and dec    Poss asp; await video    Coughing for years poss asp    Dysuria recheck ua; c and s; on abx ua abn cx neg     HTN  -Currently normotensive  -Restarting some home regimen.  - Monitor and adjust agents as needed     Hyperlipidemia  -Statin     History of CVA  -cont antiplatelets and statin    Leg brace right leg     Chronic heart failure with preserved ejection fraction   -No current signs of acute exacerbation  -Holding diuresis; decrease ivfs today   - Strict I&Os, Daily weights     History of sick sinus syndrome  -Status post pacemaker placement will ask cards to recheck; they confirmed ok    Recent loss weight and death of son/picky eater and dislikes dietary restrictions; add remeron check tsh [place]                       Code status dnar/select    Dispo: back to assisted living when issues addressed        Complex mdm; coordinating care with nurse and trying to  pt and with her permission her daughter on phone about pneumonia/chf/poss asp          JOSE MORALES MD

## 2025-03-29 NOTE — PROGRESS NOTES
Progress Note  Yin Casas Patient Status:  Inpatient    1935 MRN [id number]   Location [facility]5W Attending Ayden Boston,*   Hosp Day # 6 PCP Lyndon Talbot MD     Subjective   Sitting up in the chair. She has no complaints for me this morning. Remains on supplemental oxygen at 3L via NC.       Objective:   /77 (BP Location: Right arm)   Pulse 106   Temp 97.5 °F (36.4 °C) (Axillary)   Resp 18   Ht 5' 4\" (1.626 m)   Wt 125 lb (56.7 kg)   SpO2 98%   BMI 21.46 kg/m²     Telemetry: afib, rates overall controlled     Intake/Output:    Intake/Output Summary (Last 24 hours) at 3/29/2025 1156  Last data filed at 3/29/2025 0845  Gross per 24 hour   Intake 200 ml   Output 850 ml   Net -650 ml       Wt Readings from Last 3 Encounters:   25 125 lb (56.7 kg)   10/24/24 122 lb 8 oz (55.6 kg)   23 130 lb 11.2 oz (59.3 kg)         Labs:  Recent Labs   Lab 25  0454 25  0416 25  0507   * 163* 160*   BUN 22 25* 24*   CREATSERUM 0.73 0.80 0.80   EGFRCR 78 70 70   CA 8.6* 8.7 9.0    147* 149*   K 3.9 3.9 3.5    113* 111   CO2 23.0 23.0 26.0     Recent Labs   Lab 25  0454 25  0416 25  0507   RBC 3.47* 3.49* 3.44*   HGB 10.5* 10.9* 10.9*   HCT 33.5* 34.8* 33.2*   MCV 96.5 99.7 96.5   MCH 30.3 31.2 31.7   MCHC 31.3 31.3 32.8   RDW 12.2 12.3 12.5   NEPRELIM 10.74* 12.08* 12.46*   WBC 13.4* 14.3* 14.9*   .0 224.0 239.0         Recent Labs   Lab 25  1113   TROPHS 23       Review of Systems:     10 point review of systems completed and negative except as noted in HPI      Exam:     Physical Exam:  General: Sleepy. No apparent distress.   Neck: No JVD, carotids 2+, no bruits.  Cardiac: Irregular rhythm/ rate. No murmur, pericardial rub, S3, or extra cardiac sounds.  Lungs: Rhonchi bilaterally.  Normal excursions and effort.  Abdomen: Soft, non-tender. BS-present.  Extremities: Without clubbing or cyanosis.  Trace edema.    Skin: Warm and dry.     Medications:     potassium chloride  40 mEq Intravenous Once    furosemide  20 mg Intravenous BID (Diuretic)    mirtazapine  3.75 mg Oral Nightly    metoprolol succinate  50 mg Oral Daily Beta Blocker    mupirocin  1 Application Each Nare BID    ampicillin-sulbactam  3 g Intravenous q6h    insulin aspart  1-5 Units Subcutaneous TID CC    aspirin  325 mg Oral Daily    clopidogrel  75 mg Oral Daily    losartan  25 mg Oral Daily    OXcarbazepine  150 mg Oral Daily    pantoprazole  40 mg Oral Daily    azithromycin  500 mg Intravenous Q24H    heparin  5,000 Units Subcutaneous Q8H FARIDEH         Diagnostic Studies:     ECG: Afib with RVR and nonspecific ST changes     Assessment and Plan:     Assessment:  # Acute hypoxic respiratory failure   # possible aspiration pneumonia    Management per primary team   Wean oxygen as able   # acute on chronic HF with preserved LVEF   proBNP elevated at 2155, CXR consistent with pulmonary edema, sounds congested   Started in IV lasix 20 mg bid 3/28, weights not consistent, I/Os net positive   PO lasix 20 mg daily PTA   Strict I/Os and daily weights   # persistent atrial fibrillation   # SSS s/p PPM   Rates better controlled with PO metoprolol 50 mg bid, continue   Not on chronic anticoagulation   # HTN - borderline   Would not be aggressive with strict BP control   Optimize rate control   # Hx of CVA - cont asa and statin   # KENYA > Cr stable; monitor with diuresis     Plan:  Continue PO metoprolol for rate control   Continue IV lasix 20 mg bid, Strict I/Os and daily weight   Replace electrolytes to maintain K >=4 and Mag >=2  Monitor rates on tele       Plan of care discussed with patient, RN.      Shea George, APRN  3/29/2025  Ph [phone] ([place])  Ph [phone] ([place])      Chart reviewed and patient examined independently.    Agree with assessment and plan as above.

## 2025-03-30 NOTE — PLAN OF CARE
Problem: Patient Centered Care  Goal: Patient preferences are identified and integrated in the patient's plan of care  Description: Interventions:- What would you like us to know as we care for you? - Provide timely, complete, and accurate information to patient/family- Incorporate patient and family knowledge, values, beliefs, and cultural backgrounds into the planning and delivery of care- Encourage patient/family to participate in care and decision-making at the level they choose- Honor patient and family perspectives and choices  Outcome: Not Progressing     Problem: Patient/Family Goals  Goal: Patient/Family Long Term Goal  Description: Patient's Long Term Goal: discharge Interventions:- - Monitor vitals  - Monitor appropriate labs  - Pain management  - Follow MD order  - Diagnostics per order  - Update/Informing patient and family on plan of care  - Discharge planning- See additional Care Plan goals for specific interventions  Outcome: Not Progressing  Goal: Patient/Family Short Term Goal  Description: Patient's Short Term Goal: feel better  Interventions: - - Monitor vitals  - Monitor appropriate labs  - Pain management  - Follow MD order  - Diagnostics per order  - Update/Informing patient and family on plan of care  - Discharge planning- See additional Care Plan goals for specific interventions  Outcome: Not Progressing     Problem: RESPIRATORY - ADULT  Goal: Achieves optimal ventilation and oxygenation  Description: INTERVENTIONS:- Assess for changes in respiratory status- Assess for changes in mentation and behavior- Position to facilitate oxygenation and minimize respiratory effort- Oxygen supplementation based on oxygen saturation or ABGs- Provide Smoking Cessation handout, if applicable- Encourage broncho-pulmonary hygiene including cough, deep breathe, Incentive Spirometry- Assess the need for suctioning and perform as needed- Assess and instruct to report SOB or any respiratory difficulty-  Respiratory Therapy support as indicated- Manage/alleviate anxiety- Monitor for signs/symptoms of CO2 retention  Outcome: Not Progressing     Problem: GENITOURINARY - ADULT  Goal: Absence of urinary retention  Description: INTERVENTIONS:- Assess patient’s ability to void and empty bladder- Monitor intake/output and perform bladder scan as needed- Follow urinary retention protocol/standard of care- Consider collaborating with pharmacy to review patient's medication profile- Implement strategies to promote bladder emptying  Outcome: Not Progressing     Problem: SKIN/TISSUE INTEGRITY - ADULT  Goal: Skin integrity remains intact  Description: INTERVENTIONS- Assess and document risk factors for pressure ulcer development- Assess and document skin integrity- Monitor for areas of redness and/or skin breakdown- Initiate interventions, skin care algorithm/standards of care as needed  Outcome: Not Progressing     Problem: Impaired Swallowing  Goal: Minimize aspiration risk  Description: Interventions:- Patient should be alert and upright for all feedings (90 degrees preferred)- Offer food and liquids at a slow rate- No straws- Encourage small bites of food and small sips of liquid- Offer pills one at a time, crush or deliver with applesauce as needed- Discontinue feeding and notify MD (or speech pathologist) if coughing or persistent throat clearing or wet/gurgly vocal quality is noted  Outcome: Not Progressing

## 2025-03-30 NOTE — PROGRESS NOTES
[facility]  part of [facility]    Progress Note    Yin Casas Patient Status:  Inpatient    1935 MRN [id number]   Location [facility]5W Attending Ayden Boston,*   Hosp Day # 7 PCP Lyndon Talbot MD     Chief Complaint: sleepy    Subjective:     Constitutional:  Positive for appetite change and fatigue.   HENT:  Positive for congestion.    Respiratory:  Positive for cough. Negative for choking, chest tightness and shortness of breath.    Cardiovascular:  Negative for leg swelling.   Gastrointestinal:  Negative for abdominal pain.   Genitourinary:  Positive for dysuria and frequency.   Neurological:  Negative for weakness.   Psychiatric/Behavioral:  Positive for sleep disturbance. Negative for decreased concentration. The patient is not nervous/anxious.        Objective:   Blood pressure 131/83, pulse 103, temperature 98 °F (36.7 °C), temperature source Axillary, resp. rate 16, height 5' 4\" (1.626 m), weight 128 lb 4.8 oz (58.2 kg), SpO2 94%, not currently breastfeeding.  Physical Exam  Vitals and nursing note reviewed.   Cardiovascular:      Rate and Rhythm: Tachycardia present.   Pulmonary:      Effort: Pulmonary effort is normal.      Breath sounds: Wheezing, rhonchi and rales present.   Abdominal:      General: Bowel sounds are normal.      Palpations: Abdomen is soft.   Skin:     General: Skin is warm and dry.      Capillary Refill: Capillary refill takes less than 2 seconds.   Neurological:      General: No focal deficit present.      Mental Status: She is alert. Mental status is at baseline.         Results:   Lab Results   Component Value Date    WBC 12.0 (H) 2025    HGB 10.1 (L) 2025    HCT 32.8 (L) 2025    .0 2025    CREATSERUM 0.84 2025    BUN 27 (H) 2025     (H) 2025    K 3.3 (L) 2025    K 3.3 (L) 2025     2025    CO2 29.0 2025     (H) 2025    CA  8.5 (L) 03/30/2025    ALB 3.6 03/28/2025    ALKPHO 60 03/28/2025    BILT 0.6 03/28/2025    TP 6.2 03/28/2025    AST 37 (H) 03/28/2025    ALT 36 03/28/2025    PTT 51.2 (H) 12/08/2016    INR 1.09 09/01/2019    PT 24.7 (H) 11/07/2013    T4F 0.9 03/28/2025    TSH 0.354 (L) 03/28/2025    LIP <10 (L) 03/21/2018    DDIMER 0.81 07/01/2023    MG 1.8 03/30/2025    PHOS 3.4 03/30/2025    TROP <0.045 12/02/2019    TROPHS 23 03/23/2025     (H) 12/08/2016    B12 493 03/03/2017       XR CHEST AP PORTABLE  (CPT=71045)    Result Date: 3/29/2025  CONCLUSION:   No new abnormality.  Persistent pulmonary edema, small left pleural effusion, and bibasilar atelectasis with or without superimposed pneumonia.      Dictated by (CST): Adrián Marrufo MD on 3/29/2025 at 7:20 AM     Finalized by (CST): Adrián Marrufo MD on 3/29/2025 at 7:21 AM               Assessment & Plan:     Possible Aspiration Pneumonia   Acute respiratory distress  Acute Hypoxemic respiratory failure  -per NH notes patient was 70% on room air  -ED noted to be 88% in ED  -Patient sent in from nursing facility due to hypoxia.  -Chest x-ray reviewed.  Noted left perihilar opacity and left lower lobe lung collapse.  -Daughter also states patient with history of difficulties with swallowing.  Some concern for possible aspiration.   -sp swallow eval cont modified diet tolerating well   -Patient started on broad antibiotics with Unasyn and azithromycin cont day #4  -reviewed ct chest   -Continuing supplemental O2, weaning as able.desats at night  -Continue to monitor    Acute on chronic chf poss diastolic add lasix; check echo     Acute Kidney Injury better  -better  - Avoiding Nephrotoxic agents  - Cont to monitor - resolved     Persistent Atrial Fibrillation  -Rates not currently controlled because infection  -Continue carvedilol for rate control  -Not on chronic anticoagulation   -Telemetry monitoring hr inc and dec; [facility] lopressor 3/30; nurse notified them    Poss asp;  await video    Coughing for years poss asp    Dysuria recheck ua; c and s; on abx ua abn cx neg     HTN  -Currently normotensive  -Restarting some home regimen.  - Monitor and adjust agents as needed     Hyperlipidemia  -Statin     History of CVA  -cont antiplatelets and statin    Leg brace right leg     Chronic heart failure with preserved ejection fraction   -No current signs of acute exacerbation  -Holding diuresis; decrease ivfs today   - Strict I&Os, Daily weights     History of sick sinus syndrome  -Status post pacemaker placement will ask cards to recheck; they confirmed ok    Recent loss weight and death of son/picky eater and dislikes dietary restrictions; add remeron check tsh poss hill                       Code status dnar/select    Dispo: back to assisted living when issues addressed        Complex mdm; coordinating care with nurse and trying to  pt and with her permission her daughter by phone message about pneumonia/chf/poss asp          JOSE MORALES MD

## 2025-03-30 NOTE — PLAN OF CARE
Monitoring vital signs, stable at this time. No acute changes at this moment.  Monitoring blood glucose levels. Antibiotic were provided. Fall precautions in place- bed alarm on, bed locked in lowest position, call light within reach. Frequent rounding by nursing staff.        Problem: Patient Centered Care  Goal: Patient preferences are identified and integrated in the patient's plan of care  Description: Interventions:- What would you like us to know as we care for you? - Provide timely, complete, and accurate information to patient/family- Incorporate patient and family knowledge, values, beliefs, and cultural backgrounds into the planning and delivery of care- Encourage patient/family to participate in care and decision-making at the level they choose- Honor patient and family perspectives and choices  Outcome: Progressing     Problem: Patient/Family Goals  Goal: Patient/Family Long Term Goal  Description: Patient's Long Term Goal: discharge Interventions:- - Monitor vitals  - Monitor appropriate labs  - Pain management  - Follow MD order  - Diagnostics per order  - Update/Informing patient and family on plan of care  - Discharge planning- See additional Care Plan goals for specific interventions  Outcome: Progressing  Goal: Patient/Family Short Term Goal  Description: Patient's Short Term Goal: feel better  Interventions: - - Monitor vitals  - Monitor appropriate labs  - Pain management  - Follow MD order  - Diagnostics per order  - Update/Informing patient and family on plan of care  - Discharge planning- See additional Care Plan goals for specific interventions  Outcome: Progressing     Problem: RESPIRATORY - ADULT  Goal: Achieves optimal ventilation and oxygenation  Description: INTERVENTIONS:- Assess for changes in respiratory status- Assess for changes in mentation and behavior- Position to facilitate oxygenation and minimize respiratory effort- Oxygen supplementation based on oxygen saturation or ABGs-  Provide Smoking Cessation handout, if applicable- Encourage broncho-pulmonary hygiene including cough, deep breathe, Incentive Spirometry- Assess the need for suctioning and perform as needed- Assess and instruct to report SOB or any respiratory difficulty- Respiratory Therapy support as indicated- Manage/alleviate anxiety- Monitor for signs/symptoms of CO2 retention  Outcome: Progressing     Problem: GENITOURINARY - ADULT  Goal: Absence of urinary retention  Description: INTERVENTIONS:- Assess patient’s ability to void and empty bladder- Monitor intake/output and perform bladder scan as needed- Follow urinary retention protocol/standard of care- Consider collaborating with pharmacy to review patient's medication profile- Implement strategies to promote bladder emptying  Outcome: Progressing     Problem: SKIN/TISSUE INTEGRITY - ADULT  Goal: Skin integrity remains intact  Description: INTERVENTIONS- Assess and document risk factors for pressure ulcer development- Assess and document skin integrity- Monitor for areas of redness and/or skin breakdown- Initiate interventions, skin care algorithm/standards of care as needed  Outcome: Progressing     Problem: Impaired Swallowing  Goal: Minimize aspiration risk  Description: Interventions:- Patient should be alert and upright for all feedings (90 degrees preferred)- Offer food and liquids at a slow rate- No straws- Encourage small bites of food and small sips of liquid- Offer pills one at a time, crush or deliver with applesauce as needed- Discontinue feeding and notify MD (or speech pathologist) if coughing or persistent throat clearing or wet/gurgly vocal quality is noted  Outcome: Progressing

## 2025-03-30 NOTE — SPIRITUAL CARE NOTE
Spiritual Care Visit Note    Patient Name: Yin Casas Date of Spiritual Care Visit: 25   : 1935 Primary Dx: Community acquired pneumonia of left lower lobe of lung       Referred By: Referral From:     Spiritual Care Taxonomy:    Intended Effects: Promote a sense of peace    Methods: Offer spiritual/Nondenominational support    Interventions: Ask guided questions, Active listening, [place]    Visit Type/Summary:     - Spiritual Care: Responded to a request for spiritual care and assessed the patient for spiritual care needs. Consulted with RN prior to visit. Prayed for patient.  remains available as needed for follow up.    Spiritual Care support can be requested via an Epic consult. For urgent/immediate needs, please contact the On Call  at: [place]: ext [phone]    Chaplain Resident, Maggy Jimenez, PhD

## 2025-03-30 NOTE — PROGRESS NOTES
Progress Note  Yin Casas Patient Status:  Inpatient    1935 MRN [id number]   Location [facility]5W Attending Ayden Boston,*   Hosp Day # 7 PCP Lyndon Talbot MD     Subjective   Sleeping, easily awakes. No cardiac complaints for me this morning. Remains on supplemental oxygen at 3L via NC. Good urine output overnight.       Objective:   /86 (BP Location: Right arm)   Pulse 115   Temp 97.6 °F (36.4 °C) (Axillary)   Resp 18   Ht 5' 4\" (1.626 m)   Wt 125 lb (56.7 kg)   SpO2 96%   BMI 21.46 kg/m²     Telemetry: afib, rates overall controlled     Intake/Output:    Intake/Output Summary (Last 24 hours) at 3/30/2025 0538  Last data filed at 3/30/2025 0506  Gross per 24 hour   Intake 765 ml   Output 1725 ml   Net -960 ml       Wt Readings from Last 3 Encounters:   25 125 lb (56.7 kg)   10/24/24 122 lb 8 oz (55.6 kg)   23 130 lb 11.2 oz (59.3 kg)         Labs:  Recent Labs   Lab 25  0454 25  0416 25  0507   * 163* 160*   BUN 22 25* 24*   CREATSERUM 0.73 0.80 0.80   EGFRCR 78 70 70   CA 8.6* 8.7 9.0    147* 149*   K 3.9 3.9 3.5    113* 111   CO2 23.0 23.0 26.0     Recent Labs   Lab 25  0454 25  0416 25  0507   RBC 3.47* 3.49* 3.44*   HGB 10.5* 10.9* 10.9*   HCT 33.5* 34.8* 33.2*   MCV 96.5 99.7 96.5   MCH 30.3 31.2 31.7   MCHC 31.3 31.3 32.8   RDW 12.2 12.3 12.5   NEPRELIM 10.74* 12.08* 12.46*   WBC 13.4* 14.3* 14.9*   .0 224.0 239.0         Recent Labs   Lab 25  1113   TROPHS 23       Review of Systems:     10 point review of systems completed and negative except as noted in HPI      Exam:     Physical Exam:  General: Sleepy. No apparent distress.   Neck: No JVD, carotids 2+, no bruits.  Cardiac: Irregular rhythm/ rate. No murmur, pericardial rub, S3, or extra cardiac sounds.  Lungs: Rhonchi bilaterally.  Normal excursions and effort.  Abdomen: Soft, non-tender. BS-present.  Extremities:  Without clubbing or cyanosis. Trace edema.    Skin: Warm and dry.     Medications:     furosemide  20 mg Intravenous BID (Diuretic)    mirtazapine  3.75 mg Oral Nightly    metoprolol succinate  50 mg Oral Daily Beta Blocker    ampicillin-sulbactam  3 g Intravenous q6h    insulin aspart  1-5 Units Subcutaneous TID CC    aspirin  325 mg Oral Daily    clopidogrel  75 mg Oral Daily    losartan  25 mg Oral Daily    OXcarbazepine  150 mg Oral Daily    pantoprazole  40 mg Oral Daily    azithromycin  500 mg Intravenous Q24H    heparin  5,000 Units Subcutaneous Q8H FARIDEH         Diagnostic Studies:     ECG: Afib with RVR and nonspecific ST changes     Assessment and Plan:     Assessment:  # Acute hypoxic respiratory failure   # possible aspiration pneumonia    Management per primary team   Wean oxygen as able   # acute on chronic HF with preserved LVEF   proBNP elevated at 2155, CXR consistent with pulmonary edema, sounds congested   Started in IV lasix 20 mg bid 3/28 > good UO overnight - 1.2L  PO lasix 20 mg daily PTA   Strict I/Os and daily weights   # persistent atrial fibrillation   # SSS s/p PPM   Rates elevated overnight 120's. increase PO metoprolol to 75 mg bid  Not on chronic anticoagulation   # HTN - borderline   Would not be aggressive with strict BP control   Optimize rate control   # Hx of CVA - cont asa and statin   # KENYA > Cr stable; monitor with diuresis     Plan:  Increase PO metoprolol to 75 mg bid for rate control, lopressor IVP 2.5 mg x1   Good urine output overnight, continue IV lasix 20 mg bid,   Strict I/Os and daily weight   Replace electrolytes to maintain K >=4 and Mag >=2  Monitor rates on tele       Plan of care discussed with patient, RN.      Shea George, APRN  3/30/2025  Ph [phone] (Edana luisa)  Ph [phone] ([place])

## 2025-03-31 NOTE — PLAN OF CARE
Monitoring vital signs, stable at this time. No acute changes at this moment.  Patient is currently lethargic but is arousal with pain stimulation . . Fall precautions in place- bed alarm on, bed locked in lowest position, call light within reach. Frequent rounding by nursing staff.         Problem: Patient Centered Care  Goal: Patient preferences are identified and integrated in the patient's plan of care  Description: Interventions:- What would you like us to know as we care for you? - Provide timely, complete, and accurate information to patient/family- Incorporate patient and family knowledge, values, beliefs, and cultural backgrounds into the planning and delivery of care- Encourage patient/family to participate in care and decision-making at the level they choose- Honor patient and family perspectives and choices  3/30/2025 2225 by Rosa Mayes, RN  Outcome: Not Progressing       Problem: Patient/Family Goals  Goal: Patient/Family Long Term Goal  Description: Patient's Long Term Goal: discharge Interventions:- - Monitor vitals  - Monitor appropriate labs  - Pain management  - Follow MD order  - Diagnostics per order  - Update/Informing patient and family on plan of care  - Discharge planning- See additional Care Plan goals for specific interventions  3/30/2025 2225 by Rosa Mayes, RN  Outcome: Not Progressing    Goal: Patient/Family Short Term Goal  Description: Patient's Short Term Goal: feel better  Interventions: - - Monitor vitals  - Monitor appropriate labs  - Pain management  - Follow MD order  - Diagnostics per order  - Update/Informing patient and family on plan of care  - Discharge planning- See additional Care Plan goals for specific interventions  3/30/2025 2225 by Rosa Mayes, RN  Outcome: Not Progressing       Problem: RESPIRATORY - ADULT  Goal: Achieves optimal ventilation and oxygenation  Description: INTERVENTIONS:- Assess for changes in respiratory status- Assess for changes in  mentation and behavior- Position to facilitate oxygenation and minimize respiratory effort- Oxygen supplementation based on oxygen saturation or ABGs- Provide Smoking Cessation handout, if applicable- Encourage broncho-pulmonary hygiene including cough, deep breathe, Incentive Spirometry- Assess the need for suctioning and perform as needed- Assess and instruct to report SOB or any respiratory difficulty- Respiratory Therapy support as indicated- Manage/alleviate anxiety- Monitor for signs/symptoms of CO2 retention  3/30/2025 2225 by Rosa Mayes, RN  Outcome: Not Progressing       Problem: GENITOURINARY - ADULT  Goal: Absence of urinary retention  Description: INTERVENTIONS:- Assess patient’s ability to void and empty bladder- Monitor intake/output and perform bladder scan as needed- Follow urinary retention protocol/standard of care- Consider collaborating with pharmacy to review patient's medication profile- Implement strategies to promote bladder emptying  3/30/2025 2225 by Rosa Mayes, RN  Outcome: Not Progressing    Problem: SKIN/TISSUE INTEGRITY - ADULT  Goal: Skin integrity remains intact  Description: INTERVENTIONS- Assess and document risk factors for pressure ulcer development- Assess and document skin integrity- Monitor for areas of redness and/or skin breakdown- Initiate interventions, skin care algorithm/standards of care as needed  3/30/2025 2225 by Rosa Mayes, RN  Outcome: Not Progressing       Problem: Impaired Swallowing  Goal: Minimize aspiration risk  Description: Interventions:- Patient should be alert and upright for all feedings (90 degrees preferred)- Offer food and liquids at a slow rate- No straws- Encourage small bites of food and small sips of liquid- Offer pills one at a time, crush or deliver with applesauce as needed- Discontinue feeding and notify MD (or speech pathologist) if coughing or persistent throat clearing or wet/gurgly vocal quality is noted  3/30/2025 2225 by  Rosa Mayes, RN  Outcome: Not Progressing

## 2025-03-31 NOTE — PROGRESS NOTES
Progress Note  Yin Casas Patient Status:  Inpatient    1935 MRN [id number]   Location [facility]5W Attending Ayden Boston,*   Hosp Day # 8 PCP Lyndon Talbot MD     Subjective:  Pt denies any chest pain or SOB.   On 8L O2 Via NC    Objective:  /79 (BP Location: Right arm)   Pulse 91   Temp 97.7 °F (36.5 °C) (Axillary)   Resp 20   Ht 5' 4\" (1.626 m)   Wt 124 lb 4.8 oz (56.4 kg)   SpO2 96%   BMI 21.34 kg/m²     Telemetry: afib 's       Intake/Output:    Intake/Output Summary (Last 24 hours) at 3/31/2025 1021  Last data filed at 3/31/2025 0554  Gross per 24 hour   Intake 631.17 ml   Output 1900 ml   Net -1268.83 ml       Last 3 Weights   25 0518 124 lb 4.8 oz (56.4 kg)   25 0627 128 lb 4.8 oz (58.2 kg)   25 0525 125 lb (56.7 kg)   25 0700 128 lb 4.8 oz (58.2 kg)   25 0422 118 lb 14.4 oz (53.9 kg)   25 1509 109 lb 6.4 oz (49.6 kg)   25 1453 109 lb 6.4 oz (49.6 kg)   25 1059 121 lb 4.1 oz (55 kg)   10/24/24 1514 122 lb 8 oz (55.6 kg)   23 1323 130 lb 11.2 oz (59.3 kg)       Labs:  Recent Labs   Lab 25  0507 25  0552 25  1528 25  0535   * 180*  --  190*   BUN 24* 27*  --  25*   CREATSERUM 0.80 0.84  --  0.81   EGFRCR 70 66  --  69   CA 9.0 8.5*  --  8.7   * 151*  --  152*   K 3.5 3.3*  3.3* 3.8 3.4*  3.4*    110  --  109   CO2 26.0 29.0  --  30.0     Recent Labs   Lab 25  0507 25  0552 25  0535   RBC 3.44* 3.41* 3.47*   HGB 10.9* 10.1* 10.9*   HCT 33.2* 32.8* 33.7*   MCV 96.5 96.2 97.1   MCH 31.7 29.6 31.4   MCHC 32.8 30.8* 32.3   RDW 12.5 12.7 12.9   NEPRELIM 12.46* 9.87* 11.55*   WBC 14.9* 12.0* 13.8*   .0 257.0 269.0         No results for input(s): \"TROP\", \"TROPHS\", \"CK\" in the last 168 hours.  Lab Results   Component Value Date    PT 24.7 (H) 2013    PT See Comment 2013    PT 24.9 (H) 2013    INR 1.09 2019     INR 1.06 08/31/2019    INR 1.07 08/30/2019       Diagnostics:     Review of Systems   Respiratory: Negative.     Cardiovascular: Negative.          Physical Exam:    Physical Exam  Vitals reviewed.   Constitutional:       General: She is not in acute distress.     Appearance: She is not ill-appearing.   Neck:      Vascular: No JVD.   Cardiovascular:      Rate and Rhythm: Normal rate and regular rhythm.      Pulses: Normal pulses.      Heart sounds: Normal heart sounds.   Pulmonary:      Effort: Pulmonary effort is normal. No respiratory distress.      Breath sounds: No stridor. Rhonchi and rales present. No wheezing.   Chest:      Chest wall: No tenderness.   Musculoskeletal:      Cervical back: Normal range of motion.      Right lower leg: Edema present.      Left lower leg: Edema present.      Comments: Trace edema   Skin:     General: Skin is warm and dry.   Neurological:      Mental Status: She is alert.      Comments: Oriented x2         Medications:   potassium chloride  40 mEq Intravenous Once    metoprolol  5 mg Intravenous Once    potassium chloride  40 mEq Oral Once    furosemide  20 mg Intravenous BID (Diuretic)    metoprolol succinate  50 mg Oral Daily Beta Blocker    ampicillin-sulbactam  3 g Intravenous q6h    insulin aspart  1-5 Units Subcutaneous TID CC    aspirin  325 mg Oral Daily    clopidogrel  75 mg Oral Daily    losartan  25 mg Oral Daily    OXcarbazepine  150 mg Oral Daily    pantoprazole  40 mg Oral Daily    azithromycin  500 mg Intravenous Q24H    heparin  5,000 Units Subcutaneous Q8H FARIDEH      dextrose 75 mL/hr at 03/31/25 1003    dilTIAZem 15 mg/hr (03/31/25 0433)       Assessment/Plan:    89yr old female presented with Sob and cough    Acute hypoxic respiratory failure   Possibel aspiraion pneumonia  - on 4L NC   - on Iv antibiotics    Acute on chronic HFPEF  - last echo from 2023 with LVEF 50%  - pro BNP 3,264  - chest xray:perisistent pulmonary edema, small pleural effusion, pneumonia  -  on IV lasix 20mg BID  - good output with neg of 1L over past 24hrs.   - on losartan and BB    SSS s/p PPM  Perisistant afib with RVR  - RVR likely triggered with acute illness  - now on cardizem gtt and metoprolol with Hr in 130's.   - not on AC historically due to high risk bleeding    HTN- well controlled on BB, CCB and losartan    H/o CVA  - on statin and aspirin    Hypokalemia  - electrolyte repalcement per protocol    Plan;  - HR poorly controlled, will give lopressor 5mg IV x1 now  - continue Cardizem gtt  - increase the metoprolol succinate to 75mg daily  -  check LV function with Echo    Plan discussed with pt and RN   NICOLASA Muñoz  [facility]  3/31/2025  10:21 AM    Physician addendum:  I have seen and examined the patient agree with note as written by APN above, MDM per me.    89-year-old female presenting with shortness of breath and cough    Acute hypoxic respiratory failure, in the setting of possible aspiration pneumonia as well as possible heart failure exacerbation  Aspiration pneumonia-on IV antibiotics  Acute on chronic HFpEF-on Lasix IV symptoms mildly improved  History of sick sinus syndrome status post permanent pacemaker seems to be in RV lead only  Persistent A-fib with RVR-had an episode of A-fib flutter this afternoon where heart rates are in the 200s with one-to-one conduction of her a flutter she is on a Cardizem drip at 15 and her metoprolol is being uptitrated  Hypertension-on beta-blocker, calcium channel blocker, losartan  History of CVA on aspirin statin    Plan:  Will uptitrate her metoprolol succinate, consider oral diltiazem, try to wean her off diltiazem drip  Continue IV diuresis     L3    Nicholas Velarde MD  Interventional cardiology  [facility]

## 2025-03-31 NOTE — PROGRESS NOTES
[facility]  part of [facility]    Progress Note    Yin Casas Patient Status:  Inpatient    1935 MRN [id number]   Location [facility]5W Attending Ayden Boston,*   Hosp Day # 8 PCP Lyndon Talbot MD     Chief Complaint: sleepy    Subjective:     Constitutional:  Positive for appetite change and fatigue.   HENT:  Positive for congestion and hearing loss.    Respiratory:  Positive for cough. Negative for choking, chest tightness and shortness of breath.    Cardiovascular:  Negative for leg swelling.   Gastrointestinal:  Negative for abdominal pain.   Genitourinary:  Positive for dysuria and frequency.   Neurological:  Negative for weakness.   Psychiatric/Behavioral:  Positive for sleep disturbance. Negative for decreased concentration. The patient is not nervous/anxious.        Objective:   Blood pressure 131/79, pulse 91, temperature 97.7 °F (36.5 °C), temperature source Axillary, resp. rate 20, height 5' 4\" (1.626 m), weight 124 lb 4.8 oz (56.4 kg), SpO2 96%, not currently breastfeeding.  Physical Exam  Vitals and nursing note reviewed.   Cardiovascular:      Rate and Rhythm: Tachycardia present.   Pulmonary:      Effort: Pulmonary effort is normal.      Breath sounds: Wheezing, rhonchi and rales present.   Abdominal:      General: Bowel sounds are normal.      Palpations: Abdomen is soft.   Skin:     General: Skin is warm and dry.      Capillary Refill: Capillary refill takes less than 2 seconds.   Neurological:      General: No focal deficit present.      Mental Status: She is alert. Mental status is at baseline.         Results:   Lab Results   Component Value Date    WBC 13.8 (H) 2025    HGB 10.9 (L) 2025    HCT 33.7 (L) 2025    .0 2025    CREATSERUM 0.81 2025    BUN 25 (H) 2025     (H) 2025    K 3.4 (L) 2025    K 3.4 (L) 2025     2025    CO2 30.0 2025     (H)  03/31/2025    CA 8.7 03/31/2025    ALB 3.6 03/28/2025    ALKPHO 60 03/28/2025    BILT 0.6 03/28/2025    TP 6.2 03/28/2025    AST 37 (H) 03/28/2025    ALT 36 03/28/2025    PTT 51.2 (H) 12/08/2016    INR 1.09 09/01/2019    PT 24.7 (H) 11/07/2013    T4F 0.9 03/28/2025    TSH 0.354 (L) 03/28/2025    LIP <10 (L) 03/21/2018    DDIMER 0.81 07/01/2023    MG 1.9 03/31/2025    PHOS 3.6 03/31/2025    TROP <0.045 12/02/2019    TROPHS 23 03/23/2025     (H) 12/08/2016    B12 493 03/03/2017       No results found.        Assessment & Plan:     Possible Aspiration Pneumonia   Acute respiratory distress  Acute Hypoxemic respiratory failure  -per NH notes patient was 70% on room air  -ED noted to be 88% in ED  -Patient sent in from nursing facility due to hypoxia.  -Chest x-ray reviewed.  Noted left perihilar opacity and left lower lobe lung collapse.  -Daughter also states patient with history of difficulties with swallowing.  Some concern for possible aspiration.   -sp swallow eval cont modified diet tolerating well   -Patient started on broad antibiotics with Unasyn and azithromycin cont   -Continuing supplemental O2, weaning as able.desats at night  -Continue to monitor    Acute on chronic chf poss diastolic add lasix; check echo     Acute Kidney Injury better  -better  - Avoiding Nephrotoxic agents  - Cont to monitor - resolved     Persistent Atrial Fibrillation  -Rates not currently controlled because infection  -Continue carvedilol for rate control  -Not on chronic anticoagulation   -Telemetry monitoring hr inc and dec; [facility] lopressor 3/30; nurse notified them    Poss asp; await video    Coughing for years poss asp    Dysuria recheck ua; c and s; on abx ua abn cx neg     HTN  -Currently normotensive  -Restarting some home regimen.  - Monitor and adjust agents as needed     Hyperlipidemia  -Statin     History of CVA  -cont antiplatelets and statin    Leg brace right leg     Chronic heart failure with preserved ejection  fraction   -No current signs of acute exacerbation  -Holding diuresis; decrease ivfs today   - Strict I&Os, Daily weights     History of sick sinus syndrome  -Status post pacemaker placement will ask cards to recheck; they confirmed     Euthyroid sick    Recent loss weight and death of son/picky eater and dislikes dietary restrictions; check tsh poss hill                       Code status dnar/select; dw daughter to discuss poss transition to comfort    Dispo: back to assisted living when issues addressed        Complex mdm; coordinating care with nurse and trying to  pt and with her permission her daughter about pneumonia/chf/poss asp; family gave pt thin liquids yesterday despite restrictions          JOSE MORALES MD

## 2025-03-31 NOTE — CONSULTS
[facility]  part of [facility]    Consult Note    Date:  3/31/2025  Date of Admission:  3/23/2025    Chief Complaint:   Yin Casas is a(n) 89 year old female with dyspnea.    HPI:   The patient has a history of tobacco use having quit smoking at the age of 34.  She has underlying dementia, sick sinus syndrome status post permanent pacemaker, heart failure with preserved ejection fraction, stroke, permanent atrial fibrillation now with poor rate control.  She was transferred from assisted living with cough and shortness of breath.  She is currently on thickening of liquids and was treated for possible aspiration pneumonitis, but the clinical syndrome is lingering.  The history is obtained largely per discussion with the daughter at the bedside.  There was no precedent fever, chills, shakes, phlegm, hemoptysis, personal nor family history of deep venous thrombosis.    History     Past Medical History:    Acute, but ill-defined, cerebrovascular disease    2019    Anxiety state    Atrial fibrillation (HCC)    Cataract, bilateral    Chronic a-fib (HCC)    Congestive heart disease (HCC)    LV ej fraction of 30%    Conjunctival cyst of left eye    Cup to disc asymmetry    OU    Depression    Diabetes (HCC)    diet alone    Esophageal reflux    Essential hypertension    Hearing impairment    Hiatal hernia    Hyperlipidemia    Meibomian gland dysfunction (MGD), bilateral, both upper and lower lids    Osteoarthritis    TIA (transient ischemic attack)    Unspecified atrial fibrillation (HCC)     History reviewed. No pertinent surgical history.  Family History   Problem Relation Age of Onset    Diabetes Mother     Macular degeneration Sister     Diabetes Sister     Glaucoma Neg      Social History: , 6 children, quit tobacco at age 34, no alcohol,    Social History     Socioeconomic History    Marital status:    Tobacco Use    Smoking status: Former     Current packs/day: 0.00      Types: Cigarettes     Quit date: 6/15/1969     Years since quittin.8    Smokeless tobacco: Never   Vaping Use    Vaping status: Never Used   Substance and Sexual Activity    Alcohol use: No     Alcohol/week: 2.0 standard drinks of alcohol     Types: 2 Glasses of wine per week    Drug use: No   Other Topics Concern    Caffeine Concern Yes     Comment: Coffee 2 cups daily    Exercise No   Social History Narrative    The patient uses the following assistive device(s):  rolling walker and wheelchair.      The patient does not live in a home with stairs.     Social Drivers of Health     Food Insecurity: No Food Insecurity (3/23/2025)    NCSS - Food Insecurity     Worried About Running Out of Food in the Last Year: No     Ran Out of Food in the Last Year: No   Transportation Needs: No Transportation Needs (3/23/2025)    NCSS - Transportation     Lack of Transportation: No   Housing Stability: Not At Risk (3/23/2025)    NCSS - Housing/Utilities     Has Housing: Yes     Worried About Losing Housing: No     Unable to Get Utilities: No     Allergies/Medications:   Allergies: Allergies[1]  Medications Prior to Admission   Medication Sig    lidocaine 5 % External Patch Place 1 patch onto the skin daily as needed (Left Side Ribs).    benzonatate 100 MG Oral Cap Take 1 capsule (100 mg total) by mouth 3 (three) times daily as needed for cough.    chlorhexidine gluconate 0.12 % Mouth/Throat Solution Use as directed 5-10 mL in the mouth or throat in the morning, at noon, and at bedtime.    bisacodyl 10 MG Rectal Suppos Place 1 suppository (10 mg total) rectally daily as needed (Constipation).    ezetimibe-simvastatin 10-10 MG Oral Tab Take 1 tablet by mouth nightly.    OXcarbazepine 150 MG Oral Tab Take 1 tablet (150 mg total) by mouth daily.    bethanechol 10 MG Oral Tab Take 1 tablet (10 mg total) by mouth daily.    loratadine (ALLERGY RELIEF) 10 MG Oral Tab Take 1 tablet (10 mg total) by mouth daily.    aspirin 325 MG  Oral Tab EC Take 1 tablet (325 mg total) by mouth daily.    clopidogrel 75 MG Oral Tab Take 1 tablet (75 mg total) by mouth daily.    FLUoxetine 10 MG Oral Cap Take 1 capsule (10 mg total) by mouth daily.    furosemide 20 MG Oral Tab Take 1 tablet (20 mg total) by mouth daily.    Magnesium Oxide -Mg Supplement 400 (240 Mg) MG Oral Tab Take 1 tablet (400 mg total) by mouth daily.    PANTOPRAZOLE 40 MG Oral Tab EC TAKE 1 TABLET BY MOUTH DAILY    losartan 25 MG Oral Tab Take 1 tablet (25 mg total) by mouth daily. Hold for blood pressure  less than 110    carvedilol 3.125 MG Oral Tab TAKE 1 TABLET BY MOUTH DAILY hold FOR sbp less THEN 100 OR HR LESS THEN 60    albuterol 108 (90 Base) MCG/ACT Inhalation Aero Soln Inhale 2 puffs into the lungs every 6 (six) hours.    sennosides 8.6 MG Oral Tab Take 1 tablet (8.6 mg total) by mouth as needed (constipation).    Meclizine HCl 25 MG Oral Tab Take 1 tablet (25 mg total) by mouth daily as needed.    acetaminophen 500 MG Oral Tab Take 1 tablet (500 mg total) by mouth every 6 (six) hours as needed for Pain.    GLUCOCARD VITAL TEST In Vitro Strip 1 strip by In Vitro route daily.       Review of Systems:   Review of Systems:  Vision normal. Ear nose and throat normal. Bowel normal. Bladder function normal. No depression. No thyroid disease. No lymphatic system concerns.  No rash. Muscles and joints unremarkable. No weight loss no weight gain.    Physical Exam:   Vital Signs:  Blood pressure 105/69, pulse 80, temperature 97.7 °F (36.5 °C), temperature source Axillary, resp. rate 20, height 5' 4\" (1.626 m), weight 124 lb 4.8 oz (56.4 kg), SpO2 93%, not currently breastfeeding.     Alert frail elderly female  HEENT examination is unremarkable with pupils equal round and reactive to light and accommodation.   Neck without adenopathy, thyromegaly, JVD nor bruit.   Lungs notable for bibasilar crackles to auscultation and percussion.  Cardiac regular rate and rhythm no murmur.   Abdomen  nontender, without hepatosplenomegaly and no mass appreciable.   Extremities without clubbing cyanosis nor edema.   Neurologic grossly intact with symmetric tone and strength and reflex.  Skin without gross abnormality    Results:     Lab Results   Component Value Date    WBC 13.8 03/31/2025    HGB 10.9 03/31/2025    HCT 33.7 03/31/2025    .0 03/31/2025    CREATSERUM 0.81 03/31/2025    BUN 25 03/31/2025     03/31/2025    K 3.4 03/31/2025    K 3.4 03/31/2025     03/31/2025    CO2 30.0 03/31/2025     03/31/2025    CA 8.7 03/31/2025    MG 1.9 03/31/2025    PHOS 3.6 03/31/2025     Chest x-ray-Increased interstitial markings identified throughout both lungs which could represent interstitial edema, an interstitial pneumonitis, or a combination.  There are small streaky opacities at both lung bases likely representing bibasilar   atelectasis.     CT scan of the chest-1. Multifocal areas of secretions/debris within the trachea and left greater than right bronchial tree with associated diffuse peribronchial thickening and multifocal mucous plugging.  Overall, these findings may be secondary to aspiration and/or   infectious/inflammatory bronchiolitis.   2. Mosaic attenuation involving the right greater left lungs, which may be secondary to air trapping or infectious/inflammatory bronchiolitis.   3. Minimal atelectasis/scarring involving the left lower lobe and lingula.   4. No mediastinal or hilar lymphadenopathy is identified within the limitations of this noncontrast exam.    5. Left atrial predominant cardiomegaly with moderate coronary artery calcifications and a left chest wall single lead pacemaker device.   6. Moderate hiatal hernia.   7. Mild-to-moderate compression fracture deformity at L1, which is age indeterminate but has progressed when compared to 09/11/2023.   8. Mild age-indeterminate inferior endplate compression fracture deformity at T9.     Assessment/Plan:   1.  Respiratory  failure-the patient has multifactorial respiratory failure with combination of pulmonary edema and probable ongoing aspiration pneumonitis.  She has bibasilar crackles and bibasilar infiltrates with probable small effusions as well.  Mucous plugging identified on the CT scan of the chest.    Recommendations:  1.  Agree with current antibiotic  2.  Ongoing diuresis  3.  Chest physiotherapy  4.  Await video swallow study  5.  Anticipate need for supplemental oxygen at the time of discharge.  The patient can follow-up in the pulmonary clinic thereafter.    2.  Atrial fibrillation with history of heart failure-as per cardiology.  Carvedilol increased today.    3.  DVT prophylaxis-subcutaneous heparin    4.  CODE STATUS-DNAR select    I am delighted to assist with Yin's care.        Teja Nguyen MD  Medical Director, Critical Care, [facility]  Medical Director, [facility]  Pager: [phone]               [1]   Allergies  Allergen Reactions    Levaquin [Levofloxacin] OTHER (SEE COMMENTS)     tendonitis heel

## 2025-04-01 NOTE — DIETARY NOTE
ADULT NUTRITION REASSESSMENT    Pt is at high nutrition risk. Pt meets moderate malnutrition criteria.      RECOMMENDATIONS TO MD: See nutrition intervention for ONS (oral nutrition supplements)  Pt continues to have prolonged inadequate oral intake since admission (9 days), monitor POC regarding nutrition.     ADMITTING DIAGNOSIS:  Hypoxia [R09.02]  Community acquired pneumonia of left lower lobe of lung [J18.9]  PERTINENT PAST MEDICAL HISTORY:   Past Medical History:    Acute, but ill-defined, cerebrovascular disease    2019    Anxiety state    Atrial fibrillation (HCC)    Cataract, bilateral    Chronic a-fib (HCC)    Congestive heart disease (HCC)    LV ej fraction of 30%    Conjunctival cyst of left eye    Cup to disc asymmetry    OU    Depression    Diabetes (HCC)    diet alone    Esophageal reflux    Essential hypertension    Hearing impairment    Hiatal hernia    Hyperlipidemia    Meibomian gland dysfunction (MGD), bilateral, both upper and lower lids    Osteoarthritis    TIA (transient ischemic attack)    Unspecified atrial fibrillation (HCC)     PATIENT STATUS: Initial 03/25/25: Pt assessed due to screening at risk for malnutrition, MST 3 for unintended weight loss and decreased appetite. Chart reviewed, SLP performed bedside swallow study, recommends pureed diet, nectar/ mildly thick liquids. Visited pt, daughter Yessenia at bedside. Pt and daughter stated appetite was ok, but prior to admit appetite was good. Pt typically would eat a bigger breakfast, and smaller lunch and dinner. Prefers softer foods, enjoys eating eggs. Pt intake per chart review is poor, daughter stated she is not happy with the diet here.   Pt's daughter confirmed pt did have weight loss. Pt has been worrying about her son since December, and he passed a month ago. Per chart review, last admit weight was 122 lbs 8 oz in 10/24, this admit pt/s weight is 109 lbs, 6 oz, significant weight loss of 10%. Ordered pt ONS Sugar Free chocolate BID  for am and pm snack, and Magic cup 1x day at hs to maximize intake. Follow up per protocol.   Update 03/28/25: RA completed per protocol. Chart reviewed, undergoing treatment for aspiration pneumonia. Discussion with RN, not much appetite - refuses assistance with meals. Intakes reviewed, 0-25% x 8 meals since last visit (averaging 17% overall - poor). Pt visited, sleeping soundly. Current weight 128# 4.8 oz. Weight reviewed, pt noted weighing 118# 14 oz on 3/26 and 109# 6 oz on 3/23. Discussed weights with RN - requested reweigh for accuracy. Liberalized CHO restriction to promote increased po intake. A1c 6.6%. Current code status: DNAR/Select.    Update 04/01/25: RA completed per protocol. Chart reviewed, CXR consistent with pulmonary edema - started on IV Lasix per cardiology note. VFSS completed this morning - recommending pureed/thin liquids.  Discussion with RN, pt not drinking/eating supplements. Adjusted ONS given now on thin liquids. Intakes reviewed, 0-30% x 10 meals since last visit (poor). 0-25% x 2 ONS per flowsheet review. Pt visited, no family at bedside and pt sleeping soundly. Current weight consistent with last visit wt (124#). Message sent to MD regarding prolonged inadequate/poor oral intake.     FOOD/NUTRITION RELATED HISTORY:  Appetite: Poor  Intake: ~0 - 30% x10 meals documented since last visit poor + limited/poor intake of ONS per flowsheet review/discussion with RN. Refusals of meals noted since last visit  Intake Meeting Needs: No, even with oral nutrition supplements (ONS) ordered Increased ONS to QID on 3/28/25  Percent Meals Eaten (last 6 days)       Date/Time Percent Meals Eaten (%)    03/26/25 0900 25 %    03/26/25 1423 25 %    03/26/25 1818 25 %    03/27/25 1020 0 %     Percent Meals Eaten (%): pt did not eat any of her food at 03/27/25 1020    03/27/25 1340 0 %     Percent Meals Eaten (%): patient did not want lunch at 03/27/25 1340    03/27/25 1659 20 %    03/28/25 1013 15 %     03/28/25 1500 15 %    03/29/25 0845 30 %    03/29/25 1309 15 %    03/29/25 1900 --     Percent Meals Eaten (%): dinner tray at bedside at 03/29/25 1900    03/30/25 0910 0 %     Percent Meals Eaten (%): refused at 03/30/25 0910    03/30/25 1500 0 %     Percent Meals Eaten (%): refused at 03/30/25 1500    03/30/25 1817 0 %    03/31/25 0838 0 %     Percent Meals Eaten (%): refused breakfast at 03/31/25 0838    03/31/25 1427 0 %    04/01/25 1110 0 %     Percent Meals Eaten (%): pt too lethargic at 04/01/25 1110           Food Allergies: No Known Food Allergies (NKFA)  Cultural/Ethnic/Hoahaoism Preferences: Not Obtained    GASTROINTESTINAL: +BM 3/31/25 - smear;soft and Swallow evaluation noted    MEDICATIONS: reviewed Electrolyte replacement per protocol (non-cardiac)  IV Lasix BID noted  Remeron 3/27-3/31   magnesium oxide  400 mg Oral Once    dilTIAZem  60 mg Oral 4 times per day    [START ON 4/2/2025] metoprolol succinate  100 mg Oral Daily Beta Blocker    potassium chloride  40 mEq Oral Once    furosemide  20 mg Intravenous BID (Diuretic)    ampicillin-sulbactam  3 g Intravenous q6h    insulin aspart  1-5 Units Subcutaneous TID CC    aspirin  325 mg Oral Daily    clopidogrel  75 mg Oral Daily    losartan  25 mg Oral Daily    OXcarbazepine  150 mg Oral Daily    pantoprazole  40 mg Oral Daily    azithromycin  500 mg Intravenous Q24H    heparin  5,000 Units Subcutaneous Q8H FARIDEH     LABS: reviewed A1c 6.6% on 3/23/25  POC Glucose reviewed  Hypernatremia (148), Hyponatremia (3.3) noted  Recent Labs     03/30/25  0552 03/30/25  1528 03/31/25  0535 04/01/25  0527   *  --  190* 295*   BUN 27*  --  25* 24*   CREATSERUM 0.84  --  0.81 0.88   CA 8.5*  --  8.7 8.3*   MG 1.8  --  1.9 1.8   *  --  152* 148*   K 3.3*  3.3* 3.8 3.4*  3.4* 3.3*  3.3*     --  109 106   CO2 29.0  --  30.0 31.0   PHOS 3.4  --  3.6 3.0   OSMOCALC 322*  --  323* 321*     WEIGHT HISTORY:  Patient Weight(s) for the past 336 hrs:    Weight   03/31/25 0518 56.4 kg (124 lb 4.8 oz)   03/30/25 0627 58.2 kg (128 lb 4.8 oz)   03/29/25 0525 56.7 kg (125 lb)   03/27/25 0700 58.2 kg (128 lb 4.8 oz)   03/26/25 0422 53.9 kg (118 lb 14.4 oz)   03/23/25 1509 49.6 kg (109 lb 6.4 oz)   03/23/25 1453 49.6 kg (109 lb 6.4 oz)   03/23/25 1059 55 kg (121 lb 4.1 oz)     Wt Readings from Last 10 Encounters:   03/31/25 56.4 kg (124 lb 4.8 oz)   10/24/24 55.6 kg (122 lb 8 oz)   09/21/23 59.3 kg (130 lb 11.2 oz)   09/11/23 64 kg (141 lb)   07/05/23 60.1 kg (132 lb 6.4 oz)   11/09/22 65 kg (143 lb 6.4 oz)   10/29/22 62.6 kg (138 lb)   09/20/22 63.5 kg (140 lb)   09/02/22 65.8 kg (145 lb)   04/29/22 67.4 kg (148 lb 9.6 oz)     ANTHROPOMETRICS:  HT: 162.6 cm (5' 4\")  Wt Readings from Last 1 Encounters:   03/31/25 56.4 kg (124 lb 4.8 oz)     Last weight:  Monitor weight. Current trixie consistent with last visit weight but up from admit weight.  BMI: Body mass index is 21.34 kg/m².  Dosing Weight: 49.6 kg - admission weight, utilized for anthropometric calculations until accurate/consistent weight obtained  BMI CLASSIFICATION: 18.5-24.9 kg/m2 - WNL  IBW/lbs (Calculated) Female: 120 lbs           88% IBW compared to admit weight  Usual Body Wt: unsure, chart review inconsistent       NUTRITION RELATED PHYSICAL FINDINGS:  - Nutrition Focused Physical Exam (NFPE): mild depletion body fat orbital region and triceps region and mild depletion muscle mass temple region, clavicle region, and scapular region  - Fluid Accumulation: none  per flowsheet review --> See RN documentation for details  - Skin Integrity: at risk per flowsheet review -->. See RN documentation for details    CRITERIA FOR MALNUTRITION DIAGNOSIS:  Criteria for non-severe malnutrition diagnosis: chronic illness related to wt loss 10% in 6 months, body fat mild depletion, and muscle mass mild depletion.    NUTRITION DIAGNOSIS/PROBLEM:   Moderate Malnutrition related to Chronic - Physiological causes resulting in  anorexia or diminished intake as evidenced by weight loss of 10 lbs in 6 month, and mild muscle and fat depletion.     NUTRITION DIAGNOSIS PROGRESS:  No Improvement (continue) - poor po intake remains despite oral nutritional supplements    NUTRITION INTERVENTION:   NUTRITION PRESCRIPTION:   Estimated Nutrition needs: --dosing wt of 49.6 kg - wt taken on 3/23/25 until new weight obtained  Calories: 2497-0972 calories/day (30-35 calories per kg Dosing wt)  Protein: 64 - 74 g protein/day (1.3 - 1.5 g protein/kg Dosing wt)    - Diet:       Procedures    Cardiac diet Cardiac, No Straw; Fluid Consistency: Thin Liquids ; Texture Consistency: Pureed; Is Patient on Accuchecks? Yes    ** Liberalized CHO restriction to promote increased po intake    - Nutrition Care Plan: Liberalized diet, Encouraged small frequent meals with emphasis on high calorie/high protein, and Initiated ONS (oral nutritional supplements)  - ONS (Oral Nutrition Supplements)/Meals/Snacks: Glucerna (220 calories/ 10 g protein each) BID Vanilla or Chocolate and Magic Cup (290 calories/ 9 g protein each) Daily Vanilla or Chocolate   - Vitamin and mineral supplements: none  - Feeding assistance: meal set up  - Nutrition education: Discussed importance of adequate energy and protein intake    - Coordination of nutrition care: collaboration with other providers  - Discharge and transfer of nutrition care to new setting or provider: monitor plans    MONITOR AND EVALUATE/NUTRITION GOALS:  - Food and Nutrient Intake:      Monitor: adequacy of PO intake and adequacy of supplement intake  - Food and Nutrient Administration:      Monitor: goc/family wishes regarding nutrition  - Anthropometric Measurement:    Monitor weight  - Nutrition Goals:      halt wt loss, regain wt as able, PO and supplement greater than 75% of needs, good supplement intake, labs within acceptable limits, euglycemia, prevent skin breakdown, improved GI status, and GOC regarding nutrition       DIETITIAN FOLLOW UP: RD to follow and monitor nutrition status    Aria Delgado MS, KAT, RDN, LDN  Clinical Dietitian  P: [phone]

## 2025-04-01 NOTE — PROGRESS NOTES
Progress Note  Yin Casas Patient Status:  Inpatient    1935 MRN [id number]   Location [facility]5W Attending Ayden Boston,*   Hosp Day # 9 PCP Lyndon Talbot MD     Subjective:  Pt denies any chest pain or SOB.     Objective:  /85 (BP Location: Left arm)   Pulse 118   Temp 97.3 °F (36.3 °C) (Axillary)   Resp 18   Ht 5' 4\" (1.626 m)   Wt 124 lb 4.8 oz (56.4 kg)   SpO2 95%   BMI 21.34 kg/m²     Telemetry: afib, -120      Intake/Output:    Intake/Output Summary (Last 24 hours) at 2025 1123  Last data filed at 2025 1110  Gross per 24 hour   Intake 150 ml   Output 800 ml   Net -650 ml       Last 3 Weights   25 0518 124 lb 4.8 oz (56.4 kg)   25 0627 128 lb 4.8 oz (58.2 kg)   25 0525 125 lb (56.7 kg)   25 0700 128 lb 4.8 oz (58.2 kg)   25 0422 118 lb 14.4 oz (53.9 kg)   25 1509 109 lb 6.4 oz (49.6 kg)   25 1453 109 lb 6.4 oz (49.6 kg)   25 1059 121 lb 4.1 oz (55 kg)   10/24/24 1514 122 lb 8 oz (55.6 kg)   23 1323 130 lb 11.2 oz (59.3 kg)       Labs:  Recent Labs   Lab 25  0552 25  1528 25  0535 25  0527   *  --  190* 295*   BUN 27*  --  25* 24*   CREATSERUM 0.84  --  0.81 0.88   EGFRCR 66  --  69 62   CA 8.5*  --  8.7 8.3*   *  --  152* 148*   K 3.3*  3.3* 3.8 3.4*  3.4* 3.3*  3.3*     --  109 106   CO2 29.0  --  30.0 31.0     Recent Labs   Lab 25  0552 25  0535 25  0527   RBC 3.41* 3.47* 3.62*   HGB 10.1* 10.9* 11.0*   HCT 32.8* 33.7* 34.9*   MCV 96.2 97.1 96.4   MCH 29.6 31.4 30.4   MCHC 30.8* 32.3 31.5   RDW 12.7 12.9 13.3   NEPRELIM 9.87* 11.55* 13.61*   WBC 12.0* 13.8* 16.4*   .0 269.0 252.0         No results for input(s): \"TROP\", \"TROPHS\", \"CK\" in the last 168 hours.  Lab Results   Component Value Date    PT 24.7 (H) 2013    PT See Comment 2013    PT 24.9 (H) 2013    INR 1.09 2019    INR 1.06  08/31/2019    INR 1.07 08/30/2019       Diagnostics:     Review of Systems   Respiratory: Negative.     Cardiovascular: Negative.          Physical Exam:    Physical Exam  Vitals reviewed.   Constitutional:       General: She is awake. She is not in acute distress.     Appearance: She is not ill-appearing.   Neck:      Vascular: No JVD.   Cardiovascular:      Rate and Rhythm: Normal rate and regular rhythm.      Pulses: Normal pulses.      Heart sounds: Normal heart sounds, S1 normal and S2 normal.      Comments: Trace edema to BLE  Pulmonary:      Effort: Pulmonary effort is normal. No respiratory distress.      Breath sounds: No stridor. Rhonchi present. No wheezing or rales.   Chest:      Chest wall: No tenderness.   Musculoskeletal:      Cervical back: Normal range of motion.   Skin:     General: Skin is warm and dry.   Neurological:      Mental Status: She is alert and easily aroused.      Comments: Oriented x2         Medications:   magnesium oxide  400 mg Oral Once    potassium chloride  40 mEq Intravenous Once    dilTIAZem  60 mg Oral 4 times per day    metoprolol succinate  75 mg Oral Daily Beta Blocker    potassium chloride  40 mEq Oral Once    furosemide  20 mg Intravenous BID (Diuretic)    ampicillin-sulbactam  3 g Intravenous q6h    insulin aspart  1-5 Units Subcutaneous TID CC    aspirin  325 mg Oral Daily    clopidogrel  75 mg Oral Daily    losartan  25 mg Oral Daily    OXcarbazepine  150 mg Oral Daily    pantoprazole  40 mg Oral Daily    azithromycin  500 mg Intravenous Q24H    heparin  5,000 Units Subcutaneous Q8H FARIDEH      dextrose 75 mL/hr at 04/01/25 0005    dilTIAZem 5 mg/hr (03/31/25 2251)       Assessment/Plan:    89yr old female presented with Sob and cough     Acute hypoxic respiratory failure   Possibel aspiraion pneumonia, mucus plugging   - on 6L NC   - on Iv antibiotics  - pulmonary following    Acute on chronic HFpEF  - last echo from 2023 with LVEF 50%  - pro BNP 3,264  - chest  xray:perisistent pulmonary edema, small pleural effusion, pneumonia  - on IV lasix 20mg BID  - on losartan and BB     SSS s/p PPM  Perisistant afib with RVR  - RVR likely triggered with acute illness  - now on cardizem gtt, Cardizem oral, metoprolol with Hr in 110-120's.   - not on AC historically due to high risk bleeding     HTN- well controlled on BB, CCB and losartan     H/o CVA  - on statin and aspirin     Hypokalemia  - electrolyte repalcement per protocol    Hypernatremia  - improving     Plan;  - will increase the Cardizem oral to 60mg Q6hrs, wean off Cardizem gtt as tolerated.   - increase metoprolol to 100mg daily  - further recommendation pending echo result pending   - chest xray with finding consistent with fluid overload, continue with IV lasix.    Plan discussed with pt and RN   NICOLASA Muñoz  [facility]  4/1/2025  11:23 AM

## 2025-04-01 NOTE — PROGRESS NOTES
[facility]  part of [facility]    Progress Note      Assessment and Plan:    1.  Respiratory failure-the patient has multifactorial respiratory failure with combination of pulmonary edema and probable ongoing aspiration pneumonitis.  She has bibasilar crackles and bibasilar infiltrates with probable small effusions as well.  Mucous plugging identified on the CT scan of the chest.    The patient is yet requiring supplemental oxygen at 6 L.  Dysphagia identified on the video swallow but no gross aspiration.  Thickened liquids and puréed advised.  The white blood cell count is trending upward from 13.8-16.4.    Recommendations:  1.  Agree with current antibiotic  2.  Ongoing diuresis  3.  Chest physiotherapy  4.  Swallowing strategies  5.  Anticipate need for supplemental oxygen at the time of discharge.  The patient can follow-up in the pulmonary clinic thereafter.  5.  Morning CBC    2.  Atrial fibrillation with history of heart failure-as per cardiology.  Carvedilol increased today.    3.  DVT prophylaxis-subcutaneous heparin    4.  CODE STATUS-DNAR select    5.  Goals of care-may be most appropriate to transition to comfort care and consider hospice consultation.        Subjective:   Yin Casas is a(n) 89 year old female who is minimally responsive    Objective:   Blood pressure 115/69, pulse 115, temperature 98.3 °F (36.8 °C), temperature source Axillary, resp. rate 18, height 5' 4\" (1.626 m), weight 124 lb 4.8 oz (56.4 kg), SpO2 93%, not currently breastfeeding.    Physical Exam minimally responsive white female  HEENT examination is unremarkable with pupils equal round and reactive to light and accommodation.   Neck without adenopathy, thyromegaly, JVD nor bruit.   Lungs basilar crackles to auscultation and percussion.  Cardiac regular rate and rhythm no murmur.   Abdomen nontender, without hepatosplenomegaly and no mass appreciable.   Extremities without clubbing cyanosis nor  edema.   Neurologic minimally arousable with encephalopathy.  Skin without gross abnormality     Results:     Lab Results   Component Value Date    WBC 16.4 04/01/2025    HGB 11.0 04/01/2025    HCT 34.9 04/01/2025    .0 04/01/2025    CREATSERUM 0.88 04/01/2025    BUN 24 04/01/2025     04/01/2025    K 3.3 04/01/2025    K 3.3 04/01/2025     04/01/2025    CO2 31.0 04/01/2025     04/01/2025    CA 8.3 04/01/2025    MG 1.8 04/01/2025    PHOS 3.0 04/01/2025       Teja Nguyen MD  Medical Director, Critical Care, [facility]  Medical Director, [facility]  Pager: [phone]

## 2025-04-01 NOTE — PLAN OF CARE
Problem: Patient Centered Care  Goal: Patient preferences are identified and integrated in the patient's plan of care  Description: Interventions:- What would you like us to know as we care for you? - Provide timely, complete, and accurate information to patient/family- Incorporate patient and family knowledge, values, beliefs, and cultural backgrounds into the planning and delivery of care- Encourage patient/family to participate in care and decision-making at the level they choose- Honor patient and family perspectives and choices  Outcome: Progressing     Problem: Patient/Family Goals  Goal: Patient/Family Long Term Goal  Description: Patient's Long Term Goal: discharge Interventions:- - Monitor vitals  - Monitor appropriate labs  - Pain management  - Follow MD order  - Diagnostics per order  - Update/Informing patient and family on plan of care  - Discharge planning- See additional Care Plan goals for specific interventions  Outcome: Progressing  Goal: Patient/Family Short Term Goal  Description: Patient's Short Term Goal: feel better  Interventions: - - Monitor vitals  - Monitor appropriate labs  - Pain management  - Follow MD order  - Diagnostics per order  - Update/Informing patient and family on plan of care  - Discharge planning- See additional Care Plan goals for specific interventions  Outcome: Progressing     Problem: RESPIRATORY - ADULT  Goal: Achieves optimal ventilation and oxygenation  Description: INTERVENTIONS:- Assess for changes in respiratory status- Assess for changes in mentation and behavior- Position to facilitate oxygenation and minimize respiratory effort- Oxygen supplementation based on oxygen saturation or ABGs- Provide Smoking Cessation handout, if applicable- Encourage broncho-pulmonary hygiene including cough, deep breathe, Incentive Spirometry- Assess the need for suctioning and perform as needed- Assess and instruct to report SOB or any respiratory difficulty- Respiratory Therapy  support as indicated- Manage/alleviate anxiety- Monitor for signs/symptoms of CO2 retention  Outcome: Progressing     Problem: GENITOURINARY - ADULT  Goal: Absence of urinary retention  Description: INTERVENTIONS:- Assess patient’s ability to void and empty bladder- Monitor intake/output and perform bladder scan as needed- Follow urinary retention protocol/standard of care- Consider collaborating with pharmacy to review patient's medication profile- Implement strategies to promote bladder emptying  Outcome: Progressing     Problem: SKIN/TISSUE INTEGRITY - ADULT  Goal: Skin integrity remains intact  Description: INTERVENTIONS- Assess and document risk factors for pressure ulcer development- Assess and document skin integrity- Monitor for areas of redness and/or skin breakdown- Initiate interventions, skin care algorithm/standards of care as needed  Outcome: Progressing     Problem: Impaired Swallowing  Goal: Minimize aspiration risk  Description: Interventions:- Patient should be alert and upright for all feedings (90 degrees preferred)- Offer food and liquids at a slow rate- No straws- Encourage small bites of food and small sips of liquid- Offer pills one at a time, crush or deliver with applesauce as needed- Discontinue feeding and notify MD (or speech pathologist) if coughing or persistent throat clearing or wet/gurgly vocal quality is noted  Outcome: Progressing    Monitoring vital signs, stable at this time. No acute changes at this moment.  Monitoring blood glucose levels.  Pain medication provided as needed. Fall precautions in place- bed alarm on, bed locked in lowest position, call light within reach. Frequent rounding by nursing staff.

## 2025-04-01 NOTE — CM/SW NOTE
Care Progression Note:  Active Acute Medical Issue:   Community acquired pneumonia of left lower lobe of lung     Respiratory failure-the patient has multifactorial respiratory failure with combination of pulmonary edema and probable ongoing aspiration pneumonitis. She has bibasilar crackles and bibasilar infiltrates with probable small effusions as well. Mucous plugging identified on the CT scan of the veronica     Other Contributing Medical Factors/Dx:  Atrial fibrillation with history of heart failure-as per cardiology.  Carvedilol increased today.    Awaiting videofluoroscopic evaluation still to be ordered.  Unable to completed on 3/31.       Length of stay: 9  GMLOS: 4.6  Discharge Barriers: medical stability  Expected discharge date: TBD  Expected discharge level of care: Assisted Living with [facility] following.    May benefit from community palliative follow-up at [facility] if family is agreeable.    Daly Dsouza MBA BSN RN CRRN   RN Case Manager  [phone]

## 2025-04-01 NOTE — SLP NOTE
ADULT VIDEOFLUOROSCOPIC SWALLOWING STUDY    Admission Date: 3/23/2025  Evaluation Date: 04/01/25  Radiologist: Dr. Henson      PLAN: To f/u x3 sessions/meals for dysphagia treatment. Focus will be to reinforce all swallowing precautions/strategies for improved safety of the swallow on this NEW UPGRADED diet. Diet to continue to be upgraded (minced & moist) as appropriate. Family education as available.          RECOMMENDATIONS   Diet Recommendations - Solids: Mechanical soft chopped/ Soft & Bite Sized  Diet Recommendations - Liquids: Nectar thick liquids/ Mildly thick    Further Follow-up:  Follow Up Needed (Documentation Required): Yes  SLP Follow-up Date: 04/02/25  Compensatory Strategies Recommended: Alternate consistencies, Multiple swallows  Aspiration Precautions: Upright position, Slow rate, Small bites, Small sips, No straw  Medication Administration Recommendations: Whole in puree  Treatment Plan/Recommendations: Aspiration precautions    HISTORY   Background/Objective Information:    Problem List  Principal Problem:    Community acquired pneumonia of left lower lobe of lung  Active Problems:    Hypoxia      Past Medical History  Past Medical History:    Acute, but ill-defined, cerebrovascular disease    2019    Anxiety state    Atrial fibrillation (HCC)    Cataract, bilateral    Chronic a-fib (HCC)    Congestive heart disease (HCC)    LV ej fraction of 30%    Conjunctival cyst of left eye    Cup to disc asymmetry    OU    Depression    Diabetes (HCC)    diet alone    Esophageal reflux    Essential hypertension    Hearing impairment    Hiatal hernia    Hyperlipidemia    Meibomian gland dysfunction (MGD), bilateral, both upper and lower lids    Osteoarthritis    TIA (transient ischemic attack)    Unspecified atrial fibrillation (HCC)       Current Diet Consistency: Puree, Nectar thick liquids/ Mildly thick  Prior Level of Function: Assistance/Support for ADL's  Prior Living Situation: Skilled nursing  facility  History of Recent: Increasing chest congestion, Difficulty breathing  Precautions: Aspiration  Imaging results:     CXR 4/01/25:  CONCLUSION:   Findings consistent with worsening moderate CHF/fluid overload in this patient with a cardiac pacemaker including worsening pulmonary edema and increase in size of small bilateral pleural effusions.     Reason for Referral:  (eval/tx)    Family/Patient Goals: least restrictive safest diet      ASSESSMENT   DYSPHAGIA ASSESSMENT  Test completed in conjunction with Radiologist.  Patient Positioned: Upright, Midline.  Patient Viewed: Lateral.  Patient Alertness: Fully alert.  Consistencies Presented: Puree, Thin liquids to assess oropharyngeal swallow function and assess for compensatory strategies to improve safe swallow function.    THIN LIQUIDS  Method of Presentation: Teaspoon, Cup  Oral Phase of Swallow (VFSS - Thin Liquids): Impaired  Bilabial Seal (VFSS - Thin Liquids): Impaired  Triggered at: Pyriform sinuses  Premature Spillage to: Pyriform sinuses  Delay (seconds):  (+)  Residue Severity, Location: Mild, Valleculae, Pyriform sinuses  Cleared/Reduced with: Secondary swallow, Cued  Laryngeal Penetration: None  Tracheal Aspiration: None  Cough/Throat Clear Response: No  Strategy(ies) Implemented (Thin Liquids): Multiple swallows  Effectiveness: Yes    PUREE  Oral Phase of Swallow (VFSS - Puree): Impaired  Bolus Formation (VFSS - Puree): Impaired  Bolus Propulsion (VFSS - Puree): Impaired  Triggered at: Valleculae  Premature Spillage to: Valleculae  Delay (seconds):  (+)  Residue Severity, Location: Mild  Cleared/Reduced with: Multiple swallows, Liquid assist  Laryngeal Penetration: None  Tracheal Aspiration: None  Cough/Throat Clear Response: No  Strategy(ies) Implemented (Puree): Multple swallows, Alternate consistencies  Effectiveness: Yes     IMPRESSIONS:  Pt presents with mild to moderate oropharyngeal dysphagia. Bilabial seal reduced with anterior loss on  thin liquid trials (possible volitional spillage). Reduced lingual bolus formation and A-P transit on pureed with liquid wash used to aid in oral transit. Tongue-base retraction skills with all thin liquid trials triggering after uncontrolled bolus fell into valleculae with additional spillover into pyriform sinus prior to pharyngeal response. Hyolaryngeal excursion and epiglottic inversion sluggish with additional reduced ROM. *Despite these deficits no laryngeal penetration nor aspiration viewed during any swallows of thin liquid (tsp nor cup) nor with pureed. Mild pharyngeal retention cleared with cued second swallows and liquid wash (with pureed). UES appeared functional without retrograde bolus flow noted. Collaborated with RN regarding Pt's swallowing plan of care. Diet to be UPGRADED to pureed/THIN liquids/no straw/pills in pureed.       Additional treatment time spent reviewing VFSS images on [place] in radiology suite with Pt. Results/recommendations discussed and swallowing strategies trained. Pt with fair understanding; fatigue noted to progress.  Reinforcement would be beneficial. [place] handout of swallowing strategies provided.     GOALS  Goal #1 The patient will tolerate PUREED consistency and thin liquids without overt signs or symptoms of aspiration with 100 % accuracy over 1-2 session(s).    In Progress   Goal #2 The patient will utilize compensatory strategies as outlined by  VFSS including Slow rate, Small bites, Small sips, Multiple swallows, Alternate liquids/solids, No straws, Upright 90 degrees with PRN feeding assistance 90 % of the time across 2 sessions.    In Progress   Goal #3 The patient will tolerate trial upgrade of MINCED & MOIST consistency and thin liquids without overt signs or symptoms of aspiration with 100 % accuracy over 1-2 session(s).  In Progress   EDUCATION/INSTRUCTION  Reviewed results and recommendations with patient/family/caregiver.  Agreement/Understanding verbalized and  all questions answered to their apparent satisfaction.    INTERDISCIPLINARY COMMUNICATION  Reviewed results with Radiologist; agreement verbalized.    Patient Experiencing Pain: No  FOLLOW UP  Treatment Plan/Recommendations: Aspiration precautions  Duration: 1 week    Thank you for your referral.   If you have any questions, please contact   Yessenia Yancey M.S. DWAIN/SLP  Speech-Language Pathologist  [facility]  #[phone]

## 2025-04-01 NOTE — PROGRESS NOTES
[facility]  part of [facility]    Progress Note    Yin Casas Patient Status:  Inpatient    1935 MRN [id number]   Location [facility]5W Attending Ayden Boston,*   Hosp Day # 9 PCP Lyndon Talbot MD     Chief Complaint: sleepy/groaning    Subjective:     Constitutional:  Positive for appetite change and fatigue.   HENT:  Positive for congestion and hearing loss.    Respiratory:  Positive for cough. Negative for choking, chest tightness and shortness of breath.    Cardiovascular:  Negative for leg swelling.   Gastrointestinal:  Negative for abdominal pain.   Genitourinary:  Positive for dysuria and frequency.   Neurological:  Negative for weakness.   Psychiatric/Behavioral:  Positive for sleep disturbance. Negative for decreased concentration. The patient is not nervous/anxious.        Objective:   Blood pressure 115/82, pulse 114, temperature 97.8 °F (36.6 °C), resp. rate 20, height 5' 4\" (1.626 m), weight 124 lb 4.8 oz (56.4 kg), SpO2 96%, not currently breastfeeding.  Physical Exam  Vitals and nursing note reviewed.   Cardiovascular:      Rate and Rhythm: Tachycardia present.   Pulmonary:      Effort: Pulmonary effort is normal.      Breath sounds: Wheezing, rhonchi and rales present.   Abdominal:      General: Bowel sounds are normal.      Palpations: Abdomen is soft.   Skin:     General: Skin is warm and dry.      Capillary Refill: Capillary refill takes less than 2 seconds.   Neurological:      General: No focal deficit present.      Mental Status: She is alert. Mental status is at baseline.         Results:   Lab Results   Component Value Date    WBC 16.4 (H) 2025    HGB 11.0 (L) 2025    HCT 34.9 (L) 2025    .0 2025    CREATSERUM 0.88 2025    BUN 24 (H) 2025     (H) 2025    K 3.3 (L) 2025    K 3.3 (L) 2025     2025    CO2 31.0 2025     (H) 2025    CA  8.3 (L) 04/01/2025    ALB 3.6 03/28/2025    ALKPHO 60 03/28/2025    BILT 0.6 03/28/2025    TP 6.2 03/28/2025    AST 37 (H) 03/28/2025    ALT 36 03/28/2025    PTT 51.2 (H) 12/08/2016    INR 1.09 09/01/2019    PT 24.7 (H) 11/07/2013    T4F 0.9 03/28/2025    TSH 0.354 (L) 03/28/2025    LIP <10 (L) 03/21/2018    DDIMER 0.81 07/01/2023    MG 1.8 04/01/2025    PHOS 3.0 04/01/2025    TROP <0.045 12/02/2019    TROPHS 23 03/23/2025     (H) 12/08/2016    B12 493 03/03/2017       XR VIDEO SWALLOW (CPT=74230)    Result Date: 4/1/2025  CONCLUSION:  1. No laryngeal penetration or aspiration.    Dictated by (CST): Paulo Henson MD on 4/01/2025 at 10:00 AM     Finalized by (CST): Paulo Henson MD on 4/01/2025 at 10:02 AM          XR CHEST AP PORTABLE  (CPT=71045)    Result Date: 4/1/2025  CONCLUSION:  Findings consistent with worsening moderate CHF/fluid overload in this patient with a cardiac pacemaker including worsening pulmonary edema and increase in size of small bilateral pleural effusions.    Dictated by (CST): Juan F Varner MD on 4/01/2025 at 7:42 AM     Finalized by (CST): Juan F Varner MD on 4/01/2025 at 7:45 AM          XR CHEST AP PORTABLE  (CPT=71045)    Result Date: 3/31/2025  CONCLUSION: Increased interstitial markings identified throughout both lungs which could represent interstitial edema, an interstitial pneumonitis, or a combination.  There are small streaky opacities at both lung bases likely representing bibasilar atelectasis.    Dictated by (CST): Jose G Patterson MD on 3/31/2025 at 10:39 AM     Finalized by (CST): Jose G Patterson MD on 3/31/2025 at 10:40 AM               Assessment & Plan:     Possible Aspiration Pneumonia   Acute respiratory distress  Acute Hypoxemic respiratory failure  -per NH notes patient was 70% on room air  -ED noted to be 88% in ED  -Patient sent in from nursing facility due to hypoxia.  -Chest x-ray reviewed.  Noted left perihilar opacity and left lower  lobe lung collapse.  -Daughter also states patient with history of difficulties with swallowing.  Some concern for possible aspiration.   -sp swallow eval cont modified diet tolerating well   -Patient started on broad antibiotics with Unasyn and azithromycin cont   -Continuing supplemental O2, weaning as able.desats at night  -Continue to monitor    Acute on chronic chf poss diastolic add lasix; check echo     Acute Kidney Injury better  -better  - Avoiding Nephrotoxic agents  - Cont to monitor - resolved     Persistent Atrial Fibrillation  -Rates not currently controlled because infection  -Continue carvedilol for rate control  -Not on chronic anticoagulation   -Telemetry monitoring hr inc and dec; [facility] lopressor 3/30; nurse notified them    Poss asp; await video    Coughing for years poss asp    Dysuria recheck ua; c and s; on abx ua abn cx neg     HTN  -Currently normotensive  -Restarting some home regimen.  - Monitor and adjust agents as needed     Hyperlipidemia  -Statin     History of CVA  -cont antiplatelets and statin    Leg brace right leg     Chronic heart failure with preserved ejection fraction   -No current signs of acute exacerbation  -Holding diuresis; decrease ivfs today   - Strict I&Os, Daily weights     History of sick sinus syndrome  -Status post pacemaker placement will ask cards to recheck; they confirmed     Euthyroid sick    Recent loss weight and death of son/picky eater and dislikes dietary restrictions; check tsh poss hill                       Code status dnar/select; dw daughter to discuss poss transition to comfort    Dispo: back to assisted living when issues addressed        Complex mdm; coordinating care with nurse and trying to  pt and with her permission her daughter on phone about shelia MORALES MD

## 2025-04-02 NOTE — PROGRESS NOTES
[facility]  part of [facility]    Progress Note    Yin Casas Patient Status:  Inpatient    1935 MRN [id number]   Location [facility]5W Attending Ayden Boston,*   Hosp Day # 10 PCP Lyndon Talbot MD     Chief Complaint: awake looks good    Subjective:     Constitutional:  Positive for appetite change and fatigue.   HENT:  Positive for congestion and hearing loss.    Respiratory:  Positive for cough. Negative for choking, chest tightness and shortness of breath.    Cardiovascular:  Negative for leg swelling.   Gastrointestinal:  Negative for abdominal pain.   Genitourinary:  Positive for dysuria and frequency.   Neurological:  Negative for weakness.   Psychiatric/Behavioral:  Positive for sleep disturbance. Negative for decreased concentration. The patient is not nervous/anxious.        Objective:   Blood pressure 95/59, pulse 103, temperature 98.1 °F (36.7 °C), temperature source Axillary, resp. rate 24, height 5' 4\" (1.626 m), weight 119 lb 6.4 oz (54.2 kg), SpO2 98%, not currently breastfeeding.  Physical Exam  Vitals and nursing note reviewed.   Cardiovascular:      Rate and Rhythm: Tachycardia present.   Pulmonary:      Effort: Pulmonary effort is normal.      Breath sounds: Wheezing, rhonchi and rales present.   Abdominal:      General: Bowel sounds are normal.      Palpations: Abdomen is soft.   Skin:     General: Skin is warm and dry.      Capillary Refill: Capillary refill takes less than 2 seconds.   Neurological:      General: No focal deficit present.      Mental Status: She is alert. Mental status is at baseline.         Results:   Lab Results   Component Value Date    WBC 12.8 (H) 2025    HGB 10.7 (L) 2025    HCT 35.3 2025    .0 2025    CREATSERUM 0.71 2025    BUN 17 2025     2025    K 3.3 (L) 2025    K 3.3 (L) 2025     2025    CO2 30.0 2025     (H)  04/02/2025    CA 8.3 (L) 04/02/2025    ALB 3.6 03/28/2025    ALKPHO 60 03/28/2025    BILT 0.6 03/28/2025    TP 6.2 03/28/2025    AST 37 (H) 03/28/2025    ALT 36 03/28/2025    PTT 51.2 (H) 12/08/2016    INR 1.09 09/01/2019    PT 24.7 (H) 11/07/2013    T4F 0.9 03/28/2025    TSH 0.354 (L) 03/28/2025    LIP <10 (L) 03/21/2018    DDIMER 0.81 07/01/2023    MG 1.7 04/02/2025    PHOS 2.7 04/02/2025    TROP <0.045 12/02/2019    TROPHS 23 03/23/2025     (H) 12/08/2016    B12 493 03/03/2017       XR CHEST AP PORTABLE  (CPT=71045)    Result Date: 4/2/2025  CONCLUSION:   1. Findings most compatible with improving interstitial edema.  Please note that slight to moderate interstitial edema persists.  2. Moderate-sized peribronchial opacities at the lung bases likely representing infiltrate/atelectasis, or a combination in addition to a small to moderate-sized right pleural effusion and trace left pleural effusion.     Dictated by (CST): Jose G Patterson MD on 4/02/2025 at 2:38 PM     Finalized by (CST): Jose G Patterson MD on 4/02/2025 at 2:42 PM          XR VIDEO SWALLOW (CPT=74230)    Result Date: 4/1/2025  CONCLUSION:  1. No laryngeal penetration or aspiration.    Dictated by (CST): Paulo Henson MD on 4/01/2025 at 10:00 AM     Finalized by (CST): Paulo Henson MD on 4/01/2025 at 10:02 AM          XR CHEST AP PORTABLE  (CPT=71045)    Result Date: 4/1/2025  CONCLUSION:  Findings consistent with worsening moderate CHF/fluid overload in this patient with a cardiac pacemaker including worsening pulmonary edema and increase in size of small bilateral pleural effusions.    Dictated by (CST): Juan F Varner MD on 4/01/2025 at 7:42 AM     Finalized by (CST): Juan F Varner MD on 4/01/2025 at 7:45 AM         EKG 12 Lead    Result Date: 4/2/2025  Atrial fibrillation with rapid ventricular response with premature ventricular or aberrantly conducted complexes ST & T wave abnormality, consider lateral ischemia  Abnormal ECG When compared with ECG of 23-MAR-2025 11:07, Nonspecific T wave abnormality has replaced inverted T waves in Inferior leads     Assessment & Plan:     Possible Aspiration Pneumonia   Acute respiratory distress  Acute Hypoxemic respiratory failure  -per NH notes patient was 70% on room air  -ED noted to be 88% in ED  -Patient sent in from nursing facility due to hypoxia.  -Chest x-ray reviewed.  Noted left perihilar opacity and left lower lobe lung collapse.  -Daughter also states patient with history of difficulties with swallowing.  Some concern for possible aspiration.   -sp swallow eval cont modified diet tolerating well   -Patient started on broad antibiotics with Unasyn and azithromycin cont   -Continuing supplemental O2, weaning as able.desats at night  -Continue to monitor    Acute on chronic chf poss diastolic add lasix; check echo     Acute Kidney Injury better  -better  - Avoiding Nephrotoxic agents  - Cont to monitor - resolved     Persistent Atrial Fibrillation  -Rates not currently controlled because infection  -Continue carvedilol for rate control  -Not on chronic anticoagulation   -Telemetry monitoring hr inc and dec; [facility] lopressor 3/30; nurse notified them    Poss asp; await video    Coughing for years poss asp    Dysuria recheck ua; c and s; on abx ua abn cx neg     HTN  -Currently normotensive  -Restarting some home regimen.  - Monitor and adjust agents as needed     Hyperlipidemia  -Statin     History of CVA  -cont antiplatelets and statin    Leg brace right leg     Chronic heart failure with preserved ejection fraction   -No current signs of acute exacerbation  -Holding diuresis; decrease ivfs today   - Strict I&Os, Daily weights     History of sick sinus syndrome  -Status post pacemaker placement will ask cards to recheck; they confirmed     Euthyroid sick    Recent loss weight and death of son/picky eater and dislikes dietary restrictions; check tsh poss hill                        Code status dnar/select; dw daughter to discuss poss transition to comfort    Dispo: back to assisted living when issues addressed        Complex mdm; coordinating care with nurse and trying to  pt and with her permission her family in room and on phone about feeding issues and goc          JOSE MORALES MD

## 2025-04-02 NOTE — PROGRESS NOTES
[facility]  part of [facility]    Progress Note      Assessment and Plan:    1.  Respiratory failure-the patient has multifactorial respiratory failure with combination of pulmonary edema and probable ongoing aspiration pneumonitis.  She has bibasilar crackles and bibasilar infiltrates with probable small effusions as well.  Mucous plugging identified on the CT scan of the chest.    The patient is yet requiring supplemental oxygen at 4 L.  Dysphagia identified on the video swallow but no gross aspiration.  Thickened liquids and puréed advised.    The patient is much more alert today and a little bit more conversant.  Her white blood cell count has come down from 16-12.    Recommendations:  1.  Agree with current antibiotic  2.  Ongoing diuresis  3.  Chest physiotherapy  4.  Swallowing strategies  5.  Anticipate need for supplemental oxygen at the time of discharge.  The patient can follow-up in the pulmonary clinic thereafter.  5.  Morning CBC    2.  Atrial fibrillation with history of heart failure-as per cardiology.  Carvedilol increased today.    3.  DVT prophylaxis-subcutaneous heparin    4.  CODE STATUS-DNAR select    5.  Goals of care-may be most appropriate to transition to comfort care and consider hospice consultation.    Recommendations: Await other family member arrival this evening for further discussion.      Subjective:   Yin Casas is a(n) 89 year old female who is more responsive today.  Objective:   Blood pressure 107/78, pulse 105, temperature 98.1 °F (36.7 °C), temperature source Axillary, resp. rate 18, height 5' 4\" (1.626 m), weight 119 lb 6.4 oz (54.2 kg), SpO2 94%, not currently breastfeeding.    Physical Exam minimally responsive white female  HEENT examination is unremarkable with pupils equal round and reactive to light and accommodation.   Neck without adenopathy, thyromegaly, JVD nor bruit.   Lungs basilar crackles to auscultation and percussion.  Cardiac  regular rate and rhythm no murmur.   Abdomen nontender, without hepatosplenomegaly and no mass appreciable.   Extremities without clubbing cyanosis nor edema.   Neurologic minimally arousable with encephalopathy.  Skin without gross abnormality     Results:     Lab Results   Component Value Date    WBC 12.8 04/02/2025    HGB 10.7 04/02/2025    HCT 35.3 04/02/2025    .0 04/02/2025    CREATSERUM 0.71 04/02/2025    BUN 17 04/02/2025     04/02/2025    K 3.3 04/02/2025    K 3.3 04/02/2025     04/02/2025    CO2 30.0 04/02/2025     04/02/2025    CA 8.3 04/02/2025    MG 1.7 04/02/2025    PHOS 2.7 04/02/2025       Teja Nguyen MD  Medical Director, Critical Care, [facility]  Medical Director, [facility]  Pager: [phone]

## 2025-04-02 NOTE — SIGNIFICANT EVENT
RRT    *See RRT Documentation Record*    Reason the RRT was called: change is status, hypotensive, hypoxic  Assessment of patient leading up to RRT: patient eyes glazed over became hypotensive and hypoxic  Interventions/Testing: abg cxr, echo, albumin bolus, 250 0.9ml bolus, ekg  Patient Outcome/Disposition: stayed on unit at this time  Family Notified: yes  Name of family notified: sheila and johanny ( children ) at bedside

## 2025-04-02 NOTE — SPIRITUAL CARE NOTE
Spiritual Care Visit Note    Patient Name: Yin Casas Date of Spiritual Care Visit: 25   : 1935 Primary Dx: Community acquired pneumonia of left lower lobe of lung       Referred By: Referral From: Other (Comment) (IDT)    Spiritual Care Taxonomy:    Intended Effects: Promote a sense of peace    Methods: Offer emotional support    Interventions: Respond as  to a defined crisis event, Provide hospitality, Provide compassionate touch, Silent prayer, Acknowledge current situation, Acknowledge response to difficult experience, Active listening    Visit Type/Summary:     - Spiritual Care: Responded to a request via the on call phone Offered empathic listening and emotional support. Family expressed appreciation for  visit. Held patient's hand. Son and daughter at bedside.  remains available as needed for follow up.    Spiritual Care support can be requested via an Epic consult. For urgent/immediate needs, please contact the On Call  at: [place]: ext [phone]    Chaplain Resident, Maggy Jimenez, PhD

## 2025-04-02 NOTE — PROGRESS NOTES
Rrt called and 45 min critical care time spent    Coordinating care with ric Shannon/Harry  Counseling pts family and siblings  Cxr images and EKG tracing reviewed; afib no ischemia on EKG; cxr unchanged    Pt became suddenly unresponsive and was found to have systolic bp and sat in 70's  Improved with trendelenberg /small iv bolus and albumin will not take midodrine      Bedside echo shows no rv strain and pt stabilized getting a stat ct    Lungs poor air movement  Cor s1s2 soft holosyst murmur    Ap sudden bp and o2 sat deterioration with mild resp acidosis and moderate metabolic alk from dehydration  Poss pe; bp better with alb and fluid    Confirmed with family DNAR/ select but ok with [facility] bipap and pressors  Pt very anxious and will rip bipap off

## 2025-04-02 NOTE — PROGRESS NOTES
Progress Note  Yin Casas Patient Status:  Inpatient    1935 MRN [id number]   Location [facility]5W Attending Ayden Boston,*   Hosp Day # 10 PCP Lyndon Talbot MD     Subjective:  Pt denies any cardiac complaints.    Objective:  /73 (BP Location: Right arm)   Pulse 106   Temp 97.2 °F (36.2 °C) (Axillary)   Resp 20   Ht 5' 4\" (1.626 m)   Wt 119 lb 6.4 oz (54.2 kg)   SpO2 90%   BMI 20.49 kg/m²     Telemetry: afib RVR, HR       Intake/Output:    Intake/Output Summary (Last 24 hours) at 2025 1128  Last data filed at 2025 1123  Gross per 24 hour   Intake 2045 ml   Output 2125 ml   Net -80 ml       Last 3 Weights   25 0600 119 lb 6.4 oz (54.2 kg)   25 0518 124 lb 4.8 oz (56.4 kg)   25 0627 128 lb 4.8 oz (58.2 kg)   25 0525 125 lb (56.7 kg)   25 0700 128 lb 4.8 oz (58.2 kg)   25 0422 118 lb 14.4 oz (53.9 kg)   25 1509 109 lb 6.4 oz (49.6 kg)   25 1453 109 lb 6.4 oz (49.6 kg)   25 1059 121 lb 4.1 oz (55 kg)   10/24/24 1514 122 lb 8 oz (55.6 kg)   23 1323 130 lb 11.2 oz (59.3 kg)       Labs:  Recent Labs   Lab 25  0535 25  0527 25  0531   * 295* 212*   BUN 25* 24* 17   CREATSERUM 0.81 0.88 0.71   EGFRCR 69 62 81   CA 8.7 8.3* 8.3*   * 148* 144   K 3.4*  3.4* 3.3*  3.3* 3.3*  3.3*    106 104   CO2 30.0 31.0 30.0     Recent Labs   Lab 25  0535 25  0527 25  0531   RBC 3.47* 3.62* 3.56*   HGB 10.9* 11.0* 10.7*   HCT 33.7* 34.9* 35.3   MCV 97.1 96.4 99.2   MCH 31.4 30.4 30.1   MCHC 32.3 31.5 30.3*   RDW 12.9 13.3 13.1   NEPRELIM 11.55* 13.61* 10.21*   WBC 13.8* 16.4* 12.8*   .0 252.0 208.0         No results for input(s): \"TROP\", \"TROPHS\", \"CK\" in the last 168 hours.  Lab Results   Component Value Date    PT 24.7 (H) 2013    PT See Comment 2013    PT 24.9 (H) 2013    INR 1.09 2019    INR 1.06 2019    INR 1.07  08/30/2019       Diagnostics:     Review of Systems   Respiratory:  Positive for shortness of breath. Negative for cough, hemoptysis and sputum production.    Cardiovascular: Negative.          Physical Exam:    Physical Exam  Vitals reviewed.   Constitutional:       General: She is awake. She is not in acute distress.     Appearance: She is not ill-appearing.   Neck:      Vascular: No JVD.   Cardiovascular:      Rate and Rhythm: Normal rate and regular rhythm.      Pulses: Normal pulses.      Heart sounds: Normal heart sounds, S1 normal and S2 normal.   Pulmonary:      Effort: Pulmonary effort is normal. No respiratory distress.      Breath sounds: No stridor. Rhonchi and rales present. No wheezing.   Chest:      Chest wall: No tenderness.   Musculoskeletal:      Cervical back: Normal range of motion.   Skin:     General: Skin is warm and dry.   Neurological:      Mental Status: She is alert and easily aroused.      Comments: Oriented x2         Medications:   potassium chloride  40 mEq Intravenous Once    dilTIAZem  90 mg Oral 4 times per day    magnesium oxide  400 mg Oral Once    metoprolol succinate  100 mg Oral Daily Beta Blocker    potassium chloride  40 mEq Oral Once    furosemide  20 mg Intravenous BID (Diuretic)    ampicillin-sulbactam  3 g Intravenous q6h    insulin aspart  1-5 Units Subcutaneous TID CC    aspirin  325 mg Oral Daily    clopidogrel  75 mg Oral Daily    losartan  25 mg Oral Daily    OXcarbazepine  150 mg Oral Daily    pantoprazole  40 mg Oral Daily    azithromycin  500 mg Intravenous Q24H    heparin  5,000 Units Subcutaneous Q8H FARIDEH      dextrose 75 mL/hr at 04/02/25 0627    dilTIAZem 5 mg/hr (04/01/25 3265)       Assessment/Plan:    89yr old female presented with Sob and cough     Acute hypoxic respiratory failure   Possibel aspiraion pneumonia, mucus plugging   - on 10L NC   - on Iv antibiotics  - pulmonary following     Acute on chronic HFpEF  - last echo from 2023 with LVEF 50%  - pro  BNP 3,264  - chest xray:perisistent pulmonary edema, small pleural effusion, pneumonia  - on IV lasix 20mg BID  - on losartan and BB     SSS s/p PPM  Perisistant afib with RVR  - RVR likely triggered with acute illness  - now on cardizem gtt, Cardizem oral, metoprolol with Hr in 110-120's.   - not on AC historically due to high risk bleeding     HTN- well controlled on BB, CCB and losartan     H/o CVA  - on statin and aspirin     Hypokalemia  - electrolyte repalcement per protocol     Hypernatremia  - improving      Plan;  - will increase the oral diltiazem to 90mg Q6hrs, wean off O2  - euvolemic on exam   - further recommendation pending echo result  - discussed with family regarding goal of care       NICOLASA Muñoz  [facility]  4/2/2025  11:28 AM    Addendum;1414  Rapid was called on pt with change in status. Pt hypotensive and HR in 120's. Cardizem gtt was stopped and given 250ml bolus.  - ordered for stat limited echo to check for RV strain.     Cardiologist Addendum:  Yin Casas was seen and examined independently and I agree with the above documentation provided by DAVONTE Bailey.  Patient had a rapid response called earlier today for hypotension, hypoxia after short ambulation.  There is concern for ongoing aspiration, most recent chest x-ray with progressive infiltrates in right middle lobe.  Discussed with Dr. Boston -patient is DNR.  Will work with fluid boluses, IV albumin and consider midodrine if patient able to take p.o. Concern for possible PE, limited echo to rule out RV failure.    Thank you for allowing me to take part in the care of Yin Casas. Please call with any questions of concerns.    L3    Gracia Shannon DO  [facility]   Interventional Cardiac and Vascular Services      April 02, 2025  4:18 PM

## 2025-04-02 NOTE — PROGRESS NOTES
Dr Mcdonald :  RRT called for hypotension and increased hypoxia upon lifting the patient to the chair  Patient was placed back in the bed and was giving a bolus of IV fluid  Placed on Ventimask and now her O2 sat 99%  Blood pressure improved with bolus of fluid  Chest x-ray less edema in the left lung with right lower lobe infiltrate  Vitals are better now on blood pressure 105/60, heart rate 86 and O2 sat 99% on Ventimask  Continue antibiotics and oxygen therapy and bronchial hygiene  Discussed with family at bedside  D/w DR Boston

## 2025-04-03 NOTE — PROGRESS NOTES
Rapid response called 230 p pt appeared to have agonal resp; bipap placed and family asked to come;  came  Family member to arrive 11p tonight; if pt survives, consider morphine for dyspnes, wd bipap and allow the pt to die as comfortably as possible; may not survive long enough to be hospice

## 2025-04-03 NOTE — SIGNIFICANT EVENT
RRT    *See RRT Documentation Record*    Reason the RRT was called: family alerted RN of change in status  Assessment of patient leading up to RRT: less responsive, became hypotensive and hypoxic    Interventions/TestinmL bolus, ordered albumin given, BiPAP  Patient Outcome/Disposition: stayed on unit at this time  Family Notified: yes  Name of family notified: sheila, daughter in law, granddaughter (at bedside)

## 2025-04-03 NOTE — SPIRITUAL CARE NOTE
Spiritual Care Visit Note    Patient Name: Yin Casas Date of Spiritual Care Visit: 25   : 1935 Primary Dx: Community acquired pneumonia of left lower lobe of lung       Referred By: Referral From: Other (Comment) (IDT)    Spiritual Care Taxonomy:    Intended Effects: Aligning care plan with patient's values    Methods: Assist with spiritual/Jehovah's witness practices, Collaborate with care team member, Offer emotional support, Offer spiritual/Jehovah's witness support    Interventions: Acknowledge current situation, Acknowledge response to difficult experience, Active listening, Ask guided questions, Connect someone with their wolf community/clergy, [place], Provide hospitality    Visit Type/Summary:     - Spiritual Care: Responded to a request via the on call phone Offered empathic listening and emotional support. Provided support for family and patient spiritual/Jehovah's witness requests. Coordinated  visit for Sacrament of the Sick.  remains available for follow up.    Spiritual Care support can be requested via an Epic consult. For urgent/immediate needs, please contact the On Call  at: [place]: ext [phone]    Chaplain Resident, Maggy Jimenez, PhD

## 2025-04-03 NOTE — PROGRESS NOTES
[facility] Progress Note    Yin Casas Patient Status:  Inpatient    1935 MRN [id number]   Location [facility]5W Attending Ayden Boston,*   Hosp Day # 11 PCP Lyndon Talbot MD     Subjective:  [place]um[place]. Denies cp. Breathing improved. \"Better than yesterday\"  Various family members at bedside     Objective:  /80 (BP Location: Right arm)   Pulse 107   Temp 97.5 °F (36.4 °C) (Axillary)   Resp 20   Ht 162.6 cm (5' 4\")   Wt 119 lb 6.4 oz (54.2 kg)   SpO2 91%   BMI 20.49 kg/m²     Telemetry: afib , 90-110s BPM       Intake/Output:    Intake/Output Summary (Last 24 hours) at 4/3/2025 1120  Last data filed at 4/3/2025 0506  Gross per 24 hour   Intake 1324.6 ml   Output 1350 ml   Net -25.4 ml       Last 3 Weights   25 0600 119 lb 6.4 oz (54.2 kg)   25 0518 124 lb 4.8 oz (56.4 kg)   25 0627 128 lb 4.8 oz (58.2 kg)   25 0525 125 lb (56.7 kg)   25 0700 128 lb 4.8 oz (58.2 kg)   25 0422 118 lb 14.4 oz (53.9 kg)   25 1509 109 lb 6.4 oz (49.6 kg)   25 1453 109 lb 6.4 oz (49.6 kg)   25 1059 121 lb 4.1 oz (55 kg)   10/24/24 1514 122 lb 8 oz (55.6 kg)   23 1323 130 lb 11.2 oz (59.3 kg)       Labs:  Recent Labs   Lab 25  0527 25  0531 25  0458   * 212* 142*   BUN 24* 17 17   CREATSERUM 0.88 0.71 0.68   EGFRCR 62 81 83   CA 8.3* 8.3* 8.6*   * 144 143   K 3.3*  3.3* 3.3*  3.3* 3.6  3.6    104 104   CO2 31.0 30.0 32.0     Recent Labs   Lab 25  0527 25  0531 25  0458   RBC 3.62* 3.56* 3.55*   HGB 11.0* 10.7* 10.9*   HCT 34.9* 35.3 34.9*   MCV 96.4 99.2 98.3   MCH 30.4 30.1 30.7   MCHC 31.5 30.3* 31.2   RDW 13.3 13.1 12.9   NEPRELIM 13.61* 10.21* 11.33*   WBC 16.4* 12.8* 14.2*   .0 208.0 212.0         No results for input(s): \"TROP\", \"TROPHS\", \"CK\" in the last 168 hours.    Diagnostics:         Review of Systems   Respiratory:  Positive for  shortness of breath.    Cardiovascular:  Negative for chest pain, palpitations and leg swelling.     Physical Exam:    General: Alert and oriented x 2. No apparent distress.   HEENT: Normocephalic, anicteric sclera, neck supple, no thyromegaly or adenopathy.  Neck: No JVD, carotids 2+, no bruits.  Cardiac: Irregularly irregular rate & rhythm. S1, S2 normal. No murmur, pericardial rub, S3, or extra cardiac sounds.  Lungs: Bilateral rhonchi & rales. Normal excursions and effort.  Abdomen: Soft, non-tender. No organosplenomegally, mass or rebound, BS-present.  Extremities: Without clubbing or cyanosis.  No left lower extremity edema, no right lower extremity edema.  Neurologic: Alert and oriented, normal affect. No focal defects  Skin: Warm and dry.       Medications:   potassium phosphate dibasic 15 mmol in sodium chloride 0.9% 250 mL IVPB  15 mmol Intravenous Once    Followed by    potassium chloride  20 mEq Intravenous Once    dilTIAZem  90 mg Oral 4 times per day    magnesium oxide  400 mg Oral Once    metoprolol succinate  100 mg Oral Daily Beta Blocker    potassium chloride  40 mEq Oral Once    ampicillin-sulbactam  3 g Intravenous q6h    insulin aspart  1-5 Units Subcutaneous TID CC    aspirin  325 mg Oral Daily    clopidogrel  75 mg Oral Daily    losartan  25 mg Oral Daily    OXcarbazepine  150 mg Oral Daily    pantoprazole  40 mg Oral Daily    azithromycin  500 mg Intravenous Q24H    heparin  5,000 Units Subcutaneous Q8H FARIDEH      dextrose 21 mL/hr at 04/03/25 0506    dilTIAZem Stopped (04/02/25 1358)       Assessment:    89yr old female presented with Sob and cough     Acute hypoxic respiratory failure   Possible aspiration pneumonia, mucous plugging   - remains on 10L HF NC   - on IV antibiotics  - pulmonary following     Acute on chronic HFpEF  - last echo from 2023 with LVEF 50%.   - pro BNP 3,264  - chest xray:perisistent pulmonary edema, small pleural effusion, pneumonia  - on IV lasix 20mg BID. Scr  stable.   - GDMT:  losartan and toprol xl      SSS s/p PPM  Perisistant afib with RVR  - RVR likely triggered with acute illness  - HR 90-100s on increased dose po diltiazem & toprol xl   - not on AC historically due to high bleeding risk      HTN  - well controlled on toprol xl , diltiazem, and losartan     H/o CVA  - on plavix, aspirin. Zetia/simvastatin at home      Hypokalemia  - replenish electrolytes per cardiac protocol      Hypernatremia  - improving     Plan:    RRT called yesterday due to hypotension, hypoxia, & brief unresponsiveness. CT chest neg for PE. Stat echo w/ no RV strain. BP improved w/ albumin & small fluid bolus   Diltiazem gtt stopped yesterday due to hypotension as above. BP stable. Continue PO diltiazem & toprol xl . Remains in afib w/ HR 90-100s, somewhat improved   Limited echo 4/2 w/ LVEF 35-40%, mildly increase RV w/ systolic function mod reduced ; bi-atrial dilation; mild-mod TR; small left pleural effusion.   Change in LVEF likely tachy mediated. Given age, frailty & resp status would not consider an ischemic w/u at this time. Will d/w rounding MD. Bello in place. Marginal uop over 24h. Scr stable. D/w PMD, agree with dose of albumin + Lasix 20mg ivp x 1     LLUVIA Shearer  4/3/2025  11:20 AM  Ph [phone] ([place])  Ph [phone] ([place])

## 2025-04-03 NOTE — PROGRESS NOTES
[facility]  part of [facility]    Progress Note      Assessment and Plan:    1.  Respiratory failure-the patient has multifactorial respiratory failure with combination of pulmonary edema and probable ongoing aspiration pneumonitis.  She has bibasilar crackles and bibasilar infiltrates with probable small effusions as well.  Mucous plugging identified on the CT scan of the chest.    The patient had an episode of dyspnea yesterday with a rapid response.  She is more wakeful today and breathing comfortably.  Oxygen requirements are still elevated.  CT scan of the chest demonstrated a moderate right pleural effusion.    Recommendations:  1.  Agree with current antibiotic  2.  Ongoing diuresis  3.  Chest physiotherapy  4.  Swallowing strategies  5.  Anticipate need for supplemental oxygen at the time of discharge.  The patient can follow-up in the pulmonary clinic thereafter.  6.  Considering the limits being said, would likely not proceed to thoracentesis.    2.  Atrial fibrillation with history of heart failure-as per cardiology.  Carvedilol increased today.    3.  DVT prophylaxis-subcutaneous heparin    4.  CODE STATUS-DNAR select    5.  Goals of care-may be most appropriate to transition to comfort care and consider hospice consultation.    Recommendations: Await other family member arrival this evening for further discussion.      Subjective:   Yin Casas is a(n) 89 year old female who is more responsive today.  Objective:   Blood pressure (!) 71/60, pulse 78, temperature 97.5 °F (36.4 °C), temperature source Axillary, resp. rate 18, height 5' 4\" (1.626 m), weight 119 lb 6.4 oz (54.2 kg), SpO2 (!) 71%, not currently breastfeeding.    Physical Exam minimally responsive white female  HEENT examination is unremarkable with pupils equal round and reactive to light and accommodation.   Neck without adenopathy, thyromegaly, JVD nor bruit.   Lungs basilar crackles to auscultation and  percussion.  Cardiac regular rate and rhythm no murmur.   Abdomen nontender, without hepatosplenomegaly and no mass appreciable.   Extremities without clubbing cyanosis nor edema.   Neurologic minimally arousable with encephalopathy.  Skin without gross abnormality     Results:     Lab Results   Component Value Date    WBC 14.2 04/03/2025    HGB 10.9 04/03/2025    HCT 34.9 04/03/2025    .0 04/03/2025    CREATSERUM 0.68 04/03/2025    BUN 17 04/03/2025     04/03/2025    K 3.6 04/03/2025    K 3.6 04/03/2025     04/03/2025    CO2 32.0 04/03/2025     04/03/2025    CA 8.6 04/03/2025    MG 1.8 04/03/2025    PHOS 2.4 04/03/2025       Teja Nguyen MD  Medical Director, Critical Care, [facility]  Medical Director, [facility]  Pager: [phone]

## 2025-04-03 NOTE — PROGRESS NOTES
[facility]  part of [facility]    Progress Note    Yin Casas Patient Status:  Inpatient    1935 MRN [id number]   Location [facility]5W Attending Ayden Boston,*   Hosp Day # 11 PCP Lyndon Talbot MD     Chief Complaint: awake looks good    Subjective:     Constitutional:  Positive for appetite change and fatigue.   HENT:  Positive for congestion and hearing loss.    Respiratory:  Positive for cough and shortness of breath. Negative for choking and chest tightness.    Cardiovascular:  Negative for leg swelling.   Gastrointestinal:  Negative for abdominal pain.   Genitourinary:  Positive for dysuria and frequency.   Neurological:  Negative for weakness.   Psychiatric/Behavioral:  Positive for confusion, sleep disturbance and decreased concentration. The patient is not nervous/anxious.        Objective:   Blood pressure 121/75, pulse 84, temperature 97.5 °F (36.4 °C), temperature source Axillary, resp. rate 18, height 5' 4\" (1.626 m), weight 119 lb 6.4 oz (54.2 kg), SpO2 92%, not currently breastfeeding.  Physical Exam  Vitals and nursing note reviewed.   Cardiovascular:      Rate and Rhythm: Tachycardia present.   Pulmonary:      Effort: Pulmonary effort is normal.      Breath sounds: Wheezing, rhonchi and rales present.   Abdominal:      General: Bowel sounds are normal.      Palpations: Abdomen is soft.   Skin:     General: Skin is warm and dry.      Capillary Refill: Capillary refill takes less than 2 seconds.   Neurological:      General: No focal deficit present.      Mental Status: She is alert. Mental status is at baseline.         Results:   Lab Results   Component Value Date    WBC 14.2 (H) 2025    HGB 10.9 (L) 2025    HCT 34.9 (L) 2025    .0 2025    CREATSERUM 0.68 2025    BUN 17 2025     2025    K 3.6 2025    K 3.6 2025     2025    CO2 32.0 2025     (H)  04/03/2025    CA 8.6 (L) 04/03/2025    ALB 3.6 03/28/2025    ALKPHO 60 03/28/2025    BILT 0.6 03/28/2025    TP 6.2 03/28/2025    AST 37 (H) 03/28/2025    ALT 36 03/28/2025    PTT 51.2 (H) 12/08/2016    INR 1.09 09/01/2019    PT 24.7 (H) 11/07/2013    T4F 0.9 03/28/2025    TSH 0.354 (L) 03/28/2025    LIP <10 (L) 03/21/2018    DDIMER 0.81 07/01/2023    MG 1.8 04/03/2025    PHOS 2.4 04/03/2025    TROP <0.045 12/02/2019    TROPHS 23 03/23/2025     (H) 12/08/2016    B12 493 03/03/2017       CT CHEST PE AORTA (IV ONLY) (CPT=71260)    Result Date: 4/2/2025  CONCLUSION:  1. No pulmonary embolus. 2. CHF with pulmonary edema and right greater than left pleural effusions. 3. Occluded bronchus intermedius, and right lower lobe bronchi with atelectasis and consolidation in the right lower lobe, and partial atelectasis of the right middle lobe.  Findings may be secondary to mucous plugging, aspiration/infection. 4. Large retrocardiac hiatal hernia unchanged. 5. T9 and L1 vertebral compression fractures unchanged from 03/23/2025.    Dictated by (CST): Sagar Weller MD on 4/02/2025 at 6:14 PM     Finalized by (CST): Sagar Weller MD on 4/02/2025 at 6:24 PM          XR CHEST AP PORTABLE  (CPT=71045)    Result Date: 4/2/2025  CONCLUSION:   1. Findings most compatible with improving interstitial edema.  Please note that slight to moderate interstitial edema persists.  2. Moderate-sized peribronchial opacities at the lung bases likely representing infiltrate/atelectasis, or a combination in addition to a small to moderate-sized right pleural effusion and trace left pleural effusion.     Dictated by (CST): Jose G Patterson MD on 4/02/2025 at 2:38 PM     Finalized by (CST): Jose G Patterson MD on 4/02/2025 at 2:42 PM         EKG 12 Lead    Result Date: 4/2/2025  Atrial fibrillation with rapid ventricular response with premature ventricular or aberrantly conducted complexes ST & T wave abnormality, consider lateral ischemia  Abnormal ECG When compared with ECG of 23-MAR-2025 11:07, Nonspecific T wave abnormality has replaced inverted T waves in Inferior leads Confirmed by KIRK DE LA O, JANNETTE ([id number]) on 4/2/2025 5:36:15 PM     Assessment & Plan:     Possible Aspiration Pneumonia   Acute respiratory distress  Acute Hypoxemic respiratory failure  -per NH notes patient was 70% on room air  -ED noted to be 88% in ED    Acute on chronic chf poss diastolic added lasix/alb; checked echo; gentle diuresis alb/lasix     Acute Kidney Injury better improved  -better  - Avoiding Nephrotoxic agents  - Cont to monitor - resolved     Persistent Atrial Fibrillation  -Rates not currently controlled because infection  -Continue carvedilol for rate control  -Not on chronic anticoagulation   -Telemetry monitoring hr inc and dec; [facility] follows    Poss asp; cleared to eat by speech but rrt 4/2 with desats and hypotension  wait to feed    Coughing for years poss asp    Dysuria recheck ua; c and s; on abx ua abn cx neg     HTN  -Currently normotensive  -Restarting some home regimen.  - Monitor and adjust agents as needed     Hyperlipidemia  -Statin     History of CVA  -cont antiplatelets and statin    Leg brace right leg     History of sick sinus syndrome  -Status post pacemaker placement will ask cards to recheck; they confirmed     Euthyroid sick    Metabolic alkalosis from contraction and resp acidosis mild; tissues with fluid; intravasc depleted?    Recent loss weight and death of son/picky eater and dislikes dietary restrictions; became sleepy on tiny dose remeron                      Code status dnar/select; dw daughter to discuss poss transition to comfort no decision    Dispo: back to assisted living when issues addressed        Complex mdm; coordinating care with nurse/mci and trying to  pt and with her permission her family in room about feeding issues          JOSE MORALES MD

## 2025-04-03 NOTE — SPIRITUAL CARE NOTE
Spiritual Care Visit Note    Patient Name: Yin Casas Date of Spiritual Care Visit: 25   : 1935 Primary Dx: Community acquired pneumonia of left lower lobe of lung       Referred By: Referral From: Other (Comment) (IDT)    Spiritual Care Taxonomy:    Intended Effects: Aligning care plan with patient's values    Methods: Assist with spiritual/Voodoo practices    Interventions: Provide hospitality, Connect someone with their wolf community/clergy    Visit Type/Summary:     - Spiritual Care: Provided support for patient and family spiritual/Voodoo requests. Coordinated  visit for Sacrament of the Sick.  remains available for follow up.    Spiritual Care support can be requested via an [employer] consult. For urgent/immediate needs, please contact the On Call  at: [place]: ext [phone]    Chaplain Resident, Maggy Jimenez, PhD

## 2025-04-04 NOTE — PLAN OF CARE
Problem: RESPIRATORY - ADULT  Goal: Achieves optimal ventilation and oxygenation  Description: INTERVENTIONS:- Assess for changes in respiratory status- Assess for changes in mentation and behavior- Position to facilitate oxygenation and minimize respiratory effort- Oxygen supplementation based on oxygen saturation or ABGs- Provide Smoking Cessation handout, if applicable- Encourage broncho-pulmonary hygiene including cough, deep breathe, Incentive Spirometry- Assess the need for suctioning and perform as needed- Assess and instruct to report SOB or any respiratory difficulty- Respiratory Therapy support as indicated- Manage/alleviate anxiety- Monitor for signs/symptoms of CO2 retention  4/4/2025 1533 by Camila Young RN  Outcome: Progressing  4/4/2025 1532 by Camila Young RN  Outcome: Progressing     Problem: GENITOURINARY - ADULT  Goal: Absence of urinary retention  Description: INTERVENTIONS:- Assess patient’s ability to void and empty bladder- Monitor intake/output and perform bladder scan as needed- Follow urinary retention protocol/standard of care- Consider collaborating with pharmacy to review patient's medication profile- Implement strategies to promote bladder emptying  4/4/2025 1533 by Camila Young RN  Outcome: Progressing  4/4/2025 1532 by Camila Young RN  Outcome: Progressing     Problem: Impaired Swallowing  Goal: Minimize aspiration risk  Description: Interventions:- Patient should be alert and upright for all feedings (90 degrees preferred)- Offer food and liquids at a slow rate- No straws- Encourage small bites of food and small sips of liquid- Offer pills one at a time, crush or deliver with applesauce as needed- Discontinue feeding and notify MD (or speech pathologist) if coughing or persistent throat clearing or wet/gurgly vocal quality is noted  4/4/2025 1533 by Camila Young RN  Outcome: Progressing  4/4/2025 1532 by Camila Young RN  Outcome: Progressing     Problem: PAIN -  ADULT  Goal: Verbalizes/displays adequate comfort level or patient's stated pain goal  Description: INTERVENTIONS:- Encourage pt to monitor pain and request assistance- Assess pain using appropriate pain scale- Administer analgesics based on type and severity of pain and evaluate response- Implement non-pharmacological measures as appropriate and evaluate response- Consider cultural and social influences on pain and pain management- Manage/alleviate anxiety- Utilize distraction and/or relaxation techniques- Monitor for opioid side effects- Notify MD/LIP if interventions unsuccessful or patient reports new pain- Anticipate increased pain with activity and pre-medicate as appropriate  Outcome: Progressing     Patient taken off Bipap and placed on high flow nasal cannula, now on 1 L oxygen. Remote telemetry monitor removed and returned. Providing comfort measures per MD orders. Oral care provided as needed. Medications given per MAR to promote comfort. Bello catheter patent and draining.  Family at bedside.   Safety precautions in place, frequent nursing rounding completed, call light within reach. All questions answered and needs met.

## 2025-04-04 NOTE — SPIRITUAL CARE NOTE
Spiritual Care Visit Note    Patient Name: Yin Casas Date of Spiritual Care Visit: 25   : 1935 Primary Dx: Community acquired pneumonia of left lower lobe of lung       Referred By: Referral From: Hospice    Spiritual Care Taxonomy:    Intended Effects: Journeying with someone in the grief process    Methods: Offer spiritual/Congregational support, Offer emotional support    Interventions: Acknowledge current situation, Acknowledge response to difficult experience, Active listening, Ask guided questions, Silent prayer, Provide hospitality    Visit Type/Summary:     - Spiritual Care: Responded to a request for spiritual care and assessed family for spiritual care needs. Consulted with RN prior to visit. Offered empathic listening and emotional support.  offered spiritual and emotional support if the family would like it.  remains available as needed for follow up.    Spiritual Care support can be requested via an Epic consult. For urgent/immediate needs, please contact the On Call  at: [place]: ext [phone]    Chaplain Resident, Maggy Jimenez, PhD

## 2025-04-04 NOTE — PROGRESS NOTES
[facility]  part of [facility]     Progress Note    Yin Casas Patient Status:  Inpatient    1935 MRN [id number]   Location [facility]5W Attending Wilner West MD   Hosp Day # 12 PCP Lyndon Talbot MD       Subjective:   Patient seen and examined.  Appears comfortable.  No significant distress.    Objective:   Blood pressure (!) 154/92, pulse 108, temperature 97.7 °F (36.5 °C), temperature source Axillary, resp. rate 24, height 5' 4\" (1.626 m), weight 119 lb 6.4 oz (54.2 kg), SpO2 95%, not currently breastfeeding.  Intake/Output:   Last 3 shifts: I/O last 3 completed shifts:  In: 469.6 [P.O.:25; I.V.:144.6; IV PIGGYBACK:300]  Out: 1050 [Urine:1050]   This shift: No intake/output data recorded.     Vent Settings:      Hemodynamic parameters (last 24 hours):      Scheduled Meds:   Current Facility-Administered Medications   Medication Dose Route Frequency    ondansetron (Zofran) 4 MG/2ML injection 4 mg  4 mg Intravenous Q4H PRN    morphINE PF 2 MG/ML injection 1 mg  1 mg Intravenous Q2H PRN    morphINE PF 4 MG/ML injection 4 mg  4 mg Intravenous Q2H PRN    morphINE PF 2 MG/ML injection 2 mg  2 mg Intravenous Q2H PRN    haloperidol lactate (Haldol) 5 MG/ML injection 1 mg  1 mg Intravenous Q1H PRN    Or    haloperidol lactate (Haldol) 5 MG/ML injection 2 mg  2 mg Intravenous Q1H PRN    LORazepam (Ativan) 2 mg/mL injection 0.5 mg  0.5 mg Intravenous Q4H PRN    Or    LORazepam (Ativan) 2 mg/mL injection 1 mg  1 mg Intravenous Q4H PRN    Or    LORazepam (Ativan) 2 mg/mL injection 2 mg  2 mg Intravenous Q4H PRN    scopolamine (Transderm-Scop) 1 MG/3DAYS patch 1 patch  1 patch Transdermal Q72H    atropine (Atropine-Care) 1 % ophthalmic solution 2 drop  2 drop Oral Q2H PRN    glycopyrrolate (Robinul) 0.2 MG/ML injection 0.2 mg  0.2 mg Intravenous Q3H PRN    potassium chloride 20 mEq/100mL IVPB premix 20 mEq  20 mEq Intravenous Once    magnesium oxide (Mag-Ox) tab 400 mg   400 mg Oral Once    dextrose 5% infusion   Intravenous Continuous    ipratropium-albuterol (Duoneb) 0.5-2.5 (3) MG/3ML inhalation solution 3 mL  3 mL Nebulization Q4H PRN    potassium chloride (Klor-Con M20) tab 40 mEq  40 mEq Oral Once    ampicillin-sulbactam (Unasyn) 3 g in sodium chloride 0.9% 100mL IVPB-ADD  3 g Intravenous q6h    azithromycin (Zithromax) 500 mg in sodium chloride 0.9% 250mL IVPB premix  500 mg Intravenous Q24H    albuterol (Ventolin HFA) 108 (90 Base) MCG/ACT inhaler 2 puff  2 puff Inhalation Q6H PRN       Continuous Infusions:    dextrose 21 mL/hr at 04/03/25 0506       Physical Exam  Constitutional: no acute distress  Eyes: PERRL  ENT: nares pateint  Neck: supple, no JVD  Cardio: RRR, S1 S2  Respiratory: Basilar crackles present  GI: abdomen soft, non tender, active bowel sounds, no organomegaly  Extremities: no clubbing, cyanosis, edema  Neurologic: no gross motor deficits  Skin: warm, dry      Results:     Lab Results   Component Value Date    WBC 13.4 04/04/2025    HGB 11.2 04/04/2025    HCT 35.2 04/04/2025    .0 04/04/2025    CREATSERUM 0.80 04/04/2025    BUN 25 04/04/2025     04/04/2025    K 4.0 04/04/2025    K 4.0 04/04/2025     04/04/2025    CO2 31.0 04/04/2025     04/04/2025    CA 8.7 04/04/2025    MG 2.0 04/04/2025    PHOS 4.1 04/04/2025       CT CHEST PE AORTA (IV ONLY) (CPT=71260)    Result Date: 4/2/2025  CONCLUSION:  1. No pulmonary embolus. 2. CHF with pulmonary edema and right greater than left pleural effusions. 3. Occluded bronchus intermedius, and right lower lobe bronchi with atelectasis and consolidation in the right lower lobe, and partial atelectasis of the right middle lobe.  Findings may be secondary to mucous plugging, aspiration/infection. 4. Large retrocardiac hiatal hernia unchanged. 5. T9 and L1 vertebral compression fractures unchanged from 03/23/2025.    Dictated by (CST): Sagar Weller MD on 4/02/2025 at 6:14 PM     Finalized by  (CST): Sagar Weller MD on 4/02/2025 at 6:24 PM          XR CHEST AP PORTABLE  (CPT=71045)    Result Date: 4/2/2025  CONCLUSION:   1. Findings most compatible with improving interstitial edema.  Please note that slight to moderate interstitial edema persists.  2. Moderate-sized peribronchial opacities at the lung bases likely representing infiltrate/atelectasis, or a combination in addition to a small to moderate-sized right pleural effusion and trace left pleural effusion.     Dictated by (CST): Jose G Patterson MD on 4/02/2025 at 2:38 PM     Finalized by (CST): Jose G Patterson MD on 4/02/2025 at 2:42 PM                 Assessment   1.  Acute hypoxemic respiratory failure  2.  Pulmonary edema  3.  Aspiration pneumonia  4.  Pleural effusions  5.  Atrial fibrillation       Plan   -Patient with evidence of acute hypoxemic respiratory failure likely multifactorial in nature with combination of pulmonary edema, aspiration pneumonia, pleural effusions contributing.  - CT chest on 4/3/2025 with evidence of mucous plugging seen with pleural effusions and edema present.  - Continue antibiotic therapy at this time  - Diuresis as tolerated  - Chest physiotherapy  - Wean oxygen as tolerated  - DVT prophylaxis: Heparin  - Patient DNR/DNI.      An Henriquez,   Pulmonary Critical Care Medicine  [facility]

## 2025-04-04 NOTE — PLAN OF CARE
Pt is actively receiving comfort measures at this time - on morphine, robinol & ativan. Family at bedside. Telemetry has been discontinued. Lasix 20mg ivp x 1 ordered earlier for congestion. D/w Dr. West & RN, Cardiology will respectfully sign off. Please call with any questions/concerns    THERESA Shearer, FPA-APRN, FNP-BC, CCK   4/4/2025  3:35 PM  Ph [phone] ([place])  Ph [phone] ([place])

## 2025-04-04 NOTE — PROGRESS NOTES
PT attempted treatment and RN reported pt is not appropriate to participate with PT treatment today d/t dyspnea at rest. PT will continue to follow and progress as medical progress allows.

## 2025-04-04 NOTE — OCCUPATIONAL THERAPY NOTE
Attempted to see pt for OT today. Pt is not medically appropriate at this time, per discussion with healthcare staff, pt is having dyspnea at rest. Will re-attempt when pt is medically stable.    Cristin Henderson, OTR/L

## 2025-04-04 NOTE — PROGRESS NOTES
04/04/25 0102   BiPAP   $ RT Standby Charge (per 15 min) 1   $ BiPAP/CPAP in use daily Yes   Device [place]   BiPAP bacteria filter Yes   BIPAP plugged into main power? Yes   Mode Spontaneous/Timed   Interface Full face mask   Mask Size Medium   Control Settings   Set Rate 12 breaths/min   Set IPAP 14   Set EPAP 5   Oxygen Percent 100 %   Inspiratory time 1.5   Insp rise time 2   SIPAP   Resp 13   BiPAP/CPAP Alarm Settings   Hi Rate 25   Low Rate 10   Hi VT 1200   Low    Hi Pressure 25   Low Pressure 12   Low MV 4   High MV (L/min) 20   Apnea 20   BiPAP/CPAP Monitored Parameters   Pulse 89   SpO2 100 %   PIP 15   Total Rate 13 breaths/min   Minute Volume 9   Tidal Volume 571   Total Leak 20   Trigger % 50   Ti/Ttot % 30   IPAP 14   EPAP 5   Toleration Well   Skin Integrity Normal   Skin integrity loccation Nose

## 2025-04-04 NOTE — PROGRESS NOTES
[facility]  part of [facility]  Hospitalist Progress Note     Yin Casas Patient Status:  Inpatient    1935  89 year old CSN [id number]   Location 503/503-A Attending Wilner West MD   Hosp Day # 12 PCP Lyndon Talbot MD     Assessment & Plan:   ----------------------------------  Respiratory failure, acute hypoxemic.  Due to pneumonia, mucous plugging, pleural effusion.  Patient has had several episodes of acute hypoxia, placed on BiPAP.  After long discussion with family (see below) will take the patient off BiPAP for her comfort, focus on symptom management but will continue antibiotics at this time  -Pulmonology consultation appreciated  -Treat contributing factors as described    Advance care planning/goals of care.  I had 2 discussions with the family, 1 with the 3 sons and another with all 5 children.  We discussed her current clinical situation, challenges to recovery.  We discussed symptom management including dyspnea, pain, agitation.  At this point family would prefer to focus mostly on comfort care though through previous discussions were not interested in hospice.  They would like the BiPAP removed for the patient's comfort, would be okay with IV morphine for dyspnea.  Greater than 35 minutes spent discussing advance care planning, goals of care, CODE STATUS.  - IV morphine for dyspnea, pain  - Haldol, Ativan for agitation  - Robinul, scopolamine for secretions  - Continue antibiotics for now, will readdress in the next 1-2 days    Other problems  Pulmonary edema  Aspiration pneumonia  Viral effusions  Atrial fibrillation, persistent  Acute on chronic diastolic CHF  Hypertension  Dyslipidemia  History of stroke  Permanent pacemaker      Supplementary Documentation:   DVT Mechanical Prophylaxis:   SCDs,    DVT Pharmacologic Prophylaxis   Medication   None                Code Status: DNAR/Selective Treatment  Bello: Bello catheter in place  Bello Duration (in  days): 3  Central line:    OSMIN:     Dietitian Malnutrition Assessment    Evaluation for Malnutrition: Criteria for non-severe malnutrition diagnosis-         chronic illness related to   Wt loss 10% in 6 months., Body fat mild depletion., Muscle mass mild depletion.       RD Malnutrition Care Plan: Liberalized diet., Encouraged small frequent meals with emphasis on high calorie/high protein., Intiated ONS (oral nutritional supplements).    Body Fat/Muscle Mass: Mild depletion body fat., Mild depletion muscle mass. orbital region., triceps region. temple region., clavicle region., scapular region.    Physician Assessment     Patient has a diagnosis of moderate malnutrition      I personally reviewed the available laboratories, imaging including. I discussed/will discuss the case with consultants. I ordered laboratories and/or radiographic studies. I adjusted medications as detailed above.  Medical decision making high, risk is high.    Subjective:   ----------------------------------  Patient was very uncomfortable in her breathing this morning, unable to get mucus out.  She was suction which made her quite uncomfortable.  After this was feeling a little bit better but still asking to have the BiPAP removed.      Objective:   Chief Complaint:   Chief Complaint   Patient presents with    Breathing Problem     ----------------------------------  Temp:  [97.1 °F (36.2 °C)-98.3 °F (36.8 °C)] 98.3 °F (36.8 °C)  Pulse:  [] 89  Resp:  [13-24] 16  BP: (132-154)/(71-94) 132/71  SpO2:  [95 %-100 %] 97 %  Gen: Alert.  No distress.   HEENT: NCAT, neck supple, no carotid bruit.  BiPAP  CV: Tachypneic, S1S2, and intact distal pulses. No gallop, rub, murmur.  Pulm: Effort and breath sounds normal. No distress, wheezes, rales, rhonchi.  Abd: Soft, NTND, BS normal, no mass, no HSM, no rebound/guarding.   Neuro: Moving all 4 extremities  MS: No joint effusions.  No peripheral edema.  Skin: Skin is warm and dry. No rashes,  erythema, diaphoresis.   Psych: Mildly agitated after suctioning    Labs:  Lab Results   Component Value Date    HGB 11.2 (L) 04/04/2025    WBC 13.4 (H) 04/04/2025    .0 04/04/2025     04/04/2025    K 4.0 04/04/2025    K 4.0 04/04/2025    CREATSERUM 0.80 04/04/2025    INR 1.09 09/01/2019    AST 37 (H) 03/28/2025    ALT 36 03/28/2025    TROP <0.045 12/02/2019            scopolamine  1 patch Transdermal Q72H    potassium chloride  20 mEq Intravenous Once    magnesium oxide  400 mg Oral Once    potassium chloride  40 mEq Oral Once    ampicillin-sulbactam  3 g Intravenous q6h    azithromycin  500 mg Intravenous Q24H       ondansetron    morphINE    morphINE    morphINE    haloperidol lactate **OR** haloperidol lactate    LORazepam **OR** LORazepam **OR** LORazepam    atropine    glycopyrrolate    ipratropium-albuterol    albuterol

## 2025-04-05 PROBLEM — J96.01 ACUTE HYPOXIC RESPIRATORY FAILURE (HCC): Status: ACTIVE | Noted: 2025-01-01

## 2025-04-05 NOTE — HOSPICE RN NOTE
Hospice referral received. Emir harrison. [facility] will follow up with family at 11am today.    Purnima Mcclure RN, BSN-CHPN  Transitional Nurse Liaison  [facility]  [phone] (After-hours)

## 2025-04-05 NOTE — HOSPICE RN NOTE
[facility] RN visit for follow up on Hospice consult order. Met with IVON Casas, daughter Shital, son García and son Shlomo at bedside. Discussed information on the Hospice philosophy, Medicare benefit, levels of care, team approach and focus on comfort with questions and concerns addressed. The family would like to proceed with hospice care. Daughter, IVON Casas signed consents for hospice care and DNR-Comfort, patient unresponsive, agonal breathing with excessive secretions, mottling noted to all extremities, RR 12 on 2LNC open mouth breathing. Pallor, bilateral pitting edema to upper and lower extremities. IVP Morphine x6, IVP Ativan x4, and IVP Robinul x3 administered over past 2days.    [facility] RN admitted the patient to general inpatient hospice per Dr. Wilner West and Dr. Walter Larson. Patient remains eligible for general inpatient hospice care for symptom management of dyspnea and airway secretions requiring frequent nursing assessments for the titration of IV Morphine. Potential plan to dc to home with family on hospice once symptoms managed.    Chart flipped.    Comfort order set placed. Medications: Morphine gtt at 1mg/hr for dyspnea. IVP Ativan prn for agitation. Scopolamine patch scheduled and IVP Glycopyrrolate scheduled for secretions and congestion. PRN Lasix and Morphine to be given IVP for symptom management per Erendira Rodarte RN.     Regular diet with pleasure feeding, only if patient is awake, alert and able.    Aspiration and fall precautions in place.    PPS: 10%    POC discussed with family and Iliana RN. All in agreement. [facility] will follow daily to support the patient and family.     Purnima Mcclure RN, BSN-CHPN  Transitional Nurse Liaison  [facility]  [phone] (After-hours)

## 2025-04-05 NOTE — PROGRESS NOTES
[facility]  part of [facility]    Progress Note      Assessment and Plan:    1.  Respiratory failure-the patient has multifactorial respiratory failure with combination of pulmonary edema and probable ongoing aspiration pneumonitis.  She has bibasilar crackles and bibasilar infiltrates with probable small effusions as well.  Mucous plugging identified on the CT scan of the chest.    The patient is currently now directed toward comfort care with morphine, Ativan and is less responsive.  Has rattle in the chest.  Reportedly, family does not want to pursue hospice at this moment.    Recommendations:  1.  Redirection toward comfort care    2.  Atrial fibrillation with history of heart failure-as per cardiology.  Carvedilol increased today.    3.  DVT prophylaxis-subcutaneous heparin    4.  CODE STATUS-DNAR select    5.  Goals of care-comfort care and I believe hospice is appropriate.    Recommendations: As above.      Subjective:   Yin Casas is a(n) 89 year old female who is less responsive today.  Objective:   Blood pressure 141/74, pulse 103, temperature 97.4 °F (36.3 °C), temperature source Axillary, resp. rate 12, height 5' 4\" (1.626 m), weight 119 lb 6.4 oz (54.2 kg), SpO2 90%, not currently breastfeeding.    Physical Exam minimally responsive white female  HEENT examination is unremarkable with pupils equal round and reactive to light and accommodation.   Neck without adenopathy, thyromegaly, JVD nor bruit.   Lungs basilar crackles with central expiratory rattle to auscultation and percussion.  Cardiac regular rate and rhythm no murmur.   Abdomen nontender, without hepatosplenomegaly and no mass appreciable.   Extremities without clubbing cyanosis nor edema.   Neurologic minimally arousable with encephalopathy.  Skin without gross abnormality     Results:            Teja Nguyen MD  Medical Director, Critical Care, [facility]  Medical Director, [facility]  Pager:  [phone]

## 2025-04-05 NOTE — PLAN OF CARE
Problem: Patient Centered Care  Goal: Patient preferences are identified and integrated in the patient's plan of care  Description: Interventions:- What would you like us to know as we care for you? - Provide timely, complete, and accurate information to patient/family- Incorporate patient and family knowledge, values, beliefs, and cultural backgrounds into the planning and delivery of care- Encourage patient/family to participate in care and decision-making at the level they choose- Honor patient and family perspectives and choices  Outcome: Progressing     Problem: Patient/Family Goals  Goal: Patient/Family Long Term Goal  Description: Patient's Long Term Goal: discharge Interventions:- - Monitor vitals  - Monitor appropriate labs  - Pain management  - Follow MD order  - Diagnostics per order  - Update/Informing patient and family on plan of care  - Discharge planning- See additional Care Plan goals for specific interventions  Outcome: Progressing  Goal: Patient/Family Short Term Goal  Description: Patient's Short Term Goal: feel better  Interventions: - - Monitor vitals  - Monitor appropriate labs  - Pain management  - Follow MD order  - Diagnostics per order  - Update/Informing patient and family on plan of care  - Discharge planning- See additional Care Plan goals for specific interventions  Outcome: Progressing     Problem: RESPIRATORY - ADULT  Goal: Achieves optimal ventilation and oxygenation  Description: INTERVENTIONS:- Assess for changes in respiratory status- Assess for changes in mentation and behavior- Position to facilitate oxygenation and minimize respiratory effort- Oxygen supplementation based on oxygen saturation or ABGs- Provide Smoking Cessation handout, if applicable- Encourage broncho-pulmonary hygiene including cough, deep breathe, Incentive Spirometry- Assess the need for suctioning and perform as needed- Assess and instruct to report SOB or any respiratory difficulty- Respiratory Therapy  support as indicated- Manage/alleviate anxiety- Monitor for signs/symptoms of CO2 retention  Outcome: Progressing     Problem: GENITOURINARY - ADULT  Goal: Absence of urinary retention  Description: INTERVENTIONS:- Assess patient’s ability to void and empty bladder- Monitor intake/output and perform bladder scan as needed- Follow urinary retention protocol/standard of care- Consider collaborating with pharmacy to review patient's medication profile- Implement strategies to promote bladder emptying  Outcome: Progressing     Problem: SKIN/TISSUE INTEGRITY - ADULT  Goal: Skin integrity remains intact  Description: INTERVENTIONS- Assess and document risk factors for pressure ulcer development- Assess and document skin integrity- Monitor for areas of redness and/or skin breakdown- Initiate interventions, skin care algorithm/standards of care as needed  Outcome: Progressing     Problem: Impaired Swallowing  Goal: Minimize aspiration risk  Description: Interventions:- Patient should be alert and upright for all feedings (90 degrees preferred)- Offer food and liquids at a slow rate- No straws- Encourage small bites of food and small sips of liquid- Offer pills one at a time, crush or deliver with applesauce as needed- Discontinue feeding and notify MD (or speech pathologist) if coughing or persistent throat clearing or wet/gurgly vocal quality is noted  Outcome: Progressing     Problem: PAIN - ADULT  Goal: Verbalizes/displays adequate comfort level or patient's stated pain goal  Description: INTERVENTIONS:- Encourage pt to monitor pain and request assistance- Assess pain using appropriate pain scale- Administer analgesics based on type and severity of pain and evaluate response- Implement non-pharmacological measures as appropriate and evaluate response- Consider cultural and social influences on pain and pain management- Manage/alleviate anxiety- Utilize distraction and/or relaxation techniques- Monitor for opioid side  effects- Notify MD/LIP if interventions unsuccessful or patient reports new pain- Anticipate increased pain with activity and pre-medicate as appropriate  Outcome: Progressing

## 2025-04-05 NOTE — CM/SW NOTE
03/24/25 0900   Discharge Needs   Anticipated D/C needs Hospice     SW received MD order for hospice.    Pt started on comfort order set.    SW called [facility]- was connected to on-call line. SW explained need for inpatient eval.    Secure chat sent to  liaison P[facility].    PLAN: TBD- referral sent to [facility]    / to remain available for support and/or discharge planning.     Katharina ARANA, LSW  Discharge Planner

## 2025-04-05 NOTE — PLAN OF CARE
Problem: Patient/Family Goals  Goal: Patient/Family Long Term Goal  Description: Patient's Long Term Goal: Interventions:-  See additional Care Plan goals for specific interventions  Outcome: Progressing  Goal: Patient/Family Short Term Goal  Description: Patient's Short Term Goal: Interventions:- See additional Care Plan goals for specific interventions  Outcome: Progressing     Problem: PAIN - ADULT  Goal: Verbalizes/displays adequate comfort level or patient's stated pain goal  Description: INTERVENTIONS:- Encourage pt to monitor pain and request assistance- Assess pain using appropriate pain scale- Administer analgesics based on type and severity of pain and evaluate response- Implement non-pharmacological measures as appropriate and evaluate response- Consider cultural and social influences on pain and pain management- Manage/alleviate anxiety- Utilize distraction and/or relaxation techniques- Monitor for opioid side effects- Notify MD/LIP if interventions unsuccessful or patient reports new pain- Anticipate increased pain with activity and pre-medicate as appropriate  Outcome: Progressing      Health Maintenance Due   Topic Date Due   • Hepatitis B Vaccine (1 of 3 - 3-dose series) Never done   • COVID-19 Vaccine (1) Never done   • Pneumococcal Vaccine 0-64 (1 - PCV) Never done   • DTaP/Tdap/Td Vaccine (1 - Tdap) Never done   • Influenza Vaccine (1) Never done   • Depression Screening  03/03/2023       Patient is due for topics as listed above but is not proceeding at this time

## 2025-04-05 NOTE — PROGRESS NOTES
[facility]  part of [facility]  Hospitalist Progress Note     Yin Casas Patient Status:  Inpatient    1935  89 year old CSN [id number]   Location 503/503-A Attending Wilner West MD   Hosp Day # 13 PCP Lyndon Talbot MD     Assessment & Plan:   ----------------------------------  Respiratory failure, acute hypoxemic.  Due to pneumonia, mucous plugging, pleural effusion.  Now comfortable on nasal cannula, intermittent morphine  -Start morphine drip  -Continue supplemental oxygen    Advance care planning/goals of care.  Family feels patient is overall comfortable but asking about morphine drip  - Morphine as above  - Haldol, Ativan for agitation  - Robinul, scopolamine for secretions  -Hospice evaluation per family request    Other problems  Pulmonary edema  Aspiration pneumonia  Viral effusions  Atrial fibrillation, persistent  Acute on chronic diastolic CHF  Hypertension  Dyslipidemia  History of stroke  Permanent pacemaker      Supplementary Documentation:   DVT Mechanical Prophylaxis:        DVT Pharmacologic Prophylaxis   Medication   None                Code Status: DNAR/Comfort Care  Bello: Bello catheter in place  Bello Duration (in days): 3  Central line:    OSMIN:     Dietitian Malnutrition Assessment    Evaluation for Malnutrition: Criteria for non-severe malnutrition diagnosis-         chronic illness related to   Wt loss 10% in 6 months., Body fat mild depletion., Muscle mass mild depletion.       RD Malnutrition Care Plan: Liberalized diet., Encouraged small frequent meals with emphasis on high calorie/high protein., Intiated ONS (oral nutritional supplements).    Body Fat/Muscle Mass: Mild depletion body fat., Mild depletion muscle mass. orbital region., triceps region. temple region., clavicle region., scapular region.    Physician Assessment     Patient has a diagnosis of moderate malnutrition      I personally reviewed the available laboratories, imaging  including. I discussed/will discuss the case with consultants. I ordered laboratories and/or radiographic studies. I adjusted medications as detailed above.  Medical decision making high, risk is high.  Discussed with family at bedside    Subjective:   ----------------------------------  Unresponsive, no overnight events      Objective:   Chief Complaint:   Chief Complaint   Patient presents with    Breathing Problem     ----------------------------------  Temp:  [97.2 °F (36.2 °C)-98.3 °F (36.8 °C)] 97.4 °F (36.3 °C)  Pulse:  [] 166  Resp:  [12-16] 15  BP: (132-176)/(68-89) 176/89  SpO2:  [90 %-97 %] 90 %  Gen: Nonresponsive  HEENT: NCAT, neck supple, no carotid bruit.    CV: Tachypneic, S1S2, and intact distal pulses. No gallop, rub, murmur.  Pulm: Effort and breath sounds normal. No distress, wheezes, rales, rhonchi.  Abd: Soft, NTND, BS normal, no mass, no HSM, no rebound/guarding.   Neuro: Not assessed  MS: No joint effusions.  No peripheral edema.  Skin: Skin is warm and dry. No rashes, erythema, diaphoresis.   Psych: Calm    Labs:  Lab Results   Component Value Date    HGB 11.2 (L) 04/04/2025    WBC 13.4 (H) 04/04/2025    .0 04/04/2025     04/04/2025    K 4.0 04/04/2025    K 4.0 04/04/2025    CREATSERUM 0.80 04/04/2025    INR 1.09 09/01/2019    AST 37 (H) 03/28/2025    ALT 36 03/28/2025    TROP <0.045 12/02/2019            scopolamine  1 patch Transdermal Q72H    potassium chloride  20 mEq Intravenous Once    magnesium oxide  400 mg Oral Once    potassium chloride  40 mEq Oral Once       morphINE in sodium chloride 0.9%    ondansetron    morphINE    morphINE    morphINE    haloperidol lactate **OR** haloperidol lactate    LORazepam **OR** LORazepam **OR** LORazepam    atropine    glycopyrrolate    ipratropium-albuterol    albuterol

## 2025-04-05 NOTE — DISCHARGE SUMMARY
[facility]  part of [facility]     DISCHARGE SUMMARY     Yin Casas Patient Status:  Inpatient    1935 MRN [id number]   Location [facility]W Attending No att. providers found   Hosp Day # 13 PCP Lyndon Talbot MD     DATE OF ADMISSION: 3/23/2025  DATE OF DISCHARGE: 2025   DISPOSITION: Hospice    DISCHARGE DIAGNOSES:  Respiratory failure, acute hypoxemic  Advance care planning/goals of care  Pulmonary edema  Aspiration pneumonia  Viral effusions  Atrial fibrillation, persistent  Acute on chronic diastolic CHF  Hypertension  Dyslipidemia  History of stroke  Permanent pacemaker    HISTORY OF PRESENT ILLNESS (COPIED FROM ADMISSION H&P)  This is a 89 year oldfemale who was sent in from nursing facility after noting hypoxia.  Patient tired and sleepy at time of interview.  Limited subjective.  Denies feeling short of breath.  Agitated by O2 and nose.  Further history from daughter at bedside.  Daughter states patient began having a cough around Thursday.  Daughter states patient has been not acting herself since that time.  Daughter states she received a phone call from the nursing facility the morning of admission stating that her mother was found to have low oxygen levels and she was being sent to the ED for further evaluation.  Daughter states patient has been coughing with production of yellowish sputum.  Does not know if she has been having fevers or chills.  Of note, daughter states patient has had long-term difficulties with swallowing.  Daughter states patient does cough with eating at times.  Daughter states patient's been previously evaluated by speech and swallow.     HOSPITAL COURSE:  Patient was admitted, seen by pulmonology, cardiology.  Treated for multifactorial respiratory failure.  Treated with antibiotics for pneumonia.  There is some consideration for thoracentesis though ultimately this was not done.  She was diuresed.  Her respiratory status  remained tenuous, suspect ongoing aspiration.  Required noninvasive ventilation.  Due to lack of progress ultimately family elected for comfort care measures.  Patient will be transferred to inpatient hospice for symptom management.    DISCHARGE MEDICATIONS     Discharge Medications        ASK your doctor about these medications        Instructions Prescription details   acetaminophen 500 MG Tabs  Commonly known as: Tylenol Extra Strength      Take 1 tablet (500 mg total) by mouth every 6 (six) hours as needed for Pain.   Refills: 0     albuterol 108 (90 Base) MCG/ACT Aers  Commonly known as: Ventolin HFA      Inhale 2 puffs into the lungs every 6 (six) hours.   Refills: 0     aspirin 325 MG Tbec      Take 1 tablet (325 mg total) by mouth daily.   Quantity: 90 tablet  Refills: 1     benzonatate 100 MG Caps  Commonly known as: Tessalon      Take 1 capsule (100 mg total) by mouth 3 (three) times daily as needed for cough.   Refills: 0     bethanechol 10 MG Tabs  Commonly known as: Urecholine      Take 1 tablet (10 mg total) by mouth daily.   Quantity: 90 tablet  Refills: 3     bisacodyl 10 MG Supp  Commonly known as: Dulcolax      Place 1 suppository (10 mg total) rectally daily as needed (Constipation).   Refills: 0     carvedilol 3.125 MG Tabs  Commonly known as: Coreg      TAKE 1 TABLET BY MOUTH DAILY hold FOR sbp less THEN 100 OR HR LESS THEN 60   Quantity: 90 tablet  Refills: 3     chlorhexidine gluconate 0.12 % Soln  Commonly known as: Peridex      Use as directed 5-10 mL in the mouth or throat in the morning, at noon, and at bedtime.   Refills: 0     clopidogrel 75 MG Tabs  Commonly known as: Plavix      Take 1 tablet (75 mg total) by mouth daily.   Quantity: 90 tablet  Refills: 1     ezetimibe-simvastatin 10-10 MG Tabs  Commonly known as: Vytorin      Take 1 tablet by mouth nightly.   Quantity: 90 tablet  Refills: 0     FLUoxetine 10 MG Caps  Commonly known as: PROzac      Take 1 capsule (10 mg total) by mouth  daily.   Quantity: 90 capsule  Refills: 3     furosemide 20 MG Tabs  Commonly known as: Lasix      Take 1 tablet (20 mg total) by mouth daily.   Quantity: 90 tablet  Refills: 1     Glucocard Vital Test Strp      1 strip by In Vitro route daily.   Quantity: 100 strip  Refills: 3     lidocaine 5 % Ptch  Commonly known as: Lidoderm      Place 1 patch onto the skin daily as needed (Left Side Ribs).   Refills: 0     loratadine 10 MG Tabs  Commonly known as: Allergy Relief      Take 1 tablet (10 mg total) by mouth daily.   Quantity: 90 tablet  Refills: 3     losartan 25 MG Tabs  Commonly known as: Cozaar      Take 1 tablet (25 mg total) by mouth daily. Hold for blood pressure  less than 110   Quantity: 90 tablet  Refills: 3     Magnesium Oxide -Mg Supplement 400 (240 Mg) MG Tabs      Take 1 tablet (400 mg total) by mouth daily.   Quantity: 90 tablet  Refills: 1     meclizine 25 MG Tabs  Commonly known as: Antivert      Take 1 tablet (25 mg total) by mouth daily as needed.   Refills: 0     OXcarbazepine 150 MG Tabs  Commonly known as: Trileptal      Take 1 tablet (150 mg total) by mouth daily.   Quantity: 90 tablet  Refills: 3     pantoprazole 40 MG Tbec  Commonly known as: Protonix      TAKE 1 TABLET BY MOUTH DAILY   Quantity: 90 tablet  Refills: 3     sennosides 8.6 MG Tabs  Commonly known as: Senokot      Take 1 tablet (8.6 mg total) by mouth as needed (constipation).   Refills: 0              CONSULTANTS  Consultants         Provider   Role Specialty     Teja Nguyen MD      Consulting Physician PULMONARY DISEASES     Chasity Mcdonald MD      Consulting Physician PULMONARY DISEASES     Gagan Evans MD      Consulting Physician Cardiac Electrophysiology            FOLLOW UP:  [facility]  [address] [address]  [phone]  Schedule an appointment as soon as possible for a visit      Lyndon Talbot MD  [address] [place] [address]  [id number] [phone]    Schedule an appointment as soon as possible for a visit in 1 week(s)      The above plan and follow-up instructions were reviewed with the patient and they verbalized understanding and agreement.  They understand to return to the emergency room for any concerning signs or symptoms.  Greater than 30 minutes spent on discharge.  -----------------------    Hospital Discharge Diagnoses:  Respiratory failure    Lace+ Score: 70  59-90 High Risk  29-58 Medium Risk  0-28   Low Risk.    TCM Follow-Up Recommendation:  LACE > 58: High Risk of readmission after discharge from the hospital.  Supplementary Documentation:

## 2025-04-06 NOTE — DISCHARGE SUMMARY
[facility]  part of [facility]     DISCHARGE SUMMARY     Yin Casas Patient Status:  Inpatient    1935 MRN [id number]   Location [facility]5W Attending Wilner eWst MD   Hosp Day # 1 PCP Lyndon Talbot MD     DATE OF ADMISSION: 2025  DATE OF DISCHARGE:  25  DISPOSITION:     DISCHARGE DIAGNOSES:  Respiratory failure, acute hypoxemic  Advance care planning/goals of care  Pulmonary edema  Aspiration pneumonia  Viral effusions  Atrial fibrillation, persistent  Acute on chronic diastolic CHF  Hypertension  Dyslipidemia  History of stroke  Permanent pacemaker    HISTORY OF PRESENT ILLNESS (COPIED FROM ADMISSION H&P)  This is a 89 year oldfemale being admitted into inpatient hospice. This is a 89 year oldfemale who was sent in from nursing facility after noting hypoxia.  Patient tired and sleepy at time of interview.  Limited subjective.  Denies feeling short of breath.  Agitated by O2 and nose.  Further history from daughter at bedside.  Daughter states patient began having a cough around Thursday.  Daughter states patient has been not acting herself since that time.  Daughter states she received a phone call from the nursing facility the morning of admission stating that her mother was found to have low oxygen levels and she was being sent to the ED for further evaluation.  Daughter states patient has been coughing with production of yellowish sputum.  Does not know if she has been having fevers or chills.  Of note, daughter states patient has had long-term difficulties with swallowing.  Daughter states patient does cough with eating at times.  Daughter states patient's been previously evaluated by speech and swallow.      Patient was admitted, seen by pulmonology, cardiology.  Treated for multifactorial respiratory failure.  Treated with antibiotics for pneumonia.  There is some consideration for thoracentesis though ultimately this was not done.  She  was diuresed.  Her respiratory status remained tenuous, suspect ongoing aspiration.  Required noninvasive ventilation.  Due to lack of progress ultimately family elected for comfort care measures.  Patient will be transferred to inpatient hospice for symptom management.    HOSPITAL COURSE:  Patient admitted under hospice care.  She was kept comfortable with the usual comfort care measures.  She  on 2025.  I was not present.

## 2025-04-06 NOTE — H&P
[facility]  part of [facility]  HISTORY AND PHYSICAL       Yin Casas Patient Status:  Inpatient    1935  89 year old CSN [id number]   Location 503/503-A Attending Wilner West MD     PCP Lyndon Talbot MD     ASSESSMENT/PLAN    Respiratory failure, acute hypoxemic  Advance care planning/goals of care  Pulmonary edema  Aspiration pneumonia  Viral effusions  Atrial fibrillation, persistent  Acute on chronic diastolic CHF  Hypertension  Dyslipidemia  History of stroke  Permanent pacemaker    Admit to inpatient hospice care.  Continue morphine drip, as needed Ativan, Haldol.  Continue Robinul, scopolamine.  Nasal cannula for oxygen, avoid BiPAP.  Support and counseling given to the family at the bedside.    DATE OF ADMISSION: 25    CHIEF COMPLAINT: Hospice    HISTORY OF PRESENT ILLNESS  This is a 89 year oldfemale being admitted into inpatient hospice. This is a 89 year oldfemale who was sent in from nursing facility after noting hypoxia.  Patient tired and sleepy at time of interview.  Limited subjective.  Denies feeling short of breath.  Agitated by O2 and nose.  Further history from daughter at bedside.  Daughter states patient began having a cough around Thursday.  Daughter states patient has been not acting herself since that time.  Daughter states she received a phone call from the nursing facility the morning of admission stating that her mother was found to have low oxygen levels and she was being sent to the ED for further evaluation.  Daughter states patient has been coughing with production of yellowish sputum.  Does not know if she has been having fevers or chills.  Of note, daughter states patient has had long-term difficulties with swallowing.  Daughter states patient does cough with eating at times.  Daughter states patient's been previously evaluated by speech and swallow.      Patient was admitted, seen by pulmonology, cardiology.  Treated for  multifactorial respiratory failure.  Treated with antibiotics for pneumonia.  There is some consideration for thoracentesis though ultimately this was not done.  She was diuresed.  Her respiratory status remained tenuous, suspect ongoing aspiration.  Required noninvasive ventilation.  Due to lack of progress ultimately family elected for comfort care measures.  Patient will be transferred to inpatient hospice for symptom management.    PAST MEDICAL HISTORY  Past Medical History:    Acute, but ill-defined, cerebrovascular disease    2019    Anxiety state    Atrial fibrillation (HCC)    Cataract, bilateral    Chronic a-fib (HCC)    Congestive heart disease (HCC)    LV ej fraction of 30%    Conjunctival cyst of left eye    Cup to disc asymmetry    OU    Depression    Diabetes (HCC)    diet alone    Esophageal reflux    Essential hypertension    Hearing impairment    Hiatal hernia    Hyperlipidemia    Meibomian gland dysfunction (MGD), bilateral, both upper and lower lids    Osteoarthritis    TIA (transient ischemic attack)    Unspecified atrial fibrillation (HCC)        PAST SURGICAL HISTORY  No past surgical history on file.    ALLERGIES   Levaquin [levofloxacin]    MEDICATIONS  Current Discharge Medication List        CONTINUE these medications which have NOT CHANGED    Details   lidocaine 5 % External Patch Place 1 patch onto the skin daily as needed (Left Side Ribs).      benzonatate 100 MG Oral Cap Take 1 capsule (100 mg total) by mouth 3 (three) times daily as needed for cough.      chlorhexidine gluconate 0.12 % Mouth/Throat Solution Use as directed 5-10 mL in the mouth or throat in the morning, at noon, and at bedtime.      bisacodyl 10 MG Rectal Suppos Place 1 suppository (10 mg total) rectally daily as needed (Constipation).      ezetimibe-simvastatin 10-10 MG Oral Tab Take 1 tablet by mouth nightly.  Qty: 90 tablet, Refills: 0    Comments: NA      OXcarbazepine 150 MG Oral Tab Take 1 tablet (150 mg total) by  mouth daily.  Qty: 90 tablet, Refills: 3      bethanechol 10 MG Oral Tab Take 1 tablet (10 mg total) by mouth daily.  Qty: 90 tablet, Refills: 3      loratadine (ALLERGY RELIEF) 10 MG Oral Tab Take 1 tablet (10 mg total) by mouth daily.  Qty: 90 tablet, Refills: 3      aspirin 325 MG Oral Tab EC Take 1 tablet (325 mg total) by mouth daily.  Qty: 90 tablet, Refills: 1      clopidogrel 75 MG Oral Tab Take 1 tablet (75 mg total) by mouth daily.  Qty: 90 tablet, Refills: 1      FLUoxetine 10 MG Oral Cap Take 1 capsule (10 mg total) by mouth daily.  Qty: 90 capsule, Refills: 3      furosemide 20 MG Oral Tab Take 1 tablet (20 mg total) by mouth daily.  Qty: 90 tablet, Refills: 1      Magnesium Oxide -Mg Supplement 400 (240 Mg) MG Oral Tab Take 1 tablet (400 mg total) by mouth daily.  Qty: 90 tablet, Refills: 1      PANTOPRAZOLE 40 MG Oral Tab EC TAKE 1 TABLET BY MOUTH DAILY  Qty: 90 tablet, Refills: 3    Comments: This prescription was filled on 5/20/2024. Any refills authorized will be placed on file.      GLUCOCARD VITAL TEST In Vitro Strip 1 strip by In Vitro route daily.  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 Please dispense appropriate supplies as covered by patient's insurance      losartan 25 MG Oral Tab Take 1 tablet (25 mg total) by mouth daily. Hold for blood pressure  less than 110  Qty: 90 tablet, Refills: 3      carvedilol 3.125 MG Oral Tab TAKE 1 TABLET BY MOUTH DAILY hold FOR sbp less THEN 100 OR HR LESS THEN 60  Qty: 90 tablet, Refills: 3    Associated Diagnoses: Essential hypertension      albuterol 108 (90 Base) MCG/ACT Inhalation Aero Soln Inhale 2 puffs into the lungs every 6 (six) hours.      sennosides 8.6 MG Oral Tab Take 1 tablet (8.6 mg total) by mouth as needed (constipation).      Meclizine HCl 25 MG Oral Tab Take 1 tablet (25 mg total) by mouth daily as needed.      acetaminophen 500 MG Oral Tab Take 1 tablet (500 mg total) by mouth every 6 (six) hours as needed  for Pain.             SOCIAL HISTORY  Social History     Socioeconomic History    Marital status:    Tobacco Use    Smoking status: Former     Current packs/day: 0.00     Types: Cigarettes     Quit date: 6/15/1969     Years since quittin.8    Smokeless tobacco: Never   Vaping Use    Vaping status: Never Used   Substance and Sexual Activity    Alcohol use: No     Alcohol/week: 2.0 standard drinks of alcohol     Types: 2 Glasses of wine per week    Drug use: No   Other Topics Concern    Caffeine Concern Yes     Comment: Coffee 2 cups daily    Exercise No       FAMILY HISTORY  Family History   Problem Relation Age of Onset    Diabetes Mother     Macular degeneration Sister     Diabetes Sister     Glaucoma Neg        REVIEW OF SYSTEMS  Not possible    PHYSICAL EXAM  Vital signs:  /62 (BP Location: Right arm)   Pulse 79   Temp 97.1 °F (36.2 °C) (Axillary)   Resp (!) 6   SpO2 95%     Gen: Nonresponsive  HEENT: NCAT, neck supple, no carotid bruit.    CV: Tachypneic, S1S2, and intact distal pulses. No gallop, rub, murmur.  Pulm: Effort and breath sounds normal. No distress, wheezes, rales, rhonchi.  Abd: Soft, NTND, BS normal, no mass, no HSM, no rebound/guarding.   Neuro:  Not assessed  MS: No joint effusions.  No peripheral edema.  Skin: Skin is warm and dry. No rashes, erythema, diaphoresis.   Psych:   Calm    Data:  Recent Labs   Lab 25   RBC 3.56* 3.55* 3.65*   HGB 10.7* 10.9* 11.2*   HCT 35.3 34.9* 35.2   MCV 99.2 98.3 96.4   MCH 30.1 30.7 30.7   MCHC 30.3* 31.2 31.8   RDW 13.1 12.9 13.0   NEPRELIM 10.21* 11.33* 12.04*   WBC 12.8* 14.2* 13.4*   .0 212.0 237.0     Recent Labs   Lab 25   * 142* 200*   BUN 17 17 25*   CREATSERUM 0.71 0.68 0.80   CA 8.3* 8.6* 8.7    143 145   K 3.3*  3.3* 3.6  3.6 4.0  4.0    104 103   CO2 30.0 32.0 31.0

## 2025-04-06 NOTE — PLAN OF CARE
Problem: Patient/Family Goals  Goal: Patient/Family Long Term Goal  Description: Patient's Long Term Goal: Interventions:-  See additional Care Plan goals for specific interventions  Outcome: Progressing  Goal: Patient/Family Short Term Goal  Description: Patient's Short Term Goal: Interventions: - - See additional Care Plan goals for specific interventions  Outcome: Progressing     Problem: PAIN - ADULT  Goal: Verbalizes/displays adequate comfort level or patient's stated pain goal  Description: INTERVENTIONS:- Encourage pt to monitor pain and request assistance- Assess pain using appropriate pain scale- Administer analgesics based on type and severity of pain and evaluate response- Implement non-pharmacological measures as appropriate and evaluate response- Consider cultural and social influences on pain and pain management- Manage/alleviate anxiety- Utilize distraction and/or relaxation techniques- Monitor for opioid side effects- Notify MD/LIP if interventions unsuccessful or patient reports new pain- Anticipate increased pain with activity and pre-medicate as appropriate  Outcome: Progressing

## 2025-04-06 NOTE — HOSPICE RN NOTE
[facility] RN notified by Rn Iliana Rodarte of patient's natural death. Reported TOD 1302. Hospice RN visited with family at bedside to offer condolences and bereavement resources/services. Family stated understanding. [facility] notified via telephone @ # [phone] spoke with Ramya and provided all necessary information. [facility] team is available for family support and further resource education.    Purnima Mcclure RN, BSN-CHPN  Transitional Nurse Liaison  [facility]  [phone] (After-hours)

## (undated) DEVICE — ENDOSCOPY PACK UPPER: Brand: MEDLINE INDUSTRIES, INC.

## (undated) DEVICE — Device: Brand: DEFENDO AIR/WATER/SUCTION AND BIOPSY VALVE

## (undated) DEVICE — FORCEP RADIAL JAW 4

## (undated) NOTE — IP AVS SNAPSHOT
Patient Demographics     Address  [address]  [place] [address] Phone  [phone] (Home)  [phone] (Mobile) *Preferred*      Emergency Contact(s)     Name Relation Home Work [place] Daughter [phone] TAKE 1 TABLET BY MOUTH DAILY hold FOR sbp less THAN 100/60   Douglas Eid MD         cephALEXin 500 MG Caps  Commonly known as:  Carisa Read  Next dose due:   Tonight at bedtime      Take 1 capsule (500 mg total) by mouth 3 (three) times daily for 3 day Take 1 tablet (150 mg total) by mouth once daily. Brooklynn Coburn MD         Pantoprazole Sodium 40 MG Tbec  Commonly known as:  PROTONIX  Next dose due:   Tomorrow morning      TAKE 1 TABLET BY MOUTH DAILY   Brooklynn Coburn MD         Senna 8.6 MG Tabs [id number] carvedilol (COREG) tab 3.125 mg 04/02/19 1103 Given      [id number] furosemide (LASIX) tab 10 mg 04/02/19 1103 Given      [id number] magnesium oxide (MAG-OX) tab 400 mg 04/02/19 1103 Given      [id number] ondansetron HCl (ZOFRAN) injection 4 mg 04/ Nitrofurantoin <=16  Sensitive    Piperacillin + Tazobactam <=4  Sensitive    Trimethoprim/Sulfa <=20  Sensitive                        H&P - H&P Note      H&P filed by Funmi Carmichael MD at 3/29/2019  8:10 AM / Draft: Not Electronically Signed  Napoleon dislocation. There was subcutaneous soft tissue stranding and swelling in the subcutaneous soft tissues lateral to the left hip. Small amount of soft tissue nodularity noted, most suggestive of small subcutaneous hematoma.   At least moderate degenerative tablets, the patient takes a half a tablet daily; lidocaine patch daily as needed for left rib cage pain; loratadine 10 mg every day; losartan 25 mg daily; magnesium oxide 400 mg daily; meclizine 12.5 mg daily; oxcarbazepine 150 mg daily; Protonix 40 mg da left alone to rest.  I did try putting her hearing aids in to see if that would help, but it did not seem to help very much with her compliance, but there did not appear to be a focal neuro deficit.    PSYCHIATRIC:  She was not agitated, but was somewhat ir persists significantly, consider nerve block. 7.   We will also check a TSH given her mental status change to rule out subclinical hypothyroidism. 8.   Hyperlipidemia. Continue Vytorin. 9.   Chronic atrial fibrillation.   Given the patient's fall, we moreira rehab.She will be able to tolerate 3h/therapy, 5 days a week, and prior level of function indicates she is likely able to achieve PLOF in 12-14 days. Rehab potential is good.      Thank you    Aster Leong MD[SK.1]    Electronically signed by Sergio Earl, [facility] was noted to have a urinary tract infection at the facility and was placed on Bactrim over the last couple of days. Her mental status has been worsening over that time period as well.   She does have dementia, but is more confused than typical.  She came t subacute stroke. Unclear time of onset. Was not present in late 2018. This may be contributing to her gait instability. Patient recommended for acute inpatient rehab. We will transferred to [facility] for this.     She has a history of ESBL organisms a Take 1 capsule (500 mg total) by mouth 3 (three) times daily for 3 days.    Stop taking on:  4/5/2019  Quantity:  9 capsule  Refills:  0        CONTINUE taking these medications      Instructions Prescription details   acetaminophen 500 MG Tabs  Commonly k Commonly known as:  ANTIVERT      Take 12.5 mg by mouth daily as needed. Refills:  0     MEDI-PATCH-LIDOCAINE 0.5-0.035-5-20 % Ptch  Generic drug:  Lido-Capsaicin-Men-Methyl Sal      Apply topically daily as needed (left Rib cage).    Refills:  0     OXca WW.1 - Cm Mueller MD on 4/2/2019 12:52 PM                     Imaging Results (HF patients)    Chest X-Ray Results (HF patients only)    No exam resulted this encounter.       2D Echocardiogram Results (HF patients only)    No exam resulted this encount in the chair with all needs within reach. Handed pt off to RN in the room. The patient's Approx Degree of Impairment: 61.29% has been calculated based on documentation in the [facility] '6 clicks' Inpatient Basic Mobility Short Form.   Research supports th Approx Degree of Impairment: 61.29%   Standardized Score (AM-PAC Scale): 38.1   CMS Modifier (G-Code): CL    FUNCTIONAL ABILITY STATUS  Gait Assessment   Gait Assistance: Minimum assistance  Distance (ft): steps to the bs chair  Assistive Device: Rolling w Closed fracture of multiple ribs of left side with routine healing, subsequent encounter[ST.2]      OCCUPATIONAL THERAPY ASSESSMENT     Chart review complete, RN approved patient participation in OT tx session.  Pt received supine>sit at EOB with min A, m Management Techniques: Relaxation;Repositioning[ST. 2]     ACTIVITY TOLERANCE    O2 SATURATIONS    ACTIVITIES OF DAILY LIVING ASSESSMENT  AM-PAC ‘6-Clicks’ Inpatient Daily Activity Short Form  How much help from another person does the patient currently nee Attribution Velasco    ST. 1 - Rush Patterson, OT on 4/1/2019  3:00 PM  ST.2 - Cristh Patterson, OT on 4/1/2019  3:11 PM                     Video Swallow Study Notes     No notes of this type exist for this encounter.       SLP Notes     No notes of this typ

## (undated) NOTE — MR AVS SNAPSHOT
[facility] SPECIAL[facility] [place] [address] [address] [phone]               Thank you for choosing us for your health care visit with Danuta Delgado MD.  We are glad to serve you and happy to provide you with this summary of Take 100 mg by mouth 2 (two) times daily. Commonly known as:  COLACE           FLUoxetine HCl 10 MG Caps   Take 10 mg by mouth daily.    Commonly known as:  PROZAC           GlipiZIDE ER 2.5 MG Tb24   Take 2.5 mg by mouth daily with breakfast.   Commonly Murphy.tn

## (undated) NOTE — LETTER
2023              [facility] [address]         To Whom It May Concern,    Patient is currently under my medical care. She is being treated for Klebsiella Oxtoca ESBL per Urine culture results provided by [facility] with Bactrim /160m tablet twice a day for 7 days. Orders for contact precautions is advised.      Sincerely,      Marcellus Cisse MD  [address]  [phone]            Document electronically generated by:  Willie Macias RN

## (undated) NOTE — ED AVS SNAPSHOT
[facility] Emergency Department    [address]     [address] [place] [address]    Phone:  [phone]    Fax:  [phone]           Eleni Burciaga   MRN: [id number]    Department:  [facility] Emergency Department   Date of Visit: Quantity:  21 tablet   Commonly known as:  VALTREX   Take 1 tablet (1,000 mg total) by mouth 3 (three) times daily. Ask about: Should I take this medication?             Where to Get Your Medications      You can get these medications from any pharmacy discretion of that Physician.   If you need additional assistance selecting a physician, you may call the [employer] Physician Referral and Class Registration line at [phone] or find a doctor online by visiting [email or web address].    IF THE Additional Information       We are concerned for your overall well being:    - If you are a smoker or have smoked in the last 12 months, we encourage you to explore options for quitting.     - If you have concerns related to behavioral health issues or th

## (undated) NOTE — ED AVS SNAPSHOT
Juanito Fraga   MRN: [id number]    Department:  [facility] Emergency Department   Date of Visit:  8/23/2019           Disclosure     Insurance plans vary and the physician(s) referred by the ER may not be covered by your plan.  Please cont within the next three months to obtain basic health screening including reassessment of your blood pressure.     IF THERE IS ANY CHANGE OR WORSENING OF YOUR CONDITION, CALL YOUR PRIMARY CARE PHYSICIAN AT ONCE OR RETURN IMMEDIATELY TO THE EMERGENCY DEPARTMEN

## (undated) NOTE — ED AVS SNAPSHOT
Raquel Cazares   MRN: [id number]    Department:  [facility] Emergency Department   Date of Visit:  3/21/2018           Disclosure     Insurance plans vary and the physician(s) referred by the ER may not be covered by your plan.  Please cont within the next three months to obtain basic health screening including reassessment of your blood pressure.     IF THERE IS ANY CHANGE OR WORSENING OF YOUR CONDITION, CALL YOUR PRIMARY CARE PHYSICIAN AT ONCE OR RETURN IMMEDIATELY TO THE EMERGENCY DEPARTMEN

## (undated) NOTE — LETTER
11/29/2019              [address] [address]         To [facility]      This is to certify that Ms Kayla Epps is our patient and under my care.  Please lower the dose of her bethanec

## (undated) NOTE — Clinical Note
4/17/2017              Bea       To whom it concern :    Check glucose premeal once a week Call MD ir glucose is 180 or more.               Sincerely,    MD Ino Birch ,

## (undated) NOTE — LETTER
[address], [place]  Authorization for Invasive Procedures  1.  I hereby authorize Dr. Vicenta Capone , my physician and whomever may be designated as the doctor's assistant, to perform the following operation and/or procedure:  *** on Yazmin Tena performed for the purposes of advancing medicine, science, and/or education, provided my identity is not revealed. If the procedure has been videotaped, the physician/surgeon will obtain the original videotape.  The hospital will not be responsible for stor My signature below affirms that prior to the time of the procedure, I have explained to the patient and/or her legal representative, the risks and benefits involved in the proposed treatment and any reasonable alternative to the proposed treatment.  I have

## (undated) NOTE — ED AVS SNAPSHOT
Lars Ortiz   MRN: [id number]    Department:  [facility] Emergency Department   Date of Visit:  4/26/2019           Disclosure     Insurance plans vary and the physician(s) referred by the ER may not be covered by your plan.  Please cont within the next three months to obtain basic health screening including reassessment of your blood pressure.     IF THERE IS ANY CHANGE OR WORSENING OF YOUR CONDITION, CALL YOUR PRIMARY CARE PHYSICIAN AT ONCE OR RETURN IMMEDIATELY TO THE EMERGENCY DEPARTMEN

## (undated) NOTE — LETTER
04/18/19        [facility] [address]      Dear Francesco Bloom,    [address] [facility] records indicate that you have outstanding lab work and or testing that was ordered for you and has not yet been completed:  Orders Placed This

## (undated) NOTE — ED AVS SNAPSHOT
[facility] Emergency Department    [place] [address]    Phone:  [phone]    Fax:  [phone]           Alesia Sahu   MRN: [id number]    Department:  [facility] Emergency Department   Date of Visit: and Class Registration line at [phone] or find a doctor online by visiting [email or web address].    IF THERE IS ANY CHANGE OR WORSENING OF YOUR CONDITION, CALL YOUR PRIMARY CARE PHYSICIAN AT ONCE OR RETURN IMMEDIATELY TO [address] [place] [address].     If

## (undated) NOTE — IP AVS SNAPSHOT
Patient Demographics     Address Phone E-mail Address    [address] [place] [phone] [facility])  [phone] (Mobile) [email or web address]      Emergency Contact(s)     Name Relation Home Work [place] Daughter [phone]  832-1 Take 1 tablet (0.25 mg total) by mouth every 8 (eight) hours as needed for Anxiety. Choco Peña                           ANTIVERT 25 MG Tabs   Generic drug:  Meclizine HCl   Next dose due:  Any time, as Needed        Take by mouth as needed. Take 1 capsule (500 mg total) by mouth 2 (two) times daily.     Choco Peña                              Losartan Potassium 25 MG Tabs   Last time this was given:  25 mg on 3/7/2017  8:51 AM   Commonly known as:  COZAAR   Next dose due:  3/8/17 [facility] [id number] Losartan Potassium (COZAAR) tab 25 mg 03/06/17 1237 Given      [id number] Losartan Potassium (COZAAR) tab 25 mg 03/07/17 0851 Given      [id number] Pantoprazole Sodium (PROTONIX) EC tab 40 mg 03/06/17 1237 Given      [id number] Pantoprazole Sodium Most Recent Value    Patient Weight 61.372 kg (135 lb 4.8 oz)         Lab Results Last 24 Hours (03/07/17 - 03/07/17)      POTASSIUM [[id number]] (Normal)  Resulted: 03/07/17 0753, Result status: Final result    Ordering provider: Twila Ackerman MD Order Status:  Completed Lab Status:  Preliminary result Updated:  03/06/17 1200    Specimen Information:  Blood from Blood,peripheral      Blood Culture Result No Growth 4 Days     Blood Culture FREQ X 2 [[id number]] Collected:  03/02/17 1124    Order [employer] Specimen Information:  Other from Nares     Narrative: The following orders were created for panel order ED/MRSA SCREEN BY PCR-CC.   Procedure                               Abnormality         Status                     --------- • Sleep apnea    • Congestive heart disease (HCC)    • Hearing impairment    • Visual impairment    • Anxiety state    • Depression      History reviewed. No pertinent past surgical history.   Family History   Problem Relation Age of Onset   • Diabetes Moth Lab Results  Component Value Date   WBC 7.8 03/06/2017   HGB 12.8 03/06/2017   HCT 37.8 03/06/2017    03/06/2017   CREATSERUM 0.62 03/06/2017   BUN 11 03/06/2017   * 03/06/2017   K 4.0 03/06/2017   CL 98 03/06/2017   CO2 26 03/06/2017   GLU 10 Family relates pt able to finish meals (no dysphagia). ? H/o vomiting. History of hiatal hernia. On ppi. ? Oropharyngeal dysphagia vs UGI pathology.   PLAN: 1.) Video swallow study             2.) EGD pending video swallow study/INR/clinical course          aspirin EC tab 81 mg 81 mg Oral Daily   docusate sodium (COLACE) cap 100 mg 100 mg Oral BID   Ezetimibe-Simvastatin (VYTORIN) 10-10 MG tablet 1 tablet 1 tablet Oral Daily   FLUoxetine HCl (PROZAC) cap 10 mg 10 mg Oral Daily   GlipiZIDE ER (GLUCOTROL) 24 hr 2.) would recommend CT-abdomen. 3.) surgical consultation. D/w son and pt in detail. All questions were answered. Thank you. Chrissy Sosa MD.       [facility].   ___________________________________________________________ DISCHARGE DX: Acute systolic heart failure EF 30%  - Started on IV lasix bid - changed to daily IV and now changed to oral lasix MWF  - on aldactone  - repeat echo showed decreased EF  - cards consulted  - monitor I/O and daily weights    - s/p stress Last time this was given:  650 mg on 2/28/2017 11:43 AM   Commonly known as:  TYLENOL        Take 2 tablets (650 mg total) by mouth every 6 (six) hours as needed.     Refills:  0       alprazolam 0.25 MG Tabs   Last time this was given:  0.25 mg on 3/3/2017 Quantity:  60 capsule   Refills:  0       FLUoxetine HCl 10 MG Caps   Last time this was given:  10 mg on 3/7/2017  8:51 AM   Commonly known as:  PROZAC        Take 10 mg by mouth daily.     Refills:  0       GlipiZIDE ER 2.5 MG Tb24   Last time this was g RHONA ESCOBAR  3/7/2017  10:57 AM               Electronically signed by Roosvelt Habermann, MD on 3/7/2017 11:01 AM            Imaging Results (HF patients)     Chest X-Ray Results (HF patients only)    No exam resulted this encounter.       2D Echocardi a left pleural effusion. ------------------------------------------------------------------------------- Study data:  Comparison was made to the study of December 09, 2016. Study status:  Elective. Procedure:  Transthoracic echocardiography.  Image qualit Mild-moderate regurgitation. Pulmonary artery: The main pulmonary artery was normal-sized. Systolic pressure was mildly to moderately increased. Right atrium:  The atrium was normal in size. Pericardium:  A trivial pericardial effusion was identified.  Pl 302 cm/s   ------- Peak RV-RA gradient, S                            36 mm Hg  ------- Maximal regurgitant velocity                     302 cm/s   ------- Systemic veins Estimated CVP                                      8 mm Hg  ------- Right ventricle RV Background/Objective Information:    Problem List  Principal Problem:    Heart failure, unspecified heart failure chronicity, unspecified heart failure type (HCC)  Active Problems:    Heart failure (HCC)    CKD (chronic kidney disease) stage 3, GFR 30-59 m Trigger of the pharyngeal phase of the swallow was observed at the level of the valleculae. Epiglottic inversion was functional in motion. Hyolaryngeal elevation to palpation appeared functional in strength and ROM.  No penetration, no aspiration was observ

## (undated) NOTE — LETTER
2017    [place] [address]  [facility] [address]      To Whom It May Concern:    Please discontinue the Gabapentin medication for Darlene Mckinney ( 35) as per Dr. Minal Arnold order.  Please feel free to contact the office f

## (undated) NOTE — ED AVS SNAPSHOT
Warren Dollr   MRN: [id number]    Department:  [facility] Emergency Department   Date of Visit:  12/2/2019           Disclosure     Insurance plans vary and the physician(s) referred by the ER may not be covered by your plan.  Please cont within the next three months to obtain basic health screening including reassessment of your blood pressure.     IF THERE IS ANY CHANGE OR WORSENING OF YOUR CONDITION, CALL YOUR PRIMARY CARE PHYSICIAN AT ONCE OR RETURN IMMEDIATELY TO THE EMERGENCY DEPARTMEN

## (undated) NOTE — MR AVS SNAPSHOT
[place] [address]  [phone]               Thank you for choosing us for your health care visit with Adiel Tejeda MD.  We are glad to serve you and happy to provide you with this summar Cough    -  Primary      Instructions and Information about Your Health     None      Allergies as of Apr 07, 2017     No Known Allergies                Today's Vital Signs     BP Pulse Temp Height Weight BMI    107/74 mmHg 89 98.2 °F (36.8 °C) 5' 4\" (1. Generic drug:  cephALEXin   Take 1 capsule (500 mg total) by mouth 2 (two) times daily. loratadine 10 MG Tabs   Take 1 tablet by mouth daily.    Commonly known as:  CLARITIN           Losartan Potassium 25 MG Tabs   Take 1 tablet (25 mg total) by TOP FALL PREVENTION TIPS    INSIDE YOUR HOME   KITCHEN:  ? Use non skid mats only. ? Clean up spills as soon as they happen. ? Keep objects that you use often within easy reach.   BATHROOM:  ? Install grab bars on the bathroom walls beside the tub, larry

## (undated) NOTE — IP AVS SNAPSHOT
[facility]            (For Outpatient Use Only) Initial Admit Date: 2022   Inpt/Obs Admit Date: Inpt: 22 / Obs: N/A   Discharge Date:    Mica Perez:  [de-identified]   MRN: [de-identified]   CSN: [id number]   CEID: [id number]        ENCOUNTER  Patient Class: Inpatient Admitting Provider: Wilbur Easley MD Unit: [facility] Service: Medical Attending Provider: Jojo Silva MD   Bed: 516-A   Visit Type:   Referring Physician: No ref. provider found Billing Flag:    Admit Diagnosis: Weakness generalized [R53.1]      PATIENT  Legal Name:   Latonia Saleh   Legal Sex: Female  Gender ID:              [address] [facility] Name:    PCP:  Dileep Collins MD Home: [phone]   Address:  [address] [facility] [address] : 1935 (87 yrs) Mobile: [phone]         City/State/Zip: [address] [place] [address] Marital:  Language: Joann Juniper:  SSN4: [id number] Orthodoxy: [address]     Race: White Ethnicity: Non  Or [address] [place] [address]   Name Relationship Legal Guardian? Home Phone Work Phone Mobile Phone   1Yessenia Veloz  2.  Ilya Mercado Daughter  Son    [phone]     GUARANTOR  Guarantor: Latonia Saleh : 1935 Home Phone: [phone]   Address: [address] [facility] [address]  Sex: Female Work Phone:    City/State/Zip: [address] [place] [address]   Rel. to Patient: Self Guarantor ID: [id number]   GUARANTOR EMPLOYER   Employer:  Status: RETIRED     COVERAGE  PRIMARY INSURANCE   Payor: MEDICARE Plan: MEDICARE PART A&B   Group Number:  Insurance Type: INDEMNITY   Subscriber Name: Clark Murphy : 1935   Subscriber ID: [id number] Pt Rel to Subscriber: Self   SECONDARY INSURANCE   Payor: COMMERCIAL Plan: COMMERCIAL   Group Number: [id number] Insurance Type: [id number] Name: Clark Murphy : 1935   Subscriber ID: [id number] Pt Rel to Subscriber: SELF TERTIARY INSURANCE   Payor:  Plan:    Group Number:  Insurance Type:    Subscriber Name:  Subscriber :    Subscriber ID:  Pt Rel to Subscriber:    Hospital Account Financial Class: Medicare    2022

## (undated) NOTE — LETTER
08/24/18        [facility] [address]      Dear Jerson Gipson,    [facility] records indicate that you have outstanding lab work and or testing that was ordered for you and has not yet been completed:          Hemoglobin

## (undated) NOTE — IP AVS SNAPSHOT
[facility]            (For Outpatient Use Only) Initial Admit Date: 3/28/2019   Inpt/Obs Admit Date: Inpt: 3/28/19 / Obs: N/A   Discharge Date:    Janina Matias:  [de-identified]   MRN: [de-identified]   CSN: [id number]        ENCOUNTER  Patient Class Group Number:  Insurance Type:    Subscriber Name:  Subscriber :    Subscriber ID:  Pt Rel to Subscriber:    Hospital Account Financial Class: Medicare    2019

## (undated) NOTE — LETTER
[address] [facility] [address], [address] [facility]  [phone]        Dear Yandel Hunter MD,      I had the pleasure of seeing your patient, Sofía Weinberg on 9/7/2017.      Below flavia and was told that she had \"small strokes\" in the past.      Current Outpatient Prescriptions:  GLUCOCARD VITAL TEST In Vitro Strip Use as directed Disp:  Rfl: 99   FLUOXETINE HCL 10 MG Oral Cap TAKE 1 CAPSULE BY MOUTH DAILY Disp: 90 capsule Rfl: 0   OXCA • Chronic a-fib (HCC)    • Congestive heart disease ([facility] [address]    • Conjunctival cyst of left eye    • Cup to disc asymmetry 2006    OU   • Depression    • Diabetes ([facility] [address]     diet alone   • Essential hypertension    • Hearing impairment    • Hiatal hernia    • kg/m²  . Blood pressure was 100/60 standing. General appearance: In no distress  CV: No  Evidence of Carotid Bruits. Rhythm is irregular. Respiratory: Lungs clear  Abdomen:  No tenderness. Skin: No rash or lesions.    Extremities: No edema, no erythema be obtained, to see if she is having any acute ischemic changes, since her Coumadin has been held. She does have mild orthostatic change in her blood pressure, and some of her weakness may be related to low blood pressure.   It may be worth considering tia

## (undated) NOTE — LETTER
5/24/2019              [address] [address]         Dear Honey Yanez,    [facility] records indicate that the tests ordered for you by Risa Reynolds MD  have not been done.   If you have, in fact, al

## (undated) NOTE — MR AVS SNAPSHOT
[place] [address] [place] [address]  [phone]               Thank you for choosing us for your health care visit with Shannan Cabrera MD.  We are glad to serve you and happy to provide you with this summar office. At that time, you will be provided with any authorization numbers or be assured that none are required. You can then schedule your appointment. Failure to obtain required authorization numbers can create reimbursement difficulties for you.         e Commonly known as:  LASIX           gabapentin 100 MG Caps   Take 1 capsule (100 mg total) by mouth 3 (three) times daily.    Commonly known as:  NEURONTIN           GlipiZIDE ER 2.5 MG Tb24   TAKE 1 TABLET BY MOUTH DAILY   Commonly known as:  Chase Verma If you have questions, you can call [phone] to talk to our [facility] Staff. Remember, [email or web address] is NOT to be used for urgent needs. For medical emergencies, dial 911.            Visit [facility] online at  [email or web address]

## (undated) NOTE — ED AVS SNAPSHOT
[facility] AND [facility] in [facility] [place] [address] [address]    Phone:  [phone]    Fax:  [phone]           Bairon Petersen   MRN: [id number]    Department:  [facility] [place]   Date of Visit may not be covered by your plan. It is possible that the physician may not participate in your health insurance plan. This may result in a lower benefit level being available to you or other limited reimbursement.   The physician may seek payment directly If you have been prescribed any medication(s), please fill your prescription right away and begin taking the medication(s) as directed.   If you believe that any of the medications or instructions on this list is different from what your Primary Care doctor harming yourself, contact [facility] at [phone]. - If you don’t have insurance, Fatemeh Grady has partnered with Patient [facility] [address] to help you get signed up for insurance coverage.   Patient [place]

## (undated) NOTE — IP AVS SNAPSHOT
[facility]            (For Outpatient Use Only) Initial Admit Date: 8/29/2019   Inpt/Obs Admit Date: Inpt: 8/29/19 / Obs: N/A   Discharge Date:    Tavia Cheng:  [de-identified]   MRN: [de-identified]   CSN: [id number]   CEID: [id number]        [facility] Payor:  Plan:    Group Number:  Insurance Type:    Subscriber Name:  Subscriber :    Subscriber ID:  Pt Rel to Subscriber:    Hospital Account Financial Class: Medicare    2019

## (undated) NOTE — LETTER
2022              [address] [address]         [facility],    Please order a swallow evaluation for Isis LEE: 1935. Please call the office if you have any questions.     Sincerely,    Pedro Pablo Leroy MD  [address] [address]  [address] [facility]  [phone]

## (undated) NOTE — LETTER
[facility]  Administration of Anesthesia  1.    Randee Buckley, or _________________________________ acting on his/her behalf, (Patient) (Dependent/Representative) request to receive anesthesia for my pending procedure/operation/nina pressure, difficulty urinating, slowing of the baby's heart rate and headache. Rare risks include infections, high spinal         block, spinal bleeding, seizure, cardiac arrest and death.   7.   AWARENESS: I understand that it is possible (but unlike ________________________________________________  (Date) (Time)                                                                                                  (Responsible person in case of minor/ unconscious pt) /Relationship    My signature below affir